# Patient Record
Sex: FEMALE | Race: WHITE | NOT HISPANIC OR LATINO | Employment: OTHER | URBAN - METROPOLITAN AREA
[De-identification: names, ages, dates, MRNs, and addresses within clinical notes are randomized per-mention and may not be internally consistent; named-entity substitution may affect disease eponyms.]

---

## 2017-01-03 ENCOUNTER — ONCOLOGY VISIT (OUTPATIENT)
Dept: ONCOLOGY | Facility: CLINIC | Age: 72
End: 2017-01-03
Payer: COMMERCIAL

## 2017-01-03 DIAGNOSIS — C78.6 PERITONEAL CARCINOMATOSIS (H): Primary | ICD-10-CM

## 2017-01-03 PROCEDURE — 96523 IRRIG DRUG DELIVERY DEVICE: CPT

## 2017-01-03 RX ORDER — HEPARIN SODIUM (PORCINE) LOCK FLUSH IV SOLN 100 UNIT/ML 100 UNIT/ML
5 SOLUTION INTRAVENOUS
Status: DISCONTINUED | OUTPATIENT
Start: 2017-01-03 | End: 2017-01-03 | Stop reason: HOSPADM

## 2017-01-03 RX ADMIN — HEPARIN SODIUM (PORCINE) LOCK FLUSH IV SOLN 100 UNIT/ML 5 ML: 100 SOLUTION at 16:20

## 2017-01-06 ENCOUNTER — OFFICE VISIT (OUTPATIENT)
Dept: URGENT CARE | Facility: URGENT CARE | Age: 72
End: 2017-01-06
Payer: COMMERCIAL

## 2017-01-06 VITALS
SYSTOLIC BLOOD PRESSURE: 135 MMHG | WEIGHT: 124.4 LBS | OXYGEN SATURATION: 94 % | BODY MASS INDEX: 20.09 KG/M2 | DIASTOLIC BLOOD PRESSURE: 75 MMHG | HEART RATE: 94 BPM | TEMPERATURE: 97.6 F

## 2017-01-06 DIAGNOSIS — J30.81 NON-SEASONAL ALLERGIC RHINITIS DUE TO ANIMAL HAIR AND DANDER: Primary | ICD-10-CM

## 2017-01-06 PROCEDURE — 99213 OFFICE O/P EST LOW 20 MIN: CPT | Performed by: INTERNAL MEDICINE

## 2017-01-06 RX ORDER — FLUTICASONE PROPIONATE 50 MCG
2 SPRAY, SUSPENSION (ML) NASAL DAILY
Qty: 1 BOTTLE | Refills: 1 | Status: SHIPPED | OUTPATIENT
Start: 2017-01-06

## 2017-01-06 NOTE — PROGRESS NOTES
SUBJECTIVE:  Iris Carrion is an 71 year old female who presents for allergies.  Generally uses zyrtec and was doing well with it but not helping now.  Recently got a kitten and this past week has runny nose, watery eyes, and cough. Eyes itchy as well.  Tried using claritin and benadryl but didn't help.  No fevers, chills or sweats.  No n/v/d.  No recent travel.  No known exposures.         has a past medical history of COPD (chronic obstructive pulmonary disease) (H); GERD (gastroesophageal reflux disease); Osteoporosis; Cervical high risk HPV (human papillomavirus) test positive (10/31/12); colposcopy with cervical biopsy (11/2012); Hypertension (2013); Uncomplicated asthma (1980); Peritoneal carcinomatosis (H) (8/24/2015); and Pneumonia.  ALLERGIES:  Levaquin    Current Outpatient Prescriptions   Medication     ipratropium - albuterol 0.5 mg/2.5 mg/3 mL (DUONEB) 0.5-2.5 (3) MG/3ML neb solution     potassium chloride SA (POTASSIUM CHLORIDE) 20 MEQ tablet     omeprazole (PRILOSEC) 40 MG capsule     doxycycline (VIBRAMYCIN) 100 MG capsule     tiotropium (SPIRIVA HANDIHALER) 18 MCG inhalation capsule     budesonide-formoterol (SYMBICORT) 160-4.5 MCG/ACT inhaler     albuterol (PROAIR HFA, PROVENTIL HFA, VENTOLIN HFA) 108 (90 BASE) MCG/ACT inhaler     chlorthalidone (HYGROTON) 25 MG tablet     LORazepam (ATIVAN) 0.5 MG tablet     prochlorperazine (COMPAZINE) 5 MG tablet     ibuprofen (ADVIL,MOTRIN) 600 MG tablet     cetirizine (ZYRTEC) 10 MG tablet     Cyclobenzaprine HCl (FLEXERIL PO)     MAGNESIUM OXIDE PO     calcium polycarbophil (FIBERCON) 625 MG tablet     ondansetron (ZOFRAN) 4 MG tablet     Zinc Sulfate (ZINC 15 PO)     Cholecalciferol (VITAMIN D-3) 5000 UNITS TABS     Calcium Carbonate (CALCIUM 500 PO)     BIOTIN PO     No current facility-administered medications for this visit.         ROS:  ROS is done and is negative for general/constitutional, eye, ENT, Respiratory, cardiovascular, GI, , Skin,  musculoskeletal except as noted elsewhere.      OBJECTIVE:  /75 mmHg  Pulse 94  Temp(Src) 97.6  F (36.4  C) (Oral)  Wt 124 lb 6.4 oz (56.427 kg)  SpO2 94%  GENERAL APPEARANCE: Alert, in no acute distress  EYES: normal  EARS: External ears normal. Canals clear. TM's normal.  NOSE: mild mucosal edema with mild clear rhinorrhea  OROPHARYNX:mild erythema, no tonsillar hypertrophy and no exudates present  NECK:No adenopathy,masses or thyromegaly  RESP: normal and clear to auscultation  CV:regular rate and rhythm and no murmurs, clicks, or gallops  ABDOMEN: Abdomen soft, non-tender. BS normal. No masses, organomegaly  SKIN: no ulcers, lesions or rash  MUSCULOSKELETAL:Musculoskeletal normal      RECENT LAB RESULTS  .    ASSESSMENT/PLAN:    ASSESSMENT / PLAN:  (J30.81) Non-seasonal allergic rhinitis due to animal hair and dander  (primary encounter diagnosis)  Comment: pt's sxs seem most c/w allergies, likely worsened by introduction of a kitten into the home.   Plan: fluticasone (FLONASE) 50 MCG/ACT spray        Reviewed medication instructions and side effects. Follow up if experiences side effects. Pt to use the albuterol inhaler she has at home for resp sxs.  Continue to take either zyrtec, claritin, or allergra daily.  If sxs not improve over next 2-3 weeks with the addition of flonase, f/u with PCP and consider trial of singulair. Discussed with her that the best thing would be to remove the cat from the home, but she is not willing to do that at this time.        See [facility] for orders, medications, letters, patient instructions    Rachel Whitmore M.D.

## 2017-01-06 NOTE — NURSING NOTE
"Chief Complaint   Patient presents with     Cough     x 1 week cough and congestion       Initial /75 mmHg  Pulse 94  Temp(Src) 97.6  F (36.4  C) (Oral)  Wt 124 lb 6.4 oz (56.427 kg)  SpO2 94% Estimated body mass index is 20.09 kg/(m^2) as calculated from the following:    Height as of 11/7/16: 5' 5.98\" (1.676 m).    Weight as of this encounter: 124 lb 6.4 oz (56.427 kg).  BP completed using cuff size: reggie GREGG CMA ([facility])  5:47 PM 1/6/2017      "

## 2017-01-13 ENCOUNTER — OFFICE VISIT (OUTPATIENT)
Dept: FAMILY MEDICINE | Facility: CLINIC | Age: 72
End: 2017-01-13
Payer: COMMERCIAL

## 2017-01-13 VITALS
BODY MASS INDEX: 19.86 KG/M2 | HEART RATE: 84 BPM | TEMPERATURE: 97 F | WEIGHT: 123 LBS | SYSTOLIC BLOOD PRESSURE: 127 MMHG | OXYGEN SATURATION: 99 % | DIASTOLIC BLOOD PRESSURE: 77 MMHG

## 2017-01-13 DIAGNOSIS — J44.1 COPD EXACERBATION (H): Primary | ICD-10-CM

## 2017-01-13 DIAGNOSIS — I10 ESSENTIAL HYPERTENSION WITH GOAL BLOOD PRESSURE LESS THAN 140/90: ICD-10-CM

## 2017-01-13 PROCEDURE — 99214 OFFICE O/P EST MOD 30 MIN: CPT | Performed by: PHYSICIAN ASSISTANT

## 2017-01-13 RX ORDER — AZITHROMYCIN 250 MG/1
TABLET, FILM COATED ORAL
Qty: 6 TABLET | Refills: 0 | Status: SHIPPED | OUTPATIENT
Start: 2017-01-13 | End: 2017-05-10

## 2017-01-13 RX ORDER — LORATADINE 10 MG/1
10 TABLET ORAL DAILY
COMMUNITY

## 2017-01-13 RX ORDER — METHYLPREDNISOLONE 4 MG
TABLET, DOSE PACK ORAL
Qty: 21 TABLET | Refills: 0 | Status: SHIPPED | OUTPATIENT
Start: 2017-01-13 | End: 2017-05-19

## 2017-01-13 ASSESSMENT — ANXIETY QUESTIONNAIRES
GAD7 TOTAL SCORE: 1
2. NOT BEING ABLE TO STOP OR CONTROL WORRYING: NOT AT ALL
7. FEELING AFRAID AS IF SOMETHING AWFUL MIGHT HAPPEN: NOT AT ALL
IF YOU CHECKED OFF ANY PROBLEMS ON THIS QUESTIONNAIRE, HOW DIFFICULT HAVE THESE PROBLEMS MADE IT FOR YOU TO DO YOUR WORK, TAKE CARE OF THINGS AT HOME, OR GET ALONG WITH OTHER PEOPLE: NOT DIFFICULT AT ALL
1. FEELING NERVOUS, ANXIOUS, OR ON EDGE: NOT AT ALL
6. BECOMING EASILY ANNOYED OR IRRITABLE: NOT AT ALL
3. WORRYING TOO MUCH ABOUT DIFFERENT THINGS: NOT AT ALL
5. BEING SO RESTLESS THAT IT IS HARD TO SIT STILL: NOT AT ALL

## 2017-01-13 ASSESSMENT — PATIENT HEALTH QUESTIONNAIRE - PHQ9: 5. POOR APPETITE OR OVEREATING: SEVERAL DAYS

## 2017-01-13 NOTE — NURSING NOTE
"Chief Complaint   Patient presents with     Cough       Mask not indicated for this appointment.     Initial /77 mmHg  Pulse 84  Temp(Src) 97  F (36.1  C) (Oral)  Wt 123 lb (55.792 kg)  SpO2 99% Estimated body mass index is 19.86 kg/(m^2) as calculated from the following:    Height as of 11/7/16: 5' 5.98\" (1.676 m).    Weight as of this encounter: 123 lb (55.792 kg)..  BP completed using cuff size: regular    Kym Cooper MA    "

## 2017-01-13 NOTE — PROGRESS NOTES
SUBJECTIVE:                                                    Iris Carrion is a 71 year old female who presents to clinic today for the following health issues:      ENT Symptoms             Symptoms: cc Present Absent Comment   Fever/Chills   x    Fatigue  x     Muscle Aches   x    Eye Irritation  x     Sneezing  x     Nasal Eleazar/Drg  x  Clear drainage    Sinus Pressure/Pain  x  Maxillary sinus pressure - causing headaches   Loss of smell   x    Dental pain   x    Sore Throat   x    Swollen Glands   x    Ear Pain/Fullness   x    Cough  x  Dry cough    Wheeze  x  Intermittent wheezing, worse than normal   Chest Pain   x    Shortness of breath  x  With coughing fits and at night, needs to use nebulizer 3 times daily to help control.   Rash   x    Other   x      Symptom duration:  2 weeks - shortness of breath worse this week   Symptom severity:  Mild   Treatments tried:  Nebulizer at home.    Contacts:  Daughter.             Problem list and histories reviewed & adjusted, as indicated.  Additional history: as documented    Patient Active Problem List   Diagnosis     GERD (gastroesophageal reflux disease)     Osteoporosis     Advanced directives, counseling/discussion     CARDIOVASCULAR SCREENING; LDL GOAL LESS THAN 160     Cervical high risk HPV (human papillomavirus) test positive     Hearing loss     Acute pancreatitis     Hypokalemia     Peritoneal carcinomatosis (H)     Hypomagnesemia     Chemotherapy-induced neutropenia (H)     Chronic bronchitis, unspecified chronic bronchitis type (H)     S/P LUCILA-BSO     Malignant neoplasm of ovary, left (H)     Family history of malignant neoplasm of ovary     Essential hypertension with goal blood pressure less than 140/90     Pulmonary nodules     Past Surgical History   Procedure Laterality Date     Colonoscopy  12/4/2012     Procedure: COLONOSCOPY;  COLONOSCOPY SCREEN;  Surgeon: Mushtaq Lara MD;  Location: [facility] OR     Cholecystectomy  6/2014     Gyn surgery        Hysterectomy total abd, jey salpingo-oophorectomy, node dissection, tumor debulking, combined Bilateral 11/25/2015     Procedure: COMBINED HYSTERECTOMY TOTAL ABDOMINAL, SALPINGO-OOPHORECTOMY, NODE DISSECTION, TUMOR DEBULKING;  Surgeon: Emma Cabrera MD;  Location:  OR     Herniorrhaphy hiatal N/A 11/25/2015     Procedure: HERNIORRHAPHY HIATAL;  Surgeon: Chip Carty MD;  Location:  OR       Social History   Substance Use Topics     Smoking status: Former Smoker -- 1.00 packs/day for 30 years     Types: Cigarettes     Start date: 08/11/1965     Quit date: 10/10/2003     Smokeless tobacco: Never Used     Alcohol Use: No     Family History   Problem Relation Age of Onset     CANCER Brother      testicular     DIABETES Sister      DIABETES Sister      Ovarian Cancer Mother 60     Depression/Anxiety Daughter      Depression Daughter      Asthma Father      OSTEOPOROSIS Sister      Other Cancer Brother          Current Outpatient Prescriptions   Medication Sig Dispense Refill     loratadine (CLARITIN) 10 MG tablet Take 10 mg by mouth daily       fluticasone (FLONASE) 50 MCG/ACT spray Spray 2 sprays into both nostrils daily 1 Bottle 1     ipratropium - albuterol 0.5 mg/2.5 mg/3 mL (DUONEB) 0.5-2.5 (3) MG/3ML neb solution Take 1 vial (3 mLs) by nebulization every 4 hours as needed for shortness of breath / dyspnea or wheezing 30 vial 5     potassium chloride SA (POTASSIUM CHLORIDE) 20 MEQ tablet Take 1 tablet (20 mEq) by mouth 2 times daily 60 tablet 0     omeprazole (PRILOSEC) 40 MG capsule Take 1 capsule (40 mg) by mouth daily Take 30-60 minutes before a meal. 90 capsule 2     tiotropium (SPIRIVA HANDIHALER) 18 MCG inhalation capsule Inhale 1 capsule (18 mcg) into the lungs daily Inhale contents of one capsule. . 90 capsule 1     budesonide-formoterol (SYMBICORT) 160-4.5 MCG/ACT inhaler Inhale 2 puffs into the lungs 2 times daily 1 Inhaler 5     albuterol (PROAIR HFA, PROVENTIL HFA, VENTOLIN  HFA) 108 (90 BASE) MCG/ACT inhaler Inhale 2 puffs into the lungs every 6 hours as needed for shortness of breath / dyspnea or wheezing 1 Inhaler 3     chlorthalidone (HYGROTON) 25 MG tablet Take 1 tablet (25 mg) by mouth daily       LORazepam (ATIVAN) 0.5 MG tablet Take 1 tablet (0.5 mg) by mouth every 6 hours as needed for anxiety (sleep nause or vomiting) 30 tablet 2     prochlorperazine (COMPAZINE) 5 MG tablet Take 1 tablet (5 mg) by mouth every 6 hours as needed for nausea or vomiting 30 tablet 2     ibuprofen (ADVIL,MOTRIN) 600 MG tablet Take 1 tablet (600 mg) by mouth every 6 hours as needed for moderate pain 40 tablet 0     MAGNESIUM OXIDE PO Take 200 mg by mouth 2 times daily       ondansetron (ZOFRAN) 4 MG tablet Take 1 tablet (4 mg) by mouth every 6 hours as needed for nausea 30 tablet 5     Zinc Sulfate (ZINC 15 PO) Take by mouth daily       Cholecalciferol (VITAMIN D-3) 5000 UNITS TABS Take 1 tablet by mouth daily.       Calcium Carbonate (CALCIUM 500 PO) Take 1 tablet by mouth daily.       BIOTIN PO Take 1 tablet by mouth daily.       Allergies   Allergen Reactions     Levaquin Rash     BP Readings from Last 3 Encounters:   01/13/17 127/77   01/06/17 135/75   11/07/16 124/71    Wt Readings from Last 3 Encounters:   01/13/17 123 lb (55.792 kg)   01/06/17 124 lb 6.4 oz (56.427 kg)   11/07/16 124 lb 12.8 oz (56.609 kg)                  Problem list, Medication list, Allergies, and Medical/Social/Surgical histories reviewed in [facility] and updated as appropriate.    ROS:  Constitutional, HEENT, cardiovascular, pulmonary, and integumentary systems are negative, except as otherwise noted.    OBJECTIVE:                                                    /77 mmHg  Pulse 84  Temp(Src) 97  F (36.1  C) (Oral)  Wt 123 lb (55.792 kg)  SpO2 99%  Body mass index is 19.86 kg/(m^2).  GENERAL: healthy, alert and no distress  EYES: Eyes grossly normal to inspection  HENT: normal cephalic/atraumatic, ear canals and  TM's normal, nose and mouth without ulcers or lesions, rhinorrhea clear, oropharynx clear, oral mucous membranes moist and sinuses: not tender  NECK: no adenopathy, no asymmetry, masses, or scars and thyroid normal to palpation  RESP: No significant wheezing. No rhonchi or rales. Slightly decreased breath sounds throughout. No use of accessory muscles  CV: regular rate and rhythm, normal S1 S2, no S3 or S4, no murmur, click or rub, no peripheral edema and peripheral pulses strong  MS: no gross musculoskeletal defects noted, no edema  SKIN: no suspicious lesions or rashes         ASSESSMENT/PLAN:                                                    1. COPD exacerbation (H)  Will start steroid burst. Hold on antibiotic - start if symptoms not improving or develop a fever over the next 2-3 days.   - azithromycin (ZITHROMAX) 250 MG tablet; Two tablets first day, then one tablet daily for four days.  Dispense: 6 tablet; Refill: 0  - methylPREDNISolone (MEDROL DOSEPAK) 4 MG tablet; Follow package instructions  Dispense: 21 tablet; Refill: 0    2. Essential hypertension with goal blood pressure less than 140/90  Controlled. Continue medication as prescribed      Patient Instructions   Start the Medrol dose pack as prescribed.  I suspect this is only a COPD exacerbation without an infection cause. If your symptoms are not improving over the next 2-3 days or you develop a fever, start the antibiotic as prescribed. If symptoms improve, do not take the antibiotic.     Rest and increase fluids.    Sleep with head of bed elevated at night. Have a humidifier running at night    Alternate motrin and tylenol as needed for discomfort.    Perform nasal saline rinses to help decrease nasal congestion or breath in steam multiple times daily.     Follow up with primary care provider in 1-2 weeks if no improvement of symptoms. Sooner if any worsening of symptoms.             Analisa Aguirre PA-C  [facility]

## 2017-01-13 NOTE — MR AVS SNAPSHOT
After Visit Summary   1/13/2017    Iris Carrion    MRN: [id number]           Patient Information     Date Of Birth          1945        Visit Information        Provider Department      1/13/2017 11:20 AM Analisa Aguirre PA-C [facility]        Today's Diagnoses     COPD exacerbation (H)    -  1     Essential hypertension with goal blood pressure less than 140/90           Care Instructions    Start the Medrol dose pack as prescribed.  I suspect this is only a COPD exacerbation without an infection cause. If your symptoms are not improving over the next 2-3 days or you develop a fever, start the antibiotic as prescribed. If symptoms improve, do not take the antibiotic.     Rest and increase fluids.    Sleep with head of bed elevated at night. Have a humidifier running at night    Alternate motrin and tylenol as needed for discomfort.    Perform nasal saline rinses to help decrease nasal congestion or breath in steam multiple times daily.     Follow up with primary care provider in 1-2 weeks if no improvement of symptoms. Sooner if any worsening of symptoms.             Follow-ups after your visit        Your next 10 appointments already scheduled     Feb 13, 2017  3:00 PM   [facility]   [facility]    [address] [place] [id number] [phone]            Feb 13, 2017  3:30 PM   Return Visit with CHANDLER Linares CNP   [facility]    [address] [place] [id number] [phone]              Who to contact     If you have questions or need follow up information about today's clinic visit or your schedule please contact [facility] directly at [phone].  Normal or non-critical lab and imaging results will be communicated to you by MyChart, letter or phone within 4 business days after the  clinic has received the results. If you do not hear from us within 7 days, please contact the clinic through [employer] or phone. If you have a critical or abnormal lab result, we will notify you by phone as soon as possible.  Submit refill requests through [employer] or call your pharmacy and they will forward the refill request to us. Please allow 3 business days for your refill to be completed.          Additional Information About Your Visit        [employer] Information     [employer] gives you secure access to your electronic health record. If you see a primary care provider, you can also send messages to your care team and make appointments. If you have questions, please call your primary care clinic.  If you do not have a primary care provider, please call [phone] and they will assist you.        Care EveryWhere ID     This is your Care EveryWhere ID. This could be used by other organizations to access your [facility] medical records  [id number]        Your Vitals Were     Pulse Temperature Pulse Oximetry             84 97  F (36.1  C) (Oral) 99%          Blood Pressure from Last 3 Encounters:   01/13/17 127/77   01/06/17 135/75   11/07/16 124/71    Weight from Last 3 Encounters:   01/13/17 123 lb (55.792 kg)   01/06/17 124 lb 6.4 oz (56.427 kg)   11/07/16 124 lb 12.8 oz (56.609 kg)              Today, you had the following     No orders found for display         Today's Medication Changes          These changes are accurate as of: 1/13/17 11:36 AM.  If you have any questions, ask your nurse or doctor.               Start taking these medicines.        Dose/Directions    azithromycin 250 MG tablet   Commonly known as:  ZITHROMAX   Used for:  COPD exacerbation (H)   Started by:  Analisa Aguirre PA-C        Two tablets first day, then one tablet daily for four days.   Quantity:  6 tablet   Refills:  0       methylPREDNISolone 4 MG tablet   Commonly known as:  MEDROL DOSEPAK   Used for:  COPD exacerbation (H)    Started by:  Analisa Aguirre PA-C        Follow package instructions   Quantity:  21 tablet   Refills:  0            Where to get your medicines      These medications were sent to [id number]  [place], [place] - [address]  [address] [place] [address]     Phone:  [phone]    - methylPREDNISolone 4 MG tablet      Some of these will need a paper prescription and others can be bought over the counter.  Ask your nurse if you have questions.     Bring a paper prescription for each of these medications    - azithromycin 250 MG tablet             Primary Care Provider Office Phone # Fax #    Pillo Koehler [phone]       [facility] [address] [facility] [address]        Thank you!     Thank you for choosing [facility]  for your care. Our goal is always to provide you with excellent care. Hearing back from our patients is one way we can continue to improve our services. Please take a few minutes to complete the written survey that you may receive in the mail after your visit with us. Thank you!             Your Updated Medication List - Protect others around you: Learn how to safely use, store and throw away your medicines at www.disposemymeds.org.          This list is accurate as of: 1/13/17 11:36 AM.  Always use your most recent med list.                   Brand Name Dispense Instructions for use    albuterol 108 (90 BASE) MCG/ACT Inhaler    PROAIR HFA/PROVENTIL HFA/VENTOLIN HFA    1 Inhaler    Inhale 2 puffs into the lungs every 6 hours as needed for shortness of breath / dyspnea or wheezing       azithromycin 250 MG tablet    ZITHROMAX    6 tablet    Two tablets first day, then one tablet daily for four days.       BIOTIN PO      Take 1 tablet by mouth daily.       budesonide-formoterol 160-4.5 MCG/ACT Inhaler    SYMBICORT    1 Inhaler    Inhale 2 puffs into the lungs 2 times daily       CALCIUM 500 PO      Take 1 tablet by  mouth daily.       chlorthalidone 25 MG tablet    HYGROTON     Take 1 tablet (25 mg) by mouth daily       fluticasone 50 MCG/ACT spray    FLONASE    1 Bottle    Spray 2 sprays into both nostrils daily       ibuprofen 600 MG tablet    ADVIL/MOTRIN    40 tablet    Take 1 tablet (600 mg) by mouth every 6 hours as needed for moderate pain       ipratropium - albuterol 0.5 mg/2.5 mg/3 mL 0.5-2.5 (3) MG/3ML neb solution    DUONEB    30 vial    Take 1 vial (3 mLs) by nebulization every 4 hours as needed for shortness of breath / dyspnea or wheezing       loratadine 10 MG tablet    CLARITIN     Take 10 mg by mouth daily       LORazepam 0.5 MG tablet    ATIVAN    30 tablet    Take 1 tablet (0.5 mg) by mouth every 6 hours as needed for anxiety (sleep nause or vomiting)       MAGNESIUM OXIDE PO      Take 200 mg by mouth 2 times daily       methylPREDNISolone 4 MG tablet    MEDROL DOSEPAK    21 tablet    Follow package instructions       omeprazole 40 MG capsule    priLOSEC    90 capsule    Take 1 capsule (40 mg) by mouth daily Take 30-60 minutes before a meal.       ondansetron 4 MG tablet    ZOFRAN    30 tablet    Take 1 tablet (4 mg) by mouth every 6 hours as needed for nausea       potassium chloride SA 20 MEQ CR tablet    potassium chloride    60 tablet    Take 1 tablet (20 mEq) by mouth 2 times daily       prochlorperazine 5 MG tablet    COMPAZINE    30 tablet    Take 1 tablet (5 mg) by mouth every 6 hours as needed for nausea or vomiting       tiotropium 18 MCG capsule    SPIRIVA HANDIHALER    90 capsule    Inhale 1 capsule (18 mcg) into the lungs daily Inhale contents of one capsule. .       Vitamin D-3 5000 UNITS Tabs      Take 1 tablet by mouth daily.       ZINC 15 PO      Take by mouth daily

## 2017-01-13 NOTE — PATIENT INSTRUCTIONS
Start the Medrol dose pack as prescribed.  I suspect this is only a COPD exacerbation without an infection cause. If your symptoms are not improving over the next 2-3 days or you develop a fever, start the antibiotic as prescribed. If symptoms improve, do not take the antibiotic.     Rest and increase fluids.    Sleep with head of bed elevated at night. Have a humidifier running at night    Alternate motrin and tylenol as needed for discomfort.    Perform nasal saline rinses to help decrease nasal congestion or breath in steam multiple times daily.     Follow up with primary care provider in 1-2 weeks if no improvement of symptoms. Sooner if any worsening of symptoms.

## 2017-01-14 ASSESSMENT — ANXIETY QUESTIONNAIRES: GAD7 TOTAL SCORE: 1

## 2017-01-14 ASSESSMENT — PATIENT HEALTH QUESTIONNAIRE - PHQ9: SUM OF ALL RESPONSES TO PHQ QUESTIONS 1-9: 2

## 2017-02-01 ENCOUNTER — MYC REFILL (OUTPATIENT)
Dept: FAMILY MEDICINE | Facility: CLINIC | Age: 72
End: 2017-02-01

## 2017-02-01 DIAGNOSIS — I10 ESSENTIAL HYPERTENSION WITH GOAL BLOOD PRESSURE LESS THAN 140/90: ICD-10-CM

## 2017-02-01 RX ORDER — CHLORTHALIDONE 25 MG/1
25 TABLET ORAL DAILY
Qty: 90 TABLET | Refills: 1 | Status: SHIPPED | OUTPATIENT
Start: 2017-02-01 | End: 2017-07-31

## 2017-02-01 NOTE — TELEPHONE ENCOUNTER
Message from [employer]:  Original authorizing provider: MD Billie CAMARGOcy PATI Carrion would like a refill of the following medications:  chlorthalidone (HYGROTON) 25 MG tablet [NIKOLE BRAVO MD]    Preferred pharmacy: [facility] [facility], [place] - [address]    Comment:

## 2017-02-13 ENCOUNTER — ONCOLOGY VISIT (OUTPATIENT)
Dept: ONCOLOGY | Facility: CLINIC | Age: 72
End: 2017-02-13
Payer: COMMERCIAL

## 2017-02-13 VITALS
HEIGHT: 66 IN | RESPIRATION RATE: 20 BRPM | TEMPERATURE: 97.8 F | WEIGHT: 123 LBS | BODY MASS INDEX: 19.77 KG/M2 | DIASTOLIC BLOOD PRESSURE: 76 MMHG | HEART RATE: 70 BPM | OXYGEN SATURATION: 95 % | SYSTOLIC BLOOD PRESSURE: 134 MMHG

## 2017-02-13 DIAGNOSIS — C56.2 MALIGNANT NEOPLASM OF OVARY, LEFT (H): ICD-10-CM

## 2017-02-13 DIAGNOSIS — C56.2 MALIGNANT NEOPLASM OF OVARY, LEFT (H): Primary | ICD-10-CM

## 2017-02-13 LAB — CANCER AG125 SERPL-ACNC: 12 U/ML (ref 0–30)

## 2017-02-13 PROCEDURE — 99213 OFFICE O/P EST LOW 20 MIN: CPT | Performed by: NURSE PRACTITIONER

## 2017-02-13 PROCEDURE — 99207 ZZC NO CHARGE NURSE ONLY: CPT

## 2017-02-13 PROCEDURE — 86304 IMMUNOASSAY TUMOR CA 125: CPT | Performed by: NURSE PRACTITIONER

## 2017-02-13 RX ORDER — HEPARIN SODIUM (PORCINE) LOCK FLUSH IV SOLN 100 UNIT/ML 100 UNIT/ML
5 SOLUTION INTRAVENOUS
Status: DISCONTINUED | OUTPATIENT
Start: 2017-02-13 | End: 2017-02-13 | Stop reason: HOSPADM

## 2017-02-13 RX ADMIN — HEPARIN SODIUM (PORCINE) LOCK FLUSH IV SOLN 100 UNIT/ML 5 ML: 100 SOLUTION at 15:16

## 2017-02-13 ASSESSMENT — PAIN SCALES - GENERAL: PAINLEVEL: NO PAIN (0)

## 2017-02-13 NOTE — NURSING NOTE
"Iris Carrion is a 71 year old female who presents for:  Chief Complaint   Patient presents with     Oncology Clinic Visit     3 MO F/U OVARIAN        Initial Vitals:  /76 (BP Location: Right arm, Patient Position: Chair, Cuff Size: Adult Regular)  Pulse 70  Temp 97.8  F (36.6  C) (Oral)  Resp 20  Ht 1.676 m (5' 5.98\")  Wt 55.8 kg (123 lb)  SpO2 95%  BMI 19.86 kg/m2 Estimated body mass index is 19.86 kg/(m^2) as calculated from the following:    Height as of this encounter: 1.676 m (5' 5.98\").    Weight as of this encounter: 55.8 kg (123 lb).. Body surface area is 1.61 meters squared. BP completed using cuff size: regular  No Pain (0) No LMP recorded. Patient is postmenopausal. Allergies and medications reviewed.     Medications: Medication refills not needed today.  Pharmacy name entered into [facility]:    [facility] [facility] [place] [facility] [address] [facility] [place] [place] [facility] [address] [facility] - [place], [place] - [address]    Comments:     8 minutes for nursing intake (face to face time)   LEÓN FERRARO LPN        "

## 2017-02-13 NOTE — MR AVS SNAPSHOT
After Visit Summary   2/13/2017    Iris Carrion    MRN: [id number]           Patient Information     Date Of Birth          1945        Visit Information        Provider Department      2/13/2017 3:30 PM Yenny Pak APRN CNP [facility]        Today's Diagnoses     Malignant neoplasm of ovary, left (H)    -  1       Follow-ups after your visit        Follow-up notes from your care team     Return in about 3 months (around 5/13/2017).      Your next 10 appointments already scheduled     Mar 13, 2017  4:00 PM CDT   [facility]   [facility]    [id number] [place] [address]   [phone]            Apr 11, 2017  4:00 PM CDT   Return Visit with [facility]   [facility]    [address] [place] [address]   [phone]            May 08, 2017  4:00 PM CDT   Return Visit with [facility]   [facility]    [address] [place] [address]   [phone]            May 10, 2017  1:00 PM CDT   Return Visit with CHANDLER Linares CNP   [facility] ([facility])    [id number] [place] [address]   [phone]              Who to contact     If you have questions or need follow up information about today's clinic visit or your schedule please contact [facility] directly at [phone].  Normal or non-critical lab and imaging results will be communicated to you by MyChart, letter or phone within 4 business days after the clinic has received the results. If you do not hear from us within 7 days, please contact the clinic through MyChart or phone. If you have a critical or abnormal lab result, we will notify you by phone as soon as possible.  Submit refill  "requests through [employer] or call your pharmacy and they will forward the refill request to us. Please allow 3 business days for your refill to be completed.          Additional Information About Your Visit        YouLicenseharMamaBear App Information     [employer] gives you secure access to your electronic health record. If you see a primary care provider, you can also send messages to your care team and make appointments. If you have questions, please call your primary care clinic.  If you do not have a primary care provider, please call [phone] and they will assist you.      [employer] is an electronic gateway that provides easy, online access to your medical records. With [employer], you can request a clinic appointment, read your test results, renew a prescription or communicate with your care team.     To access your existing account, please contact your [facility] or call [phone] for assistance.        Care EveryWhere ID     This is your Care EveryWhere ID. This could be used by other organizations to access your [place] medical records  [id number]        Your Vitals Were     Pulse Temperature Respirations Height Pulse Oximetry BMI (Body Mass Index)    70 97.8  F (36.6  C) (Oral) 20 1.676 m (5' 5.98\") 95% 19.86 kg/m2       Blood Pressure from Last 3 Encounters:   02/13/17 134/76   01/13/17 127/77   01/06/17 135/75    Weight from Last 3 Encounters:   02/13/17 55.8 kg (123 lb)   01/13/17 55.8 kg (123 lb)   01/06/17 56.4 kg (124 lb 6.4 oz)              Today, you had the following     No orders found for display       Primary Care Provider Office Phone # Fax #    Pillo Koehler [phone]       [facility] [address] [facility] [address]        Thank you!     Thank you for choosing [facility]  for your care. Our goal is always to provide you with excellent care. Hearing back from our patients is one way we can continue to improve our " services. Please take a few minutes to complete the written survey that you may receive in the mail after your visit with us. Thank you!             Your Updated Medication List - Protect others around you: Learn how to safely use, store and throw away your medicines at www.disposemymeds.org.          This list is accurate as of: 2/13/17  4:23 PM.  Always use your most recent med list.                   Brand Name Dispense Instructions for use    albuterol 108 (90 BASE) MCG/ACT Inhaler    PROAIR HFA/PROVENTIL HFA/VENTOLIN HFA    1 Inhaler    Inhale 2 puffs into the lungs every 6 hours as needed for shortness of breath / dyspnea or wheezing       azithromycin 250 MG tablet    ZITHROMAX    6 tablet    Two tablets first day, then one tablet daily for four days.       BIOTIN PO      Take 1 tablet by mouth daily.       budesonide-formoterol 160-4.5 MCG/ACT Inhaler    SYMBICORT    1 Inhaler    Inhale 2 puffs into the lungs 2 times daily       CALCIUM 500 PO      Take 1 tablet by mouth daily.       chlorthalidone 25 MG tablet    HYGROTON    90 tablet    Take 1 tablet (25 mg) by mouth daily       fluticasone 50 MCG/ACT spray    FLONASE    1 Bottle    Spray 2 sprays into both nostrils daily       ibuprofen 600 MG tablet    ADVIL/MOTRIN    40 tablet    Take 1 tablet (600 mg) by mouth every 6 hours as needed for moderate pain       ipratropium - albuterol 0.5 mg/2.5 mg/3 mL 0.5-2.5 (3) MG/3ML neb solution    DUONEB    30 vial    Take 1 vial (3 mLs) by nebulization every 4 hours as needed for shortness of breath / dyspnea or wheezing       loratadine 10 MG tablet    CLARITIN     Take 10 mg by mouth daily       LORazepam 0.5 MG tablet    ATIVAN    30 tablet    Take 1 tablet (0.5 mg) by mouth every 6 hours as needed for anxiety (sleep nause or vomiting)       MAGNESIUM OXIDE PO      Take 200 mg by mouth 2 times daily       methylPREDNISolone 4 MG tablet    MEDROL DOSEPAK    21 tablet    Follow package instructions        omeprazole 40 MG capsule    priLOSEC    90 capsule    Take 1 capsule (40 mg) by mouth daily Take 30-60 minutes before a meal.       ondansetron 4 MG tablet    ZOFRAN    30 tablet    Take 1 tablet (4 mg) by mouth every 6 hours as needed for nausea       potassium chloride SA 20 MEQ CR tablet    potassium chloride    60 tablet    Take 1 tablet (20 mEq) by mouth 2 times daily       prochlorperazine 5 MG tablet    COMPAZINE    30 tablet    Take 1 tablet (5 mg) by mouth every 6 hours as needed for nausea or vomiting       tiotropium 18 MCG capsule    SPIRIVA HANDIHALER    90 capsule    Inhale 1 capsule (18 mcg) into the lungs daily Inhale contents of one capsule. .       Vitamin D-3 5000 UNITS Tabs      Take 1 tablet by mouth daily.       ZINC 15 PO      Take by mouth daily

## 2017-02-13 NOTE — MR AVS SNAPSHOT
After Visit Summary   2/13/2017    Iris Carrion    MRN: [id number]           Patient Information     Date Of Birth          1945        Visit Information        Provider Department      2/13/2017 3:00 PM [facility]        Today's Diagnoses     Malignant neoplasm of ovary, left (H)           Follow-ups after your visit        Your next 10 appointments already scheduled     Mar 13, 2017  4:00 PM CDT   Return Visit with [facility]   [facility] ([facility])    [address] [place] [address]   [phone]            Apr 11, 2017  4:00 PM CDT   Return Visit with [facility]   [facility] ([facility])    [address] [place] [address]   [phone]            May 08, 2017  4:00 PM CDT   Return Visit with [facility]   [facility] ([facility])    [address] [place] [address]   [phone]            May 10, 2017  1:00 PM CDT   Return Visit with CHANDLER Linares CNP   [facility] ([facility])    [address] [place] [address] [phone]              Who to contact     If you have questions or need follow up information about today's clinic visit or your schedule please contact [facility] directly at [phone].  Normal or non-critical lab and imaging results will be communicated to you by MyChart, letter or phone within 4 business days after the clinic has received the results. If you do not hear from us within 7 days, please contact the clinic through MyChart or phone. If you have a critical or abnormal lab result, we will notify you by phone as soon as possible.  Submit refill requests through [employer] or call your pharmacy and they will forward the refill request to us. Please  allow 3 business days for your refill to be completed.          Additional Information About Your Visit        [employer] Ehart Information     [employer] gives you secure access to your electronic health record. If you see a primary care provider, you can also send messages to your care team and make appointments. If you have questions, please call your primary care clinic.  If you do not have a primary care provider, please call [phone] and they will assist you.      [employer] is an electronic gateway that provides easy, online access to your medical records. With [employer], you can request a clinic appointment, read your test results, renew a prescription or communicate with your care team.     To access your existing account, please contact your [facility] or call [phone] for assistance.        Care EveryWhere ID     This is your Care EveryWhere ID. This could be used by other organizations to access your [place] medical records  [id number]         Blood Pressure from Last 3 Encounters:   02/13/17 134/76   01/13/17 127/77   01/06/17 135/75    Weight from Last 3 Encounters:   02/13/17 55.8 kg (123 lb)   01/13/17 55.8 kg (123 lb)   01/06/17 56.4 kg (124 lb 6.4 oz)              We Performed the Following             Primary Care Provider Office Phone # Fax #    Pillo Koehler [phone]       [facility] [address] [facility] [address]        Thank you!     Thank you for choosing [facility]  for your care. Our goal is always to provide you with excellent care. Hearing back from our patients is one way we can continue to improve our services. Please take a few minutes to complete the written survey that you may receive in the mail after your visit with us. Thank you!             Your Updated Medication List - Protect others around you: Learn how to safely use, store and throw away your medicines at www.disposemymeds.org.           This list is accurate as of: 2/13/17  4:50 PM.  Always use your most recent med list.                   Brand Name Dispense Instructions for use    albuterol 108 (90 BASE) MCG/ACT Inhaler    PROAIR HFA/PROVENTIL HFA/VENTOLIN HFA    1 Inhaler    Inhale 2 puffs into the lungs every 6 hours as needed for shortness of breath / dyspnea or wheezing       azithromycin 250 MG tablet    ZITHROMAX    6 tablet    Two tablets first day, then one tablet daily for four days.       BIOTIN PO      Take 1 tablet by mouth daily.       budesonide-formoterol 160-4.5 MCG/ACT Inhaler    SYMBICORT    1 Inhaler    Inhale 2 puffs into the lungs 2 times daily       CALCIUM 500 PO      Take 1 tablet by mouth daily.       chlorthalidone 25 MG tablet    HYGROTON    90 tablet    Take 1 tablet (25 mg) by mouth daily       fluticasone 50 MCG/ACT spray    FLONASE    1 Bottle    Spray 2 sprays into both nostrils daily       ibuprofen 600 MG tablet    ADVIL/MOTRIN    40 tablet    Take 1 tablet (600 mg) by mouth every 6 hours as needed for moderate pain       ipratropium - albuterol 0.5 mg/2.5 mg/3 mL 0.5-2.5 (3) MG/3ML neb solution    DUONEB    30 vial    Take 1 vial (3 mLs) by nebulization every 4 hours as needed for shortness of breath / dyspnea or wheezing       loratadine 10 MG tablet    CLARITIN     Take 10 mg by mouth daily       LORazepam 0.5 MG tablet    ATIVAN    30 tablet    Take 1 tablet (0.5 mg) by mouth every 6 hours as needed for anxiety (sleep nause or vomiting)       MAGNESIUM OXIDE PO      Take 200 mg by mouth 2 times daily       methylPREDNISolone 4 MG tablet    MEDROL DOSEPAK    21 tablet    Follow package instructions       omeprazole 40 MG capsule    priLOSEC    90 capsule    Take 1 capsule (40 mg) by mouth daily Take 30-60 minutes before a meal.       ondansetron 4 MG tablet    ZOFRAN    30 tablet    Take 1 tablet (4 mg) by mouth every 6 hours as needed for nausea       potassium chloride SA 20 MEQ CR tablet    potassium  chloride    60 tablet    Take 1 tablet (20 mEq) by mouth 2 times daily       prochlorperazine 5 MG tablet    COMPAZINE    30 tablet    Take 1 tablet (5 mg) by mouth every 6 hours as needed for nausea or vomiting       tiotropium 18 MCG capsule    SPIRIVA HANDIHALER    90 capsule    Inhale 1 capsule (18 mcg) into the lungs daily Inhale contents of one capsule. .       Vitamin D-3 5000 UNITS Tabs      Take 1 tablet by mouth daily.       ZINC 15 PO      Take by mouth daily

## 2017-02-13 NOTE — PROGRESS NOTES
Follow Up Notes on Referred Patient    Date: 2017       Dr. Emma Cabrera MD   [facility]  [address] [place] [address]       RE: Iris Carrion  : 1945  ANGELES: 2017    Dear Dr. 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 She completed treatment 2016. She is here today for a follow up visit.     In brief:  Ms Carrion started having her symptoms of constipation and bloating (May/2015). Her doctor recommended miralax. Symptoms continued to occurred so they followed with a CT scan which showed extensive peritoneal metastatic disease.      Mother had cancer unknown age, brother had testicular cancer. Has 2 children.      8/17/15: CT c/a/p IMPRESSION:   1. Left renal cortical cysts. No enhancing renal mass. No urinary tract calculi or hydronephrosis. Collecting systems, ureters and bladder are unremarkable.  2. Extensive peritoneal metastatic disease with moderate ascites likely related to malignant ascites. Followup as clinically warranted. An obvious etiology for this metastatic disease is not  visualized.  3. No bowel obstruction or diverticulitis. Serosal metastatic disease is present. No obvious colonic mass. Followup colonoscopy as needed for further assessment.  4. Moderate-sized hiatal hernia. No gastric obstruction.     8/22/15:  1950     8/27/15: New patient visit. she continues to have constipation and bloating. She has lost about 5 pounds. She also admits to having lood in urine as well as urinary urgenrgy. She has loose bm bm every other day.      Plan: chemotherapy with Taxol/Carbo. To be considered for these clinical trials: PARP, avatar circulating tumor study, small neogman.      8/31/15: CT guided omental nodule biopsy. Final diagnosis:  Adenocarcinoma, with features consistent with endometrioid adenocarcinoma      COMMENT:  Immunohistochemical  staining was obtained with appropriate control slides for ER, [place] 8 and WT-1. Malignant cells are positive for all three markers confirming their endometrioid cell differentiation.      Plan: treat like an ovarian cancer but will do dose dense Taxol carbo for 3 cycles then be re-evalated with scan      9/4/15-10/14/15: Cycle #1-3 dose dense Taxol/Carbo.  2008, 470, 30.      11/19/15: CT c/a/p IMPRESSION:   1. Decreased peritoneal carcinomatosis and malignant ascites.  2. Severe emphysema and scattered areas of likely postinfectious scarring. Several of these areas particularly along the right minor fissure appear more nodular malignancy cannot be excluded. Recommend 3-six-month interval followup.  3. Diverticulosis.  4. Osteopenia and numerous compression deformities, none of which appear acute.  5. Stable large hiatal hernia.     11/23/15:  19     11/25/15: Exploratory Laparotomy, Repair Umbilical Hernia, Total Abdominal Hysterectomy, Right Salpingo Oophorectomy, Left Salpingectomy, Appendectomy, Complete Omentectomy, CUSA Tumor Debulking,Cystoscopy, Left PeriAortc Lymph Node Biopsy, Insertion Peritoneal Port, Mobilization of Liver, Ablation of Liver Nodules Open Repair Hiatal Hernia Repair     Surgical pathology report:  SPECIMEN(S):  A: Hernia sac, umbilical  B: Hepatic ligament  C: Splenic flexure implant  D: Inferior vena cava implant  E: Omentum resectection  F: Nodule, liver  G: Right pelvic peritoneum  H: Cul-de-sac peritoneum  I: Uterus, cervix, right fallopian tube and ovary  J: Large bowel epicloicae  K: Appendix  L: Lymph node, left periaortic    FINAL DIAGNOSIS:  A: Umbilical hernia sac, repair:  - Adenocarcinoma  - Fibroadipose tissue, partially surfaced by mesothelium, consistent with hernia sac    B: Hepatic ligament, biopsy:  - One benign lymph node (0/1)    C: Splenic ligament implant, biopsy:  - Adenocarcinoma    D: Inferior vena cava implant, biopsy:  - Adenocarcinoma    E: Omentum,  "omentectomy:  - Adenocarcinoma    F: \"Liver nodule\", biopsy:  - Fibro-adipose tissue with adenocarcinoma    G: Right pelvic peritoneum, biopsy:  - Adenocarcinoma    H: Cul-de-sac peritoneum, biopsy:  - Adenocarcinoma    I: Uterus, cervix, right ovary and fallopian tube, hysterectomy with right salpingo-oophorectomy:  - Adenocarcinoma present in the soft tissue at the left adnexal area,cervix and right fallopian tube, most likely endometrioid adenocarcinoma  - Atrophic endometrium  - Benign endometrial polyp  - Myometrium with no significant histologic abnormality  - Atrophic cervix and nabothian cysts  - Tumor present on the external aspect of the cervix  - Right ovary with numerous corpora albicantia and simple epithelial cysts  - Right fallopian tube with a single focus of carcinoma  - See comment    J: Large bowel epiploica, biopsy:  - Adenocarcinoma    K: Appendix, appendectomy:  - Appendix with fibrous replacement    L: Lymph nodes, left periaortic, excision:  - One benign lymph node (0/1)     12/14/15:  46. post op visit. reviewed the results of her surgical pathology copy of report was given to pt. She will start IV/IP taxol/Cddp chemo tomorrow. IV fluids on Thursday and Friday at [place] and Saturday and Sunday at the [facility]. Day 8 taxol will be on dec 22nd. She will need to have fluids again on 23rd and 24th. She will also need Neupogen.      Plan: 3 cycles IV/IP chemo.      12/15/15: Cycle #1 IV/IP chemo.    1/6/16: Cycle #2 IV/IP chemo.   18. Delayed d/t neutropenia; given 1/13. Neupogen and Neulasta added.    2/1/16: Cycle #3 IV/IP chemo.  23  2/29/16:  41. CT cap  Findings:  Right internal jugular Port-A-Cath with tip at atriocaval junction. The heart is not enlarged. No significant pericardial effusion. The mediastinal great vessels are normal in caliber. No central pulmonary embolus. Thoracic aortic atherosclerotic calcifications. Calcified mediastinal and " hilar lymph nodes. No lymphadenopathy in the chest by size criteria. The central tracheobronchial tree is patent. New nodular pleural-based consolidative opacity in the left lower lobe measuring 26 x 29 mm (image 110, series 7). There are a few other new subcentimeter nodules in the left lower lobe. For example, 7 mm nodule on image 104, series 4. Severe centrilobular emphysematous changes. Likely nodular scarring in the right upper lobe (image 32, series 7) is not significantly changed. Other scattered fibrotic changes, most prominent in the right lower lobe, do not appear significantly changed. Calcified granuloma in the right lower lobe. Postsurgical changes of a hernia repair and hysterectomy/bilateral salpingo-oophorectomy. There is no abnormal soft tissue within the  resection bed to suggest locally recurrent disease. No suspicious liver lesions. Unchanged extrahepatic and central intrahepatic biliary dilatation, likely reservoir effect in the setting of cholecystectomy. Atrophic pancreas. Calcified splenic granulomata. Unchanged  hypoattenuating lesions in the kidneys, the largest of which on the left are compatible with cysts. No abnormally dilated or thickened loops of bowel. No free intraperitoneal air or free fluid. Colonic  diverticuli without evidence of active inflammation. Duodenal diverticulum. Intra-abdominal port with tip in the left pelvis. Aortoiliac atheromatous plaque without aneurysmal dilatation. No  abnormally enlarged abdominal or pelvic lymph nodes. No definite residual peritoneal/or omental nodules. Marked generalized osteopenia. Redemonstration of multiple compression  deformities throughout the thoracolumbar spine, greatest at T8 with greater than 50% loss of vertebral body height, not significantly changed. There is mild increased associated sclerosis. Associated kyphosis of the upper thoracic spine. There are 6 lumbar type vertebral bodies. No new aggressive osseous  "lesions.  Impression:  1. Continued positive response to therapy in the abdomen/pelvis with no definite residual peritoneal/omental metastasis.  2. New pleural-based consolidative opacity in the left lower lobe. There are a few other new subcentimeter nodules in the left lower lobe. This may be secondary to infection/inflammation, but metastases should be considered and short-term interval followup is recommended.     4/6/16 CT/PET IMPRESSION: Patient's history of ovarian carcinoma.  1. Left lower lobe 2 cm irregular pulmonary nodular density is minimally metabolic and argues that it is benign such as rounded atelectasis and less likely rounded pneumonia.  2. Underlying COPD with scattered scars within both lungs but no hypermetabolic structures to indicate malignancy.  3. No evidence of carcinomatosis, peritoneal or abdominal metastasis, or metastatic disease to any of the organs of the abdomen or pelvis.  4. Possible mild esophagitis at the GE junction.     4/11/16:  8. \"Had been unclear as to why  up to 41 from 23-this is concerning in light of recent completion of cddp/taxol, however the  is 8. CT PET is negative for metastatic disease. Recommend q 3 month visits x 2 years, then every 6 mos for 3 additional years.\"     7/25/16:   9.  7/27/16: CT cap Impression:    1. Interval resolution of left lower lobe spiculated pulmonary nodule. This likely represented atelectasis or consolidation.  2. Significant panlobular emphysematous changes of both lungs.  3. Spiculated right upper lobe pulmonary nodule measuring up to 8 mm, unchanged since 11/19/2015, although progressed since 2/4/2011. This may represent scarring. Recommend attention on followup.  4. New right hip fracture, possibly pathologic.  5. New sclerotic lesions of the sternum which are indeterminate. This may represent metastatic disease.     Of note, patient tripped over a cord at home 5/30/16 and fell and had an avulsed fx of her " right hip. She underwent PT for this. She subsequently fell backwards 6/13/16 and fractured her left wrist and seeing OT for this.      She was recommended to have a bone scan done. This was scheduled and the patient cancelled the appt and has not yet rescheduled this; she has been contacted several times to have this done.      10/31/16:  15.  12/5/16:   11.  2/13/17:  pending.      Today she comes to clinic feeling well and denies any concerns. She denies any vaginal bleeding, no changes in her bowel or bladder habits, no nausea/emesis, no lower extremity edema, and no difficulties eating or sleeping. She denies any abdominal discomfort/bloating, no fevers or chills, and no chest pain. She continues to have shortness of breath from her COPD and is due to see her PCP about this.. She is current with her health screenings (colonoscopy is due this year) an dis due to see her PCP for a check on her DM. She is not sexually active. She has a BP cuff at home and uses it. She has been getting her chest port flushed every month. She has been taking her Mg supplement.          Review of Systems:    Systemic           no weight changes; no fever; no chills; no night sweats; no appetite changes  Skin           no rashes, or lesions  Eye           no irritation; no changes in vision  Katarzyna-Laryngeal           no dysphagia; no hoarseness   Pulmonary    no cough; + shortness of breath (COPD)  Cardiovascular    no chest pain; no palpitations  Gastrointestinal    no diarrhea; no constipation; no abdominal pain; no changes in bowel habits; no blood in stool  Genitourinary   no urinary frequency; no urinary urgency; no dysuria; no pain; no abnormal vaginal discharge; no abnormal vaginal bleeding  Breast    no breast discharge; no breast changes; no breast pain  Musculoskeletal    no myalgias; no arthralgias; no back pain  Psychiatric           no depressed mood; no anxiety    Hematologic               no tender lymph  nodes; no noticeable swellings or lumps   Endocrine    no hot flashes; no heat/cold intolerance         Neurological   no tremor; no numbness and tingling; no headaches; no difficulty sleeping      Past Medical History:    Past Medical History   Diagnosis Date     Cervical high risk HPV (human papillomavirus) test positive 10/31/12     type 18     COPD (chronic obstructive pulmonary disease) (H)      GERD (gastroesophageal reflux disease)      Hx of colposcopy with cervical biopsy 11/2012     negative     Hypertension 2013     Osteoporosis      Peritoneal carcinomatosis (H) 8/24/2015     Pneumonia      Uncomplicated asthma 1980         Past Surgical History:    Past Surgical History   Procedure Laterality Date     Colonoscopy  12/4/2012     Procedure: COLONOSCOPY;  COLONOSCOPY SCREEN;  Surgeon: Mushtaq Lara MD;  Location: [facility] OR     Cholecystectomy  6/2014     Gyn surgery       Hysterectomy total abd, jey salpingo-oophorectomy, node dissection, tumor debulking, combined Bilateral 11/25/2015     Procedure: COMBINED HYSTERECTOMY TOTAL ABDOMINAL, SALPINGO-OOPHORECTOMY, NODE DISSECTION, TUMOR DEBULKING;  Surgeon: Emma Cabrera MD;  Location: [facility] OR     Herniorrhaphy hiatal N/A 11/25/2015     Procedure: HERNIORRHAPHY HIATAL;  Surgeon: Chip Carty MD;  Location: [facility] OR         Health Maintenance Due   Topic Date Due     DEXA SCAN SCREENING (SYSTEM ASSIGNED)  08/11/2012       Current Medications:     Current Outpatient Prescriptions   Medication Sig Dispense Refill     chlorthalidone (HYGROTON) 25 MG tablet Take 1 tablet (25 mg) by mouth daily 90 tablet 1     loratadine (CLARITIN) 10 MG tablet Take 10 mg by mouth daily       azithromycin (ZITHROMAX) 250 MG tablet Two tablets first day, then one tablet daily for four days. 6 tablet 0     methylPREDNISolone (MEDROL DOSEPAK) 4 MG tablet Follow package instructions 21 tablet 0     fluticasone (FLONASE) 50 MCG/ACT spray Spray 2 sprays into both  nostrils daily 1 Bottle 1     ipratropium - albuterol 0.5 mg/2.5 mg/3 mL (DUONEB) 0.5-2.5 (3) MG/3ML neb solution Take 1 vial (3 mLs) by nebulization every 4 hours as needed for shortness of breath / dyspnea or wheezing 30 vial 5     potassium chloride SA (POTASSIUM CHLORIDE) 20 MEQ tablet Take 1 tablet (20 mEq) by mouth 2 times daily 60 tablet 0     omeprazole (PRILOSEC) 40 MG capsule Take 1 capsule (40 mg) by mouth daily Take 30-60 minutes before a meal. 90 capsule 2     tiotropium (SPIRIVA HANDIHALER) 18 MCG inhalation capsule Inhale 1 capsule (18 mcg) into the lungs daily Inhale contents of one capsule. . 90 capsule 1     budesonide-formoterol (SYMBICORT) 160-4.5 MCG/ACT inhaler Inhale 2 puffs into the lungs 2 times daily 1 Inhaler 5     albuterol (PROAIR HFA, PROVENTIL HFA, VENTOLIN HFA) 108 (90 BASE) MCG/ACT inhaler Inhale 2 puffs into the lungs every 6 hours as needed for shortness of breath / dyspnea or wheezing 1 Inhaler 3     LORazepam (ATIVAN) 0.5 MG tablet Take 1 tablet (0.5 mg) by mouth every 6 hours as needed for anxiety (sleep nause or vomiting) 30 tablet 2     prochlorperazine (COMPAZINE) 5 MG tablet Take 1 tablet (5 mg) by mouth every 6 hours as needed for nausea or vomiting 30 tablet 2     ibuprofen (ADVIL,MOTRIN) 600 MG tablet Take 1 tablet (600 mg) by mouth every 6 hours as needed for moderate pain 40 tablet 0     MAGNESIUM OXIDE PO Take 200 mg by mouth 2 times daily       ondansetron (ZOFRAN) 4 MG tablet Take 1 tablet (4 mg) by mouth every 6 hours as needed for nausea 30 tablet 5     Zinc Sulfate (ZINC 15 PO) Take by mouth daily       Cholecalciferol (VITAMIN D-3) 5000 UNITS TABS Take 1 tablet by mouth daily.       Calcium Carbonate (CALCIUM 500 PO) Take 1 tablet by mouth daily.       BIOTIN PO Take 1 tablet by mouth daily.           Allergies:        Allergies   Allergen Reactions     Levaquin Rash        Social History:     Social History   Substance Use Topics     Smoking status: Former  "Smoker     Packs/day: 1.00     Years: 30.00     Types: Cigarettes     Start date: 8/11/1965     Quit date: 10/10/2003     Smokeless tobacco: Never Used     Alcohol use No       History   Drug Use No         Family History:     The patient's family history is notable for:    Family History   Problem Relation Age of Onset     CANCER Brother      testicular     DIABETES Sister      Ovarian Cancer Mother 60     Asthma Father      DIABETES Sister      Depression/Anxiety Daughter      Depression Daughter      OSTEOPOROSIS Sister      Other Cancer Brother          Physical Exam:     /76 (BP Location: Right arm, Patient Position: Chair, Cuff Size: Adult Regular)  Pulse 70  Temp 97.8  F (36.6  C) (Oral)  Resp 20  Ht 1.676 m (5' 5.98\")  Wt 55.8 kg (123 lb)  SpO2 95%  BMI 19.86 kg/m2  Body mass index is 19.86 kg/(m^2).    General Appearance: healthy and alert, no distress     HEENT: no thyromegaly, no palpable nodules or masses        Cardiovascular: regular rate and rhythm, no gallops, rubs or murmurs     Respiratory: lungs clear, no rales, rhonchi or wheezes, normal diaphragmatic excursion    Musculoskeletal: extremities non tender and without edema    Skin: no lesions or rashes     Neurological: normal gait, no gross defects     Psychiatric: appropriate mood and affect                               Hematological: normal cervical, supraclavicular and inguinal lymph nodes     Gastrointestinal:       abdomen soft, non-tender, non-distended, no organomegaly or masses    Genitourinary: External genitalia and urethral meatus appears normal.  Vagina is smooth without nodularity or masses.  Cervix surgically absent. Bimanual exam reveal no masses, nodularity or fullness.  Recto-vaginal exam confirms these findings.      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 She completed treatment 4/2016. She is here today for a follow up " visit.    15 minutes were spent with this patient, over 50% of that time was spent in symptom management, treatment planning and in counseling and coordination of care.      Plan:     1.)        Patient to RTC in 3 months for her next surveillance visit and ; today's is pending. Reviewed signs and symptoms for when she should contact the clinic or seek additional care. Patient to contact the clinic with any questions or concerns in the interim.     2.) Genetic risk factors were assessed and she is negative for mutations in GINGER, BARD1, BRCA1, BRCA2, BRIP1, CDH1, CHEK2, EPCAM, MLH1, MRE11A, MSH2, MSH6, MUTYH, NBN, NF1, PALB2, PMS2, PTEN, RAD50, RAD51C, RAD51D, SMARCA4, STK11, and TP53.    3.) Labs and/or tests ordered include:  .     4.) Health maintenance issues addressed today include annual health maintenance and non-gyn issues with PCP. Encouraged to have her Mg drawn when she has her DM labs done (as she has already had lab work today) to see if she still needs to be taking her Mg supplement. She verbalized understanding.    5.)        Chest port: continue to have monthly flushes. Will discuss removal at her next visit.    CHANDLER Jay, WHNP-BC, ANP-BC  Women's Health Nurse Practitioner  Adult Nurse Pracitioner  Gynecologic Oncology          CC  Patient Care Team:  Pillo Koehler MD as PCP - General (Family Practice)  Jessica Lua, RN as Continuity Care Coordinator (Gyn-Onc)  Iliana Pierre, JANE as   ARLINE SINGH

## 2017-02-21 ENCOUNTER — MYC REFILL (OUTPATIENT)
Dept: ONCOLOGY | Facility: CLINIC | Age: 72
End: 2017-02-21

## 2017-02-21 DIAGNOSIS — C78.6 PERITONEAL CARCINOMATOSIS (H): ICD-10-CM

## 2017-02-21 RX ORDER — PROCHLORPERAZINE MALEATE 5 MG
5 TABLET ORAL EVERY 6 HOURS PRN
Qty: 30 TABLET | Refills: 2 | OUTPATIENT
Start: 2017-02-21

## 2017-02-21 NOTE — TELEPHONE ENCOUNTER
Message from [facility]:  Original authorizing provider: CHANDLER Linares CNPcy PATI Carrion would like a refill of the following medications:  prochlorperazine (COMPAZINE) 5 MG tablet [CHANDLER Linares CNP]    Preferred pharmacy: [id number] - [place], [place] - [address]    Comment:      Medication renewals requested in this message routed to other providers:  LORazepam (ATIVAN) 0.5 MG tablet [CHANDLER Bergman CNP]

## 2017-02-21 NOTE — TELEPHONE ENCOUNTER
RN called patient to see why she required refill for Ativan and Compazine. Patient states she occasionally gets anxious and has an upset stomach and likes to have them on hand.  Explained to patient these medications are usually prescribed by our providers for side effects from chemotherapy.  Since it has been a year since she received chemotherapy, she should probably check with her PCP regarding her symptoms and treatment for them.  Patient expressed understanding and will check with her PCP.  Melissa Cosme RN, BSN, OCN

## 2017-03-13 ENCOUNTER — ONCOLOGY VISIT (OUTPATIENT)
Dept: ONCOLOGY | Facility: CLINIC | Age: 72
End: 2017-03-13
Payer: COMMERCIAL

## 2017-03-13 DIAGNOSIS — C56.2 MALIGNANT NEOPLASM OF OVARY, LEFT (H): Primary | ICD-10-CM

## 2017-03-13 PROCEDURE — 96523 IRRIG DRUG DELIVERY DEVICE: CPT

## 2017-03-13 RX ORDER — HEPARIN SODIUM (PORCINE) LOCK FLUSH IV SOLN 100 UNIT/ML 100 UNIT/ML
5 SOLUTION INTRAVENOUS
Status: DISCONTINUED | OUTPATIENT
Start: 2017-03-13 | End: 2017-03-13 | Stop reason: HOSPADM

## 2017-03-13 RX ADMIN — HEPARIN SODIUM (PORCINE) LOCK FLUSH IV SOLN 100 UNIT/ML 5 ML: 100 SOLUTION at 16:03

## 2017-03-13 NOTE — PROGRESS NOTES
"Patient's name and  were verified. See Doc Flowsheet - IV assess for details.   IVAD accessed with 20G 3/4\" verma gripper plus needle   Blood return positive: YES   Site without redness, tenderness or swelling: YES   Flushed with 10 mL NS and 5 mL 100 units/mL Heparin   Needle: D/c'd intact. Gauze dressing applied.   Comments: Port flushed. Patient tolerated procedure without incident.   Felipe Cao RN, BSN, OCN      "

## 2017-03-13 NOTE — MR AVS SNAPSHOT
After Visit Summary   3/13/2017    Iris Carrion    MRN: [id number]           Patient Information     Date Of Birth          1945        Visit Information        Provider Department      3/13/2017 4:00 PM NURSE ONLY CANCER CENTER [facility]        Today's Diagnoses     Malignant neoplasm of ovary, left (H)    -  1       Follow-ups after your visit        Your next 10 appointments already scheduled     Apr 11, 2017  4:00 PM CDT   Return Visit with NURSE ONLY CANCER CENTER   [facility])    [address] [place] [id number] [phone]            May 08, 2017  4:00 PM CDT   Return Visit with NURSE ONLY CANCER CENTER   [facility] ([facility])    [address] [place] [id number] [phone]            May 10, 2017  1:00 PM CDT   Return Visit with CHANDLER Linares CNP   [facility])    [address] [place] [address] [phone]              Who to contact     If you have questions or need follow up information about today's clinic visit or your schedule please contact [facility] directly at [phone].  Normal or non-critical lab and imaging results will be communicated to you by MyChart, letter or phone within 4 business days after the clinic has received the results. If you do not hear from us within 7 days, please contact the clinic through MyChart or phone. If you have a critical or abnormal lab result, we will notify you by phone as soon as possible.  Submit refill requests through Naubo or call your pharmacy and they will forward the refill request to us. Please allow 3 business days for your refill to be completed.          Additional Information About Your Visit        Marketohart Information     Naubo gives you secure access to your electronic health record. If you  see a primary care provider, you can also send messages to your care team and make appointments. If you have questions, please call your primary care clinic.  If you do not have a primary care provider, please call [phone] and they will assist you.      eCommHub is an electronic gateway that provides easy, online access to your medical records. With eCommHub, you can request a clinic appointment, read your test results, renew a prescription or communicate with your care team.     To access your existing account, please contact your [facility] or call [phone] for assistance.        Care EveryWhere ID     This is your Care EveryWhere ID. This could be used by other organizations to access your [facility] medical records  [id number]         Blood Pressure from Last 3 Encounters:   02/13/17 134/76   01/13/17 127/77   01/06/17 135/75    Weight from Last 3 Encounters:   02/13/17 55.8 kg (123 lb)   01/13/17 55.8 kg (123 lb)   01/06/17 56.4 kg (124 lb 6.4 oz)              Today, you had the following     No orders found for display       Primary Care Provider Office Phone # Fax #    Pillo Koehler [phone]       [facility] [address] [facility] [address]        Thank you!     Thank you for choosing [facility]  for your care. Our goal is always to provide you with excellent care. Hearing back from our patients is one way we can continue to improve our services. Please take a few minutes to complete the written survey that you may receive in the mail after your visit with us. Thank you!             Your Updated Medication List - Protect others around you: Learn how to safely use, store and throw away your medicines at www.disposemymeds.org.          This list is accurate as of: 3/13/17  4:10 PM.  Always use your most recent med list.                   Brand Name Dispense Instructions for use    albuterol 108 (90 BASE) MCG/ACT  Inhaler    PROAIR HFA/PROVENTIL HFA/VENTOLIN HFA    1 Inhaler    Inhale 2 puffs into the lungs every 6 hours as needed for shortness of breath / dyspnea or wheezing       azithromycin 250 MG tablet    ZITHROMAX    6 tablet    Two tablets first day, then one tablet daily for four days.       BIOTIN PO      Take 1 tablet by mouth daily.       budesonide-formoterol 160-4.5 MCG/ACT Inhaler    SYMBICORT    1 Inhaler    Inhale 2 puffs into the lungs 2 times daily       CALCIUM 500 PO      Take 1 tablet by mouth daily.       chlorthalidone 25 MG tablet    HYGROTON    90 tablet    Take 1 tablet (25 mg) by mouth daily       fluticasone 50 MCG/ACT spray    FLONASE    1 Bottle    Spray 2 sprays into both nostrils daily       ibuprofen 600 MG tablet    ADVIL/MOTRIN    40 tablet    Take 1 tablet (600 mg) by mouth every 6 hours as needed for moderate pain       ipratropium - albuterol 0.5 mg/2.5 mg/3 mL 0.5-2.5 (3) MG/3ML neb solution    DUONEB    30 vial    Take 1 vial (3 mLs) by nebulization every 4 hours as needed for shortness of breath / dyspnea or wheezing       loratadine 10 MG tablet    CLARITIN     Take 10 mg by mouth daily       LORazepam 0.5 MG tablet    ATIVAN    30 tablet    Take 1 tablet (0.5 mg) by mouth every 6 hours as needed for anxiety (sleep nause or vomiting)       MAGNESIUM OXIDE PO      Take 200 mg by mouth 2 times daily       methylPREDNISolone 4 MG tablet    MEDROL DOSEPAK    21 tablet    Follow package instructions       omeprazole 40 MG capsule    priLOSEC    90 capsule    Take 1 capsule (40 mg) by mouth daily Take 30-60 minutes before a meal.       ondansetron 4 MG tablet    ZOFRAN    30 tablet    Take 1 tablet (4 mg) by mouth every 6 hours as needed for nausea       potassium chloride SA 20 MEQ CR tablet    potassium chloride    60 tablet    Take 1 tablet (20 mEq) by mouth 2 times daily       prochlorperazine 5 MG tablet    COMPAZINE    30 tablet    Take 1 tablet (5 mg) by mouth every 6 hours as  needed for nausea or vomiting       tiotropium 18 MCG capsule    SPIRIVA HANDIHALER    90 capsule    Inhale 1 capsule (18 mcg) into the lungs daily Inhale contents of one capsule. .       Vitamin D-3 5000 UNITS Tabs      Take 1 tablet by mouth daily.       ZINC 15 PO      Take by mouth daily

## 2017-04-11 ENCOUNTER — ONCOLOGY VISIT (OUTPATIENT)
Dept: ONCOLOGY | Facility: CLINIC | Age: 72
End: 2017-04-11
Payer: COMMERCIAL

## 2017-04-11 DIAGNOSIS — C56.2 MALIGNANT NEOPLASM OF OVARY, LEFT (H): Primary | ICD-10-CM

## 2017-04-11 PROCEDURE — 96523 IRRIG DRUG DELIVERY DEVICE: CPT

## 2017-04-11 RX ORDER — HEPARIN SODIUM (PORCINE) LOCK FLUSH IV SOLN 100 UNIT/ML 100 UNIT/ML
5 SOLUTION INTRAVENOUS
Status: DISCONTINUED | OUTPATIENT
Start: 2017-04-11 | End: 2017-04-11 | Stop reason: HOSPADM

## 2017-04-11 RX ADMIN — HEPARIN SODIUM (PORCINE) LOCK FLUSH IV SOLN 100 UNIT/ML 5 ML: 100 SOLUTION at 16:06

## 2017-04-11 NOTE — MR AVS SNAPSHOT
After Visit Summary   4/11/2017    Iris Carrion    MRN: [id number]           Patient Information     Date Of Birth          1945        Visit Information        Provider Department      4/11/2017 4:00 PM NURSE ONLY CANCER CENTER [facility]        Today's Diagnoses     Malignant neoplasm of ovary, left (H)    -  1       Follow-ups after your visit        Your next 10 appointments already scheduled     May 08, 2017  4:00 PM CDT   [facility] with [facility] ONLY [facility]ER CENTER   [facility]    [id number] [place] [address] [phone]            May 10, 2017  1:00 PM CDT   Return Visit with CHANDLER Linares CNP   [facility]    [address] [place] [id number] [phone]              Who to contact     If you have questions or need follow up information about today's clinic visit or your schedule please contact [facility] directly at [phone].  Normal or non-critical lab and imaging results will be communicated to you by CodeSquarehart, letter or phone within 4 business days after the clinic has received the results. If you do not hear from us within 7 days, please contact the clinic through CodeSquarehart or phone. If you have a critical or abnormal lab result, we will notify you by phone as soon as possible.  Submit refill requests through Insticator or call your pharmacy and they will forward the refill request to us. Please allow 3 business days for your refill to be completed.          Additional Information About Your Visit        MyChart Information     Insticator gives you secure access to your electronic health record. If you see a primary care provider, you can also send messages to your care team and make appointments. If you have questions, please call your primary care clinic.  If you do not have a primary care provider,  please call [phone] and they will assist you.      MedSave [employer] is an electronic gateway that provides easy, online access to your medical records. With MedSave [employer], you can request a clinic appointment, read your test results, renew a prescription or communicate with your care team.     To access your existing account, please contact your [facility] or call [phone] for assistance.        Care EveryWhere ID     This is your Care EveryWhere ID. This could be used by other organizations to access your [facility] medical records  [id number]         Blood Pressure from Last 3 Encounters:   02/13/17 134/76   01/13/17 127/77   01/06/17 135/75    Weight from Last 3 Encounters:   02/13/17 55.8 kg (123 lb)   01/13/17 55.8 kg (123 lb)   01/06/17 56.4 kg (124 lb 6.4 oz)              We Performed the Following     Central Venous Catheter: implanted port (Port-A-Cath, Power Port)        Primary Care Provider Office Phone # Fax #    Pillo Koehler [phone]       [facility] [address] [facility] [address]        Thank you!     Thank you for choosing [facility]  for your care. Our goal is always to provide you with excellent care. Hearing back from our patients is one way we can continue to improve our services. Please take a few minutes to complete the written survey that you may receive in the mail after your visit with us. Thank you!             Your Updated Medication List - Protect others around you: Learn how to safely use, store and throw away your medicines at www.disposemymeds.org.          This list is accurate as of: 4/11/17  4:07 PM.  Always use your most recent med list.                   Brand Name Dispense Instructions for use    albuterol 108 (90 BASE) MCG/ACT Inhaler    PROAIR HFA/PROVENTIL HFA/VENTOLIN HFA    1 Inhaler    Inhale 2 puffs into the lungs every 6 hours as needed for shortness of breath / dyspnea or wheezing        azithromycin 250 MG tablet    ZITHROMAX    6 tablet    Two tablets first day, then one tablet daily for four days.       BIOTIN PO      Take 1 tablet by mouth daily.       budesonide-formoterol 160-4.5 MCG/ACT Inhaler    SYMBICORT    1 Inhaler    Inhale 2 puffs into the lungs 2 times daily       CALCIUM 500 PO      Take 1 tablet by mouth daily.       chlorthalidone 25 MG tablet    HYGROTON    90 tablet    Take 1 tablet (25 mg) by mouth daily       fluticasone 50 MCG/ACT spray    FLONASE    1 Bottle    Spray 2 sprays into both nostrils daily       ibuprofen 600 MG tablet    ADVIL/MOTRIN    40 tablet    Take 1 tablet (600 mg) by mouth every 6 hours as needed for moderate pain       ipratropium - albuterol 0.5 mg/2.5 mg/3 mL 0.5-2.5 (3) MG/3ML neb solution    DUONEB    30 vial    Take 1 vial (3 mLs) by nebulization every 4 hours as needed for shortness of breath / dyspnea or wheezing       loratadine 10 MG tablet    CLARITIN     Take 10 mg by mouth daily       LORazepam 0.5 MG tablet    ATIVAN    30 tablet    Take 1 tablet (0.5 mg) by mouth every 6 hours as needed for anxiety (sleep nause or vomiting)       MAGNESIUM OXIDE PO      Take 200 mg by mouth 2 times daily       methylPREDNISolone 4 MG tablet    MEDROL DOSEPAK    21 tablet    Follow package instructions       omeprazole 40 MG capsule    priLOSEC    90 capsule    Take 1 capsule (40 mg) by mouth daily Take 30-60 minutes before a meal.       ondansetron 4 MG tablet    ZOFRAN    30 tablet    Take 1 tablet (4 mg) by mouth every 6 hours as needed for nausea       potassium chloride SA 20 MEQ CR tablet    potassium chloride    60 tablet    Take 1 tablet (20 mEq) by mouth 2 times daily       prochlorperazine 5 MG tablet    COMPAZINE    30 tablet    Take 1 tablet (5 mg) by mouth every 6 hours as needed for nausea or vomiting       tiotropium 18 MCG capsule    SPIRIVA HANDIHALER    90 capsule    Inhale 1 capsule (18 mcg) into the lungs daily Inhale contents of one  capsule. .       Vitamin D-3 5000 UNITS Tabs      Take 1 tablet by mouth daily.       ZINC 15 PO      Take by mouth daily

## 2017-04-13 ENCOUNTER — TRANSFERRED RECORDS (OUTPATIENT)
Dept: HEALTH INFORMATION MANAGEMENT | Facility: CLINIC | Age: 72
End: 2017-04-13

## 2017-05-08 ENCOUNTER — ONCOLOGY VISIT (OUTPATIENT)
Dept: ONCOLOGY | Facility: CLINIC | Age: 72
End: 2017-05-08
Payer: COMMERCIAL

## 2017-05-08 DIAGNOSIS — C56.2 MALIGNANT NEOPLASM OF OVARY, LEFT (H): ICD-10-CM

## 2017-05-08 LAB — CANCER AG125 SERPL-ACNC: 11 U/ML (ref 0–30)

## 2017-05-08 PROCEDURE — 36591 DRAW BLOOD OFF VENOUS DEVICE: CPT

## 2017-05-08 PROCEDURE — 86304 IMMUNOASSAY TUMOR CA 125: CPT | Performed by: NURSE PRACTITIONER

## 2017-05-08 RX ORDER — HEPARIN SODIUM (PORCINE) LOCK FLUSH IV SOLN 100 UNIT/ML 100 UNIT/ML
5 SOLUTION INTRAVENOUS
Status: DISCONTINUED | OUTPATIENT
Start: 2017-05-08 | End: 2017-05-08 | Stop reason: HOSPADM

## 2017-05-08 RX ADMIN — HEPARIN SODIUM (PORCINE) LOCK FLUSH IV SOLN 100 UNIT/ML 5 ML: 100 SOLUTION at 16:09

## 2017-05-08 NOTE — PROGRESS NOTES
sent prior to provider appointment on 5/10/17. See IV flowsheet for port access details.    Dahiana Zelaya RN

## 2017-05-08 NOTE — MR AVS SNAPSHOT
After Visit Summary   5/8/2017    Iris Carrion    MRN: [id number]           Patient Information     Date Of Birth          1945        Visit Information        Provider Department      5/8/2017 4:00 PM NURSE ONLY CANCER CENTER [facility]        Today's Diagnoses     Malignant neoplasm of ovary, left (H)           Follow-ups after your visit        Your next 10 appointments already scheduled     May 10, 2017  1:00 PM CDT   Return Visit with CHANDLER Linares CNP   [facility] ([facility])    [address] [place] [address] [phone]              Who to contact     If you have questions or need follow up information about today's clinic visit or your schedule please contact [facility] directly at [phone].  Normal or non-critical lab and imaging results will be communicated to you by Niko Nikohart, letter or phone within 4 business days after the clinic has received the results. If you do not hear from us within 7 days, please contact the clinic through Niko Nikohart or phone. If you have a critical or abnormal lab result, we will notify you by phone as soon as possible.  Submit refill requests through Emprivo or call your pharmacy and they will forward the refill request to us. Please allow 3 business days for your refill to be completed.          Additional Information About Your Visit        Niko Nikohart Information     Emprivo gives you secure access to your electronic health record. If you see a primary care provider, you can also send messages to your care team and make appointments. If you have questions, please call your primary care clinic.  If you do not have a primary care provider, please call [phone] and they will assist you.      Emprivo is an electronic gateway that provides easy, online access to your medical records. With Emprivo, you can request a clinic appointment, read your test  results, renew a prescription or communicate with your care team.     To access your existing account, please contact your [facility] or call [phone] for assistance.        Care EveryWhere ID     This is your Care EveryWhere ID. This could be used by other organizations to access your [facility] medical records  [id number]         Blood Pressure from Last 3 Encounters:   02/13/17 134/76   01/13/17 127/77   01/06/17 135/75    Weight from Last 3 Encounters:   02/13/17 55.8 kg (123 lb)   01/13/17 55.8 kg (123 lb)   01/06/17 56.4 kg (124 lb 6.4 oz)              We Performed the Following          Central Venous Catheter: implanted port (Port-A-Cath, Power Port)        Primary Care Provider Office Phone # Fax #    Pillo Koehler [phone]       [facility] [address] [facility] [address]        Thank you!     Thank you for choosing [facility]  for your care. Our goal is always to provide you with excellent care. Hearing back from our patients is one way we can continue to improve our services. Please take a few minutes to complete the written survey that you may receive in the mail after your visit with us. Thank you!             Your Updated Medication List - Protect others around you: Learn how to safely use, store and throw away your medicines at www.disposemymeds.org.          This list is accurate as of: 5/8/17  4:13 PM.  Always use your most recent med list.                   Brand Name Dispense Instructions for use    albuterol 108 (90 BASE) MCG/ACT Inhaler    PROAIR HFA/PROVENTIL HFA/VENTOLIN HFA    1 Inhaler    Inhale 2 puffs into the lungs every 6 hours as needed for shortness of breath / dyspnea or wheezing       azithromycin 250 MG tablet    ZITHROMAX    6 tablet    Two tablets first day, then one tablet daily for four days.       BIOTIN PO      Take 1 tablet by mouth daily.       budesonide-formoterol 160-4.5  MCG/ACT Inhaler    SYMBICORT    1 Inhaler    Inhale 2 puffs into the lungs 2 times daily       CALCIUM 500 PO      Take 1 tablet by mouth daily.       chlorthalidone 25 MG tablet    HYGROTON    90 tablet    Take 1 tablet (25 mg) by mouth daily       fluticasone 50 MCG/ACT spray    FLONASE    1 Bottle    Spray 2 sprays into both nostrils daily       ibuprofen 600 MG tablet    ADVIL/MOTRIN    40 tablet    Take 1 tablet (600 mg) by mouth every 6 hours as needed for moderate pain       ipratropium - albuterol 0.5 mg/2.5 mg/3 mL 0.5-2.5 (3) MG/3ML neb solution    DUONEB    30 vial    Take 1 vial (3 mLs) by nebulization every 4 hours as needed for shortness of breath / dyspnea or wheezing       loratadine 10 MG tablet    CLARITIN     Take 10 mg by mouth daily       LORazepam 0.5 MG tablet    ATIVAN    30 tablet    Take 1 tablet (0.5 mg) by mouth every 6 hours as needed for anxiety (sleep nause or vomiting)       MAGNESIUM OXIDE PO      Take 200 mg by mouth 2 times daily       methylPREDNISolone 4 MG tablet    MEDROL DOSEPAK    21 tablet    Follow package instructions       omeprazole 40 MG capsule    priLOSEC    90 capsule    Take 1 capsule (40 mg) by mouth daily Take 30-60 minutes before a meal.       ondansetron 4 MG tablet    ZOFRAN    30 tablet    Take 1 tablet (4 mg) by mouth every 6 hours as needed for nausea       potassium chloride SA 20 MEQ CR tablet    potassium chloride    60 tablet    Take 1 tablet (20 mEq) by mouth 2 times daily       prochlorperazine 5 MG tablet    COMPAZINE    30 tablet    Take 1 tablet (5 mg) by mouth every 6 hours as needed for nausea or vomiting       tiotropium 18 MCG capsule    SPIRIVA HANDIHALER    90 capsule    Inhale 1 capsule (18 mcg) into the lungs daily Inhale contents of one capsule. .       Vitamin D-3 5000 UNITS Tabs      Take 1 tablet by mouth daily.       ZINC 15 PO      Take by mouth daily

## 2017-05-10 ENCOUNTER — ONCOLOGY VISIT (OUTPATIENT)
Dept: ONCOLOGY | Facility: CLINIC | Age: 72
End: 2017-05-10
Payer: COMMERCIAL

## 2017-05-10 VITALS
WEIGHT: 123.9 LBS | SYSTOLIC BLOOD PRESSURE: 130 MMHG | HEART RATE: 75 BPM | OXYGEN SATURATION: 96 % | DIASTOLIC BLOOD PRESSURE: 74 MMHG | HEIGHT: 66 IN | TEMPERATURE: 97.1 F | RESPIRATION RATE: 15 BRPM | BODY MASS INDEX: 19.91 KG/M2

## 2017-05-10 DIAGNOSIS — C56.2 MALIGNANT NEOPLASM OF OVARY, LEFT (H): Primary | ICD-10-CM

## 2017-05-10 DIAGNOSIS — C78.6 PERITONEAL CARCINOMATOSIS (H): ICD-10-CM

## 2017-05-10 PROCEDURE — 99213 OFFICE O/P EST LOW 20 MIN: CPT | Performed by: NURSE PRACTITIONER

## 2017-05-10 ASSESSMENT — PAIN SCALES - GENERAL: PAINLEVEL: NO PAIN (0)

## 2017-05-10 NOTE — PROGRESS NOTES
Follow Up Notes on Referred Patient    Date: 5/10/2017       Dr. Emma Cabrera MD   [facility]  [address] [place] [address]       RE: Iris Carrion  : 1945  ANGELES: 5/10/2017    Dear Dr. 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 She completed treatment 2016. She is here today for a follow up visit.      In brief:  Ms Carrion started having her symptoms of constipation and bloating (May/2015). Her doctor recommended miralax. Symptoms continued to occurred so they followed with a CT scan which showed extensive peritoneal metastatic disease.       Mother had cancer unknown age, brother had testicular cancer. Has 2 children.       8/17/15: CT c/a/p IMPRESSION:   1. Left renal cortical cysts. No enhancing renal mass. No urinary tract calculi or hydronephrosis. Collecting systems, ureters and bladder are unremarkable.  2. Extensive peritoneal metastatic disease with moderate ascites likely related to malignant ascites. Followup as clinically warranted. An obvious etiology for this metastatic disease is not  visualized.  3. No bowel obstruction or diverticulitis. Serosal metastatic disease is present. No obvious colonic mass. Followup colonoscopy as needed for further assessment.  4. Moderate-sized hiatal hernia. No gastric obstruction.      8/22/15:  1950      8/27/15: New patient visit. she continues to have constipation and bloating. She has lost about 5 pounds. She also admits to having lood in urine as well as urinary urgenrgy. She has loose bm bm every other day.       Plan: chemotherapy with Taxol/Carbo. To be considered for these clinical trials: PARP, avatar circulating tumor study, small neogman.       8/31/15: CT guided omental nodule biopsy. Final diagnosis:  Adenocarcinoma, with features consistent with endometrioid adenocarcinoma        COMMENT:  Immunohistochemical staining was obtained with appropriate control slides for ER, Talent 8 and WT-1. Malignant cells are positive for all three markers confirming their endometrioid cell differentiation.       Plan: treat like an ovarian cancer but will do dose dense Taxol carbo for 3 cycles then be re-evalated with scan       9/4/15-10/14/15: Cycle #1-3 dose dense Taxol/Carbo.  2008, 470, 30.       11/19/15: CT c/a/p IMPRESSION:   1. Decreased peritoneal carcinomatosis and malignant ascites.  2. Severe emphysema and scattered areas of likely postinfectious scarring. Several of these areas particularly along the right minor fissure appear more nodular malignancy cannot be excluded. Recommend 3-six-month interval followup.  3. Diverticulosis.  4. Osteopenia and numerous compression deformities, none of which appear acute.  5. Stable large hiatal hernia.      11/23/15:  19      11/25/15: Exploratory Laparotomy, Repair Umbilical Hernia, Total Abdominal Hysterectomy, Right Salpingo Oophorectomy, Left Salpingectomy, Appendectomy, Complete Omentectomy, CUSA Tumor Debulking,Cystoscopy, Left PeriAortc Lymph Node Biopsy, Insertion Peritoneal Port, Mobilization of Liver, Ablation of Liver Nodules Open Repair Hiatal Hernia Repair      Surgical pathology report:  SPECIMEN(S):  A: Hernia sac, umbilical  B: Hepatic ligament  C: Splenic flexure implant  D: Inferior vena cava implant  E: Omentum resectection  F: Nodule, liver  G: Right pelvic peritoneum  H: Cul-de-sac peritoneum  I: Uterus, cervix, right fallopian tube and ovary  J: Large bowel epicloicae  K: Appendix  L: Lymph node, left periaortic    FINAL DIAGNOSIS:  A: Umbilical hernia sac, repair:  - Adenocarcinoma  - Fibroadipose tissue, partially surfaced by mesothelium, consistent with hernia sac    B: Hepatic ligament, biopsy:  - One benign lymph node (0/1)    C: Splenic ligament implant, biopsy:  - Adenocarcinoma    D: Inferior vena cava implant,  "biopsy:  - Adenocarcinoma    E: Omentum, omentectomy:  - Adenocarcinoma    F: \"Liver nodule\", biopsy:  - Fibro-adipose tissue with adenocarcinoma    G: Right pelvic peritoneum, biopsy:  - Adenocarcinoma    H: Cul-de-sac peritoneum, biopsy:  - Adenocarcinoma    I: Uterus, cervix, right ovary and fallopian tube, hysterectomy with right salpingo-oophorectomy:  - Adenocarcinoma present in the soft tissue at the left adnexal area,cervix and right fallopian tube, most likely endometrioid adenocarcinoma  - Atrophic endometrium  - Benign endometrial polyp  - Myometrium with no significant histologic abnormality  - Atrophic cervix and nabothian cysts  - Tumor present on the external aspect of the cervix  - Right ovary with numerous corpora albicantia and simple epithelial cysts  - Right fallopian tube with a single focus of carcinoma  - See comment    J: Large bowel epiploica, biopsy:  - Adenocarcinoma    K: Appendix, appendectomy:  - Appendix with fibrous replacement    L: Lymph nodes, left periaortic, excision:  - One benign lymph node (0/1)      12/14/15:  46. post op visit. reviewed the results of her surgical pathology copy of report was given to pt. She will start IV/IP taxol/Cddp chemo tomorrow. IV fluids on Thursday and Friday at [place] and Saturday and Sunday at the [facility]. Day 8 taxol will be on dec 22nd. She will need to have fluids again on 23rd and 24th. She will also need Neupogen.       Plan: 3 cycles IV/IP chemo.       12/15/15: Cycle #1 IV/IP chemo.    1/6/16: Cycle #2 IV/IP chemo.   18. Delayed d/t neutropenia; given 1/13. Neupogen and Neulasta added.    2/1/16: Cycle #3 IV/IP chemo.  23  2/29/16:  41. CT cap  Findings:  Right internal jugular Port-A-Cath with tip at atriocaval junction. The heart is not enlarged. No significant pericardial effusion. The mediastinal great vessels are normal in caliber. No central pulmonary embolus. Thoracic aortic " atherosclerotic calcifications. Calcified mediastinal and hilar lymph nodes. No lymphadenopathy in the chest by size criteria. The central tracheobronchial tree is patent. New nodular pleural-based consolidative opacity in the left lower lobe measuring 26 x 29 mm (image 110, series 7). There are a few other new subcentimeter nodules in the left lower lobe. For example, 7 mm nodule on image 104, series 4. Severe centrilobular emphysematous changes. Likely nodular scarring in the right upper lobe (image 32, series 7) is not significantly changed. Other scattered fibrotic changes, most prominent in the right lower lobe, do not appear significantly changed. Calcified granuloma in the right lower lobe. Postsurgical changes of a hernia repair and hysterectomy/bilateral salpingo-oophorectomy. There is no abnormal soft tissue within the  resection bed to suggest locally recurrent disease. No suspicious liver lesions. Unchanged extrahepatic and central intrahepatic biliary dilatation, likely reservoir effect in the setting of cholecystectomy. Atrophic pancreas. Calcified splenic granulomata. Unchanged  hypoattenuating lesions in the kidneys, the largest of which on the left are compatible with cysts. No abnormally dilated or thickened loops of bowel. No free intraperitoneal air or free fluid. Colonic  diverticuli without evidence of active inflammation. Duodenal diverticulum. Intra-abdominal port with tip in the left pelvis. Aortoiliac atheromatous plaque without aneurysmal dilatation. No  abnormally enlarged abdominal or pelvic lymph nodes. No definite residual peritoneal/or omental nodules. Marked generalized osteopenia. Redemonstration of multiple compression  deformities throughout the thoracolumbar spine, greatest at T8 with greater than 50% loss of vertebral body height, not significantly changed. There is mild increased associated sclerosis. Associated kyphosis of the upper thoracic spine. There are 6 lumbar type  "vertebral bodies. No new aggressive osseous lesions.  Impression:  1. Continued positive response to therapy in the abdomen/pelvis with no definite residual peritoneal/omental metastasis.  2. New pleural-based consolidative opacity in the left lower lobe. There are a few other new subcentimeter nodules in the left lower lobe. This may be secondary to infection/inflammation, but metastases should be considered and short-term interval followup is recommended.      4/6/16 CT/PET IMPRESSION: Patient's history of ovarian carcinoma.  1. Left lower lobe 2 cm irregular pulmonary nodular density is minimally metabolic and argues that it is benign such as rounded atelectasis and less likely rounded pneumonia.  2. Underlying COPD with scattered scars within both lungs but no hypermetabolic structures to indicate malignancy.  3. No evidence of carcinomatosis, peritoneal or abdominal metastasis, or metastatic disease to any of the organs of the abdomen or pelvis.  4. Possible mild esophagitis at the GE junction.      4/11/16:  8. \"Had been unclear as to why  up to 41 from 23-this is concerning in light of recent completion of cddp/taxol, however the  is 8. CT PET is negative for metastatic disease. Recommend q 3 month visits x 2 years, then every 6 mos for 3 additional years.\"      7/25/16:   9.  7/27/16: CT cap Impression:    1. Interval resolution of left lower lobe spiculated pulmonary nodule. This likely represented atelectasis or consolidation.  2. Significant panlobular emphysematous changes of both lungs.  3. Spiculated right upper lobe pulmonary nodule measuring up to 8 mm, unchanged since 11/19/2015, although progressed since 2/4/2011. This may represent scarring. Recommend attention on followup.  4. New right hip fracture, possibly pathologic.  5. New sclerotic lesions of the sternum which are indeterminate. This may represent metastatic disease.      Of note, patient tripped over a cord at home " 5/30/16 and fell and had an avulsed fx of her right hip. She underwent PT for this. She subsequently fell backwards 6/13/16 and fractured her left wrist and seeing OT for this.       She was recommended to have a bone scan done. This was scheduled and the patient cancelled the appt and has not yet rescheduled this; she has been contacted several times to have this done.       10/31/16:  15.  12/5/16:  11.  2/13/17:  12.  5/8/17:  11.       Today she comes to clinic feeling well and denies any new issues or concerns. She denies any vaginal bleeding, no changes in her bowel or bladder habits, no nausea/emesis, no lower extremity edema, and no difficulties eating or sleeping. She denies any abdominal discomfort/bloating, no fevers or chills, and no chest pain or new shortness of breath. She is current with her health screenings (colonoscopy is due end of the year). She is not sexually active and denies any vaginal or vulvar dryness. She checks her BP at home and reports readings of 130's/65. She has been having her chest port flushed every month.         Review of Systems:    Systemic           no weight changes; no fever; no chills; no night sweats; no appetite changes  Skin           no rashes, or lesions  Eye           no irritation; no changes in vision  Katarzyna-Laryngeal           no dysphagia; no hoarseness   Pulmonary    no cough; no shortness of breath; + COPD  Cardiovascular    no chest pain; no palpitations  Gastrointestinal    no diarrhea; no constipation; no abdominal pain; no changes in bowel habits; no blood in stool  Genitourinary   no urinary frequency; no urinary urgency; no dysuria; no pain; no abnormal vaginal discharge; no abnormal vaginal bleeding  Breast    no breast discharge; no breast changes; no breast pain  Musculoskeletal    no myalgias; no arthralgias; no back pain  Psychiatric           no depressed mood; no anxiety    Hematologic               no tender lymph nodes; no  noticeable swellings or lumps   Endocrine    no hot flashes; no heat/cold intolerance         Neurological   no tremor; no numbness and tingling; no headaches; no difficulty sleeping      Past Medical History:    Past Medical History:   Diagnosis Date     Cervical high risk HPV (human papillomavirus) test positive 10/31/12    type 18     COPD (chronic obstructive pulmonary disease) (H)      GERD (gastroesophageal reflux disease)      Hx of colposcopy with cervical biopsy 11/2012    negative     Hypertension 2013     Osteoporosis      Peritoneal carcinomatosis (H) 8/24/2015     Pneumonia      Uncomplicated asthma 1980         Past Surgical History:    Past Surgical History:   Procedure Laterality Date     CHOLECYSTECTOMY  6/2014     COLONOSCOPY  12/4/2012    Procedure: COLONOSCOPY;  COLONOSCOPY SCREEN;  Surgeon: Mushtaq Lara MD;  Location: [facility] OR     GYN SURGERY       HERNIORRHAPHY HIATAL N/A 11/25/2015    Procedure: HERNIORRHAPHY HIATAL;  Surgeon: Chip Carty MD;  Location: [facility] OR     HYSTERECTOMY TOTAL ABD, ALVIN SALPINGO-OOPHORECTOMY, NODE DISSECTION, TUMOR DEBULKING, COMBINED Bilateral 11/25/2015    Procedure: COMBINED HYSTERECTOMY TOTAL ABDOMINAL, SALPINGO-OOPHORECTOMY, NODE DISSECTION, TUMOR DEBULKING;  Surgeon: Emma Cabrera MD;  Location: [facility] OR         Health Maintenance Due   Topic Date Due     DEXA SCAN SCREENING (SYSTEM ASSIGNED)  08/11/2012       Current Medications:     Current Outpatient Prescriptions   Medication Sig Dispense Refill     tiotropium (SPIRIVA HANDIHALER) 18 MCG capsule Inhale 1 capsule (18 mcg) into the lungs daily Inhale contents of one capsule. . 90 capsule 2     chlorthalidone (HYGROTON) 25 MG tablet Take 1 tablet (25 mg) by mouth daily 90 tablet 1     loratadine (CLARITIN) 10 MG tablet Take 10 mg by mouth daily       fluticasone (FLONASE) 50 MCG/ACT spray Spray 2 sprays into both nostrils daily 1 Bottle 1     ipratropium - albuterol 0.5 mg/2.5 mg/3 mL  (DUONEB) 0.5-2.5 (3) MG/3ML neb solution Take 1 vial (3 mLs) by nebulization every 4 hours as needed for shortness of breath / dyspnea or wheezing 30 vial 5     potassium chloride SA (POTASSIUM CHLORIDE) 20 MEQ tablet Take 1 tablet (20 mEq) by mouth 2 times daily 60 tablet 0     omeprazole (PRILOSEC) 40 MG capsule Take 1 capsule (40 mg) by mouth daily Take 30-60 minutes before a meal. 90 capsule 2     budesonide-formoterol (SYMBICORT) 160-4.5 MCG/ACT inhaler Inhale 2 puffs into the lungs 2 times daily 1 Inhaler 5     albuterol (PROAIR HFA, PROVENTIL HFA, VENTOLIN HFA) 108 (90 BASE) MCG/ACT inhaler Inhale 2 puffs into the lungs every 6 hours as needed for shortness of breath / dyspnea or wheezing 1 Inhaler 3     ibuprofen (ADVIL,MOTRIN) 600 MG tablet Take 1 tablet (600 mg) by mouth every 6 hours as needed for moderate pain 40 tablet 0     MAGNESIUM OXIDE PO Take 200 mg by mouth 2 times daily       Zinc Sulfate (ZINC 15 PO) Take by mouth daily       Cholecalciferol (VITAMIN D-3) 5000 UNITS TABS Take 1 tablet by mouth daily.       Calcium Carbonate (CALCIUM 500 PO) Take 1 tablet by mouth daily.       BIOTIN PO Take 1 tablet by mouth daily.       methylPREDNISolone (MEDROL DOSEPAK) 4 MG tablet Follow package instructions (Patient not taking: Reported on 5/10/2017) 21 tablet 0     LORazepam (ATIVAN) 0.5 MG tablet Take 1 tablet (0.5 mg) by mouth every 6 hours as needed for anxiety (sleep nause or vomiting) (Patient not taking: Reported on 5/10/2017) 30 tablet 2         Allergies:        Allergies   Allergen Reactions     Levaquin Rash        Social History:     Social History   Substance Use Topics     Smoking status: Former Smoker     Packs/day: 1.00     Years: 30.00     Types: Cigarettes     Start date: 8/11/1965     Quit date: 10/10/2003     Smokeless tobacco: Never Used     Alcohol use No       History   Drug Use No         Family History:     The patient's family history is notable for:    Family History   Problem  "Relation Age of Onset     CANCER Brother      testicular     DIABETES Sister      Ovarian Cancer Mother 60     Asthma Father      DIABETES Sister      Depression/Anxiety Daughter      Depression Daughter      OSTEOPOROSIS Sister      Other Cancer Brother          Physical Exam:     /74  Pulse 75  Temp 97.1  F (36.2  C)  Resp 15  Ht 1.676 m (5' 5.98\")  Wt 56.2 kg (123 lb 14.4 oz)  SpO2 96%  BMI 20.01 kg/m2  Body mass index is 20.01 kg/(m^2).    General Appearance: healthy and alert, no distress     HEENT: no thyromegaly, no palpable nodules or masses        Cardiovascular: regular rate and rhythm, no gallops, rubs or murmurs     Respiratory: lungs clear, no rales, rhonchi or wheezes, normal diaphragmatic excursion    Musculoskeletal: extremities non tender and without edema    Skin: no lesions or rashes     Neurological: normal gait, no gross defects     Psychiatric: appropriate mood and affect                               Hematological: normal cervical, supraclavicular and inguinal lymph nodes     Gastrointestinal:       abdomen soft, non-tender, non-distended, no organomegaly or masses    Genitourinary: External genitalia and urethral meatus appears normal; atrophic.  Vagina is smooth without nodularity or masses.  Cervix surgically absent.  Bimanual exam reveal no masses, nodularity or fullness.  Recto-vaginal exam confirms these findings.      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 She completed treatment 4/2016. She is here today for a follow up visit.        15 minutes were spent with this patient, over 50% of that time was spent in symptom management, treatment planning and in counseling and coordination of care.      Plan:     1.)        Patient to RTC in 3 months for her next surveillance visit and ; reviewed her most recent  and answered her questions to the best of my ability. Reviewed " remaining surveillance of every 3 months for one more year and then every 6 months x 3 additional years. Reviewed signs and symptoms for when she should contact the clinic or seek additional care. Patient to contact the clinic with any questions or concerns in the interim.     2.) Genetic risk factors were assessed and she is negative for mutations in GINGER, BARD1, BRCA1, BRCA2, BRIP1, CDH1, CHEK2, EPCAM, MLH1, MRE11A, MSH2, MSH6, MUTYH, NBN, NF1, PALB2, PMS2, PTEN, RAD50, RAD51C, RAD51D, SMARCA4, STK11, and TP53.     3.) Labs and/or tests ordered include:  . Port removal.      4.) Health maintenance issues addressed today include annual health maintenance and non-gynecologic issues with PCP.    5.)        Chest port: she is now a year post treatment and can have this removed at any point. At this time she would like to have this removed. Order place.     CHANDLER Jay, WHNP-BC, ANP-BC  Women's Health Nurse Practitioner  Adult Nurse Pracitioner  Gynecologic Oncology          CC  Patient Care Team:  Pillo Koehler MD as PCP - General (Family Practice)  Jessica Lua, RN as Continuity Care Coordinator (Gyn-Onc)  Iliana Pierre, JANE as   ARLINE SINGH

## 2017-05-10 NOTE — MR AVS SNAPSHOT
After Visit Summary   5/10/2017    Iris Carrion    MRN: [id number]           Patient Information     Date Of Birth          1945        Visit Information        Provider Department      5/10/2017 1:00 PM Yenny Pak APRN CNP [facility]        Today's Diagnoses     Malignant neoplasm of ovary, left (H)    -  1    Peritoneal carcinomatosis (H)           Follow-ups after your visit        Follow-up notes from your care team     Return in about 3 months (around 8/10/2017).      Your next 10 appointments already scheduled     May 19, 2017  1:45 PM CDT   PROCEDURE with Dary Hermosillo MD   Formerly named [facility])    [id number] [place] [id number] [phone]            Aug 16, 2017 12:45 PM CDT   LAB with LAB ONC [facility])    [address] [place] [address] [phone]           Patient must bring picture ID.  Patient should be prepared to give a urine specimen  Please do not eat 10-12 hours before your appointment if you are coming in fasting for labs on lipids, cholesterol, or glucose (sugar).  Pregnant women should follow their Care Team instructions. Water with medications is okay. Do not drink coffee or other fluids.   If you have concerns about taking  your medications, please ask at office or if scheduling via ITChart, send a message by clicking on Secure Messaging, Message Your Care Team.            Aug 16, 2017  1:30 PM CDT   Return Visit with CHANDLER Linarse CNP   [facility] ([facility])    [address] [place] [id number] [phone]              Future tests that were ordered for you today     Open Future Orders        Priority Expected Expires Ordered    IR Port Removal Right Routine  5/10/2018 5/10/2017            Who to contact     If you have questions or need  "follow up information about today's clinic visit or your schedule please contact [facility] directly at [phone].  Normal or non-critical lab and imaging results will be communicated to you by SocialVesthart, letter or phone within 4 business days after the clinic has received the results. If you do not hear from us within 7 days, please contact the clinic through Social[employer]est[employer] or phone. If you have a critical or abnormal lab result, we will notify you by phone as soon as possible.  Submit refill requests through [employer] or call your pharmacy and they will forward the refill request to us. Please allow 3 business days for your refill to be completed.          Additional Information About Your Visit        AXS-One Information     [employer]One gives you secure access to your electronic health record. If you see a primary care provider, you can also send messages to your care team and make appointments. If you have questions, please call your primary care clinic.  If you do not have a primary care provider, please call [phone] and they will assist you.      [employer] is an electronic gateway that provides easy, online access to your medical records. With [employer]One, you can request a clinic appointment, read your test results, renew a prescription or communicate with your care team.     To access your existing account, please contact your [facility] or call [phone] for assistance.        Care EveryWhere ID     This is your Care EveryWhere ID. This could be used by other organizations to access your [place] medical records  [id number]        Your Vitals Were     Pulse Temperature Respirations Height Pulse Oximetry BMI (Body Mass Index)    75 97.1  F (36.2  C) 15 1.676 m (5' 5.98\") 96% 20.01 kg/m2       Blood Pressure from Last 3 Encounters:   05/10/17 130/74   02/13/17 134/76   01/13/17 127/77    Weight from Last 3 Encounters:   05/10/17 56.2 kg (123 lb 14.4 oz)   02/13/17 55.8 " kg (123 lb)   01/13/17 55.8 kg (123 lb)               Primary Care Provider Office Phone # Fax #    Pillo Koehler [phone]       [employer] [facility] [address] [facility] [address]        Thank you!     Thank you for choosing [facility]  for your care. Our goal is always to provide you with excellent care. Hearing back from our patients is one way we can continue to improve our services. Please take a few minutes to complete the written survey that you may receive in the mail after your visit with us. Thank you!             Your Updated Medication List - Protect others around you: Learn how to safely use, store and throw away your medicines at www.disposemymeds.org.          This list is accurate as of: 5/10/17  1:42 PM.  Always use your most recent med list.                   Brand Name Dispense Instructions for use    albuterol 108 (90 BASE) MCG/ACT Inhaler    PROAIR HFA/PROVENTIL HFA/VENTOLIN HFA    1 Inhaler    Inhale 2 puffs into the lungs every 6 hours as needed for shortness of breath / dyspnea or wheezing       BIOTIN PO      Take 1 tablet by mouth daily.       budesonide-formoterol 160-4.5 MCG/ACT Inhaler    SYMBICORT    1 Inhaler    Inhale 2 puffs into the lungs 2 times daily       CALCIUM 500 PO      Take 1 tablet by mouth daily.       chlorthalidone 25 MG tablet    HYGROTON    90 tablet    Take 1 tablet (25 mg) by mouth daily       fluticasone 50 MCG/ACT spray    FLONASE    1 Bottle    Spray 2 sprays into both nostrils daily       ibuprofen 600 MG tablet    ADVIL/MOTRIN    40 tablet    Take 1 tablet (600 mg) by mouth every 6 hours as needed for moderate pain       ipratropium - albuterol 0.5 mg/2.5 mg/3 mL 0.5-2.5 (3) MG/3ML neb solution    DUONEB    30 vial    Take 1 vial (3 mLs) by nebulization every 4 hours as needed for shortness of breath / dyspnea or wheezing       loratadine 10 MG tablet    CLARITIN     Take 10 mg by mouth daily       MAGNESIUM  OXIDE PO      Take 200 mg by mouth 2 times daily       methylPREDNISolone 4 MG tablet    MEDROL DOSEPAK    21 tablet    Follow package instructions       omeprazole 40 MG capsule    priLOSEC    90 capsule    Take 1 capsule (40 mg) by mouth daily Take 30-60 minutes before a meal.       potassium chloride SA 20 MEQ CR tablet    potassium chloride    60 tablet    Take 1 tablet (20 mEq) by mouth 2 times daily       tiotropium 18 MCG capsule    SPIRIVA HANDIHALER    90 capsule    Inhale 1 capsule (18 mcg) into the lungs daily Inhale contents of one capsule. .       Vitamin D-3 5000 UNITS Tabs      Take 1 tablet by mouth daily.       ZINC 15 PO      Take by mouth daily

## 2017-05-10 NOTE — NURSING NOTE
"Oncology Rooming Note    May 10, 2017 1:05 PM   Iris Carrion is a 71 year old female who presents for:    Chief Complaint   Patient presents with     Oncology Clinic Visit     follow up     Initial Vitals: /74  Pulse 75  Temp 97.1  F (36.2  C)  Resp 15  Ht 1.676 m (5' 5.98\")  Wt 56.2 kg (123 lb 14.4 oz)  SpO2 96%  BMI 20.01 kg/m2 Estimated body mass index is 20.01 kg/(m^2) as calculated from the following:    Height as of this encounter: 1.676 m (5' 5.98\").    Weight as of this encounter: 56.2 kg (123 lb 14.4 oz). Body surface area is 1.62 meters squared.  No Pain (0) Comment: Data Unavailable   No LMP recorded. Patient is postmenopausal.  Allergies reviewed: Yes  Medications reviewed: Yes    Medications: Medication refills not needed today.  Pharmacy name entered into [facility]:    [facility] [address] [place], [place] [facility] [address] [facility] [address] [facility] [address] [place], [place] - [address]    Clinical concerns: none  [facility] was notified.    5 minutes for nursing intake (face to face time)     Nidia Joseph LPN              "

## 2017-05-12 ENCOUNTER — TELEPHONE (OUTPATIENT)
Dept: SURGERY | Facility: CLINIC | Age: 72
End: 2017-05-12

## 2017-05-12 NOTE — TELEPHONE ENCOUNTER
Called patient with appointment reminder, updated any changes in the chart and answered any questions. Nurse went over preparation instructions of the port removal. Pt verbalized understanding.    Preparation for a port removal:    You have been scheduled for a port removal with Dr. Hermosillo. The procedure takes about 15-20 minutes and will be done in the office.  During this procedure you are not sedated, a medication call Lidocaine will be injected under the skin to numb the area. This medication is effective in blocking the feeling of pain but will not make you sleepy nor affect your ability to drive. Following the procedure you will be given verbal and written post procedure instructions.     How to Prepare for the Procedure:    - On the day of the procedure eat and drink normally.  Please do not fast or skip meals on the day of your procedure.     -  Do not take any of the following medications 7 days before your procedure:   Anacin   Bufferin   Excedrin   Ibuprofen   Ecotrin   Motrin   Naprosyn   Any other aspirin based products.   Any other anti-inflammatory medications.     - If you are taking any anticoagulation medications such as Coumadin, Jantoven, Warfarin please discuss with your primary doctor for direction on whether these medications can be stopped prior to your appointment.     If you have any questions or concerns, feel free to contact the Surgery Clinic at [phone] during M-F, 8:00-5:00 business hours.

## 2017-05-19 ENCOUNTER — OFFICE VISIT (OUTPATIENT)
Dept: SURGERY | Facility: CLINIC | Age: 72
End: 2017-05-19
Attending: NURSE PRACTITIONER
Payer: COMMERCIAL

## 2017-05-19 VITALS
DIASTOLIC BLOOD PRESSURE: 79 MMHG | TEMPERATURE: 97.6 F | SYSTOLIC BLOOD PRESSURE: 148 MMHG | HEART RATE: 68 BPM | OXYGEN SATURATION: 98 %

## 2017-05-19 DIAGNOSIS — C56.2 MALIGNANT NEOPLASM OF OVARY, LEFT (H): ICD-10-CM

## 2017-05-19 DIAGNOSIS — C78.6 PERITONEAL CARCINOMATOSIS (H): ICD-10-CM

## 2017-05-19 PROCEDURE — 36590 REMOVAL TUNNELED CV CATH: CPT | Performed by: SURGERY

## 2017-05-19 NOTE — NURSING NOTE
"Iris Carrion's goals for this visit include:   Chief Complaint   Patient presents with     Procedure     Malignant neoplasm of ovary, left (H) port remvoal        She requests these members of her care team be copied on today's visit information: YES-PCP     Referring Provider:  CHANDLER Linares CNP  [facility]  [address] [facility] [address]  [place] [address]    Initial /79 (BP Location: Right arm, Patient Position: Chair, Cuff Size: Adult Regular)  Pulse 68  Temp 97.6  F (36.4  C) (Oral)  SpO2 98% Estimated body mass index is 20.01 kg/(m^2) as calculated from the following:    Height as of 5/10/17: 1.676 m (5' 5.98\").    Weight as of 5/10/17: 56.2 kg (123 lb 14.4 oz).  BP completed using cuff size: regular      "

## 2017-05-19 NOTE — PATIENT INSTRUCTIONS
"Wound Care Instructions  Possible complications of any surgical procedure are bleeding, infection, scarring, alteration in skin color and sensation, muscle weakness in the area, wound dehiscence or seperation, or recurrence of the lesion or disease. On occasion, after healing, a secondary procedure or revision may be recommended in order to obtain the best cosmetic or functional result.   After your surgery please follow these instructions:  1. There are absorbable stitches placed under your skin. These should dissolve in 7-10 days. There is also an outer \"super\" glue on the incision. The outer glue will fall off on its own, usually after 7-10 days. Please do not remove this outer glue, let it fall off naturally.  2. For the first 24 hour relax and take it easy. Do not do any vigorous exercise, heavy lifting, or bending forward. This could cause the wound to bleed.  3. Post-operative pain is usually mild. You may take plain or extra strength Tylenol every 4 hours as needed (do not take more than 4,000mg in one day). Do not take any medicine that contains aspirin, ibuprofen, or motrin unless you have been recommended these by a doctor.  Avoid alcohol and vitamin E as these may increase your tendency to bleed.  4. You may put an ice pack wrapped in a towel around the area for 20 minutes every 2-3 hours. This may help reduce swelling, bruising, and pain. Make sure the ice pack is waterproof.  5. You may see a small amount of drainage or blood. This is normal. However, if drainage or bleeding continues, you will need to apply firm pressure over the area for 15 minutes. If bleeding continues after applying pressure for 15 minutes then go to the nearest emergency room.  6. Use caution when washing the wound, be gentle and do not let the forceful shower stream hit the wound directly. PAT DRY.    Call Us If:  1. You have pain that is not controlled with Tylenol.  2. You have signs or symptoms of an infection, such as: fever " over 100 degrees F, redness, warmth, or foul-smelling or yellow/creamy drainage from the wound.  Who should I call with questions?    [facility]: [phone]

## 2017-05-19 NOTE — MR AVS SNAPSHOT
"              After Visit Summary   5/19/2017    Iris Carrion    MRN: [id number]           Patient Information     Date Of Birth          1945        Visit Information        Provider Department      5/19/2017 1:45 PM Dary Hermosillo MD [facility]        Today's Diagnoses     Malignant neoplasm of ovary, left (H)        Peritoneal carcinomatosis (H)          Care Instructions    Wound Care Instructions  Possible complications of any surgical procedure are bleeding, infection, scarring, alteration in skin color and sensation, muscle weakness in the area, wound dehiscence or seperation, or recurrence of the lesion or disease. On occasion, after healing, a secondary procedure or revision may be recommended in order to obtain the best cosmetic or functional result.   After your surgery please follow these instructions:  1. There are absorbable stitches placed under your skin. These should dissolve in 7-10 days. There is also an outer \"super\" glue on the incision. The outer glue will fall off on its own, usually after 7-10 days. Please do not remove this outer glue, let it fall off naturally.  2. For the first 24 hour relax and take it easy. Do not do any vigorous exercise, heavy lifting, or bending forward. This could cause the wound to bleed.  3. Post-operative pain is usually mild. You may take plain or extra strength Tylenol every 4 hours as needed (do not take more than 4,000mg in one day). Do not take any medicine that contains aspirin, ibuprofen, or motrin unless you have been recommended these by a doctor.  Avoid alcohol and vitamin E as these may increase your tendency to bleed.  4. You may put an ice pack wrapped in a towel around the area for 20 minutes every 2-3 hours. This may help reduce swelling, bruising, and pain. Make sure the ice pack is waterproof.  5. You may see a small amount of drainage or blood. This is normal. However, if drainage or bleeding continues, you will " need to apply firm pressure over the area for 15 minutes. If bleeding continues after applying pressure for 15 minutes then go to the nearest emergency room.  6. Use caution when washing the wound, be gentle and do not let the forceful shower stream hit the wound directly. PAT DRY.    Call Us If:  1. You have pain that is not controlled with Tylenol.  2. You have signs or symptoms of an infection, such as: fever over 100 degrees F, redness, warmth, or foul-smelling or yellow/creamy drainage from the wound.  Who should I call with questions?    [facility]: [phone]             Follow-ups after your visit        Your next 10 appointments already scheduled     Aug 16, 2017 12:45 PM CDT   LAB with LAB ONC [facility])    [address] [place] [address] [phone]           Patient must bring picture ID.  Patient should be prepared to give a urine specimen  Please do not eat 10-12 hours before your appointment if you are coming in fasting for labs on lipids, cholesterol, or glucose (sugar).  Pregnant women should follow their Care Team instructions. Water with medications is okay. Do not drink coffee or other fluids.   If you have concerns about taking  your medications, please ask at office or if scheduling via Startapp, send a message by clicking on Secure Messaging, Message Your Care Team.            Aug 16, 2017  1:30 PM CDT   Return Visit with CHANDLER Linares CNP   [facility])    [address] [address] [place] [address] [phone]              Who to contact     If you have questions or need follow up information about today's clinic visit or your schedule please contact [facility] directly at [phone].  Normal or non-critical lab and imaging results will be communicated to you by MyChart, letter or phone within 4  business days after the clinic has received the results. If you do not hear from us within 7 days, please contact the clinic through [employer] or phone. If you have a critical or abnormal lab result, we will notify you by phone as soon as possible.  Submit refill requests through [employer] or call your pharmacy and they will forward the refill request to us. Please allow 3 business days for your refill to be completed.          Additional Information About Your Visit        [employer] Information     [employer] gives you secure access to your electronic health record. If you see a primary care provider, you can also send messages to your care team and make appointments. If you have questions, please call your primary care clinic.  If you do not have a primary care provider, please call [phone] and they will assist you.      [employer] is an electronic gateway that provides easy, online access to your medical records. With [employer], you can request a clinic appointment, read your test results, renew a prescription or communicate with your care team.     To access your existing account, please contact your [facility] or call [phone] for assistance.        Care EveryWhere ID     This is your Care EveryWhere ID. This could be used by other organizations to access your [place] medical records  [id number]        Your Vitals Were     Pulse Temperature Pulse Oximetry             68 97.6  F (36.4  C) (Oral) 98%          Blood Pressure from Last 3 Encounters:   05/19/17 148/79   05/10/17 130/74   02/13/17 134/76    Weight from Last 3 Encounters:   05/10/17 56.2 kg (123 lb 14.4 oz)   02/13/17 55.8 kg (123 lb)   01/13/17 55.8 kg (123 lb)              We Performed the Following     IR Port Removal Right        Primary Care Provider Office Phone # Fax #    Pillo Koehler [phone]       [facility] [address] [facility] [address]        Thank you!     Thank you  for choosing [facility]  for your care. Our goal is always to provide you with excellent care. Hearing back from our patients is one way we can continue to improve our services. Please take a few minutes to complete the written survey that you may receive in the mail after your visit with us. Thank you!             Your Updated Medication List - Protect others around you: Learn how to safely use, store and throw away your medicines at www.disposemymeds.org.          This list is accurate as of: 5/19/17  2:13 PM.  Always use your most recent med list.                   Brand Name Dispense Instructions for use    albuterol 108 (90 BASE) MCG/ACT Inhaler    PROAIR HFA/PROVENTIL HFA/VENTOLIN HFA    1 Inhaler    Inhale 2 puffs into the lungs every 6 hours as needed for shortness of breath / dyspnea or wheezing       BIOTIN PO      Take 1 tablet by mouth daily.       budesonide-formoterol 160-4.5 MCG/ACT Inhaler    SYMBICORT    1 Inhaler    Inhale 2 puffs into the lungs 2 times daily       CALCIUM 500 PO      Take 1 tablet by mouth daily.       chlorthalidone 25 MG tablet    HYGROTON    90 tablet    Take 1 tablet (25 mg) by mouth daily       fluticasone 50 MCG/ACT spray    FLONASE    1 Bottle    Spray 2 sprays into both nostrils daily       ibuprofen 600 MG tablet    ADVIL/MOTRIN    40 tablet    Take 1 tablet (600 mg) by mouth every 6 hours as needed for moderate pain       ipratropium - albuterol 0.5 mg/2.5 mg/3 mL 0.5-2.5 (3) MG/3ML neb solution    DUONEB    30 vial    Take 1 vial (3 mLs) by nebulization every 4 hours as needed for shortness of breath / dyspnea or wheezing       loratadine 10 MG tablet    CLARITIN     Take 10 mg by mouth daily       MAGNESIUM OXIDE PO      Take 200 mg by mouth 2 times daily       omeprazole 40 MG capsule    priLOSEC    90 capsule    Take 1 capsule (40 mg) by mouth daily Take 30-60 minutes before a meal.       potassium chloride SA 20 MEQ CR tablet    potassium chloride     60 tablet    Take 1 tablet (20 mEq) by mouth 2 times daily       tiotropium 18 MCG capsule    SPIRIVA HANDIHALER    90 capsule    Inhale 1 capsule (18 mcg) into the lungs daily Inhale contents of one capsule. .       Vitamin D-3 5000 UNITS Tabs      Take 1 tablet by mouth daily.       ZINC 15 PO      Take by mouth daily

## 2017-06-01 NOTE — PROGRESS NOTES
IN-CLINIC PROCEDURE NOTE      PREOPERATIVE DIAGNOSIS:  Ovarian carcinoma.      POSTOPERATIVE DIAGNOSIS:  Ovarian carcinoma.      SURGICAL PROCEDURE PERFORMED:  Excision of a right anterior chest wall MediPort.      SURGEON:  Best Hermosillo MD      ASSISTANT:  None.      ANESTHESIA:  1% lidocaine with epinephrine.      COMPLICATIONS:  None.      ESTIMATED BLOOD LOSS:  Less than 5 cc.      DETAILS OF PROCEDURE:  After discussing the procedure with Iris Carrion, as well as the risks including but not limited to bleeding, infection, pneumothorax or hemothorax, consent was obtained.      A timeout was performed.  The area was prepped and draped in sterile fashion.  1% lidocaine was injected into the skin and subcutaneous tissue after confirming the patient had no allergy to lidocaine.  We made the incision over the previous incision site.  I dissected down to the MediPort catheter.  The catheter was dissected free and pulled from its insertion into the jugular vein.  Pressure was held for almost 2 minutes over the jugular vein insertion site.  I then dissected around the MediPort reservoir.  The specimen was removed in toto.  The area was irrigated.  No bleeding was identified.  The subcutaneous tissue was then approximated using a 4-0 Vicryl interrupted suture.  The skin incision was approximated using 5-0 Monocryl running subcuticular suture.  The wound was dressed with Dermabond.  The patient tolerated the procedure well.         BEST HERMOSILLO MD             D: 2017 19:02   T: 2017 06:35   MT: MICHELLE#[id number]      Name:     IRIS CARRION   MRN:      [id number]        Account:        [id number]   :      1945           Procedure Date: 2017      Document: [id number]       cc: Pillo Koehler MD

## 2017-06-18 ENCOUNTER — OFFICE VISIT (OUTPATIENT)
Dept: URGENT CARE | Facility: URGENT CARE | Age: 72
End: 2017-06-18
Payer: COMMERCIAL

## 2017-06-18 VITALS
SYSTOLIC BLOOD PRESSURE: 129 MMHG | HEART RATE: 82 BPM | TEMPERATURE: 97.6 F | OXYGEN SATURATION: 97 % | DIASTOLIC BLOOD PRESSURE: 69 MMHG | WEIGHT: 124.38 LBS | BODY MASS INDEX: 20.08 KG/M2

## 2017-06-18 DIAGNOSIS — M54.6 RIGHT-SIDED THORACIC BACK PAIN, UNSPECIFIED CHRONICITY: Primary | ICD-10-CM

## 2017-06-18 DIAGNOSIS — M41.9 SCOLIOSIS OF THORACIC SPINE, UNSPECIFIED SCOLIOSIS TYPE: ICD-10-CM

## 2017-06-18 PROCEDURE — 99213 OFFICE O/P EST LOW 20 MIN: CPT | Performed by: FAMILY MEDICINE

## 2017-06-18 RX ORDER — TRAMADOL HYDROCHLORIDE 50 MG/1
25-50 TABLET ORAL EVERY 8 HOURS PRN
Qty: 20 TABLET | Refills: 0 | Status: SHIPPED | OUTPATIENT
Start: 2017-06-18 | End: 2017-09-27

## 2017-06-18 NOTE — PROGRESS NOTES
SUBJECTIVE:                                                    Iris Carrion is a 71 year old female who presents to clinic today for the following health issues:      Back pain       Duration: chronic low back pain, worse this morning     Description (location/character/radiation): mid back pain     Intensity:  8/10    Accompanying signs and symptoms: none, no paresthesia, bowel or bladder symptoms, no h/o trauma, fall or other injury      History (similar episodes/previous evaluation): None    Precipitating or alleviating factors: None    Therapies tried and outcome: paulo back and body, aspirin, hot and cold pack, ibuprofen with no relief         Problem list and histories reviewed & adjusted, as indicated.  Additional history: as documented    Patient Active Problem List   Diagnosis     GERD (gastroesophageal reflux disease)     Osteoporosis     Advanced directives, counseling/discussion     CARDIOVASCULAR SCREENING; LDL GOAL LESS THAN 160     Cervical high risk HPV (human papillomavirus) test positive     Hearing loss     Acute pancreatitis     Hypokalemia     Peritoneal carcinomatosis (H)     Hypomagnesemia     Chemotherapy-induced neutropenia (H)     Chronic bronchitis, unspecified chronic bronchitis type (H)     S/P LUCILA-BSO     Malignant neoplasm of ovary, left (H)     Family history of malignant neoplasm of ovary     Essential hypertension with goal blood pressure less than 140/90     Pulmonary nodules     Past Surgical History:   Procedure Laterality Date     CHOLECYSTECTOMY  6/2014     COLONOSCOPY  12/4/2012    Procedure: COLONOSCOPY;  COLONOSCOPY SCREEN;  Surgeon: Mushtaq Lara MD;  Location: [facility]     GYN SURGERY       HERNIORRHAPHY HIATAL N/A 11/25/2015    Procedure: HERNIORRHAPHY HIATAL;  Surgeon: Chip Carty MD;  Location: [facility] OR     HYSTERECTOMY TOTAL ABD, ALVIN SALPINGO-OOPHORECTOMY, NODE DISSECTION, TUMOR DEBULKING, COMBINED Bilateral 11/25/2015    Procedure: COMBINED  HYSTERECTOMY TOTAL ABDOMINAL, SALPINGO-OOPHORECTOMY, NODE DISSECTION, TUMOR DEBULKING;  Surgeon: Emma Cabrera MD;  Location:  OR       Social History   Substance Use Topics     Smoking status: Former Smoker     Packs/day: 1.00     Years: 30.00     Types: Cigarettes     Start date: 8/11/1965     Quit date: 10/10/2003     Smokeless tobacco: Never Used     Alcohol use No     Family History   Problem Relation Age of Onset     CANCER Brother      testicular     DIABETES Sister      Ovarian Cancer Mother 60     Asthma Father      DIABETES Sister      Depression/Anxiety Daughter      Depression Daughter      OSTEOPOROSIS Sister      Other Cancer Brother          Current Outpatient Prescriptions   Medication Sig Dispense Refill     tiotropium (SPIRIVA HANDIHALER) 18 MCG capsule Inhale 1 capsule (18 mcg) into the lungs daily Inhale contents of one capsule. . 90 capsule 2     chlorthalidone (HYGROTON) 25 MG tablet Take 1 tablet (25 mg) by mouth daily 90 tablet 1     loratadine (CLARITIN) 10 MG tablet Take 10 mg by mouth daily       fluticasone (FLONASE) 50 MCG/ACT spray Spray 2 sprays into both nostrils daily 1 Bottle 1     ipratropium - albuterol 0.5 mg/2.5 mg/3 mL (DUONEB) 0.5-2.5 (3) MG/3ML neb solution Take 1 vial (3 mLs) by nebulization every 4 hours as needed for shortness of breath / dyspnea or wheezing 30 vial 5     potassium chloride SA (POTASSIUM CHLORIDE) 20 MEQ tablet Take 1 tablet (20 mEq) by mouth 2 times daily 60 tablet 0     omeprazole (PRILOSEC) 40 MG capsule Take 1 capsule (40 mg) by mouth daily Take 30-60 minutes before a meal. 90 capsule 2     budesonide-formoterol (SYMBICORT) 160-4.5 MCG/ACT inhaler Inhale 2 puffs into the lungs 2 times daily 1 Inhaler 5     albuterol (PROAIR HFA, PROVENTIL HFA, VENTOLIN HFA) 108 (90 BASE) MCG/ACT inhaler Inhale 2 puffs into the lungs every 6 hours as needed for shortness of breath / dyspnea or wheezing 1 Inhaler 3     ibuprofen (ADVIL,MOTRIN) 600 MG tablet Take  1 tablet (600 mg) by mouth every 6 hours as needed for moderate pain 40 tablet 0     MAGNESIUM OXIDE PO Take 200 mg by mouth 2 times daily       Zinc Sulfate (ZINC 15 PO) Take by mouth daily       Cholecalciferol (VITAMIN D-3) 5000 UNITS TABS Take 1 tablet by mouth daily.       Calcium Carbonate (CALCIUM 500 PO) Take 1 tablet by mouth daily.       BIOTIN PO Take 1 tablet by mouth daily.       Allergies   Allergen Reactions     Levaquin Rash     Recent Labs   Lab Test  10/05/16   1510  07/25/16   0857   02/01/16   1045   01/13/16   0840   12/14/15   1350   07/18/15   0930   11/03/12   1117   LDL   --   91   --    --    --    --    --    --    --   93   --   148*   HDL   --   61   --    --    --    --    --    --    --   48*   --   58   TRIG   --   166*   --    --    --    --    --    --    --   189*   --   119   ALT   --    --    --   18   --   35   --   21   < >   --    < >   --    CR  0.81  0.96   < >  0.77   < >  0.93   < >  0.62   < >  0.78   < >  0.74   GFRESTIMATED  70  58*   < >  74   < >  60*   < >  >90  Non  GFR Calc     < >  73   < >  79   GFRESTBLACK  85  70   < >  90   < >  72   < >  >90   GFR Calc     < >  88   < >  >90   POTASSIUM  3.2*  3.6   < >  3.5   < >  3.8   < >  3.5   < >  2.9*   < >  4.1    < > = values in this interval not displayed.      BP Readings from Last 3 Encounters:   06/18/17 129/69   05/19/17 148/79   05/10/17 130/74    Wt Readings from Last 3 Encounters:   06/18/17 124 lb 6 oz (56.4 kg)   05/10/17 123 lb 14.4 oz (56.2 kg)   02/13/17 123 lb (55.8 kg)                  Labs reviewed in EPIC    ROS:  Constitutional, HEENT, cardiovascular, pulmonary, gi and gu systems are negative, except as otherwise noted.    OBJECTIVE:                                                    /69  Pulse 82  Temp 97.6  F (36.4  C) (Tympanic)  Wt 124 lb 6 oz (56.4 kg)  SpO2 97%  BMI 20.08 kg/m2  Body mass index is 20.08 kg/(m^2).  GENERAL: alert, frail and  elderly  NECK: no adenopathy, no asymmetry, masses, or scars and thyroid normal to palpation  RESP: lungs clear to auscultation - no rales, rhonchi or wheezes  CV: regular rates and rhythm, normal S1 S2, no S3 or S4 and no murmur, click or rub  ABDOMEN: soft, nontender, no hepatosplenomegaly, no masses and bowel sounds normal  MS: right lateral thoracic paraspinal tenderness along with scoliotic spine, SLR negative, sensation to touch and pressure intact, pulses 3+   NEURO: no focal neurology      ASSESSMENT/PLAN:                                                          ICD-10-CM    1. Right-sided thoracic back pain, unspecified chronicity M54.6 traMADol (ULTRAM) 50 MG tablet     PHYSICAL THERAPY REFERRAL   2. Scoliosis of thoracic spine, unspecified scoliosis type M41.9 traMADol (ULTRAM) 50 MG tablet     PHYSICAL THERAPY REFERRAL       Discussed in detail differentials and further management. Symptoms are musculoskeletal in etiology. Recommended conservative treatment and suggested to continue tylenol, ibuprofen and heating massage. Tramadol prescribed for severe pain, common side effects discussed including sedation. Suggested physical therapy if symptoms persist or worsen, should also have imaging in that case. Written instructions/information provided. Patient understood and in agreement with the above plan. All questions are answered. Follow-up if symptoms persist or worsen.      MEDICATIONS:   Orders Placed This Encounter   Medications     traMADol (ULTRAM) 50 MG tablet     Sig: Take 0.5-1 tablets (25-50 mg) by mouth every 8 hours as needed for pain     Dispense:  20 tablet     Refill:  0     Patient Instructions       How Your Back Works  A healthy back allows you to bend and stretch without pain. The spine has three natural curves, which keep your body balanced. Strong, flexible muscles support your spine. Soft, cushioning disks separate the hard bones of your spine, allowing it to bend and move.    The  parts of the spine    The vertebrae are the 24 bones that make up the spine.    The spinous process is the part of each vertebra you can feel through your skin.    Each of these bones has a canal that runs top to bottom. Together these canals form a tunnel called the spinal canal.    The lamina of each vertebra forms the back of the spinal canal.    Running through the canal are nerves.    A foramen is a small opening where a nerve leaves the spinal canal.    Disks serve as cushions between vertebrae. A disk s soft center absorbs shock during movement.     Two vertebrae and a disk     The supporting muscles  Strong, flexible muscles help maintain your three natural curves. They hold your spine in proper alignment. This helps support your upper body. Strong core muscular including the stomach, buttock, and thigh muscles help take the strain off your back.  Date Last Reviewed: 8/31/2015 2000-2017 [employer]. [address] [place] [address]. All rights reserved. This information is not intended as a substitute for professional medical care. Always follow your healthcare professional's instructions.        Patient Education    Tramadol Hydrochloride Oral capsule, biphasic release    Tramadol Hydrochloride Oral disintegrating tablet    Tramadol Hydrochloride Oral tablet    Tramadol Hydrochloride Oral tablet, extended-release  Tramadol Hydrochloride Oral tablet  What is this medicine?  TRAMADOL (TRA ma dole) is a pain reliever. It is used to treat moderate to severe pain in adults.  This medicine may be used for other purposes; ask your health care provider or pharmacist if you have questions.  What should I tell my health care provider before I take this medicine?  They need to know if you have any of these conditions:    brain tumor    depression    drug abuse or addiction    head injury    if you frequently drink alcohol containing drinks    kidney disease or trouble passing urine    liver  disease    lung disease, asthma, or breathing problems    seizures or epilepsy    suicidal thoughts, plans, or attempt; a previous suicide attempt by you or a family member    an unusual or allergic reaction to tramadol, codeine, other medicines, foods, dyes, or preservatives    pregnant or trying to get pregnant    breast-feeding  How should I use this medicine?  Take this medicine by mouth with a full glass of water. Follow the directions on the prescription label. If the medicine upsets your stomach, take it with food or milk. Do not take more medicine than you are told to take.  Talk to your pediatrician regarding the use of this medicine in children. Special care may be needed.  Overdosage: If you think you have taken too much of this medicine contact a poison control center or emergency room at once.  NOTE: This medicine is only for you. Do not share this medicine with others.  What if I miss a dose?  If you miss a dose, take it as soon as you can. If it is almost time for your next dose, take only that dose. Do not take double or extra doses.  What may interact with this medicine?  Do not take this medicine with any of the following medications:    MAOIs like Carbex, Eldepryl, Marplan, Nardil, and Parnate  This medicine may also interact with the following medications:    alcohol or medicines that contain alcohol    antihistamines    benzodiazepines    bupropion    carbamazepine or oxcarbazepine    clozapine    cyclobenzaprine    digoxin    furazolidone    linezolid    medicines for depression, anxiety, or psychotic disturbances    medicines for migraine headache like almotriptan, eletriptan, frovatriptan, naratriptan, rizatriptan, sumatriptan, zolmitriptan    medicines for pain like pentazocine, buprenorphine, butorphanol, meperidine, nalbuphine, and propoxyphene    medicines for sleep    muscle relaxants    naltrexone    phenobarbital    phenothiazines like perphenazine, thioridazine, chlorpromazine,  mesoridazine, fluphenazine, prochlorperazine, promazine, and trifluoperazine    procarbazine    warfarin  This list may not describe all possible interactions. Give your health care provider a list of all the medicines, herbs, non-prescription drugs, or dietary supplements you use. Also tell them if you smoke, drink alcohol, or use illegal drugs. Some items may interact with your medicine.  What should I watch for while using this medicine?  Tell your doctor or health care professional if your pain does not go away, if it gets worse, or if you have new or a different type of pain. You may develop tolerance to the medicine. Tolerance means that you will need a higher dose of the medicine for pain relief. Tolerance is normal and is expected if you take this medicine for a long time.  Do not suddenly stop taking your medicine because you may develop a severe reaction. Your body becomes used to the medicine. This does NOT mean you are addicted. Addiction is a behavior related to getting and using a drug for a non-medical reason. If you have pain, you have a medical reason to take pain medicine. Your doctor will tell you how much medicine to take. If your doctor wants you to stop the medicine, the dose will be slowly lowered over time to avoid any side effects.  You may get drowsy or dizzy. Do not drive, use machinery, or do anything that needs mental alertness until you know how this medicine affects you. Do not stand or sit up quickly, especially if you are an older patient. This reduces the risk of dizzy or fainting spells. Alcohol can increase or decrease the effects of this medicine. Avoid alcoholic drinks.  You may have constipation. Try to have a bowel movement at least every 2 to 3 days. If you do not have a bowel movement for 3 days, call your doctor or health care professional.   Your mouth may get dry. Chewing sugarless gum or sucking hard candy, and drinking plenty of water may help. Contact your doctor if  the problem does not go away or is severe.  What side effects may I notice from receiving this medicine?  Side effects that you should report to your doctor or health care professional as soon as possible:    allergic reactions like skin rash, itching or hives, swelling of the face, lips, or tongue    breathing difficulties, wheezing    confusion    itching    light headedness or fainting spells    redness, blistering, peeling or loosening of the skin, including inside the mouth    seizures  Side effects that usually do not require medical attention (report to your doctor or health care professional if they continue or are bothersome):    constipation    dizziness    drowsiness    headache    nausea, vomiting  This list may not describe all possible side effects. Call your doctor for medical advice about side effects. You may report side effects to FDA at 1-791-FDA-2246.  Where should I keep my medicine?  Keep out of the reach of children.  Store at room temperature between 15 and 30 degrees C (59 and 86 degrees F). Keep container tightly closed. Throw away any unused medicine after the expiration date.  NOTE:This sheet is a summary. It may not cover all possible information. If you have questions about this medicine, talk to your doctor, pharmacist, or health care provider. Copyright  2016 [employer]            Indio Benavides MD  [facility]

## 2017-06-18 NOTE — NURSING NOTE
"Chief Complaint   Patient presents with     Back Pain       Initial /69  Pulse 82  Temp 97.6  F (36.4  C) (Tympanic)  Wt 124 lb 6 oz (56.4 kg)  SpO2 97%  BMI 20.08 kg/m2 Estimated body mass index is 20.08 kg/(m^2) as calculated from the following:    Height as of 5/10/17: 5' 5.98\" (1.676 m).    Weight as of this encounter: 124 lb 6 oz (56.4 kg).  Medication Reconciliation: complete     CHRISTY Lee      "

## 2017-06-18 NOTE — PATIENT INSTRUCTIONS
How Your Back Works  A healthy back allows you to bend and stretch without pain. The spine has three natural curves, which keep your body balanced. Strong, flexible muscles support your spine. Soft, cushioning disks separate the hard bones of your spine, allowing it to bend and move.    The parts of the spine    The vertebrae are the 24 bones that make up the spine.    The spinous process is the part of each vertebra you can feel through your skin.    Each of these bones has a canal that runs top to bottom. Together these canals form a tunnel called the spinal canal.    The lamina of each vertebra forms the back of the spinal canal.    Running through the canal are nerves.    A foramen is a small opening where a nerve leaves the spinal canal.    Disks serve as cushions between vertebrae. A disk s soft center absorbs shock during movement.     Two vertebrae and a disk     The supporting muscles  Strong, flexible muscles help maintain your three natural curves. They hold your spine in proper alignment. This helps support your upper body. Strong core muscular including the stomach, buttock, and thigh muscles help take the strain off your back.  Date Last Reviewed: 8/31/2015 2000-2017 [employer]. [address] [place] [address]. All rights reserved. This information is not intended as a substitute for professional medical care. Always follow your healthcare professional's instructions.        Patient Education    Tramadol Hydrochloride Oral capsule, biphasic release    Tramadol Hydrochloride Oral disintegrating tablet    Tramadol Hydrochloride Oral tablet    Tramadol Hydrochloride Oral tablet, extended-release  Tramadol Hydrochloride Oral tablet  What is this medicine?  TRAMADOL (TRA ma dole) is a pain reliever. It is used to treat moderate to severe pain in adults.  This medicine may be used for other purposes; ask your health care provider or pharmacist if you have questions.  What should  I tell my health care provider before I take this medicine?  They need to know if you have any of these conditions:    brain tumor    depression    drug abuse or addiction    head injury    if you frequently drink alcohol containing drinks    kidney disease or trouble passing urine    liver disease    lung disease, asthma, or breathing problems    seizures or epilepsy    suicidal thoughts, plans, or attempt; a previous suicide attempt by you or a family member    an unusual or allergic reaction to tramadol, codeine, other medicines, foods, dyes, or preservatives    pregnant or trying to get pregnant    breast-feeding  How should I use this medicine?  Take this medicine by mouth with a full glass of water. Follow the directions on the prescription label. If the medicine upsets your stomach, take it with food or milk. Do not take more medicine than you are told to take.  Talk to your pediatrician regarding the use of this medicine in children. Special care may be needed.  Overdosage: If you think you have taken too much of this medicine contact a poison control center or emergency room at once.  NOTE: This medicine is only for you. Do not share this medicine with others.  What if I miss a dose?  If you miss a dose, take it as soon as you can. If it is almost time for your next dose, take only that dose. Do not take double or extra doses.  What may interact with this medicine?  Do not take this medicine with any of the following medications:    MAOIs like Carbex, Eldepryl, Marplan, Nardil, and Parnate  This medicine may also interact with the following medications:    alcohol or medicines that contain alcohol    antihistamines    benzodiazepines    bupropion    carbamazepine or oxcarbazepine    clozapine    cyclobenzaprine    digoxin    furazolidone    linezolid    medicines for depression, anxiety, or psychotic disturbances    medicines for migraine headache like almotriptan, eletriptan, frovatriptan, naratriptan,  rizatriptan, sumatriptan, zolmitriptan    medicines for pain like pentazocine, buprenorphine, butorphanol, meperidine, nalbuphine, and propoxyphene    medicines for sleep    muscle relaxants    naltrexone    phenobarbital    phenothiazines like perphenazine, thioridazine, chlorpromazine, mesoridazine, fluphenazine, prochlorperazine, promazine, and trifluoperazine    procarbazine    warfarin  This list may not describe all possible interactions. Give your health care provider a list of all the medicines, herbs, non-prescription drugs, or dietary supplements you use. Also tell them if you smoke, drink alcohol, or use illegal drugs. Some items may interact with your medicine.  What should I watch for while using this medicine?  Tell your doctor or health care professional if your pain does not go away, if it gets worse, or if you have new or a different type of pain. You may develop tolerance to the medicine. Tolerance means that you will need a higher dose of the medicine for pain relief. Tolerance is normal and is expected if you take this medicine for a long time.  Do not suddenly stop taking your medicine because you may develop a severe reaction. Your body becomes used to the medicine. This does NOT mean you are addicted. Addiction is a behavior related to getting and using a drug for a non-medical reason. If you have pain, you have a medical reason to take pain medicine. Your doctor will tell you how much medicine to take. If your doctor wants you to stop the medicine, the dose will be slowly lowered over time to avoid any side effects.  You may get drowsy or dizzy. Do not drive, use machinery, or do anything that needs mental alertness until you know how this medicine affects you. Do not stand or sit up quickly, especially if you are an older patient. This reduces the risk of dizzy or fainting spells. Alcohol can increase or decrease the effects of this medicine. Avoid alcoholic drinks.  You may have  constipation. Try to have a bowel movement at least every 2 to 3 days. If you do not have a bowel movement for 3 days, call your doctor or health care professional.   Your mouth may get dry. Chewing sugarless gum or sucking hard candy, and drinking plenty of water may help. Contact your doctor if the problem does not go away or is severe.  What side effects may I notice from receiving this medicine?  Side effects that you should report to your doctor or health care professional as soon as possible:    allergic reactions like skin rash, itching or hives, swelling of the face, lips, or tongue    breathing difficulties, wheezing    confusion    itching    light headedness or fainting spells    redness, blistering, peeling or loosening of the skin, including inside the mouth    seizures  Side effects that usually do not require medical attention (report to your doctor or health care professional if they continue or are bothersome):    constipation    dizziness    drowsiness    headache    nausea, vomiting  This list may not describe all possible side effects. Call your doctor for medical advice about side effects. You may report side effects to FDA at 9-325-FDA-4286.  Where should I keep my medicine?  Keep out of the reach of children.  Store at room temperature between 15 and 30 degrees C (59 and 86 degrees F). Keep container tightly closed. Throw away any unused medicine after the expiration date.  NOTE:This sheet is a summary. It may not cover all possible information. If you have questions about this medicine, talk to your doctor, pharmacist, or health care provider. Copyright  2016 [employer]

## 2017-06-18 NOTE — MR AVS SNAPSHOT
After Visit Summary   6/18/2017    Iris Carrion    MRN: [id number]           Patient Information     Date Of Birth          1945        Visit Information        Provider Department      6/18/2017 12:30 PM Indio Benavides MD [facility]        Today's Diagnoses     Right-sided thoracic back pain, unspecified chronicity    -  1    Scoliosis of thoracic spine, unspecified scoliosis type          Care Instructions      How Your Back Works  A healthy back allows you to bend and stretch without pain. The spine has three natural curves, which keep your body balanced. Strong, flexible muscles support your spine. Soft, cushioning disks separate the hard bones of your spine, allowing it to bend and move.    The parts of the spine    The vertebrae are the 24 bones that make up the spine.    The spinous process is the part of each vertebra you can feel through your skin.    Each of these bones has a canal that runs top to bottom. Together these canals form a tunnel called the spinal canal.    The lamina of each vertebra forms the back of the spinal canal.    Running through the canal are nerves.    A foramen is a small opening where a nerve leaves the spinal canal.    Disks serve as cushions between vertebrae. A disk s soft center absorbs shock during movement.     Two vertebrae and a disk     The supporting muscles  Strong, flexible muscles help maintain your three natural curves. They hold your spine in proper alignment. This helps support your upper body. Strong core muscular including the stomach, buttock, and thigh muscles help take the strain off your back.  Date Last Reviewed: 8/31/2015 2000-2017 [employer]. [address] [place] [address], [place] [address]. All rights reserved. This information is not intended as a substitute for professional medical care. Always follow your healthcare professional's instructions.        Patient Education    Tramadol Hydrochloride Oral  capsule, biphasic release    Tramadol Hydrochloride Oral disintegrating tablet    Tramadol Hydrochloride Oral tablet    Tramadol Hydrochloride Oral tablet, extended-release  Tramadol Hydrochloride Oral tablet  What is this medicine?  TRAMADOL (TRA ma dole) is a pain reliever. It is used to treat moderate to severe pain in adults.  This medicine may be used for other purposes; ask your health care provider or pharmacist if you have questions.  What should I tell my health care provider before I take this medicine?  They need to know if you have any of these conditions:    brain tumor    depression    drug abuse or addiction    head injury    if you frequently drink alcohol containing drinks    kidney disease or trouble passing urine    liver disease    lung disease, asthma, or breathing problems    seizures or epilepsy    suicidal thoughts, plans, or attempt; a previous suicide attempt by you or a family member    an unusual or allergic reaction to tramadol, codeine, other medicines, foods, dyes, or preservatives    pregnant or trying to get pregnant    breast-feeding  How should I use this medicine?  Take this medicine by mouth with a full glass of water. Follow the directions on the prescription label. If the medicine upsets your stomach, take it with food or milk. Do not take more medicine than you are told to take.  Talk to your pediatrician regarding the use of this medicine in children. Special care may be needed.  Overdosage: If you think you have taken too much of this medicine contact a poison control center or emergency room at once.  NOTE: This medicine is only for you. Do not share this medicine with others.  What if I miss a dose?  If you miss a dose, take it as soon as you can. If it is almost time for your next dose, take only that dose. Do not take double or extra doses.  What may interact with this medicine?  Do not take this medicine with any of the following medications:    MAOIs like Carbex,  Eldepryl, Marplan, Nardil, and Parnate  This medicine may also interact with the following medications:    alcohol or medicines that contain alcohol    antihistamines    benzodiazepines    bupropion    carbamazepine or oxcarbazepine    clozapine    cyclobenzaprine    digoxin    furazolidone    linezolid    medicines for depression, anxiety, or psychotic disturbances    medicines for migraine headache like almotriptan, eletriptan, frovatriptan, naratriptan, rizatriptan, sumatriptan, zolmitriptan    medicines for pain like pentazocine, buprenorphine, butorphanol, meperidine, nalbuphine, and propoxyphene    medicines for sleep    muscle relaxants    naltrexone    phenobarbital    phenothiazines like perphenazine, thioridazine, chlorpromazine, mesoridazine, fluphenazine, prochlorperazine, promazine, and trifluoperazine    procarbazine    warfarin  This list may not describe all possible interactions. Give your health care provider a list of all the medicines, herbs, non-prescription drugs, or dietary supplements you use. Also tell them if you smoke, drink alcohol, or use illegal drugs. Some items may interact with your medicine.  What should I watch for while using this medicine?  Tell your doctor or health care professional if your pain does not go away, if it gets worse, or if you have new or a different type of pain. You may develop tolerance to the medicine. Tolerance means that you will need a higher dose of the medicine for pain relief. Tolerance is normal and is expected if you take this medicine for a long time.  Do not suddenly stop taking your medicine because you may develop a severe reaction. Your body becomes used to the medicine. This does NOT mean you are addicted. Addiction is a behavior related to getting and using a drug for a non-medical reason. If you have pain, you have a medical reason to take pain medicine. Your doctor will tell you how much medicine to take. If your doctor wants you to stop the  medicine, the dose will be slowly lowered over time to avoid any side effects.  You may get drowsy or dizzy. Do not drive, use machinery, or do anything that needs mental alertness until you know how this medicine affects you. Do not stand or sit up quickly, especially if you are an older patient. This reduces the risk of dizzy or fainting spells. Alcohol can increase or decrease the effects of this medicine. Avoid alcoholic drinks.  You may have constipation. Try to have a bowel movement at least every 2 to 3 days. If you do not have a bowel movement for 3 days, call your doctor or health care professional.   Your mouth may get dry. Chewing sugarless gum or sucking hard candy, and drinking plenty of water may help. Contact your doctor if the problem does not go away or is severe.  What side effects may I notice from receiving this medicine?  Side effects that you should report to your doctor or health care professional as soon as possible:    allergic reactions like skin rash, itching or hives, swelling of the face, lips, or tongue    breathing difficulties, wheezing    confusion    itching    light headedness or fainting spells    redness, blistering, peeling or loosening of the skin, including inside the mouth    seizures  Side effects that usually do not require medical attention (report to your doctor or health care professional if they continue or are bothersome):    constipation    dizziness    drowsiness    headache    nausea, vomiting  This list may not describe all possible side effects. Call your doctor for medical advice about side effects. You may report side effects to FDA at 3-059-JEO-1303.  Where should I keep my medicine?  Keep out of the reach of children.  Store at room temperature between 15 and 30 degrees C (59 and 86 degrees F). Keep container tightly closed. Throw away any unused medicine after the expiration date.  NOTE:This sheet is a summary. It may not cover all possible information. If  "you have questions about this medicine, talk to your doctor, pharmacist, or health care provider. Copyright  2016 [employer]                Follow-ups after your visit        Additional Services     PHYSICAL THERAPY REFERRAL       *This therapy referral will be filtered to a centralized scheduling office at [facility] and the patient will receive a call to schedule an appointment at a [facility] location most convenient for them. *     [facility] provides Physical Therapy evaluation and treatment and many specialty services across the [facility] system.  If requesting a specialty program, please choose from the list below.    If you have not heard from the scheduling office within 2 business days, please call [phone] for all locations, with the exception of [place], please call [phone].  Treatment: Evaluation & Treatment  Special Instructions/Modalities: none   Special Programs: None    Please be aware that coverage of these services is subject to the terms and limitations of your health insurance plan.  Call member services at your health plan with any benefit or coverage questions.      **Note to Provider:  If you are referring outside of [facility] for the therapy appointment, please list the name of the location in the \"special instructions\" above, print the referral and give to the patient to schedule the appointment.                  Your next 10 appointments already scheduled     Aug 28, 2017  3:30 PM CDT   LAB with LAB ONC [facility] ([facility])    [address] [place] [address] [phone]           Patient must bring picture ID.  Patient should be prepared to give a urine specimen  Please do not eat 10-12 hours before your appointment if you are coming in fasting for labs on lipids, cholesterol, or glucose (sugar).  Pregnant women should follow their Care Team instructions. Water with medications " is okay. Do not drink coffee or other fluids.   If you have concerns about taking  your medications, please ask at office or if scheduling via [employer] send a message by clicking on Secure Messaging, Message Your Care Team.            Aug 28, 2017  4:00 PM CDT   Return Visit with CHANDLER Linares CNP   [facility] ([facility])    [address] [place] [address] [phone]              Who to contact     If you have questions or need follow up information about today's clinic visit or your schedule please contact [facility] directly at [phone].  Normal or non-critical lab and imaging results will be communicated to you by MyChart, letter or phone within 4 business days after the clinic has received the results. If you do not hear from us within 7 days, please contact the clinic through [employer] or phone. If you have a critical or abnormal lab result, we will notify you by phone as soon as possible.  Submit refill requests through [employer] or call your pharmacy and they will forward the refill request to us. Please allow 3 business days for your refill to be completed.          Additional Information About Your Visit        [employer] Information     [employer] gives you secure access to your electronic health record. If you see a primary care provider, you can also send messages to your care team and make appointments. If you have questions, please call your primary care clinic.  If you do not have a primary care provider, please call [phone] and they will assist you.        Care EveryWhere ID     This is your Care EveryWhere ID. This could be used by other organizations to access your [facility] medical records  [id number]        Your Vitals Were     Pulse Temperature Pulse Oximetry BMI (Body Mass Index)          82 97.6  F (36.4  C) (Tympanic) 97% 20.08 kg/m2         Blood Pressure from Last 3 Encounters:   06/18/17 129/69   05/19/17  148/79   05/10/17 130/74    Weight from Last 3 Encounters:   06/18/17 124 lb 6 oz (56.4 kg)   05/10/17 123 lb 14.4 oz (56.2 kg)   02/13/17 123 lb (55.8 kg)              We Performed the Following     PHYSICAL THERAPY REFERRAL          Today's Medication Changes          These changes are accurate as of: 6/18/17  1:00 PM.  If you have any questions, ask your nurse or doctor.               Start taking these medicines.        Dose/Directions    traMADol 50 MG tablet   Commonly known as:  ULTRAM   Used for:  Right-sided thoracic back pain, unspecified chronicity, Scoliosis of thoracic spine, unspecified scoliosis type   Started by:  Indio Benavides MD        Dose:  25-50 mg   Take 0.5-1 tablets (25-50 mg) by mouth every 8 hours as needed for pain   Quantity:  20 tablet   Refills:  0            Where to get your medicines      Some of these will need a paper prescription and others can be bought over the counter.  Ask your nurse if you have questions.     Bring a paper prescription for each of these medications     traMADol 50 MG tablet                Primary Care Provider Office Phone # Fax #    iPllo Koehler [phone]       [facility] [address] [facility] [address]        Thank you!     Thank you for choosing [facility]  for your care. Our goal is always to provide you with excellent care. Hearing back from our patients is one way we can continue to improve our services. Please take a few minutes to complete the written survey that you may receive in the mail after your visit with us. Thank you!             Your Updated Medication List - Protect others around you: Learn how to safely use, store and throw away your medicines at www.disposemymeds.org.          This list is accurate as of: 6/18/17  1:00 PM.  Always use your most recent med list.                   Brand Name Dispense Instructions for use    albuterol 108 (90 BASE) MCG/ACT Inhaler    PROAIR  HFA/PROVENTIL HFA/VENTOLIN HFA    1 Inhaler    Inhale 2 puffs into the lungs every 6 hours as needed for shortness of breath / dyspnea or wheezing       BIOTIN PO      Take 1 tablet by mouth daily.       budesonide-formoterol 160-4.5 MCG/ACT Inhaler    SYMBICORT    1 Inhaler    Inhale 2 puffs into the lungs 2 times daily       CALCIUM 500 PO      Take 1 tablet by mouth daily.       chlorthalidone 25 MG tablet    HYGROTON    90 tablet    Take 1 tablet (25 mg) by mouth daily       fluticasone 50 MCG/ACT spray    FLONASE    1 Bottle    Spray 2 sprays into both nostrils daily       ibuprofen 600 MG tablet    ADVIL/MOTRIN    40 tablet    Take 1 tablet (600 mg) by mouth every 6 hours as needed for moderate pain       ipratropium - albuterol 0.5 mg/2.5 mg/3 mL 0.5-2.5 (3) MG/3ML neb solution    DUONEB    30 vial    Take 1 vial (3 mLs) by nebulization every 4 hours as needed for shortness of breath / dyspnea or wheezing       loratadine 10 MG tablet    CLARITIN     Take 10 mg by mouth daily       MAGNESIUM OXIDE PO      Take 200 mg by mouth 2 times daily       omeprazole 40 MG capsule    priLOSEC    90 capsule    Take 1 capsule (40 mg) by mouth daily Take 30-60 minutes before a meal.       potassium chloride SA 20 MEQ CR tablet    potassium chloride    60 tablet    Take 1 tablet (20 mEq) by mouth 2 times daily       tiotropium 18 MCG capsule    SPIRIVA HANDIHALER    90 capsule    Inhale 1 capsule (18 mcg) into the lungs daily Inhale contents of one capsule. .       traMADol 50 MG tablet    ULTRAM    20 tablet    Take 0.5-1 tablets (25-50 mg) by mouth every 8 hours as needed for pain       Vitamin D-3 5000 UNITS Tabs      Take 1 tablet by mouth daily.       ZINC 15 PO      Take by mouth daily

## 2017-07-07 ENCOUNTER — THERAPY VISIT (OUTPATIENT)
Dept: PHYSICAL THERAPY | Facility: CLINIC | Age: 72
End: 2017-07-07
Payer: COMMERCIAL

## 2017-07-07 DIAGNOSIS — M54.50 BILATERAL LOW BACK PAIN WITHOUT SCIATICA, UNSPECIFIED CHRONICITY: Primary | ICD-10-CM

## 2017-07-07 PROCEDURE — 97110 THERAPEUTIC EXERCISES: CPT | Mod: GP | Performed by: PHYSICAL THERAPIST

## 2017-07-07 PROCEDURE — 97161 PT EVAL LOW COMPLEX 20 MIN: CPT | Mod: GP | Performed by: PHYSICAL THERAPIST

## 2017-07-07 NOTE — MR AVS SNAPSHOT
After Visit Summary   7/7/2017    Iris Carrion    MRN: [id number]           Patient Information     Date Of Birth          1945        Visit Information        Provider Department      7/7/2017 2:10 PM Kennedy John PT [facility] [place] [facility]        Today's Diagnoses     Bilateral low back pain without sciatica, unspecified chronicity    -  1       Follow-ups after your visit        Your next 10 appointments already scheduled     Jul 14, 2017  2:10 PM CDT   JANE Spine with Andrzej Barcenas PT   [facility] [place] [facility] ([facility] [place])    [address] [facility] [facility] [address]  [place] [address]   [phone]            Jul 21, 2017  2:10 PM CDT   JANE Spine with Andrzej Barcenas PT   [facility] [place] [facility] ([facility] [place])    [address] [facility] [address]  [place] [address]   [phone]            Aug 30, 2017  8:30 AM CDT   LAB with LAB ONC [facility])    [id number] [place] [address] [phone]           Patient must bring picture ID.  Patient should be prepared to give a urine specimen  Please do not eat 10-12 hours before your appointment if you are coming in fasting for labs on lipids, cholesterol, or glucose (sugar).  Pregnant women should follow their Care Team instructions. Water with medications is okay. Do not drink coffee or other fluids.   If you have concerns about taking  your medications, please ask at office or if scheduling via [employer], send a message by clicking on Secure Messaging, Message Your Care Team.            Aug 30, 2017  9:00 AM CDT   Return Visit with CHANDLER Linares [facility])    [address] [address] [place] [address] [phone]              Who to contact     If you have questions or need follow up information about today's clinic  visit or your schedule please contact [facility] directly at [phone].  Normal or non-critical lab and imaging results will be communicated to you by MyChart, letter or phone within 4 business days after the clinic has received the results. If you do not hear from us within 7 days, please contact the clinic through [employer] or phone. If you have a critical or abnormal lab result, we will notify you by phone as soon as possible.  Submit refill requests through [employer] or call your pharmacy and they will forward the refill request to us. Please allow 3 business days for your refill to be completed.          Additional Information About Your Visit        [employer] Information     [employer] gives you secure access to your electronic health record. If you see a primary care provider, you can also send messages to your care team and make appointments. If you have questions, please call your primary care clinic.  If you do not have a primary care provider, please call [phone] and they will assist you.        Care EveryWhere ID     This is your Care EveryWhere ID. This could be used by other organizations to access your [facility] medical records  [id number]         Blood Pressure from Last 3 Encounters:   06/18/17 129/69   05/19/17 148/79   05/10/17 130/74    Weight from Last 3 Encounters:   06/18/17 56.4 kg (124 lb 6 oz)   05/10/17 56.2 kg (123 lb 14.4 oz)   02/13/17 55.8 kg (123 lb)              We Performed the Following     HC PT EVAL, LOW COMPLEXITY     JANE INITIAL EVAL REPORT     THERAPEUTIC EXERCISES        Primary Care Provider Office Phone # Fax #    Pillo Koehler [phone]       [facility] [address] [facility] [address]        Equal Access to Services     [place] RODRIGUEZ : Junior Hernandes, anisha couch, delia wallace, vignesh coats. So [facility] [phone].    ATENCIÓN: Shanna ewing,  tiene a pena disposición servicios gratuitos de asistencia lingüística. Li bazzi [phone].    We comply with applicable federal civil rights laws and [place] laws. We do not discriminate on the basis of race, color, national origin, age, disability sex, sexual orientation or gender identity.            Thank you!     Thank you for choosing [facility]  for your care. Our goal is always to provide you with excellent care. Hearing back from our patients is one way we can continue to improve our services. Please take a few minutes to complete the written survey that you may receive in the mail after your visit with us. Thank you!             Your Updated Medication List - Protect others around you: Learn how to safely use, store and throw away your medicines at www.disposemymeds.org.          This list is accurate as of: 7/7/17 11:59 PM.  Always use your most recent med list.                   Brand Name Dispense Instructions for use Diagnosis    albuterol 108 (90 BASE) MCG/ACT Inhaler    PROAIR HFA/PROVENTIL HFA/VENTOLIN HFA    1 Inhaler    Inhale 2 puffs into the lungs every 6 hours as needed for shortness of breath / dyspnea or wheezing    Chronic bronchitis, unspecified chronic bronchitis type (H)       BIOTIN PO      Take 1 tablet by mouth daily.        budesonide-formoterol 160-4.5 MCG/ACT Inhaler    SYMBICORT    1 Inhaler    Inhale 2 puffs into the lungs 2 times daily    COPD (chronic obstructive pulmonary disease) (H)       CALCIUM 500 PO      Take 1 tablet by mouth daily.        chlorthalidone 25 MG tablet    HYGROTON    90 tablet    Take 1 tablet (25 mg) by mouth daily    Essential hypertension with goal blood pressure less than 140/90       fluticasone 50 MCG/ACT spray    FLONASE    1 Bottle    Spray 2 sprays into both nostrils daily    Non-seasonal allergic rhinitis due to animal hair and dander       ibuprofen 600 MG tablet    ADVIL/MOTRIN    40 tablet    Take 1 tablet  (600 mg) by mouth every 6 hours as needed for moderate pain    S/P LUCILA-BSO       ipratropium - albuterol 0.5 mg/2.5 mg/3 mL 0.5-2.5 (3) MG/3ML neb solution    DUONEB    30 vial    Take 1 vial (3 mLs) by nebulization every 4 hours as needed for shortness of breath / dyspnea or wheezing    COPD (chronic obstructive pulmonary disease) (H)       loratadine 10 MG tablet    CLARITIN     Take 10 mg by mouth daily        MAGNESIUM OXIDE PO      Take 200 mg by mouth 2 times daily        omeprazole 40 MG capsule    priLOSEC    90 capsule    Take 1 capsule (40 mg) by mouth daily Take 30-60 minutes before a meal.    GERD (gastroesophageal reflux disease)       potassium chloride SA 20 MEQ CR tablet    potassium chloride    60 tablet    Take 1 tablet (20 mEq) by mouth 2 times daily    Hypokalemia       tiotropium 18 MCG capsule    SPIRIVA HANDIHALER    90 capsule    Inhale 1 capsule (18 mcg) into the lungs daily Inhale contents of one capsule. .    COPD (chronic obstructive pulmonary disease) (H)       traMADol 50 MG tablet    ULTRAM    20 tablet    Take 0.5-1 tablets (25-50 mg) by mouth every 8 hours as needed for pain    Right-sided thoracic back pain, unspecified chronicity, Scoliosis of thoracic spine, unspecified scoliosis type       Vitamin D-3 5000 UNITS Tabs      Take 1 tablet by mouth daily.        ZINC 15 PO      Take by mouth daily

## 2017-07-07 NOTE — PROGRESS NOTES
[facility] Initial Evaluation    Subjective:    Patient is a 71 year old female presenting with rehab back hpi. The history is provided by the patient. No  was used.   Iris Carrion is a 71 year old female with a lumbar condition.  Condition occurred with:  Insidious onset.  Condition occurred: for unknown reasons.  This is a chronic and recurrent condition  Patient reports history of recurrent low back pain with exacerbation of pain 3 weeks ago. She reports no change in activity level prior to this, but woke up with the pain one morning and could barely move. Most recently saw MD for this problem on 6/18/17.    Patient reports pain:  Lower lumbar spine.  Radiates to:  No radiation.  Pain is described as aching and is intermittent and reported as 3/10 (up to 5-6/10).  Associated symptoms:  Loss of motion/stiffness. Pain is worse during the day.  Symptoms are exacerbated by certain positions and standing and relieved by activity/movement.  Since onset symptoms are gradually improving.        General health as reported by patient is fair.  Pertinent medical history includes:  Osteoporosis, cancer, high blood pressure, asthma, emphysema, migraines and history of fractures (right hip fracture - June 2016).  Medical allergies: yes (Levaquin).  Other surgeries include:  Cancer surgery (Nov. 2015 - ovarian cancer).  Current medications:  High blood pressure medication and other (COPD medications, vitamins, Omeprazol).  Current occupation is Retired  .        Barriers include:  None as reported by the patient.    Red flags:  None as reported by the patient.                        Objective:          Flexibility/Screens:       Lower Extremity:  Decreased left lower extremity flexibility:Piriformis and Hamstrings    Decreased right lower extremity flexibility:  Hamstrings               Lumbar/SI Evaluation              Lumbar Palpation:  Palpation (lumbar): Hypertonicity in  bilateral lumbar and thoracic paraspinals.  Tenderness present at Left:    Erector Spinae  Tenderness present at Right: Erector Spinae                                                       Bruce Lumbar Evaluation    Posture:  Sitting: fair  Standing: fair  Lordosis: Reduced  Lateral Shift: no  Correction of Posture: no effect  Other Observations: Increased thoracic kyphosis  Movement Loss:  Flexion (Flex): min  Extension (EXT): mod and pain  Side Glide R (SG R): mod  Side Glide L (SG L): mod, major and pain  Test Movements:  FIS: During: no effect  After: no effect    Repeat FIS: During: no effect  After: no effect  Mechanical Response: no effect  EIS: During: increases  After: no worse    Repeat EIS: During: increases  After: worse  Mechanical Response: no effect  FATOU: During: no effect  After: no effect    Repeat FATOU: During: decreases  After: better  Mechanical Response: IncROM    SGIS R: During: no effect  After: no effect      SGIS L: During: increases  After: no worse                                                 ROS    Assessment/Plan:      Patient is a 71 year old female with lumbar complaints.    Patient has the following significant findings with corresponding treatment plan.                Diagnosis 1:  Low back pain    Pain -  self management, education, directional preference exercise and home program  Decreased ROM/flexibility - manual therapy, therapeutic exercise and home program  Decreased joint mobility - manual therapy, therapeutic exercise and home program  Impaired muscle performance - neuro re-education and home program  Decreased function - therapeutic activities and home program  Impaired posture - neuro re-education and home program    Therapy Evaluation Codes:   1) History comprised of:   Personal factors that impact the plan of care:      Age.    Comorbidity factors that impact the plan of care are:      Cancer, Emphysema and Osteoporosis.     Medications impacting care:  None.  2) Examination of Body Systems comprised of:   Body structures and functions that impact the plan of care:      Lumbar spine.   Activity limitations that impact the plan of care are:      Standing.  3) Clinical presentation characteristics are:   Stable/Uncomplicated.  4) Decision-Making    Low complexity using standardized patient assessment instrument and/or measureable assessment of functional outcome.  Cumulative Therapy Evaluation is: Low complexity.    Previous and current functional limitations:  (See Goal Flow Sheet for this information)    Short term and Long term goals: (See Goal Flow Sheet for this information)     Communication ability:  Patient appears to be able to clearly communicate and understand verbal and written communication and follow directions correctly.  Treatment Explanation - The following has been discussed with the patient:   RX ordered/plan of care  Anticipated outcomes  Possible risks and side effects  This patient would benefit from PT intervention to resume normal activities.   Rehab potential is good.    Frequency:  1 X week, once daily  Duration:  for 4 weeks tapering to 2 X a month over 4 weeks  Discharge Plan:  Achieve all LTG.  Independent in home treatment program.  Reach maximal therapeutic benefit.    Please refer to the daily flowsheet for treatment today, total treatment time and time spent performing 1:1 timed codes.

## 2017-07-14 ENCOUNTER — THERAPY VISIT (OUTPATIENT)
Dept: PHYSICAL THERAPY | Facility: CLINIC | Age: 72
End: 2017-07-14
Payer: COMMERCIAL

## 2017-07-14 DIAGNOSIS — M54.50 BILATERAL LOW BACK PAIN WITHOUT SCIATICA, UNSPECIFIED CHRONICITY: ICD-10-CM

## 2017-07-14 PROCEDURE — 97110 THERAPEUTIC EXERCISES: CPT | Mod: GP | Performed by: PHYSICAL THERAPIST

## 2017-07-14 NOTE — MR AVS SNAPSHOT
After Visit Summary   7/14/2017    Iris Carrion    MRN: [id number]           Patient Information     Date Of Birth          1945        Visit Information        Provider Department      7/14/2017 2:10 PM Andrzej Barcenas PT [facility] [place] [facility]        Today's Diagnoses     Bilateral low back pain without sciatica, unspecified chronicity           Follow-ups after your visit        Your next 10 appointments already scheduled     Jul 21, 2017  2:10 PM CDT   [facility] with Andrzej Barcenas PT   [facility] [place] [facility] ([facility] [place])    [address] [address] [address] [facility]  [address]  [place] [id number] [phone]            Aug 30, 2017  8:30 AM CDT   LAB with LAB ONC [facility])    [id number] [place] [id number] [phone]           Patient must bring picture ID.  Patient should be prepared to give a urine specimen  Please do not eat 10-12 hours before your appointment if you are coming in fasting for labs on lipids, cholesterol, or glucose (sugar).  Pregnant women should follow their Care Team instructions. Water with medications is okay. Do not drink coffee or other fluids.   If you have concerns about taking  your medications, please ask at office or if scheduling via [employer], send a message by clicking on Secure Messaging, Message Your Care Team.            Aug 30, 2017  9:00 AM CDT   Return Visit with [facility])    [address] [address] [place] [address] [phone]              Who to contact     If you have questions or need follow up information about today's clinic visit or your schedule please contact [facility] directly at [phone].  Normal or non-critical lab and imaging results will be communicated to you by MyChart, letter or phone  within 4 business days after the clinic has received the results. If you do not hear from us within 7 days, please contact the clinic through [employer] or phone. If you have a critical or abnormal lab result, we will notify you by phone as soon as possible.  Submit refill requests through [employer] or call your pharmacy and they will forward the refill request to us. Please allow 3 business days for your refill to be completed.          Additional Information About Your Visit        [employer] Information     [employer] gives you secure access to your electronic health record. If you see a primary care provider, you can also send messages to your care team and make appointments. If you have questions, please call your primary care clinic.  If you do not have a primary care provider, please call [phone] and they will assist you.        Care EveryWhere ID     This is your Care EveryWhere ID. This could be used by other organizations to access your [facility] medical records  [id number]         Blood Pressure from Last 3 Encounters:   06/18/17 129/69   05/19/17 148/79   05/10/17 130/74    Weight from Last 3 Encounters:   06/18/17 56.4 kg (124 lb 6 oz)   05/10/17 56.2 kg (123 lb 14.4 oz)   02/13/17 55.8 kg (123 lb)              We Performed the Following     THERAPEUTIC EXERCISES        Primary Care Provider Office Phone # Fax #    Pillo Koehler [phone]       [facility] [address] [facility] [address]        Equal Access to Services     [place] RODRIGUEZ : Hadii bouchra ku hadasho Soomaali, waaxda luqadaha, qaybta kaalmada adeegyada, vignesh coats. So [facility] [phone].    ATENCIÓN: Si habla español, tiene a pena disposición servicios gratuitos de asistencia lingüística. Llame al [phone].    We comply with applicable federal civil rights laws and [place] laws. We do not discriminate on the basis of race, color, national origin, age, disability sex, sexual  orientation or gender identity.            Thank you!     Thank you for choosing [facility]  for your care. Our goal is always to provide you with excellent care. Hearing back from our patients is one way we can continue to improve our services. Please take a few minutes to complete the written survey that you may receive in the mail after your visit with us. Thank you!             Your Updated Medication List - Protect others around you: Learn how to safely use, store and throw away your medicines at www.disposemymeds.org.          This list is accurate as of: 7/14/17  2:46 PM.  Always use your most recent med list.                   Brand Name Dispense Instructions for use Diagnosis    albuterol 108 (90 BASE) MCG/ACT Inhaler    PROAIR HFA/PROVENTIL HFA/VENTOLIN HFA    1 Inhaler    Inhale 2 puffs into the lungs every 6 hours as needed for shortness of breath / dyspnea or wheezing    Chronic bronchitis, unspecified chronic bronchitis type (H)       BIOTIN PO      Take 1 tablet by mouth daily.        budesonide-formoterol 160-4.5 MCG/ACT Inhaler    SYMBICORT    1 Inhaler    Inhale 2 puffs into the lungs 2 times daily    COPD (chronic obstructive pulmonary disease) (H)       CALCIUM 500 PO      Take 1 tablet by mouth daily.        chlorthalidone 25 MG tablet    HYGROTON    90 tablet    Take 1 tablet (25 mg) by mouth daily    Essential hypertension with goal blood pressure less than 140/90       fluticasone 50 MCG/ACT spray    FLONASE    1 Bottle    Spray 2 sprays into both nostrils daily    Non-seasonal allergic rhinitis due to animal hair and dander       ibuprofen 600 MG tablet    ADVIL/MOTRIN    40 tablet    Take 1 tablet (600 mg) by mouth every 6 hours as needed for moderate pain    S/P LUCILA-BSO       ipratropium - albuterol 0.5 mg/2.5 mg/3 mL 0.5-2.5 (3) MG/3ML neb solution    DUONEB    30 vial    Take 1 vial (3 mLs) by nebulization every 4 hours as needed for shortness of breath /  dyspnea or wheezing    COPD (chronic obstructive pulmonary disease) (H)       loratadine 10 MG tablet    CLARITIN     Take 10 mg by mouth daily        MAGNESIUM OXIDE PO      Take 200 mg by mouth 2 times daily        omeprazole 40 MG capsule    priLOSEC    90 capsule    Take 1 capsule (40 mg) by mouth daily Take 30-60 minutes before a meal.    GERD (gastroesophageal reflux disease)       potassium chloride SA 20 MEQ CR tablet    potassium chloride    60 tablet    Take 1 tablet (20 mEq) by mouth 2 times daily    Hypokalemia       tiotropium 18 MCG capsule    SPIRIVA HANDIHALER    90 capsule    Inhale 1 capsule (18 mcg) into the lungs daily Inhale contents of one capsule. .    COPD (chronic obstructive pulmonary disease) (H)       traMADol 50 MG tablet    ULTRAM    20 tablet    Take 0.5-1 tablets (25-50 mg) by mouth every 8 hours as needed for pain    Right-sided thoracic back pain, unspecified chronicity, Scoliosis of thoracic spine, unspecified scoliosis type       Vitamin D-3 5000 UNITS Tabs      Take 1 tablet by mouth daily.        ZINC 15 PO      Take by mouth daily

## 2017-07-14 NOTE — PROGRESS NOTES
"Subjective:    HPI                    Objective:    System    Physical Exam    General     ROS    Assessment/Plan:      SUBJECTIVE  Subjective: Exercises are helping.  Can bend farther forward and a little less pain with standing   Current Pain level: 2/10   Changes in function:  Yes (See Goal flowsheet attached for changes in current functional level)     Adverse reaction to treatment or activity:  None    OBJECTIVE  Objective: Initially finger tips 7\" from floor with standing flex and down to 3\" following exercises     ASSESSMENT  Iris continues to require intervention to meet STG and LTG's: PT  Patient is progressing as expected.  Response to therapy has shown an improvement in  pain level and ROM   Progress made towards STG/LTG?  Yes (See Goal flowsheet attached for updates on achievement of STG and LTG)    PLAN  Current treatment program is being advanced to more complex exercises.    PTA/ATC plan:  N/A    Please refer to the daily flowsheet for treatment today, total treatment time and time spent performing 1:1 timed codes.              "

## 2017-07-28 ENCOUNTER — MYC REFILL (OUTPATIENT)
Dept: FAMILY MEDICINE | Facility: CLINIC | Age: 72
End: 2017-07-28

## 2017-07-28 DIAGNOSIS — J44.9 COPD (CHRONIC OBSTRUCTIVE PULMONARY DISEASE) (H): ICD-10-CM

## 2017-07-28 DIAGNOSIS — K21.9 GERD (GASTROESOPHAGEAL REFLUX DISEASE): ICD-10-CM

## 2017-07-28 RX ORDER — BUDESONIDE AND FORMOTEROL FUMARATE DIHYDRATE 160; 4.5 UG/1; UG/1
2 AEROSOL RESPIRATORY (INHALATION) 2 TIMES DAILY
Qty: 1 INHALER | Refills: 5 | Status: SHIPPED | OUTPATIENT
Start: 2017-07-28 | End: 2018-04-08

## 2017-07-28 RX ORDER — OMEPRAZOLE 40 MG/1
40 CAPSULE, DELAYED RELEASE ORAL DAILY
Qty: 90 CAPSULE | Refills: 1 | Status: SHIPPED | OUTPATIENT
Start: 2017-07-28 | End: 2018-01-15

## 2017-07-28 NOTE — TELEPHONE ENCOUNTER
Message from [employer] [employer]:  Original authorizing provider: MD Iris CAMARGO would like a refill of the following medications:  budesonide-formoterol (SYMBICORT) 160-4.5 MCG/ACT inhaler [NIKOLE BRAVO MD]  omeprazole (PRILOSEC) 40 MG capsule [NIKOLE BRAVO MD]    Preferred pharmacy: [address]    Comment:

## 2017-07-31 NOTE — PROGRESS NOTES
HPI:    Iris is a 71 year old female here for follow-up and also address a new problem:    Palpitations - this has been occurring once per week, lasting about 5 minutes. It causes her to sweat and have shortness of breath. No chest pain. She also gets dizzy with it. Denies weight changes or temperature intolerance.  Evaluation and treatment:   EKG today is normal.   Check CMP, CBC and TSH   She is advised what symptoms would warrant urgent or emergent attention.    HTN with hypokalemia - checks at home sometimes - around 120 systolic.controlled in clinic.    Evaluation and treatment:   Clorthalidone 25 mg qd. KCL 20 meq bid. No side effects.   ACEI or ARB may be a better choice - we will consider once we get labs above back.    Last Basic Metabolic Panel:  NA      140   7/25/2016   POTASSIUM      3.6   7/25/2016  CHLORIDE      103   7/25/2016  MAURISIO      9.1   7/25/2016  CO2       33   7/25/2016  BUN       22   7/25/2016  CR     0.96   7/25/2016  GLC       89   7/25/2016    CBC RESULTS:   Recent Labs   Lab Test  07/25/16   0857   WBC  4.0   RBC  4.26   HGB  13.1   HCT  40.4   MCV  95   MCH  30.8   MCHC  32.4   RDW  13.0   PLT  178       COPD - Has 35 pack history of smoking, quit 2003. Symptoms were cough, shortness of breath and wheezing. But currently feeling well.  Evaluation and treatment:   Spirometry 12/2/13 showed FEV1 42% predicted.    Advair was too expensive.    Symbicort 160/4.5, 2 puffs bid   Spiriva one puff qd.    She seems to need supplemental O 2 after surgeries. Her last surgery in June 2014 was for cholecystectomy.    I previously filled out disability parking form for her.    1) Advanced changes of emphysema.   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 Minimal residual   right pleural fluid or thickening in the posterior aspect of the right mid   hemithorax, both less conspicuous then on 4/13/2011 and likely residual   scarring from the extensive consolidative infiltrate that was  present on   1/7/2011.   3) Evidence of previous granulomatous disease.   4) Small esophageal hiatal hernia.   5) Please see above for additional less significant details.     Yelena Oseguera M.D.  [facility]  CHICO/ben  D:9/13/2011/T:9/13/2011    Metastatic endometrial adenocarcinoma - discovered after she presented with abdominal pain since early July 2015. She follows with oncology.     Gall stone pancreatitis - in June 2014 had cholecystectomy and MRCP to remove common bile duct stone. Fine since then.    Previous poor balance and weakness - no longer using walker. Now balance is fairly good. Has gone to PT.    Preventive -    Immunization History   Administered Date(s) Administered     Influenza (High Dose) 3 valent vaccine 11/28/2015, 09/22/2016     Influenza (IIV3) 10/31/2012, 12/02/2013, 11/02/2014     Pneumococcal (PCV 13) 08/08/2016     Pneumococcal 23 valent 10/18/2009, 07/31/2013     TDAP Vaccine (Adacel) 01/07/2011     Zoster vaccine, live 04/21/2015     STD screen: declined previously    Mammogram: Negative 11/14/16    PAP - has had hysterectomy    PAP NIL 12/2/2013  PAP NIL 10/31/2012    Colonoscopy: 12/4/12 - repeat in 5 years    DEXA: ordered previously but she did not get it done - she wants to hold off since she went a number of times and they cancelled.    Hep C: Negative 7/18/15    Vit D: takes 5000 units per day    ROS:    Const: No fevers, weight changes or night sweats recently.  ENT: No runny nose, sore throat or ear pain.  Resp: No cough or shortness of breath.  CV: No chest pain.  GI: No nausea, vomiting, diarrhea or constipation. Denies blood in stools or black stools.  : No dysuria, frequency or hematuria.    SH:    Marital status:   Kids: 2  Employment: retired, takes care of grand kids  Exercise: active  Tobacco: per HPI  Etoh: no  Recreational drugs: no  Caffeine: rarely        Exam:    /70  Pulse 85  Temp 97.4  F (36.3  C) (Oral)  Wt 123 lb  (55.8 kg)  SpO2 97%  BMI 19.86 kg/m2    Gen: Healthy appearing female in no acute distress  Eyes: Conjunctiva and sclera normal. Pupils react normally to light. No nystagmus.  Neck: No enlarged lymph nodes, thyromegally or other masses.  Lungs: Good air movement and otherwise clear.  CV: Heart RRR with no murmurs. No JVD, carotid bruits or leg edema.      Assessment and Plan - Decision Making    1. Palpitations  Per HPI  - CBC with platelets differential  - TSH with free T4 reflex  - EKG 12-lead complete w/read - Clinics  - T4 free    2. Essential hypertension with goal blood pressure less than 140/90  Per HPI  - Comprehensive metabolic panel    3. Hypokalemia  Per HPI  - Comprehensive metabolic panel  - CBC with platelets differential    4. Chronic bronchitis, unspecified chronic bronchitis type (H)  Per HPI  - COPD ACTION PLAN    5. Screening cholesterol level  Per HPI  - Lipid panel reflex to direct LDL      Written instructions given as follows:    Patient Instructions   1. I recommend including veggies, fresh fruits (3 to 5 servings), nuts (unsalted) in your daily diet. Cut down on carbs like bread, pasta, rice, potatoes. Cut down on red meats like burgers etc. Increase healthy proteins like beans, tofu, tuna, chicken and turkey.    2. Get regular exercise like walking at least 3 times per week.      3. Please call [phone] to set up an appointment to discuss advanced directive.    4. Your EKG is normal. I will contact you with the rest of the test results along with further instructions.

## 2017-08-04 ENCOUNTER — OFFICE VISIT (OUTPATIENT)
Dept: FAMILY MEDICINE | Facility: CLINIC | Age: 72
End: 2017-08-04
Payer: COMMERCIAL

## 2017-08-04 VITALS
OXYGEN SATURATION: 97 % | WEIGHT: 123 LBS | HEART RATE: 85 BPM | SYSTOLIC BLOOD PRESSURE: 132 MMHG | BODY MASS INDEX: 19.86 KG/M2 | DIASTOLIC BLOOD PRESSURE: 70 MMHG | TEMPERATURE: 97.4 F

## 2017-08-04 DIAGNOSIS — R00.2 PALPITATIONS: Primary | ICD-10-CM

## 2017-08-04 DIAGNOSIS — Z13.220 SCREENING CHOLESTEROL LEVEL: ICD-10-CM

## 2017-08-04 DIAGNOSIS — I10 ESSENTIAL HYPERTENSION WITH GOAL BLOOD PRESSURE LESS THAN 140/90: ICD-10-CM

## 2017-08-04 DIAGNOSIS — J42 CHRONIC BRONCHITIS, UNSPECIFIED CHRONIC BRONCHITIS TYPE (H): ICD-10-CM

## 2017-08-04 DIAGNOSIS — E87.6 HYPOKALEMIA: ICD-10-CM

## 2017-08-04 LAB
ALBUMIN SERPL-MCNC: 3.9 G/DL (ref 3.4–5)
ALP SERPL-CCNC: 101 U/L (ref 40–150)
ALT SERPL W P-5'-P-CCNC: 19 U/L (ref 0–50)
ANION GAP SERPL CALCULATED.3IONS-SCNC: 6 MMOL/L (ref 3–14)
AST SERPL W P-5'-P-CCNC: 20 U/L (ref 0–45)
BASOPHILS # BLD AUTO: 0.1 10E9/L (ref 0–0.2)
BASOPHILS NFR BLD AUTO: 0.7 %
BILIRUB SERPL-MCNC: 0.4 MG/DL (ref 0.2–1.3)
BUN SERPL-MCNC: 24 MG/DL (ref 7–30)
CALCIUM SERPL-MCNC: 9.5 MG/DL (ref 8.5–10.1)
CHLORIDE SERPL-SCNC: 102 MMOL/L (ref 94–109)
CHOLEST SERPL-MCNC: 238 MG/DL
CO2 SERPL-SCNC: 30 MMOL/L (ref 20–32)
CREAT SERPL-MCNC: 0.92 MG/DL (ref 0.52–1.04)
DIFFERENTIAL METHOD BLD: NORMAL
EOSINOPHIL # BLD AUTO: 0.2 10E9/L (ref 0–0.7)
EOSINOPHIL NFR BLD AUTO: 2.6 %
ERYTHROCYTE [DISTWIDTH] IN BLOOD BY AUTOMATED COUNT: 13 % (ref 10–15)
GFR SERPL CREATININE-BSD FRML MDRD: 60 ML/MIN/1.7M2
GLUCOSE SERPL-MCNC: 102 MG/DL (ref 70–99)
HCT VFR BLD AUTO: 46.2 % (ref 35–47)
HDLC SERPL-MCNC: 62 MG/DL
HGB BLD-MCNC: 15.5 G/DL (ref 11.7–15.7)
LDLC SERPL CALC-MCNC: 146 MG/DL
LYMPHOCYTES # BLD AUTO: 0.8 10E9/L (ref 0.8–5.3)
LYMPHOCYTES NFR BLD AUTO: 10.8 %
MCH RBC QN AUTO: 30.4 PG (ref 26.5–33)
MCHC RBC AUTO-ENTMCNC: 33.5 G/DL (ref 31.5–36.5)
MCV RBC AUTO: 91 FL (ref 78–100)
MONOCYTES # BLD AUTO: 0.5 10E9/L (ref 0–1.3)
MONOCYTES NFR BLD AUTO: 6.7 %
NEUTROPHILS # BLD AUTO: 5.6 10E9/L (ref 1.6–8.3)
NEUTROPHILS NFR BLD AUTO: 79.2 %
NONHDLC SERPL-MCNC: 176 MG/DL
PLATELET # BLD AUTO: 221 10E9/L (ref 150–450)
POTASSIUM SERPL-SCNC: 3.4 MMOL/L (ref 3.4–5.3)
PROT SERPL-MCNC: 7.8 G/DL (ref 6.8–8.8)
RBC # BLD AUTO: 5.1 10E12/L (ref 3.8–5.2)
SODIUM SERPL-SCNC: 138 MMOL/L (ref 133–144)
T4 FREE SERPL-MCNC: 1.16 NG/DL (ref 0.76–1.46)
TRIGL SERPL-MCNC: 152 MG/DL
TSH SERPL DL<=0.005 MIU/L-ACNC: 4.01 MU/L (ref 0.4–4)
WBC # BLD AUTO: 7 10E9/L (ref 4–11)

## 2017-08-04 PROCEDURE — 99214 OFFICE O/P EST MOD 30 MIN: CPT | Performed by: FAMILY MEDICINE

## 2017-08-04 PROCEDURE — 84439 ASSAY OF FREE THYROXINE: CPT | Performed by: FAMILY MEDICINE

## 2017-08-04 PROCEDURE — 93000 ELECTROCARDIOGRAM COMPLETE: CPT | Performed by: FAMILY MEDICINE

## 2017-08-04 PROCEDURE — 80061 LIPID PANEL: CPT | Performed by: FAMILY MEDICINE

## 2017-08-04 PROCEDURE — 36415 COLL VENOUS BLD VENIPUNCTURE: CPT | Performed by: FAMILY MEDICINE

## 2017-08-04 PROCEDURE — 80050 GENERAL HEALTH PANEL: CPT | Performed by: FAMILY MEDICINE

## 2017-08-04 NOTE — MR AVS SNAPSHOT
After Visit Summary   8/4/2017    Iris Carrion    MRN: [id number]           Patient Information     Date Of Birth          1945        Visit Information        Provider Department      8/4/2017 8:50 AM Pillo Koehler MD [facility]        Today's Diagnoses     Palpitations    -  1    Essential hypertension with goal blood pressure less than 140/90        Hypokalemia        Chronic bronchitis, unspecified chronic bronchitis type (H)        Screening cholesterol level          Care Instructions    1. I recommend including veggies, fresh fruits (3 to 5 servings), nuts (unsalted) in your daily diet. Cut down on carbs like bread, pasta, rice, potatoes. Cut down on red meats like burgers etc. Increase healthy proteins like beans, tofu, tuna, chicken and turkey.    2. Get regular exercise like walking at least 3 times per week.      3. Please call [phone] to set up an appointment to discuss advanced directive.    4. Your EKG is normal. I will contact you with the rest of the test results along with further instructions.                Follow-ups after your visit        Your next 10 appointments already scheduled     Aug 30, 2017  8:30 AM CDT   LAB with LAB ONC [facility] ([facility])    [address] [place] [address] [phone]           Patient must bring picture ID. Patient should be prepared to give a urine specimen  Please do not eat 10-12 hours before your appointment if you are coming in fasting for labs on lipids, cholesterol, or glucose (sugar). Pregnant women should follow their Care Team instructions. Water with medications is okay. Do not drink coffee or other fluids. If you have concerns about taking  your medications, please ask at office or if scheduling via Nousco, send a message by clicking on Secure Messaging, Message Your Care Team.            Aug 30, 2017  9:00 AM CDT   Return Visit with Yenny  CHANDLER Soto CNP   [facility] ([facility]    [address] [address] [place] [address] [phone]              Who to contact     If you have questions or need follow up information about today's clinic visit or your schedule please contact [facility] directly at [phone].  Normal or non-critical lab and imaging results will be communicated to you by MyChart, letter or phone within 4 business days after the clinic has received the results. If you do not hear from us within 7 days, please contact the clinic through Lagiarhart or phone. If you have a critical or abnormal lab result, we will notify you by phone as soon as possible.  Submit refill requests through SoundFit or call your pharmacy and they will forward the refill request to us. Please allow 3 business days for your refill to be completed.          Additional Information About Your Visit        Lagiarhart Information     SoundFit gives you secure access to your electronic health record. If you see a primary care provider, you can also send messages to your care team and make appointments. If you have questions, please call your primary care clinic.  If you do not have a primary care provider, please call [phone] and they will assist you.        Care EveryWhere ID     This is your Care EveryWhere ID. This could be used by other organizations to access your [place] medical records  [id number]        Your Vitals Were     Pulse Temperature Pulse Oximetry BMI (Body Mass Index)          85 97.4  F (36.3  C) (Oral) 97% 19.86 kg/m2         Blood Pressure from Last 3 Encounters:   08/04/17 132/70   06/18/17 129/69   05/19/17 148/79    Weight from Last 3 Encounters:   08/04/17 123 lb (55.8 kg)   06/18/17 124 lb 6 oz (56.4 kg)   05/10/17 123 lb 14.4 oz (56.2 kg)              We Performed the Following     CBC with platelets differential     Comprehensive metabolic panel     COPD ACTION PLAN     EKG  12-lead complete w/read - Clinics     Lipid panel reflex to direct LDL     TSH with free T4 reflex        Primary Care Provider Office Phone # Fax #    Pillo Koehler [phone]       [facility] [address] [address] [facility] [address]        Equal Access to Services     [place] RODRIGUEZ : Hadii aad ku hadasho Soomaali, waaxda luqadaha, qaybta kaalmada adeegyada, waxalexsander oconnordebrakal coats. So [facility] [place] [phone].    ATENCIÓN: Si habla español, tiene a pena disposición servicios gratuitos de asistencia lingüística. Llame al [phone].    We comply with applicable federal civil rights laws and [place] laws. We do not discriminate on the basis of race, color, national origin, age, disability sex, sexual orientation or gender identity.            Thank you!     Thank you for choosing [facility]  for your care. Our goal is always to provide you with excellent care. Hearing back from our patients is one way we can continue to improve our services. Please take a few minutes to complete the written survey that you may receive in the mail after your visit with us. Thank you!             Your Updated Medication List - Protect others around you: Learn how to safely use, store and throw away your medicines at www.disposemymeds.org.          This list is accurate as of: 8/4/17  9:52 AM.  Always use your most recent med list.                   Brand Name Dispense Instructions for use Diagnosis    albuterol 108 (90 BASE) MCG/ACT Inhaler    PROAIR HFA/PROVENTIL HFA/VENTOLIN HFA    1 Inhaler    Inhale 2 puffs into the lungs every 6 hours as needed for shortness of breath / dyspnea or wheezing    Chronic bronchitis, unspecified chronic bronchitis type (H)       BIOTIN PO      Take 1 tablet by mouth daily.        budesonide-formoterol 160-4.5 MCG/ACT Inhaler    SYMBICORT    1 Inhaler    Inhale 2 puffs into the lungs 2 times daily    COPD (chronic obstructive pulmonary disease)  (H)       CALCIUM 500 PO      Take 1 tablet by mouth daily.        chlorthalidone 25 MG tablet    HYGROTON    90 tablet    Take 1 tablet (25 mg) by mouth daily    Essential hypertension with goal blood pressure less than 140/90       fluticasone 50 MCG/ACT spray    FLONASE    1 Bottle    Spray 2 sprays into both nostrils daily    Non-seasonal allergic rhinitis due to animal hair and dander       ibuprofen 600 MG tablet    ADVIL/MOTRIN    40 tablet    Take 1 tablet (600 mg) by mouth every 6 hours as needed for moderate pain    S/P LUCILA-BSO       ipratropium - albuterol 0.5 mg/2.5 mg/3 mL 0.5-2.5 (3) MG/3ML neb solution    DUONEB    30 vial    Take 1 vial (3 mLs) by nebulization every 4 hours as needed for shortness of breath / dyspnea or wheezing    COPD (chronic obstructive pulmonary disease) (H)       loratadine 10 MG tablet    CLARITIN     Take 10 mg by mouth daily        MAGNESIUM OXIDE PO      Take 200 mg by mouth 2 times daily        omeprazole 40 MG capsule    priLOSEC    90 capsule    Take 1 capsule (40 mg) by mouth daily Take 30-60 minutes before a meal.    GERD (gastroesophageal reflux disease)       potassium chloride SA 20 MEQ CR tablet    potassium chloride    60 tablet    Take 1 tablet (20 mEq) by mouth 2 times daily    Hypokalemia       tiotropium 18 MCG capsule    SPIRIVA HANDIHALER    90 capsule    Inhale 1 capsule (18 mcg) into the lungs daily Inhale contents of one capsule. .    COPD (chronic obstructive pulmonary disease) (H)       traMADol 50 MG tablet    ULTRAM    20 tablet    Take 0.5-1 tablets (25-50 mg) by mouth every 8 hours as needed for pain    Right-sided thoracic back pain, unspecified chronicity, Scoliosis of thoracic spine, unspecified scoliosis type       Vitamin D-3 5000 UNITS Tabs      Take 1 tablet by mouth daily.        ZINC 15 PO      Take by mouth daily

## 2017-08-04 NOTE — PATIENT INSTRUCTIONS
1. I recommend including veggies, fresh fruits (3 to 5 servings), nuts (unsalted) in your daily diet. Cut down on carbs like bread, pasta, rice, potatoes. Cut down on red meats like burgers etc. Increase healthy proteins like beans, tofu, tuna, chicken and turkey.    2. Get regular exercise like walking at least 3 times per week.      3. Please call [phone] to set up an appointment to discuss advanced directive.    4. Your EKG is normal. I will contact you with the rest of the test results along with further instructions.

## 2017-08-04 NOTE — NURSING NOTE
"Chief Complaint   Patient presents with     Hypertension       Initial /90 (Cuff Size: Adult Small)  Pulse 85  Temp 97.4  F (36.3  C) (Oral)  Wt 123 lb (55.8 kg)  SpO2 97%  BMI 19.86 kg/m2 Estimated body mass index is 19.86 kg/(m^2) as calculated from the following:    Height as of 5/10/17: 5' 5.98\" (1.676 m).    Weight as of this encounter: 123 lb (55.8 kg).  Medication Reconciliation: complete    Porsha Lares LPN    "

## 2017-08-04 NOTE — LETTER
My COPD Action Plan   Name: Iris Carrion    YOB: 1945   Date: 7/31/2017    My doctor: NIKOLE BRAVO MD   My clinic: [address] [address] [phone]  My Controller Medicine: { :918418}   Dose: ***     My Rescue Medicine: { :621740}   Dose: ***     My Flare Up Medicine: { :122930}   Dose: ***  My COPD Severity: { :565868}     Use of Oxygen: { :590669}        GREEN ZONE       Doing well today      Usual level of activity and exercise    Usual amount of cough and mucus    No shortness of breath    Usual level of health (thinking clearly, sleeping well, feel like eating) Actions:      Take daily medicines    Use oxygen as prescribed    Follow regular exercise and diet plan    Avoid cigarette smoke and other irritants that harm the lungs           YELLOW ZONE          Having a bad day or flare up      Short of breath more than usual    A lot more sputum (mucus) than usual    Sputum looks yellow, green, tan, brown or bloody    More coughing or wheezing    Fever or chills    Less energy; trouble completing activities    Trouble thinking or focusing    Using quick relief inhaler or nebulizer more often    Poor sleep; symptoms wake me up    Do not feel like eating Actions:      Get plenty of rest    Take daily medicines    Use quick relief inhaler every *** hours    If you use oxygen, call you doctor to see if you should adjust your oxygen    Do breathing exercises or other things to help you relax    Let a loved one, friend or neighbor know you are feeling worse    Call your care team if you have 2 or more symptoms.  Start taking steroids or antibiotics if directed by your care team           RED ZONE       Need medical care now      Severe shortness of breath (feel you can't breathe)    Fever, chills    Not enough breath to do any activity    Trouble coughing up mucus, walking or talking    Blood in mucus    Frequent coughing   Rescue medicines are  not working    Not able to sleep because of breathing    Feel confused or drowsy    Chest pain    Actions:      Call your health care team.  If you cannot reach your care team, call 911 or go to the emergency room.        Annual Reminders:  Meet with Care Team, Flu Shot every Fall and Pneumonia Shot at least once  Pharmacy:    [id number]  [place], [place] - [address] [facility] [address] - [place], [place] - [address] [facility]  [place], [place] - [address] [address]

## 2017-08-06 ENCOUNTER — TELEPHONE (OUTPATIENT)
Dept: FAMILY MEDICINE | Facility: CLINIC | Age: 72
End: 2017-08-06

## 2017-08-06 DIAGNOSIS — R00.2 PALPITATIONS: Primary | ICD-10-CM

## 2017-08-07 NOTE — TELEPHONE ENCOUNTER
Pt notified of provider message as written.  Pt verbalized good understanding.  Appointment made for Zio placement.  My Perry RN

## 2017-08-07 NOTE — PROGRESS NOTES
Iraj Simmons,    Your cholesterol was a bit high. Your estimated 10 year risk of a heart attack or stroke is 9.3%. Statin drugs are recommended at 7.5% or greater.     At this time I would like to see if you can lower your risk via diet and exercise first. If that is not effective then, I would recommend a statin drug.    I recommend including veggies, fresh fruits (3 to 5 servings), nuts (unsalted) in your daily diet. Cut down on carbs like bread, pasta, rice, potatoes. Cut down on red meats like burgers etc. Increase healthy proteins like beans, tofu, tuna, chicken and turkey.    Get regular exercise like walking for about 10 minutes daily.      In 3 months, make a lab only appointment to give a fasting blood blood sample (nothing except water for 10-12 hours).    All other results were fine.    In addition, I think we should have you carry a cardiac monitor - I will have my nurse contact you about this.    Regards,    Pillo Koehler M.D.

## 2017-08-07 NOTE — TELEPHONE ENCOUNTER
I sent Results Note but please call her:     1. Your cholesterol was a bit high. Your estimated 10 year risk of a heart attack or stroke is 9.3%. Statin drugs are recommended at 7.5% or greater.      2. At this time I would like to see if you can lower your risk via diet and exercise first. If that is not effective then, I would recommend a statin drug.     3. I recommend including veggies, fresh fruits (3 to 5 servings), nuts (unsalted) in your daily diet. Cut down on carbs like bread, pasta, rice, potatoes. Cut down on red meats like burgers etc. Increase healthy proteins like beans, tofu, tuna,  chicken and turkey.     4. Get regular exercise like walking for about 10 minutes daily.       5. In 3 months, make a lab only appointment to give a fasting blood blood sample (nothing except water for 10-12 hours).     6. All other results were fine.     7. In addition, I think we should have you carry a cardiac monitor -  RN to help her schedule for Zio Patch.    Thanks,    Pillo Koehler M.D.

## 2017-08-11 ENCOUNTER — ALLIED HEALTH/NURSE VISIT (OUTPATIENT)
Dept: NURSING | Facility: CLINIC | Age: 72
End: 2017-08-11
Payer: COMMERCIAL

## 2017-08-11 DIAGNOSIS — R00.2 PALPITATIONS: ICD-10-CM

## 2017-08-11 PROCEDURE — 0296T ZIO PATCH HOLTER: CPT

## 2017-08-11 PROCEDURE — 99207 ZZC NO CHARGE NURSE ONLY: CPT

## 2017-08-11 PROCEDURE — 0298T ZZC EXT ECG > 48HR TO 21 DAY REVIEW AND INTERPRETATN: CPT | Performed by: INTERNAL MEDICINE

## 2017-08-11 NOTE — MR AVS SNAPSHOT
After Visit Summary   8/11/2017    Iris Carrion    MRN: [id number]           Patient Information     Date Of Birth          1945        Visit Information        Provider Department      8/11/2017 9:45 AM AN [facility]        Today's Diagnoses     Palpitations           Follow-ups after your visit        Your next 10 appointments already scheduled     Aug 30, 2017  8:30 AM CDT   LAB with LAB ONC [facility] ([facility])    [address] [address] [place] [address] [phone]           Patient must bring picture ID. Patient should be prepared to give a urine specimen  Please do not eat 10-12 hours before your appointment if you are coming in fasting for labs on lipids, cholesterol, or glucose (sugar). Pregnant women should follow their Care Team instructions. Water with medications is okay. Do not drink coffee or other fluids. If you have concerns about taking  your medications, please ask at office or if scheduling via [employer], send a message by clicking on Secure Messaging, Message Your Care Team.            Aug 30, 2017  9:00 AM CDT   Return Visit with CHANDLER Linares [facility])    [address] [address] [place] [address] [phone]              Who to contact     If you have questions or need follow up information about today's clinic visit or your schedule please contact [facility] directly at [phone].  Normal or non-critical lab and imaging results will be communicated to you by MyChart, letter or phone within 4 business days after the clinic has received the results. If you do not hear from us within 7 days, please contact the clinic through MyChart or phone. If you have a critical or abnormal lab result, we will notify you by phone as soon as possible.  Submit refill requests through [employer] or call your pharmacy and  they will forward the refill request to us. Please allow 3 business days for your refill to be completed.          Additional Information About Your Visit        [employer] Information     At[employer] gives you secure access to your electronic health record. If you see a primary care provider, you can also send messages to your care team and make appointments. If you have questions, please call your primary care clinic.  If you do not have a primary care provider, please call [phone] and they will assist you.        Care EveryWhere ID     This is your Care EveryWhere ID. This could be used by other organizations to access your [place] medical records  [id number]         Blood Pressure from Last 3 Encounters:   08/04/17 132/70   06/18/17 129/69   05/19/17 148/79    Weight from Last 3 Encounters:   08/04/17 123 lb (55.8 kg)   06/18/17 124 lb 6 oz (56.4 kg)   05/10/17 123 lb 14.4 oz (56.2 kg)              We Performed the Following     Zio Patch Holter        Primary Care Provider Office Phone # Fax #    Pillo Koehler [phone] [address] [facility] [address]        Equal Access to Services     [place] RODRIGUEZ : Hadii aad ku hadasho Sopat, waaxda luqadaha, qaybta kaalmada adeegyada, vignesh coats. So [facility] [phone].    ATENCIÓN: Si habla español, tiene a pena disposición servicios gratuitos de asistencia lingüística. [facility] [phone].    We comply with applicable federal civil rights laws and [place] laws. We do not discriminate on the basis of race, color, national origin, age, disability sex, sexual orientation or gender identity.            Thank you!     Thank you for choosing [facility]  for your care. Our goal is always to provide you with excellent care. Hearing back from our patients is one way we can continue to improve our services. Please take a few minutes to complete the written survey that you may receive in the mail  after your visit with us. Thank you!             Your Updated Medication List - Protect others around you: Learn how to safely use, store and throw away your medicines at www.disposemymeds.org.          This list is accurate as of: 8/11/17 10:03 AM.  Always use your most recent med list.                   Brand Name Dispense Instructions for use Diagnosis    albuterol 108 (90 BASE) MCG/ACT Inhaler    PROAIR HFA/PROVENTIL HFA/VENTOLIN HFA    1 Inhaler    Inhale 2 puffs into the lungs every 6 hours as needed for shortness of breath / dyspnea or wheezing    Chronic bronchitis, unspecified chronic bronchitis type (H)       BIOTIN PO      Take 1 tablet by mouth daily.        budesonide-formoterol 160-4.5 MCG/ACT Inhaler    SYMBICORT    1 Inhaler    Inhale 2 puffs into the lungs 2 times daily    COPD (chronic obstructive pulmonary disease) (H)       CALCIUM 500 PO      Take 1 tablet by mouth daily.        chlorthalidone 25 MG tablet    HYGROTON    90 tablet    Take 1 tablet (25 mg) by mouth daily    Essential hypertension with goal blood pressure less than 140/90       fluticasone 50 MCG/ACT spray    FLONASE    1 Bottle    Spray 2 sprays into both nostrils daily    Non-seasonal allergic rhinitis due to animal hair and dander       ibuprofen 600 MG tablet    ADVIL/MOTRIN    40 tablet    Take 1 tablet (600 mg) by mouth every 6 hours as needed for moderate pain    S/P LUCILA-BSO       ipratropium - albuterol 0.5 mg/2.5 mg/3 mL 0.5-2.5 (3) MG/3ML neb solution    DUONEB    30 vial    Take 1 vial (3 mLs) by nebulization every 4 hours as needed for shortness of breath / dyspnea or wheezing    COPD (chronic obstructive pulmonary disease) (H)       loratadine 10 MG tablet    CLARITIN     Take 10 mg by mouth daily        MAGNESIUM OXIDE PO      Take 200 mg by mouth 2 times daily        omeprazole 40 MG capsule    priLOSEC    90 capsule    Take 1 capsule (40 mg) by mouth daily Take 30-60 minutes before a meal.    GERD (gastroesophageal  reflux disease)       potassium chloride SA 20 MEQ CR tablet    potassium chloride    60 tablet    Take 1 tablet (20 mEq) by mouth 2 times daily    Hypokalemia       tiotropium 18 MCG capsule    SPIRIVA HANDIHALER    90 capsule    Inhale 1 capsule (18 mcg) into the lungs daily Inhale contents of one capsule. .    COPD (chronic obstructive pulmonary disease) (H)       traMADol 50 MG tablet    ULTRAM    20 tablet    Take 0.5-1 tablets (25-50 mg) by mouth every 8 hours as needed for pain    Right-sided thoracic back pain, unspecified chronicity, Scoliosis of thoracic spine, unspecified scoliosis type       Vitamin D-3 5000 UNITS Tabs      Take 1 tablet by mouth daily.        ZINC 15 PO      Take by mouth daily

## 2017-08-29 NOTE — PROGRESS NOTES
Follow Up Notes on Referred Patient    Date: 2017       Dr. Emma Cabrera MD  [address] [place] [address]       RE: Iris Carrion  : 1945  ANGELES: 2017    Dear Dr. 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 She completed treatment 2016. She is here today for a follow up visit.     Today she comes to clinic feeling well and denies any new issues or concerns. Notes a chronic cough related to COPD. She denies any vaginal bleeding, no changes in her bowel or bladder habits, no nausea/emesis, no lower extremity edema, and no difficulties eating or sleeping. She denies any abdominal discomfort/bloating, no fevers or chills, and no chest pain or new shortness of breath. She is up to date with mammogram and colonoscopy is due end of the year and is planning to schedule. Needs to schedule bone density test. She is not sexually active and denies any vaginal or vulvar dryness. She checks her BP at home and reports readings of 130's/65. She has been having her chest port flushed every month.      Treatment History:  Ms Carrion started having her symptoms of constipation and bloating (May/2015). Her doctor recommended miralax. Symptoms continued to occurred so they followed with a CT scan which showed extensive peritoneal metastatic disease.   8/17/15: CT c/a/p IMPRESSION:   1. Left renal cortical cysts. No enhancing renal mass. No urinary tract calculi or hydronephrosis. Collecting systems, ureters and bladder are unremarkable.  2. Extensive peritoneal metastatic disease with moderate ascites likely related to malignant ascites. Followup as clinically warranted. An obvious etiology for this metastatic disease is not  visualized.  3. No bowel obstruction or diverticulitis. Serosal metastatic disease is present. No obvious colonic mass. Followup colonoscopy as needed for further  assessment.  4. Moderate-sized hiatal hernia. No gastric obstruction.      8/22/15:  1950      8/27/15: New patient visit. she continues to have constipation and bloating. She has lost about 5 pounds. She also admits to having lood in urine as well as urinary urgenrgy. She has loose bm bm every other day.       Plan: chemotherapy with Taxol/Carbo. To be considered for these clinical trials: PARP, avatar circulating tumor study, small neogman.       8/31/15: CT guided omental nodule biopsy. Final diagnosis:  Adenocarcinoma, with features consistent with endometrioid adenocarcinoma       COMMENT:  Immunohistochemical staining was obtained with appropriate control slides for ER, [place] 8 and WT-1. Malignant cells are positive for all three markers confirming their endometrioid cell differentiation.       Plan: treat like an ovarian cancer but will do dose dense Taxol carbo for 3 cycles then be re-evalated with scan       9/4/15-10/14/15: Cycle #1-3 dose dense Taxol/Carbo.  2008, 470, 30.       11/19/15: CT c/a/p IMPRESSION:   1. Decreased peritoneal carcinomatosis and malignant ascites.  2. Severe emphysema and scattered areas of likely postinfectious scarring. Several of these areas particularly along the right minor fissure appear more nodular malignancy cannot be excluded. Recommend 3-six-month interval followup.  3. Diverticulosis.  4. Osteopenia and numerous compression deformities, none of which appear acute.  5. Stable large hiatal hernia.      11/23/15:  17      11/25/15: Exploratory Laparotomy, Repair Umbilical Hernia, Total Abdominal Hysterectomy, Right Salpingo Oophorectomy, Left Salpingectomy, Appendectomy, Complete Omentectomy, CUSA Tumor Debulking,Cystoscopy, Left PeriAortc Lymph Node Biopsy, Insertion Peritoneal Port, Mobilization of Liver, Ablation of Liver Nodules Open Repair Hiatal Hernia Repair      Surgical pathology report:  FINAL DIAGNOSIS:  A: Umbilical hernia sac, repair:  -  "Adenocarcinoma  - Fibroadipose tissue, partially surfaced by mesothelium, consistent with hernia sac  B: Hepatic ligament, biopsy:  - One benign lymph node (0/1)  C: Splenic ligament implant, biopsy:  - Adenocarcinoma  D: Inferior vena cava implant, biopsy:  - Adenocarcinoma  E: Omentum, omentectomy:  - Adenocarcinoma  F: \"Liver nodule\", biopsy:  - Fibro-adipose tissue with adenocarcinoma  G: Right pelvic peritoneum, biopsy:  - Adenocarcinoma  H: Cul-de-sac peritoneum, biopsy:  - Adenocarcinoma  I: Uterus, cervix, right ovary and fallopian tube, hysterectomy with right salpingo-oophorectomy:  - Adenocarcinoma present in the soft tissue at the left adnexal area,cervix and right fallopian tube, most likely endometrioid adenocarcinoma  - Atrophic endometrium  - Benign endometrial polyp  - Myometrium with no significant histologic abnormality  - Atrophic cervix and nabothian cysts  - Tumor present on the external aspect of the cervix  - Right ovary with numerous corpora albicantia and simple epithelial cysts  - Right fallopian tube with a single focus of carcinoma  - See comment  J: Large bowel epiploica, biopsy:  - Adenocarcinoma  K: Appendix, appendectomy:  - Appendix with fibrous replacement  L: Lymph nodes, left periaortic, excision:  - One benign lymph node (0/1)      12/14/15:  46. post op visit. reviewed the results of her surgical pathology copy of report was given to pt. She will start IV/IP taxol/Cddp chemo tomorrow. IV fluids on Thursday and Friday at [facility] and Saturday and Sunday at the [facility]. Day 8 taxol will be on dec 22nd. She will need to have fluids again on 23rd and 24th. She will also need Neupogen.       Plan: 3 cycles IV/IP chemo.       12/15/15: Cycle #1 IV/IP chemo.    1/6/16: Cycle #2 IV/IP chemo.   18. Delayed d/t neutropenia; given 1/13. Neupogen and Neulasta added.    2/1/16: Cycle #3 IV/IP chemo.  23  2/29/16:  41. CT cap  Findings:  Right " internal jugular Port-A-Cath with tip at atriocaval junction. The heart is not enlarged. No significant pericardial effusion. The mediastinal great vessels are normal in caliber. No central pulmonary embolus. Thoracic aortic atherosclerotic calcifications. Calcified mediastinal and hilar lymph nodes. No lymphadenopathy in the chest by size criteria. The central tracheobronchial tree is patent. New nodular pleural-based consolidative opacity in the left lower lobe measuring 26 x 29 mm (image 110, series 7). There are a few other new subcentimeter nodules in the left lower lobe. For example, 7 mm nodule on image 104, series 4. Severe centrilobular emphysematous changes. Likely nodular scarring in the right upper lobe (image 32, series 7) is not significantly changed. Other scattered fibrotic changes, most prominent in the right lower lobe, do not appear significantly changed. Calcified granuloma in the right lower lobe. Postsurgical changes of a hernia repair and hysterectomy/bilateral salpingo-oophorectomy. There is no abnormal soft tissue within the  resection bed to suggest locally recurrent disease. No suspicious liver lesions. Unchanged extrahepatic and central intrahepatic biliary dilatation, likely reservoir effect in the setting of cholecystectomy. Atrophic pancreas. Calcified splenic granulomata. Unchanged  hypoattenuating lesions in the kidneys, the largest of which on the left are compatible with cysts. No abnormally dilated or thickened loops of bowel. No free intraperitoneal air or free fluid. Colonic diverticuli without evidence of active inflammation. Duodenal diverticulum. Intra-abdominal port with tip in the left pelvis. Aortoiliac atheromatous plaque without aneurysmal dilatation. No abnormally enlarged abdominal or pelvic lymph nodes. No definite residual peritoneal/or omental nodules. Marked generalized osteopenia. Redemonstration of multiple compression deformities throughout the thoracolumbar  "spine, greatest at T8 with greater than 50% loss of vertebral body height, not significantly changed. There is mild increased associated sclerosis. Associated kyphosis of the upper thoracic spine. There are 6 lumbar type vertebral bodies. No new aggressive osseous lesions.  Impression:  1. Continued positive response to therapy in the abdomen/pelvis with no definite residual peritoneal/omental metastasis.  2. New pleural-based consolidative opacity in the left lower lobe. There are a few other new subcentimeter nodules in the left lower lobe. This may be secondary to infection/inflammation, but metastases should be considered and short-term interval followup is recommended.      4/6/16 CT/PET IMPRESSION: Patient's history of ovarian carcinoma.  1. Left lower lobe 2 cm irregular pulmonary nodular density is minimally metabolic and argues that it is benign such as rounded atelectasis and less likely rounded pneumonia.  2. Underlying COPD with scattered scars within both lungs but no hypermetabolic structures to indicate malignancy.  3. No evidence of carcinomatosis, peritoneal or abdominal metastasis, or metastatic disease to any of the organs of the abdomen or pelvis.  4. Possible mild esophagitis at the GE junction.      4/11/16:  8. \"Had been unclear as to why  up to 41 from 23-this is concerning in light of recent completion of cddp/taxol, however the  is 8. CT PET is negative for metastatic disease. Recommend q 3 month visits x 2 years, then every 6 mos for 3 additional years.\"      7/25/16:   9.  7/27/16: CT cap Impression:    1. Interval resolution of left lower lobe spiculated pulmonary nodule. This likely represented atelectasis or consolidation.  2. Significant panlobular emphysematous changes of both lungs.  3. Spiculated right upper lobe pulmonary nodule measuring up to 8 mm, unchanged since 11/19/2015, although progressed since 2/4/2011. This may represent scarring. Recommend " attention on followup.  4. New right hip fracture, possibly pathologic.  5. New sclerotic lesions of the sternum which are indeterminate. This may represent metastatic disease.      Of note, patient tripped over a cord at home 5/30/16 and fell and had an avulsed fx of her right hip. She underwent PT for this. She subsequently fell backwards 6/13/16 and fractured her left wrist and seeing OT for this.       She was recommended to have a bone scan done. This was scheduled and the patient cancelled the appt and has not yet rescheduled this; she has been contacted several times to have this done.       10/31/16:  15.  12/5/16:  11.  2/13/17:  12.  5/8/17:  11.    8/30/17:  pending     Review of Systems:     Systemic                         no weight changes; no fever; no chills; no night sweats; no appetite changes  Skin                         no rashes, or lesions  Eye                         no irritation; no changes in vision  Katarzyna-Laryngeal                         no dysphagia; no hoarseness   Pulmonary                         + chronic cough; no shortness of breath; + COPD  Cardiovascular                         no chest pain; no palpitations  Gastrointestinal                         no diarrhea; no constipation; no abdominal pain; no changes in bowel habits; no blood in stool  Genitourinary                        no urinary frequency; no urinary urgency; no dysuria; no pain; no abnormal vaginal discharge; no abnormal vaginal bleeding  Musculoskeletal                         no myalgias; no arthralgias; no back pain  Psychiatric                         no depressed mood; no anxiety    Hematologic               no tender lymph nodes; no noticeable swellings or lumps   Endocrine                         no hot flashes; no heat/cold intolerance         Neurological                        no tremor; no numbness and tingling; no headaches; no difficulty sleeping    Past Medical  History:    Past Medical History:   Diagnosis Date     Cervical high risk HPV (human papillomavirus) test positive 10/31/12    type 18     COPD (chronic obstructive pulmonary disease) (H)      GERD (gastroesophageal reflux disease)      Hx of colposcopy with cervical biopsy 11/2012    negative     Hypertension 2013     Osteoporosis      Peritoneal carcinomatosis (H) 8/24/2015     Pneumonia      Uncomplicated asthma 1980         Past Surgical History:    Past Surgical History:   Procedure Laterality Date     CHOLECYSTECTOMY  6/2014     COLONOSCOPY  12/4/2012    Procedure: COLONOSCOPY;  COLONOSCOPY SCREEN;  Surgeon: Mushtaq Lara MD;  Location: [facility] OR     GYN SURGERY       HERNIORRHAPHY HIATAL N/A 11/25/2015    Procedure: HERNIORRHAPHY HIATAL;  Surgeon: Chip Carty MD;  Location: [facility] OR     HYSTERECTOMY TOTAL ABD, ALVIN SALPINGO-OOPHORECTOMY, NODE DISSECTION, TUMOR DEBULKING, COMBINED Bilateral 11/25/2015    Procedure: COMBINED HYSTERECTOMY TOTAL ABDOMINAL, SALPINGO-OOPHORECTOMY, NODE DISSECTION, TUMOR DEBULKING;  Surgeon: Emma Cabrera MD;  Location: [facility] OR         Health Maintenance Due   Topic Date Due     DEXA SCAN SCREENING (SYSTEM ASSIGNED)  08/11/2012       Current Medications:     Current Outpatient Prescriptions   Medication Sig Dispense Refill     chlorthalidone (HYGROTON) 25 MG tablet Take 1 tablet (25 mg) by mouth daily 90 tablet 0     budesonide-formoterol (SYMBICORT) 160-4.5 MCG/ACT Inhaler Inhale 2 puffs into the lungs 2 times daily 1 Inhaler 5     omeprazole (PRILOSEC) 40 MG capsule Take 1 capsule (40 mg) by mouth daily Take 30-60 minutes before a meal. 90 capsule 1     traMADol (ULTRAM) 50 MG tablet Take 0.5-1 tablets (25-50 mg) by mouth every 8 hours as needed for pain 20 tablet 0     tiotropium (SPIRIVA HANDIHALER) 18 MCG capsule Inhale 1 capsule (18 mcg) into the lungs daily Inhale contents of one capsule. . 90 capsule 2     loratadine (CLARITIN) 10 MG tablet Take 10 mg by  mouth daily       fluticasone (FLONASE) 50 MCG/ACT spray Spray 2 sprays into both nostrils daily 1 Bottle 1     ipratropium - albuterol 0.5 mg/2.5 mg/3 mL (DUONEB) 0.5-2.5 (3) MG/3ML neb solution Take 1 vial (3 mLs) by nebulization every 4 hours as needed for shortness of breath / dyspnea or wheezing 30 vial 5     potassium chloride SA (POTASSIUM CHLORIDE) 20 MEQ tablet Take 1 tablet (20 mEq) by mouth 2 times daily 60 tablet 0     albuterol (PROAIR HFA, PROVENTIL HFA, VENTOLIN HFA) 108 (90 BASE) MCG/ACT inhaler Inhale 2 puffs into the lungs every 6 hours as needed for shortness of breath / dyspnea or wheezing 1 Inhaler 3     ibuprofen (ADVIL,MOTRIN) 600 MG tablet Take 1 tablet (600 mg) by mouth every 6 hours as needed for moderate pain 40 tablet 0     MAGNESIUM OXIDE PO Take 200 mg by mouth 2 times daily       Zinc Sulfate (ZINC 15 PO) Take by mouth daily       Cholecalciferol (VITAMIN D-3) 5000 UNITS TABS Take 1 tablet by mouth daily.       Calcium Carbonate (CALCIUM 500 PO) Take 1 tablet by mouth daily.       BIOTIN PO Take 1 tablet by mouth daily.           Allergies:        Allergies   Allergen Reactions     Levaquin Rash        Social History:     Social History   Substance Use Topics     Smoking status: Former Smoker     Packs/day: 1.00     Years: 30.00     Types: Cigarettes     Start date: 1965     Quit date: 10/10/2003     Smokeless tobacco: Never Used     Alcohol use No       History   Drug Use No         Family History:     The patient's family history is notable for    Family History   Problem Relation Age of Onset     CANCER Brother      testicular     DIABETES Sister      Ovarian Cancer Mother 60     DIABETES Mother           Asthma Father           DIABETES Sister      Depression/Anxiety Daughter      Depression Daughter      OSTEOPOROSIS Sister      Other Cancer Brother      DIABETES Brother      Depression Other      Grandson     Mother had cancer unknown age, brother had  "testicular cancer.     Physical Exam:     /74  Pulse 64  Temp 97.8  F (36.6  C) (Oral)  Resp 20  Ht 1.676 m (5' 5.98\")  Wt 55.3 kg (122 lb)  SpO2 98%  BMI 19.7 kg/m2  Body mass index is 19.7 kg/(m^2).    General Appearance: healthy and alert, no distress     HEENT:  no palpable nodules or masses        Cardiovascular: regular rate and rhythm, no gallops, rubs or murmurs     Respiratory: lungs clear slightly decreased breath sounds, no rales, rhonchi or wheezes    Musculoskeletal: extremities non tender and without edema    Skin: no lesions or rashes     Neurological: normal gait, no gross defects     Psychiatric: appropriate mood and affect                               Hematological: normal cervical, supraclavicular and inguinal lymph nodes     Gastrointestinal:       abdomen soft, non-tender, non-distended, no organomegaly or masses    Genitourinary:           External genitalia and urethral meatus appears normal.  Vagina is smooth without nodularity or masses; atrophic.  Cervix surgically absent.  Bimanual exam reveal no masses, nodularity or fullness.  Recto-vaginal exam confirms these findings.        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 She completed treatment 4/2016. She is here today for a follow up visit.     Plan:      1.)    Stage IIIC endometrioid adenocarcinoma of the left ovary.  pending at todays visit, will send results through [employer] if normal or call her with plan if abnormally increased. If  remains normal plan for patient to RTC in 3 months for her next surveillance visit and . Reviewed remaining surveillance of every 3 months for one more year and then every 6 months x 3 additional years. Reviewed signs and symptoms for when she should contact the clinic or seek additional care. Patient to contact the clinic with any questions or concerns in the interim.      2.)       "            Genetic risk factors were assessed and she is negative for mutations in GINGER, BARD1, BRCA1, BRCA2, BRIP1, CDH1, CHEK2, EPCAM, MLH1, MRE11A, MSH2, MSH6, MUTYH, NBN, NF1, PALB2, PMS2, PTEN, RAD50, RAD51C, RAD51D, SMARCA4, STK11, and TP53.      3.)                  Labs and/or tests ordered include:  .        4.)                  Health maintenance issues addressed today include annual health maintenance and non-gynecologic issues with PCP. Recommend patient to complete bone density test and schedule colonoscopy in November.     Gavi Olivera CNP  8/30/2017 9:20 AM    CC  Patient Care Team:  Pillo Koehler MD as PCP - General (Family Practice)  Jessica Lua, RN as Continuity Care Coordinator (Gyn-Onc)  Iliana Pierre, JANE as   ARLINE SINGH

## 2017-08-30 ENCOUNTER — ONCOLOGY VISIT (OUTPATIENT)
Dept: ONCOLOGY | Facility: CLINIC | Age: 72
End: 2017-08-30
Payer: COMMERCIAL

## 2017-08-30 VITALS
BODY MASS INDEX: 19.61 KG/M2 | RESPIRATION RATE: 20 BRPM | DIASTOLIC BLOOD PRESSURE: 74 MMHG | HEIGHT: 66 IN | OXYGEN SATURATION: 98 % | HEART RATE: 64 BPM | WEIGHT: 122 LBS | SYSTOLIC BLOOD PRESSURE: 123 MMHG | TEMPERATURE: 97.8 F

## 2017-08-30 DIAGNOSIS — C56.2 MALIGNANT NEOPLASM OF OVARY, LEFT (H): ICD-10-CM

## 2017-08-30 DIAGNOSIS — C56.2 MALIGNANT NEOPLASM OF OVARY, LEFT (H): Primary | ICD-10-CM

## 2017-08-30 LAB — CANCER AG125 SERPL-ACNC: 13 U/ML (ref 0–30)

## 2017-08-30 PROCEDURE — 86304 IMMUNOASSAY TUMOR CA 125: CPT | Performed by: NURSE PRACTITIONER

## 2017-08-30 PROCEDURE — 99213 OFFICE O/P EST LOW 20 MIN: CPT | Performed by: NURSE PRACTITIONER

## 2017-08-30 PROCEDURE — 36415 COLL VENOUS BLD VENIPUNCTURE: CPT | Performed by: NURSE PRACTITIONER

## 2017-08-30 ASSESSMENT — PAIN SCALES - GENERAL: PAINLEVEL: NO PAIN (0)

## 2017-08-30 NOTE — MR AVS SNAPSHOT
After Visit Summary   8/30/2017    Iris Carrion    MRN: [id number]           Patient Information     Date Of Birth          1945        Visit Information        Provider Department      8/30/2017 9:00 AM Gavi Olivera APRN CNP [facility]        Care Instructions    Please follow up with Yenny Pak CNP in 3 months          Follow-ups after your visit        Follow-up notes from your care team     Return in about 3 months (around 11/30/2017).      Your next 10 appointments already scheduled     Dec 04, 2017 11:00 AM CST   Return Visit with CHANDLER Linares CNP   [facility] ([facility])    [id number] [place] [address] [phone]              Who to contact     If you have questions or need follow up information about today's clinic visit or your schedule please contact [facility] directly at [phone].  Normal or non-critical lab and imaging results will be communicated to you by Qbakahart, letter or phone within 4 business days after the clinic has received the results. If you do not hear from us within 7 days, please contact the clinic through TigerTextt or phone. If you have a critical or abnormal lab result, we will notify you by phone as soon as possible.  Submit refill requests through Recordant or call your pharmacy and they will forward the refill request to us. Please allow 3 business days for your refill to be completed.          Additional Information About Your Visit        Qbakahart Information     Recordant gives you secure access to your electronic health record. If you see a primary care provider, you can also send messages to your care team and make appointments. If you have questions, please call your primary care clinic.  If you do not have a primary care provider, please call [phone] and they will assist you.      Recordant is an electronic gateway that provides easy,  "online access to your medical records. With [employer], you can request a clinic appointment, read your test results, renew a prescription or communicate with your care team.     To access your existing account, please contact your [facility] or call [phone] for assistance.        Care EveryWhere ID     This is your Care EveryWhere ID. This could be used by other organizations to access your [place] medical records  [id number]        Your Vitals Were     Pulse Temperature Respirations Height Pulse Oximetry BMI (Body Mass Index)    64 97.8  F (36.6  C) (Oral) 20 1.676 m (5' 5.98\") 98% 19.7 kg/m2       Blood Pressure from Last 3 Encounters:   08/30/17 123/74   08/04/17 132/70   06/18/17 129/69    Weight from Last 3 Encounters:   08/30/17 55.3 kg (122 lb)   08/04/17 55.8 kg (123 lb)   06/18/17 56.4 kg (124 lb 6 oz)              Today, you had the following     No orders found for display       Primary Care Provider Office Phone # Fax #    Pillo Koehler [phone] [address] [facility] [address]        Equal Access to Services     [place] RODRIGUEZ : Hadii aad ku hadasho Soomaali, waaxda luqadaha, qaybta kaalmada adeegyada, vignesh serrain hayjordann martin coats. So [facility] [phone].    ATENCIÓN: Si habla español, tiene a pena disposición servicios gratuitos de asistencia lingüística. Llame al [phone].    We comply with applicable federal civil rights laws and [place] laws. We do not discriminate on the basis of race, color, national origin, age, disability sex, sexual orientation or gender identity.            Thank you!     Thank you for choosing [facility]  for your care. Our goal is always to provide you with excellent care. Hearing back from our patients is one way we can continue to improve our services. Please take a few minutes to complete the written survey that you may receive in the mail after your visit with us. Thank " you!             Your Updated Medication List - Protect others around you: Learn how to safely use, store and throw away your medicines at www.disposemymeds.org.          This list is accurate as of: 8/30/17  9:06 AM.  Always use your most recent med list.                   Brand Name Dispense Instructions for use Diagnosis    albuterol 108 (90 BASE) MCG/ACT Inhaler    PROAIR HFA/PROVENTIL HFA/VENTOLIN HFA    1 Inhaler    Inhale 2 puffs into the lungs every 6 hours as needed for shortness of breath / dyspnea or wheezing    Chronic bronchitis, unspecified chronic bronchitis type (H)       BIOTIN PO      Take 1 tablet by mouth daily.        budesonide-formoterol 160-4.5 MCG/ACT Inhaler    SYMBICORT    1 Inhaler    Inhale 2 puffs into the lungs 2 times daily    COPD (chronic obstructive pulmonary disease) (H)       CALCIUM 500 PO      Take 1 tablet by mouth daily.        chlorthalidone 25 MG tablet    HYGROTON    90 tablet    Take 1 tablet (25 mg) by mouth daily    Essential hypertension with goal blood pressure less than 140/90       fluticasone 50 MCG/ACT spray    FLONASE    1 Bottle    Spray 2 sprays into both nostrils daily    Non-seasonal allergic rhinitis due to animal hair and dander       ibuprofen 600 MG tablet    ADVIL/MOTRIN    40 tablet    Take 1 tablet (600 mg) by mouth every 6 hours as needed for moderate pain    S/P LUCILA-BSO       ipratropium - albuterol 0.5 mg/2.5 mg/3 mL 0.5-2.5 (3) MG/3ML neb solution    DUONEB    30 vial    Take 1 vial (3 mLs) by nebulization every 4 hours as needed for shortness of breath / dyspnea or wheezing    COPD (chronic obstructive pulmonary disease) (H)       loratadine 10 MG tablet    CLARITIN     Take 10 mg by mouth daily        MAGNESIUM OXIDE PO      Take 200 mg by mouth 2 times daily        omeprazole 40 MG capsule    priLOSEC    90 capsule    Take 1 capsule (40 mg) by mouth daily Take 30-60 minutes before a meal.    GERD (gastroesophageal reflux disease)       potassium  chloride SA 20 MEQ CR tablet    potassium chloride    60 tablet    Take 1 tablet (20 mEq) by mouth 2 times daily    Hypokalemia       tiotropium 18 MCG capsule    SPIRIVA HANDIHALER    90 capsule    Inhale 1 capsule (18 mcg) into the lungs daily Inhale contents of one capsule. .    COPD (chronic obstructive pulmonary disease) (H)       traMADol 50 MG tablet    ULTRAM    20 tablet    Take 0.5-1 tablets (25-50 mg) by mouth every 8 hours as needed for pain    Right-sided thoracic back pain, unspecified chronicity, Scoliosis of thoracic spine, unspecified scoliosis type       Vitamin D-3 5000 UNITS Tabs      Take 1 tablet by mouth daily.        ZINC 15 PO      Take by mouth daily

## 2017-08-30 NOTE — NURSING NOTE
"Oncology Rooming Note    August 30, 2017 8:43 AM   Iris Carrion is a 72 year old female who presents for:    Chief Complaint   Patient presents with     Oncology Clinic Visit     3 mo f/u ovarian     Initial Vitals: There were no vitals taken for this visit. Estimated body mass index is 19.86 kg/(m^2) as calculated from the following:    Height as of 5/10/17: 1.676 m (5' 5.98\").    Weight as of 8/4/17: 55.8 kg (123 lb). There is no height or weight on file to calculate BSA.  Data Unavailable Comment: Data Unavailable   No LMP recorded. Patient is postmenopausal.  Allergies reviewed: Yes  Medications reviewed: Yes    Medications: Medication refills not needed today.  Pharmacy name entered into Countdown: [facility] [place], [place] - [address]    Clinical concerns:     8 minutes for nursing intake (face to face time)     LEÓN FERRARO LPN              "

## 2017-09-06 ENCOUNTER — TELEPHONE (OUTPATIENT)
Dept: FAMILY MEDICINE | Facility: CLINIC | Age: 72
End: 2017-09-06

## 2017-09-06 DIAGNOSIS — I47.10 PAROXYSMAL SUPRAVENTRICULAR TACHYCARDIA (H): Primary | ICD-10-CM

## 2017-09-06 NOTE — TELEPHONE ENCOUNTER
Patient informed of provider note as below. Patient verbalized understanding..Emma BIANCHIN, RN, CPN

## 2017-09-06 NOTE — TELEPHONE ENCOUNTER
Please call patient:     1. The cardiac monitor showed SVT. This causes a fast heart rate off and on.   2. This is not a dangerous rhythm but I still would like you to visit with a cardiologist.   3. Please set that up by calling - [phone].    Thanks.    Pillo Koehler M.D.

## 2017-09-06 NOTE — TELEPHONE ENCOUNTER
Left message on answering machine for patient to call back. [phone]  Emma BIANCHIN, RN, CPN

## 2017-09-12 ENCOUNTER — PRE VISIT (OUTPATIENT)
Dept: CARDIOLOGY | Facility: CLINIC | Age: 72
End: 2017-09-12

## 2017-09-27 ENCOUNTER — OFFICE VISIT (OUTPATIENT)
Dept: CARDIOLOGY | Facility: CLINIC | Age: 72
End: 2017-09-27
Payer: COMMERCIAL

## 2017-09-27 VITALS — HEART RATE: 71 BPM | DIASTOLIC BLOOD PRESSURE: 80 MMHG | SYSTOLIC BLOOD PRESSURE: 131 MMHG | OXYGEN SATURATION: 98 %

## 2017-09-27 DIAGNOSIS — I47.10 PAROXYSMAL SUPRAVENTRICULAR TACHYCARDIA (H): Primary | ICD-10-CM

## 2017-09-27 PROCEDURE — 0298T ZZC EXT ECG > 48HR TO 21 DAY REVIEW AND INTERPRETATN: CPT | Performed by: INTERNAL MEDICINE

## 2017-09-27 PROCEDURE — 99204 OFFICE O/P NEW MOD 45 MIN: CPT | Mod: 25 | Performed by: INTERNAL MEDICINE

## 2017-09-27 PROCEDURE — 0296T ZZHC  EXT ECG > 48HR TO 21 DAY RCRD W/CONECT INTL RCRD: CPT | Performed by: INTERNAL MEDICINE

## 2017-09-27 ASSESSMENT — PAIN SCALES - GENERAL: PAINLEVEL: NO PAIN (0)

## 2017-09-27 NOTE — NURSING NOTE
Cardiac Monitors: Patient was instructed regarding the indication, function, care and prompt return of a holter 2 week zio monitor. The monitor was placed on the patient with instructions regarding care of the skin electrodes and monitor, as well as documentation in the patient diary. Patient demonstrated understanding of this information and agreed to call with further questions or concerns.  PATIENT WILL NEED TO REPEAT ANOTHER 2 WEEK ZIO IF SHE DOES NOT ENCOUNTER ANY SYMPTOMS WHILE WEARING THE FIRST ZIO SHE WILL NEED TO REPEAT ANOTHER 2 WEEK ZIO    Cardiac Testing: Patient given instructions regarding  echocardiogram . Discussed purpose, preparation, procedure and when to expect results reported back to the patient. Patient demonstrated understanding of this information and agreed to call with further questions or concerns.    Med Reconcile: Reviewed and verified all current medications with the patient. The updated medication list was printed and given to the patient.    Return Appointment: Patient given instructions regarding scheduling next clinic visit. Patient demonstrated understanding of this information and agreed to call with further questions or concerns.  DEPENDING ON ZIO RESULTS    Patient stated she understood all health information given and agreed to call with further questions or concerns.    Ml Garcia RN  Care Coordinator  [facility] [place] [facility]  [phone]

## 2017-09-27 NOTE — MR AVS SNAPSHOT
After Visit Summary   9/27/2017    Iris Carrion    MRN: [id number]           Patient Information     Date Of Birth          1945        Visit Information        Provider Department      9/27/2017 1:30 PM Yolanda Rogers MD [facility] AT [facility]        Today's Diagnoses     Paroxysmal supraventricular tachycardia (H)    -  1      Care Instructions    1.  Dr. Yolanda Rogers would like for you to wear a 2 week zio patch (heart monitor). If you have any symptoms during these two weeks, we will not need to put the 2nd, 2 week patch on.    2. ECHO scheduled for 10/11/2017 at 2:00pm. [address].   Come to the clinic at 1:45 and I will take the patch off for you, and then you will have the ECHO at 2pm.            [facility] [place] Location    If you have any questions regarding to your visit please contact your care team:     Cardiology  Telephone Number   Elicia Renae  Cardiology RN's.    Elisha Real CMA [phone]    After hours: [phone].  [phone]   For scheduling appts:     [phone] or  [phone]    After hours: [phone]   For the Device Clinic (Pacemakers and ICD's)  RN's :  Melissa Williamson   During business hours: [phone]  After business hours:  [phone]- select option 4.      If you need a medication refill please contact your pharmacy.  Please allow 3 business days for your refill to be completed.    As always, Thank you for trusting us with your health care needs!  _____________________________________________________________________              Follow-ups after your visit        Your next 10 appointments already scheduled     Oct 11, 2017  1:45 PM CDT   Nurse Only with FK ANCILLARY CARDS   [facility] AT [facility] ([facility])    [address] [place] [id number]   [phone]            Oct 11,  2017  2:00 PM CDT   Ech Complete with FKECHR1   [facility] [place] ([facility])    [address] [facility] 2nd Floor  [facility] [id number] [phone]           1. Please bring or wear a comfortable two-piece outfit. 2. You may eat, drink and take your normal medicines. 3. For any questions that cannot be answered, please contact the ordering physician            Dec 04, 2017 11:00 AM CST   Return Visit with CHANDLER Linares CNP   [facility] ([facility])    [address] [place] [address] [phone]              Future tests that were ordered for you today     Open Future Orders        Priority Expected Expires Ordered    Echocardiogram Complete Routine  9/27/2018 9/27/2017            Who to contact     If you have questions or need follow up information about today's clinic visit or your schedule please contact [facility] AT [facility] directly at [phone].  Normal or non-critical lab and imaging results will be communicated to you by [address] [employer] Restaurantshart, letter or phone within 4 business days after the clinic has received the results. If you do not hear from us within 7 days, please contact the clinic through [address] [employer] or phone. If you have a critical or abnormal lab result, we will notify you by phone as soon as possible.  Submit refill requests through [employer] or call your pharmacy and they will forward the refill request to us. Please allow 3 business days for your refill to be completed.          Additional Information About Your Visit        [employer] Information     [employer] gives you secure access to your electronic health record. If you see a primary care provider, you can also send messages to your care team and make appointments. If you have questions, please call your primary care clinic.  If you do not have a primary care provider, please call [phone] and they will assist  you.        Care EveryWhere ID     This is your Care EveryWhere ID. This could be used by other organizations to access your [place] medical records  [id number]        Your Vitals Were     Pulse Pulse Oximetry                71 98%           Blood Pressure from Last 3 Encounters:   09/27/17 131/80   08/30/17 123/74   08/04/17 132/70    Weight from Last 3 Encounters:   08/30/17 55.3 kg (122 lb)   08/04/17 55.8 kg (123 lb)   06/18/17 56.4 kg (124 lb 6 oz)               Primary Care Provider Office Phone # Fax #    Pillo Koehler [phone] [address] [facility] [address]        Equal Access to Services     [place] RODRIGUEZ : Hadii aad ku hadasho Sopat, waaxda luqadaha, qaybta kaalmada adeegyada, waxalexsander coats. So [facility] [phone].    ATENCIÓN: Si habla español, tiene a pena disposición servicios gratuitos de asistencia lingüística. [facility] [phone].    We comply with applicable federal civil rights laws and [place] laws. We do not discriminate on the basis of race, color, national origin, age, disability sex, sexual orientation or gender identity.            Thank you!     Thank you for choosing [facility] PHYSICIANS HE[facility] AT [facility]  for your care. Our goal is always to provide you with excellent care. Hearing back from our patients is one way we can continue to improve our services. Please take a few minutes to complete the written survey that you may receive in the mail after your visit with us. Thank you!             Your Updated Medication List - Protect others around you: Learn how to safely use, store and throw away your medicines at www.disposemymeds.org.          This list is accurate as of: 9/27/17  2:34 PM.  Always use your most recent med list.                   Brand Name Dispense Instructions for use Diagnosis    albuterol 108 (90 BASE) MCG/ACT Inhaler    PROAIR HFA/PROVENTIL HFA/VENTOLIN HFA    1 Inhaler    Inhale 2  puffs into the lungs every 6 hours as needed for shortness of breath / dyspnea or wheezing    Chronic bronchitis, unspecified chronic bronchitis type (H)       BIOTIN PO      Take 1 tablet by mouth daily.        budesonide-formoterol 160-4.5 MCG/ACT Inhaler    SYMBICORT    1 Inhaler    Inhale 2 puffs into the lungs 2 times daily    COPD (chronic obstructive pulmonary disease) (H)       CALCIUM 500 PO      Take 1 tablet by mouth daily.        chlorthalidone 25 MG tablet    HYGROTON    90 tablet    Take 1 tablet (25 mg) by mouth daily    Essential hypertension with goal blood pressure less than 140/90       fluticasone 50 MCG/ACT spray    FLONASE    1 Bottle    Spray 2 sprays into both nostrils daily    Non-seasonal allergic rhinitis due to animal hair and dander       ibuprofen 600 MG tablet    ADVIL/MOTRIN    40 tablet    Take 1 tablet (600 mg) by mouth every 6 hours as needed for moderate pain    S/P LUCILA-BSO       ipratropium - albuterol 0.5 mg/2.5 mg/3 mL 0.5-2.5 (3) MG/3ML neb solution    DUONEB    30 vial    Take 1 vial (3 mLs) by nebulization every 4 hours as needed for shortness of breath / dyspnea or wheezing    COPD (chronic obstructive pulmonary disease) (H)       loratadine 10 MG tablet    CLARITIN     Take 10 mg by mouth daily        MAGNESIUM OXIDE PO      Take 200 mg by mouth 2 times daily        omeprazole 40 MG capsule    priLOSEC    90 capsule    Take 1 capsule (40 mg) by mouth daily Take 30-60 minutes before a meal.    GERD (gastroesophageal reflux disease)       potassium chloride SA 20 MEQ CR tablet    KLOR-CON    60 tablet    Take 1 tablet (20 mEq) by mouth 2 times daily    Hypokalemia       tiotropium 18 MCG capsule    SPIRIVA HANDIHALER    90 capsule    Inhale 1 capsule (18 mcg) into the lungs daily Inhale contents of one capsule. .    COPD (chronic obstructive pulmonary disease) (H)       ZINC 15 PO      Take by mouth daily

## 2017-09-27 NOTE — PATIENT INSTRUCTIONS
1.  Dr. Guerrero Can would like for you to wear a 2 week zio patch (heart monitor). If you have any symptoms during these two weeks, we will not need to put the 2nd, 2 week patch on.    2. ECHO scheduled for 10/11/2017 at 2:00pm. [address].   Come to the clinic at 1:45 and I will take the patch off for you, and then you will have the ECHO at 2pm.            [facility] Location    If you have any questions regarding to your visit please contact your care team:     Cardiology  Telephone Number   Elicia Renae  Cardiology RN's.    Elisah Real CMA [phone]    After hours: [phone].  [phone]   For scheduling appts:     [phone] or  [phone]    After hours: [phone]   For the Device Clinic (Pacemakers and ICD's)  RN's :  Melissa Williamson   During business hours: [phone]  After business hours:  [phone]- select option 4.      If you need a medication refill please contact your pharmacy.  Please allow 3 business days for your refill to be completed.    As always, Thank you for trusting us with your health care needs!  _____________________________________________________________________

## 2017-09-27 NOTE — LETTER
9/27/2017      RE: Iris Carrion  [id number] [facility] [id number]       Dear Colleague,    Thank you for the opportunity to participate in the care of your patient, Iris Carrion, at the [facility] AT [facility] at [facility]. Please see a copy of my visit note below.    Chief complaint: Palpitations    HPI: Ms. Iris Carrion is a 72 year old  female with  HT, COPD and metastatic endometrial carcinoma presents today with palpitations for the last 4-5 months. She feels them about 2 times a month. She feels dizzy when it starts and she has to sit down until it stops. She denies syncope.She feels that her heart rate goes really fast and make her feel sweaty. She is also aware of the offset as well. Her episodes can last between 5min to 20 min. No chest pain during palpitations.  Her last episode was a few days ago lasting for about 5 min. She has COPD and she says she feels SOB all the time.  Her ROS is negative except abdominal pain due to her IBS. She presented with these symptoms to , her PCP on 7/31/2017. Her TSH, CBC and BMP were all normal. She wore a 2 week Zio patch ECG monitor during which time she has told me today that she did not have any of the episodes that she is complaining about. Her Zio Patch showed up to 16 beats of regular SVT.      She doesnot have a hx of CAD or structural heart disease. HT+, DM-, smoking+ (30 pack years), FH of CAD or SCD (-).             Medications, personal, family, and social history reviewed with patient and revised.    PAST MEDICAL HISTORY:  Past Medical History:   Diagnosis Date     Cervical high risk HPV (human papillomavirus) test positive 10/31/12    type 18     COPD (chronic obstructive pulmonary disease) (H)      GERD (gastroesophageal reflux disease)      Hx of colposcopy with cervical biopsy 11/2012    negative     Hypertension 2013     Osteoporosis      Paroxysmal  supraventricular tachycardia (H) 9/6/2017     Peritoneal carcinomatosis (H) 8/24/2015     Pneumonia      Uncomplicated asthma 1980       CURRENT MEDICATIONS:  Current Outpatient Prescriptions   Medication Sig Dispense Refill     chlorthalidone (HYGROTON) 25 MG tablet Take 1 tablet (25 mg) by mouth daily 90 tablet 0     budesonide-formoterol (SYMBICORT) 160-4.5 MCG/ACT Inhaler Inhale 2 puffs into the lungs 2 times daily 1 Inhaler 5     omeprazole (PRILOSEC) 40 MG capsule Take 1 capsule (40 mg) by mouth daily Take 30-60 minutes before a meal. 90 capsule 1     tiotropium (SPIRIVA HANDIHALER) 18 MCG capsule Inhale 1 capsule (18 mcg) into the lungs daily Inhale contents of one capsule. . 90 capsule 2     loratadine (CLARITIN) 10 MG tablet Take 10 mg by mouth daily       fluticasone (FLONASE) 50 MCG/ACT spray Spray 2 sprays into both nostrils daily 1 Bottle 1     ipratropium - albuterol 0.5 mg/2.5 mg/3 mL (DUONEB) 0.5-2.5 (3) MG/3ML neb solution Take 1 vial (3 mLs) by nebulization every 4 hours as needed for shortness of breath / dyspnea or wheezing 30 vial 5     potassium chloride SA (POTASSIUM CHLORIDE) 20 MEQ tablet Take 1 tablet (20 mEq) by mouth 2 times daily 60 tablet 0     albuterol (PROAIR HFA, PROVENTIL HFA, VENTOLIN HFA) 108 (90 BASE) MCG/ACT inhaler Inhale 2 puffs into the lungs every 6 hours as needed for shortness of breath / dyspnea or wheezing 1 Inhaler 3     ibuprofen (ADVIL,MOTRIN) 600 MG tablet Take 1 tablet (600 mg) by mouth every 6 hours as needed for moderate pain 40 tablet 0     MAGNESIUM OXIDE PO Take 200 mg by mouth 2 times daily       Calcium Carbonate (CALCIUM 500 PO) Take 1 tablet by mouth daily.       BIOTIN PO Take 1 tablet by mouth daily.       Zinc Sulfate (ZINC 15 PO) Take by mouth daily         PAST SURGICAL HISTORY:  Past Surgical History:   Procedure Laterality Date     CHOLECYSTECTOMY  6/2014     COLONOSCOPY  12/4/2012    Procedure: COLONOSCOPY;  COLONOSCOPY SCREEN;  Surgeon: Marco  Mushtaq Corbin MD;  Location: [facility] OR     GYN SURGERY       HERNIORRHAPHY HIATAL N/A 2015    Procedure: HERNIORRHAPHY HIATAL;  Surgeon: Chip Carty MD;  Location: [facility] OR     HYSTERECTOMY TOTAL ABD, ALVIN SALPINGO-OOPHORECTOMY, NODE DISSECTION, TUMOR DEBULKING, COMBINED Bilateral 2015    Procedure: COMBINED HYSTERECTOMY TOTAL ABDOMINAL, SALPINGO-OOPHORECTOMY, NODE DISSECTION, TUMOR DEBULKING;  Surgeon: Emma Cabrera MD;  Location: [facility] OR       ALLERGIES:     Allergies   Allergen Reactions     Levaquin Rash       FAMILY HISTORY:  Family History   Problem Relation Age of Onset     CANCER Brother      testicular     DIABETES Sister      Ovarian Cancer Mother 60     DIABETES Mother           Asthma Father           DIABETES Sister      Depression/Anxiety Daughter      Depression Daughter      OSTEOPOROSIS Sister      Other Cancer Brother      DIABETES Brother      Depression Other      Grandson         SOCIAL HISTORY:  Social History   Substance Use Topics     Smoking status: Former Smoker     Packs/day: 1.00     Years: 30.00     Types: Cigarettes     Start date: 1965     Quit date: 10/10/2003     Smokeless tobacco: Never Used     Alcohol use No       ROS:   Constitutional: No fever, chills, or sweats. Weight stable.   ENT: No visual disturbance, ear ache, epistaxis, sore throat.   Cardiovascular: As per HPI.   Respiratory: No cough, hemoptysis.    GI: No nausea, vomiting, hematemesis, melena, or hematochezia, abdominal pain +  : No hematuria.   Integument: Negative.   Psychiatric: Negative.   Hematologic:  Easy bruising, no easy bleeding.  Neuro: Negative.   Endocrinology: No significant heat or cold intolerance   Musculoskeletal: No myalgia.    Exam:  /80 (BP Location: Left arm, Patient Position: Chair, Cuff Size: Adult Regular)  Pulse 71  SpO2 98%  GENERAL APPEARANCE: healthy, alert and no distress  HEENT: no icterus, no central cyanosis  LYMPH/NECK: no  adenopathy, no asymmetry, JVP not elevated, no carotid bruits.  RESPIRATORY: lungs clear to auscultation - no rales, rhonchi or wheezes, no use of accessory muscles, no retractions, respirations are unlabored, normal respiratory rate  CARDIOVASCULAR: regular rhythm, normal S1, S2, no S3 or S4 and no murmur, click or rub, precordium quiet with normal PMI.  GI: soft, non tender  EXTREMITIES: peripheral pulses normal, no edema  NEURO: alert and oriented to person/place/time, normal speech,and affect  VASC: Radial, dorsalis pedis and posterior tibialis pulses are normal in volumes and symmetric bilaterally.   SKIN: no ecchymoses, no rashes     I have reviewed the labs and personally reviewed the imaging below (EKG and Zio Patch) and made my comment in the assessment and plan.    Labs:  CBC RESULTS:   Lab Results   Component Value Date    WBC 7.0 08/04/2017    RBC 5.10 08/04/2017    HGB 15.5 08/04/2017    HCT 46.2 08/04/2017    MCV 91 08/04/2017    MCH 30.4 08/04/2017    MCHC 33.5 08/04/2017    RDW 13.0 08/04/2017     08/04/2017       BMP RESULTS:  Lab Results   Component Value Date     08/04/2017    POTASSIUM 3.4 08/04/2017    CHLORIDE 102 08/04/2017    CO2 30 08/04/2017    ANIONGAP 6 08/04/2017     (H) 08/04/2017    BUN 24 08/04/2017    CR 0.92 08/04/2017    GFRESTIMATED 60 (L) 08/04/2017    GFRESTBLACK 73 08/04/2017    MAURISIO 9.5 08/04/2017          Procedures:    Zio-Patch (8/11/2017): I personally reviewed 2 week Zio-Patch that showed SVT episodes max 16 bpm. The SVT appears regular.  She reported no symptoms during the entire time.      EKG 8/4/2017 normal ECG from [facility].      Assessment and Plan:      is a 72 year old lady with PMH of metastatic endometrial carcinoma, HT, and COPD presents today with short SVT (asymptomatic) episodes on her Zio Patch monitor and palpitations for the last 4 months. No prior hx of cardiac disease.    1. Palpitations: Recently she had a 2 week Zio patch  with short SVTs up to 16 bpm but she was asymptomatic during the recorded episodes. I have discussed the options to try to capture those symptomatic episodes with repeat ECG monitoring versus low dose prophylactic beta blocker (since she has a hint of SVT in Zio Patch) versus catheter ablation. She feels dizzy during her episodes. We decided to do a longer ECG monitoring to capture one of her symptomatic episodes to decide on the treatment plan. She is happy with the plan.    2.COPD: On inhalers.    3. Hypertension: She is on 25 mg chlorthalidone qday and K replacement. Her BP is under control.    4. Metastatic endometrial carcinoma: Followed by oncology.    Planned tests: TT echocardiogram                          2 week Zio Patch    I will follow her up with the results.    It was my absolute pleasure to meet Mrs. Casillas in the office today. Please donot hesitate to contact me if you have any questions or concerns.    Yolanda VEGA MD  [facility] Division of Cardiology  Pager [phone]

## 2017-09-27 NOTE — NURSING NOTE
"Chief Complaint   Patient presents with     Heart Problem      referra from Dr. Koehler for: paraxoysmal SVT. Hx of HTN, hyperlipidemia. Pt reports she gets panic attacks 1-2 times per month and then will feel her heart racing. States she will get dizzy if standing too fast. Has COPD so always sob. NO chest pain  or abnormal fatigue.       Initial /80 (BP Location: Left arm, Patient Position: Chair, Cuff Size: Adult Regular)  Pulse 71  SpO2 98% Estimated body mass index is 19.7 kg/(m^2) as calculated from the following:    Height as of 8/30/17: 5' 5.98\" (1.676 m).    Weight as of 8/30/17: 122 lb (55.3 kg)..  BP completed using cuff size: regular    Ritu Real CMA    "

## 2017-09-28 NOTE — NURSING NOTE
2 week ZioXT applied to patient today. Instructions on use and removal given and mail back instructions discussed. All questions answered. Consent form signed. Device registered. Form faxed to [employer].  Device number: [id number].    Ritu Real CMA.

## 2017-10-01 NOTE — PROGRESS NOTES
Chief complaint: Palpitations    HPI: Ms. Iris Carrion is a 72 year old  female with  HT, COPD and metastatic endometrial carcinoma presents today with palpitations for the last 4-5 months. She feels them about 2 times a month. She feels dizzy when it starts and she has to sit down until it stops. She denies syncope.She feels that her heart rate goes really fast and make her feel sweaty. She is also aware of the offset as well. Her episodes can last between 5min to 20 min. No chest pain during palpitations.  Her last episode was a few days ago lasting for about 5 min. She has COPD and she says she feels SOB all the time.  Her ROS is negative except abdominal pain due to her IBS. She presented with these symptoms to , her PCP on 7/31/2017. Her TSH, CBC and BMP were all normal. She wore a 2 week Zio patch ECG monitor during which time she has told me today that she did not have any of the episodes that she is complaining about. Her Zio Patch showed up to 16 beats of regular SVT.      She doesnot have a hx of CAD or structural heart disease. HT+, DM-, smoking+ (30 pack years), FH of CAD or SCD (-).             Medications, personal, family, and social history reviewed with patient and revised.    PAST MEDICAL HISTORY:  Past Medical History:   Diagnosis Date     Cervical high risk HPV (human papillomavirus) test positive 10/31/12    type 18     COPD (chronic obstructive pulmonary disease) (H)      GERD (gastroesophageal reflux disease)      Hx of colposcopy with cervical biopsy 11/2012    negative     Hypertension 2013     Osteoporosis      Paroxysmal supraventricular tachycardia (H) 9/6/2017     Peritoneal carcinomatosis (H) 8/24/2015     Pneumonia      Uncomplicated asthma 1980       CURRENT MEDICATIONS:  Current Outpatient Prescriptions   Medication Sig Dispense Refill     chlorthalidone (HYGROTON) 25 MG tablet Take 1 tablet (25 mg) by mouth daily 90 tablet 0     budesonide-formoterol (SYMBICORT) 160-4.5  MCG/ACT Inhaler Inhale 2 puffs into the lungs 2 times daily 1 Inhaler 5     omeprazole (PRILOSEC) 40 MG capsule Take 1 capsule (40 mg) by mouth daily Take 30-60 minutes before a meal. 90 capsule 1     tiotropium (SPIRIVA HANDIHALER) 18 MCG capsule Inhale 1 capsule (18 mcg) into the lungs daily Inhale contents of one capsule. . 90 capsule 2     loratadine (CLARITIN) 10 MG tablet Take 10 mg by mouth daily       fluticasone (FLONASE) 50 MCG/ACT spray Spray 2 sprays into both nostrils daily 1 Bottle 1     ipratropium - albuterol 0.5 mg/2.5 mg/3 mL (DUONEB) 0.5-2.5 (3) MG/3ML neb solution Take 1 vial (3 mLs) by nebulization every 4 hours as needed for shortness of breath / dyspnea or wheezing 30 vial 5     potassium chloride SA (POTASSIUM CHLORIDE) 20 MEQ tablet Take 1 tablet (20 mEq) by mouth 2 times daily 60 tablet 0     albuterol (PROAIR HFA, PROVENTIL HFA, VENTOLIN HFA) 108 (90 BASE) MCG/ACT inhaler Inhale 2 puffs into the lungs every 6 hours as needed for shortness of breath / dyspnea or wheezing 1 Inhaler 3     ibuprofen (ADVIL,MOTRIN) 600 MG tablet Take 1 tablet (600 mg) by mouth every 6 hours as needed for moderate pain 40 tablet 0     MAGNESIUM OXIDE PO Take 200 mg by mouth 2 times daily       Calcium Carbonate (CALCIUM 500 PO) Take 1 tablet by mouth daily.       BIOTIN PO Take 1 tablet by mouth daily.       Zinc Sulfate (ZINC 15 PO) Take by mouth daily         PAST SURGICAL HISTORY:  Past Surgical History:   Procedure Laterality Date     CHOLECYSTECTOMY  6/2014     COLONOSCOPY  12/4/2012    Procedure: COLONOSCOPY;  COLONOSCOPY SCREEN;  Surgeon: Mushtaq Lara MD;  Location: [facility] OR     GYN SURGERY       HERNIORRHAPHY HIATAL N/A 11/25/2015    Procedure: HERNIORRHAPHY HIATAL;  Surgeon: Chip Carty MD;  Location: [facility] OR     HYSTERECTOMY TOTAL ABD, ALVIN SALPINGO-OOPHORECTOMY, NODE DISSECTION, TUMOR DEBULKING, COMBINED Bilateral 11/25/2015    Procedure: COMBINED HYSTERECTOMY TOTAL ABDOMINAL,  SALPINGO-OOPHORECTOMY, NODE DISSECTION, TUMOR DEBULKING;  Surgeon: Emma Cabrera MD;  Location: [facility] OR       ALLERGIES:     Allergies   Allergen Reactions     Levaquin Rash       FAMILY HISTORY:  Family History   Problem Relation Age of Onset     CANCER Brother      testicular     DIABETES Sister      Ovarian Cancer Mother 60     DIABETES Mother           Asthma Father           DIABETES Sister      Depression/Anxiety Daughter      Depression Daughter      OSTEOPOROSIS Sister      Other Cancer Brother      DIABETES Brother      Depression Other      Grandson         SOCIAL HISTORY:  Social History   Substance Use Topics     Smoking status: Former Smoker     Packs/day: 1.00     Years: 30.00     Types: Cigarettes     Start date: 1965     Quit date: 10/10/2003     Smokeless tobacco: Never Used     Alcohol use No       ROS:   Constitutional: No fever, chills, or sweats. Weight stable.   ENT: No visual disturbance, ear ache, epistaxis, sore throat.   Cardiovascular: As per HPI.   Respiratory: No cough, hemoptysis.    GI: No nausea, vomiting, hematemesis, melena, or hematochezia, abdominal pain +  : No hematuria.   Integument: Negative.   Psychiatric: Negative.   Hematologic:  Easy bruising, no easy bleeding.  Neuro: Negative.   Endocrinology: No significant heat or cold intolerance   Musculoskeletal: No myalgia.    Exam:  /80 (BP Location: Left arm, Patient Position: Chair, Cuff Size: Adult Regular)  Pulse 71  SpO2 98%  GENERAL APPEARANCE: healthy, alert and no distress  HEENT: no icterus, no central cyanosis  LYMPH/NECK: no adenopathy, no asymmetry, JVP not elevated, no carotid bruits.  RESPIRATORY: lungs clear to auscultation - no rales, rhonchi or wheezes, no use of accessory muscles, no retractions, respirations are unlabored, normal respiratory rate  CARDIOVASCULAR: regular rhythm, normal S1, S2, no S3 or S4 and no murmur, click or rub, precordium quiet with normal PMI.  GI:  soft, non tender  EXTREMITIES: peripheral pulses normal, no edema  NEURO: alert and oriented to person/place/time, normal speech,and affect  VASC: Radial, dorsalis pedis and posterior tibialis pulses are normal in volumes and symmetric bilaterally.   SKIN: no ecchymoses, no rashes     I have reviewed the labs and personally reviewed the imaging below (EKG and Zio Patch) and made my comment in the assessment and plan.    Labs:  CBC RESULTS:   Lab Results   Component Value Date    WBC 7.0 08/04/2017    RBC 5.10 08/04/2017    HGB 15.5 08/04/2017    HCT 46.2 08/04/2017    MCV 91 08/04/2017    MCH 30.4 08/04/2017    MCHC 33.5 08/04/2017    RDW 13.0 08/04/2017     08/04/2017       BMP RESULTS:  Lab Results   Component Value Date     08/04/2017    POTASSIUM 3.4 08/04/2017    CHLORIDE 102 08/04/2017    CO2 30 08/04/2017    ANIONGAP 6 08/04/2017     (H) 08/04/2017    BUN 24 08/04/2017    CR 0.92 08/04/2017    GFRESTIMATED 60 (L) 08/04/2017    GFRESTBLACK 73 08/04/2017    MAURISIO 9.5 08/04/2017          Procedures:    Zio-Patch (8/11/2017): I personally reviewed 2 week Zio-Patch that showed SVT episodes max 16 bpm. The SVT appears regular.  She reported no symptoms during the entire time.      EKG 8/4/2017 normal ECG from [facility].      Assessment and Plan:      is a 72 year old lady with PMH of metastatic endometrial carcinoma, HT, and COPD presents today with short SVT (asymptomatic) episodes on her Zio Patch monitor and palpitations for the last 4 months. No prior hx of cardiac disease.    1. Palpitations: Recently she had a 2 week Zio patch with short SVTs up to 16 bpm but she was asymptomatic during the recorded episodes. I have discussed the options to try to capture those symptomatic episodes with repeat ECG monitoring versus low dose prophylactic beta blocker (since she has a hint of SVT in Zio Patch) versus catheter ablation. She feels dizzy during her episodes. We decided to do a longer ECG  monitoring to capture one of her symptomatic episodes to decide on the treatment plan. She is happy with the plan.    2.COPD: On inhalers.    3. Hypertension: She is on 25 mg chlorthalidone qday and K replacement. Her BP is under control.    4. Metastatic endometrial carcinoma: Followed by oncology.    Planned tests: TT echocardiogram                          2 week Zio Patch    I will follow her up with the results.    It was my absolute pleasure to meet Mrs. Casillas in the office today. Please donot hesitate to contact me if you have any questions or concerns.    Yolanda VEGA MD  [facility] Division of Cardiology  Pager [phone]

## 2017-10-11 ENCOUNTER — ALLIED HEALTH/NURSE VISIT (OUTPATIENT)
Dept: CARDIOLOGY | Facility: CLINIC | Age: 72
End: 2017-10-11

## 2017-10-11 ENCOUNTER — RADIANT APPOINTMENT (OUTPATIENT)
Dept: CARDIOLOGY | Facility: CLINIC | Age: 72
End: 2017-10-11
Attending: INTERNAL MEDICINE
Payer: COMMERCIAL

## 2017-10-11 DIAGNOSIS — I47.10 PAROXYSMAL SUPRAVENTRICULAR TACHYCARDIA (H): ICD-10-CM

## 2017-10-11 DIAGNOSIS — I47.10 PAROXYSMAL SUPRAVENTRICULAR TACHYCARDIA (H): Primary | ICD-10-CM

## 2017-10-11 PROCEDURE — 93306 TTE W/DOPPLER COMPLETE: CPT | Performed by: INTERNAL MEDICINE

## 2017-10-11 NOTE — NURSING NOTE
Patient presents today for resting echo ordered by MD.   Echo Tech provided patient education.    Echo technician completed resting echo. Data transferred to [facility] for final interpretation through "[employer].   Patient education provided about cardiology interpretation and primary provider will be notified of results.    Dahiana Puente RDCS

## 2017-10-24 ENCOUNTER — TELEPHONE (OUTPATIENT)
Dept: CARDIOLOGY | Facility: CLINIC | Age: 72
End: 2017-10-24

## 2017-10-24 NOTE — TELEPHONE ENCOUNTER
Results for the zio patch holter was reviewed with the patient and patient is agreeable with MD's response and states she feels well.  Patient added to waitlist for her yearly appointment.  Ml Garcia RN

## 2017-10-24 NOTE — TELEPHONE ENCOUNTER
----- Message from Wyatt Rogers MD sent at 10/23/2017 10:41 AM CDT -----  Hi,    She has brief asymptomatic svt episodes. I would like to see her in one year time in clinic if she is not very symptomatic with these episodes. Otherwise we can make an 30 min appointment to discuss the results. Can you call and let me know please ?  Thanks,  WYATT

## 2017-11-14 ENCOUNTER — MYC REFILL (OUTPATIENT)
Dept: FAMILY MEDICINE | Facility: CLINIC | Age: 72
End: 2017-11-14

## 2017-11-14 DIAGNOSIS — J44.9 COPD (CHRONIC OBSTRUCTIVE PULMONARY DISEASE) (H): ICD-10-CM

## 2017-11-14 RX ORDER — IPRATROPIUM BROMIDE AND ALBUTEROL SULFATE 2.5; .5 MG/3ML; MG/3ML
1 SOLUTION RESPIRATORY (INHALATION) EVERY 4 HOURS PRN
Qty: 30 VIAL | Refills: 5 | Status: SHIPPED | OUTPATIENT
Start: 2017-11-14 | End: 2018-04-18

## 2017-11-14 NOTE — TELEPHONE ENCOUNTER
Message from [facility]:  Original authorizing provider: MD Iris CAMARGO would like a refill of the following medications:  ipratropium - albuterol 0.5 mg/2.5 mg/3 mL (DUONEB) 0.5-2.5 (3) MG/3ML neb solution [NIKOLE BRAVO MD]    Preferred pharmacy: [address] [address] [address]    Comment:

## 2017-12-04 ENCOUNTER — ONCOLOGY VISIT (OUTPATIENT)
Dept: ONCOLOGY | Facility: CLINIC | Age: 72
End: 2017-12-04
Payer: COMMERCIAL

## 2017-12-04 VITALS
TEMPERATURE: 98.1 F | HEIGHT: 66 IN | RESPIRATION RATE: 18 BRPM | DIASTOLIC BLOOD PRESSURE: 72 MMHG | BODY MASS INDEX: 20.09 KG/M2 | OXYGEN SATURATION: 100 % | SYSTOLIC BLOOD PRESSURE: 143 MMHG | HEART RATE: 72 BPM | WEIGHT: 125 LBS

## 2017-12-04 DIAGNOSIS — C56.2 MALIGNANT NEOPLASM OF OVARY, LEFT (H): Primary | ICD-10-CM

## 2017-12-04 DIAGNOSIS — Z23 NEED FOR PROPHYLACTIC VACCINATION AND INOCULATION AGAINST INFLUENZA: ICD-10-CM

## 2017-12-04 DIAGNOSIS — C56.2 MALIGNANT NEOPLASM OF OVARY, LEFT (H): ICD-10-CM

## 2017-12-04 LAB — CANCER AG125 SERPL-ACNC: 11 U/ML (ref 0–30)

## 2017-12-04 PROCEDURE — 90686 IIV4 VACC NO PRSV 0.5 ML IM: CPT | Performed by: NURSE PRACTITIONER

## 2017-12-04 PROCEDURE — 86304 IMMUNOASSAY TUMOR CA 125: CPT | Performed by: NURSE PRACTITIONER

## 2017-12-04 PROCEDURE — 36415 COLL VENOUS BLD VENIPUNCTURE: CPT | Performed by: NURSE PRACTITIONER

## 2017-12-04 PROCEDURE — 99213 OFFICE O/P EST LOW 20 MIN: CPT | Mod: 25 | Performed by: NURSE PRACTITIONER

## 2017-12-04 PROCEDURE — G0008 ADMIN INFLUENZA VIRUS VAC: HCPCS | Performed by: NURSE PRACTITIONER

## 2017-12-04 ASSESSMENT — PAIN SCALES - GENERAL: PAINLEVEL: NO PAIN (0)

## 2017-12-04 NOTE — NURSING NOTE
"Oncology Rooming Note    December 4, 2017 11:04 AM   Iris Carrion is a 72 year old female who presents for:    Chief Complaint   Patient presents with     Oncology Clinic Visit     3 mo f/u ovarian     Initial Vitals: There were no vitals taken for this visit. Estimated body mass index is 19.7 kg/(m^2) as calculated from the following:    Height as of 8/30/17: 1.676 m (5' 5.98\").    Weight as of 8/30/17: 55.3 kg (122 lb). There is no height or weight on file to calculate BSA.  Data Unavailable Comment: Data Unavailable   No LMP recorded. Patient is postmenopausal.  Allergies reviewed: Yes  Medications reviewed: Yes    Medications: Medication refills not needed today.  Pharmacy name entered into [employer]: [facility] [address] [place], [place] - [address]    Clinical concerns:     8 minutes for nursing intake (face to face time)     LEÓN FERRARO LPN              "

## 2017-12-04 NOTE — PROGRESS NOTES
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 She completed treatment 2016. She is here today for a follow up visit.      Treatment History:  Ms Carrion started having her symptoms of constipation and bloating (May/2015). Her doctor recommended miralax. Symptoms continued to occurred so they followed with a CT scan which showed extensive peritoneal metastatic disease.   8/17/15: CT c/a/p IMPRESSION:   1. Left renal cortical cysts. No enhancing renal mass. No urinary tract calculi or hydronephrosis. Collecting systems, ureters and bladder are unremarkable.  2. Extensive peritoneal metastatic disease with moderate ascites likely related to malignant ascites. Followup as clinically warranted. An obvious etiology for this metastatic disease is not  visualized.  3. No bowel obstruction or diverticulitis. Serosal metastatic disease is present. No obvious colonic mass. Followup colonoscopy as needed for further assessment.  4. Moderate-sized hiatal hernia. No gastric obstruction.      8/22/15:  1950      8/27/15: New patient visit. she continues to have constipation and bloating. She has lost about 5 pounds. She also admits to having lood in urine as well as urinary urgenrgy. She has loose bm bm every other day.       Plan: chemotherapy with Taxol/Carbo. To be considered for these clinical trials: PARP, avatar circulating tumor study, small neogman.       8/31/15: CT guided omental nodule biopsy. Final diagnosis:  Adenocarcinoma, with features consistent with endometrioid adenocarcinoma       COMMENT:  Immunohistochemical staining was obtained with appropriate control slides for ER, [place] 8 and WT-1. Malignant cells are positive for all three markers confirming their endometrioid cell differentiation.       Plan:  "treat like an ovarian cancer but will do dose dense Taxol carbo for 3 cycles then be re-evalated with scan       9/4/15-10/14/15: Cycle #1-3 dose dense Taxol/Carbo.  2008, 470, 30.       11/19/15: CT c/a/p IMPRESSION:   1. Decreased peritoneal carcinomatosis and malignant ascites.  2. Severe emphysema and scattered areas of likely postinfectious scarring. Several of these areas particularly along the right minor fissure appear more nodular malignancy cannot be excluded. Recommend 3-six-month interval followup.  3. Diverticulosis.  4. Osteopenia and numerous compression deformities, none of which appear acute.  5. Stable large hiatal hernia.      11/23/15:  17      11/25/15: Exploratory Laparotomy, Repair Umbilical Hernia, Total Abdominal Hysterectomy, Right Salpingo Oophorectomy, Left Salpingectomy, Appendectomy, Complete Omentectomy, CUSA Tumor Debulking,Cystoscopy, Left PeriAortc Lymph Node Biopsy, Insertion Peritoneal Port, Mobilization of Liver, Ablation of Liver Nodules Open Repair Hiatal Hernia Repair      Surgical pathology report:  FINAL DIAGNOSIS:  A: Umbilical hernia sac, repair:  - Adenocarcinoma  - Fibroadipose tissue, partially surfaced by mesothelium, consistent with hernia sac  B: Hepatic ligament, biopsy:  - One benign lymph node (0/1)  C: Splenic ligament implant, biopsy:  - Adenocarcinoma  D: Inferior vena cava implant, biopsy:  - Adenocarcinoma  E: Omentum, omentectomy:  - Adenocarcinoma  F: \"Liver nodule\", biopsy:  - Fibro-adipose tissue with adenocarcinoma  G: Right pelvic peritoneum, biopsy:  - Adenocarcinoma  H: Cul-de-sac peritoneum, biopsy:  - Adenocarcinoma  I: Uterus, cervix, right ovary and fallopian tube, hysterectomy with right salpingo-oophorectomy:  - Adenocarcinoma present in the soft tissue at the left adnexal area,cervix and right fallopian tube, most likely endometrioid adenocarcinoma  - Atrophic endometrium  - Benign endometrial polyp  - Myometrium with no significant " histologic abnormality  - Atrophic cervix and nabothian cysts  - Tumor present on the external aspect of the cervix  - Right ovary with numerous corpora albicantia and simple epithelial cysts  - Right fallopian tube with a single focus of carcinoma  - See comment  J: Large bowel epiploica, biopsy:  - Adenocarcinoma  K: Appendix, appendectomy:  - Appendix with fibrous replacement  L: Lymph nodes, left periaortic, excision:  - One benign lymph node (0/1)      12/14/15:  46. post op visit. reviewed the results of her surgical pathology copy of report was given to pt. She will start IV/IP taxol/Cddp chemo tomorrow. IV fluids on Thursday and Friday at [place] and Saturday and Sunday at the [facility]. Day 8 taxol will be on dec 22nd. She will need to have fluids again on 23rd and 24th. She will also need Neupogen.       Plan: 3 cycles IV/IP chemo.       12/15/15: Cycle #1 IV/IP chemo.    1/6/16: Cycle #2 IV/IP chemo.   18. Delayed d/t neutropenia; given 1/13. Neupogen and Neulasta added.    2/1/16: Cycle #3 IV/IP chemo.  23  2/29/16:  41. CT cap  Findings:  Right internal jugular Port-A-Cath with tip at atriocaval junction. The heart is not enlarged. No significant pericardial effusion. The mediastinal great vessels are normal in caliber. No central pulmonary embolus. Thoracic aortic atherosclerotic calcifications. Calcified mediastinal and hilar lymph nodes. No lymphadenopathy in the chest by size criteria. The central tracheobronchial tree is patent. New nodular pleural-based consolidative opacity in the left lower lobe measuring 26 x 29 mm (image 110, series 7). There are a few other new subcentimeter nodules in the left lower lobe. For example, 7 mm nodule on image 104, series 4. Severe centrilobular emphysematous changes. Likely nodular scarring in the right upper lobe (image 32, series 7) is not significantly changed. Other scattered fibrotic changes, most prominent in the  right lower lobe, do not appear significantly changed. Calcified granuloma in the right lower lobe. Postsurgical changes of a hernia repair and hysterectomy/bilateral salpingo-oophorectomy. There is no abnormal soft tissue within the  resection bed to suggest locally recurrent disease. No suspicious liver lesions. Unchanged extrahepatic and central intrahepatic biliary dilatation, likely reservoir effect in the setting of cholecystectomy. Atrophic pancreas. Calcified splenic granulomata. Unchanged  hypoattenuating lesions in the kidneys, the largest of which on the left are compatible with cysts. No abnormally dilated or thickened loops of bowel. No free intraperitoneal air or free fluid. Colonic diverticuli without evidence of active inflammation. Duodenal diverticulum. Intra-abdominal port with tip in the left pelvis. Aortoiliac atheromatous plaque without aneurysmal dilatation. No abnormally enlarged abdominal or pelvic lymph nodes. No definite residual peritoneal/or omental nodules. Marked generalized osteopenia. Redemonstration of multiple compression deformities throughout the thoracolumbar spine, greatest at T8 with greater than 50% loss of vertebral body height, not significantly changed. There is mild increased associated sclerosis. Associated kyphosis of the upper thoracic spine. There are 6 lumbar type vertebral bodies. No new aggressive osseous lesions.  Impression:  1. Continued positive response to therapy in the abdomen/pelvis with no definite residual peritoneal/omental metastasis.  2. New pleural-based consolidative opacity in the left lower lobe. There are a few other new subcentimeter nodules in the left lower lobe. This may be secondary to infection/inflammation, but metastases should be considered and short-term interval followup is recommended.      4/6/16 CT/PET IMPRESSION: Patient's history of ovarian carcinoma.  1. Left lower lobe 2 cm irregular pulmonary nodular density is minimally  "metabolic and argues that it is benign such as rounded atelectasis and less likely rounded pneumonia.  2. Underlying COPD with scattered scars within both lungs but no hypermetabolic structures to indicate malignancy.  3. No evidence of carcinomatosis, peritoneal or abdominal metastasis, or metastatic disease to any of the organs of the abdomen or pelvis.  4. Possible mild esophagitis at the GE junction.      4/11/16:  8. \"Had been unclear as to why  up to 41 from 23-this is concerning in light of recent completion of cddp/taxol, however the  is 8. CT PET is negative for metastatic disease. Recommend q 3 month visits x 2 years, then every 6 mos for 3 additional years.\"      7/25/16:   9.  7/27/16: CT cap Impression:    1. Interval resolution of left lower lobe spiculated pulmonary nodule. This likely represented atelectasis or consolidation.  2. Significant panlobular emphysematous changes of both lungs.  3. Spiculated right upper lobe pulmonary nodule measuring up to 8 mm, unchanged since 11/19/2015, although progressed since 2/4/2011. This may represent scarring. Recommend attention on followup.  4. New right hip fracture, possibly pathologic.  5. New sclerotic lesions of the sternum which are indeterminate. This may represent metastatic disease.      Of note, patient tripped over a cord at home 5/30/16 and fell and had an avulsed fx of her right hip. She underwent PT for this. She subsequently fell backwards 6/13/16 and fractured her left wrist and seeing OT for this.       She was recommended to have a bone scan done. This was scheduled and the patient cancelled the appt and has not yet rescheduled this; she has been contacted several times to have this done.       10/31/16:  15.  12/5/16:  11.  2/13/17:  12.  5/8/17:  11. Port removed.   8/30/17:  13.  12/4/17:  pending.        Today she comes to clinic feeling well and denies any concerns. She denies " any vaginal bleeding, no changes in her bowel or bladder habits, no nausea/emesis, no lower extremity edema, and no difficulties eating or sleeping. She denies any abdominal discomfort/bloating, no fevers or chills, and no chest pain or shortness of breath but will have some QUIROS at times from her COPD. She gets HA at times but takes Tylenol or they resolve on their own. She sees her PCP for management of her COPD. She is current with her annual exam but is due for her mammogram and colonoscopy. She is not sexually active. She would like a flu shot today.         Review of Systems:    Systemic           no weight changes; no fever; no chills; no night sweats; no appetite changes  Skin           no rashes, or lesions  Eye           no irritation; no changes in vision  Katarzyna-Laryngeal           no dysphagia; no hoarseness   Pulmonary    no cough; no shortness of breath; + COPD  Cardiovascular    no chest pain; no palpitations; + HTN  Gastrointestinal    no diarrhea; no constipation; no abdominal pain; no changes in bowel habits; no blood in stool  Genitourinary   no urinary frequency; no urinary urgency; no dysuria; no pain; no abnormal vaginal discharge; no abnormal vaginal bleeding  Breast    no breast discharge; no breast changes; no breast pain  Musculoskeletal    no myalgias; no arthralgias; no back pain  Psychiatric           no depressed mood; no anxiety    Hematologic              no tender lymph nodes; no noticeable swellings or lumps   Endocrine    no hot flashes; no heat/cold intolerance         Neurological   no tremor; no numbness and tingling; no headaches; no difficulty sleeping      Past Medical History:    Past Medical History:   Diagnosis Date     Cervical high risk HPV (human papillomavirus) test positive 10/31/12    type 18     COPD (chronic obstructive pulmonary disease) (H)      GERD (gastroesophageal reflux disease)      Hx of colposcopy with cervical biopsy 11/2012    negative     Hypertension  2013     Osteoporosis      Paroxysmal supraventricular tachycardia (H) 9/6/2017     Peritoneal carcinomatosis (H) 8/24/2015     Pneumonia      Uncomplicated asthma 1980         Past Surgical History:    Past Surgical History:   Procedure Laterality Date     CHOLECYSTECTOMY  6/2014     COLONOSCOPY  12/4/2012    Procedure: COLONOSCOPY;  COLONOSCOPY SCREEN;  Surgeon: Mushtaq Lara MD;  Location: [facility] OR     GYN SURGERY       HERNIORRHAPHY HIATAL N/A 11/25/2015    Procedure: HERNIORRHAPHY HIATAL;  Surgeon: Chip Carty MD;  Location: [facility] OR     HYSTERECTOMY TOTAL ABD, ALVIN SALPINGO-OOPHORECTOMY, NODE DISSECTION, TUMOR DEBULKING, COMBINED Bilateral 11/25/2015    Procedure: COMBINED HYSTERECTOMY TOTAL ABDOMINAL, SALPINGO-OOPHORECTOMY, NODE DISSECTION, TUMOR DEBULKING;  Surgeon: Emma Cabrera MD;  Location: [facility] OR         Health Maintenance Due   Topic Date Due     DEXA SCAN SCREENING (SYSTEM ASSIGNED)  08/11/2012     INFLUENZA VACCINE (SYSTEM ASSIGNED)  09/01/2017     ADVANCE DIRECTIVE PLANNING Q5 YRS  10/31/2017     MAMMO Q1 YR  11/14/2017     COLONOSCOPY Q5 YR  12/04/2017       Current Medications:     Current Outpatient Prescriptions   Medication Sig Dispense Refill     ipratropium - albuterol 0.5 mg/2.5 mg/3 mL (DUONEB) 0.5-2.5 (3) MG/3ML neb solution Take 1 vial (3 mLs) by nebulization every 4 hours as needed for shortness of breath / dyspnea or wheezing 30 vial 5     chlorthalidone (HYGROTON) 25 MG tablet Take 1 tablet (25 mg) by mouth daily 90 tablet 0     budesonide-formoterol (SYMBICORT) 160-4.5 MCG/ACT Inhaler Inhale 2 puffs into the lungs 2 times daily 1 Inhaler 5     omeprazole (PRILOSEC) 40 MG capsule Take 1 capsule (40 mg) by mouth daily Take 30-60 minutes before a meal. 90 capsule 1     tiotropium (SPIRIVA HANDIHALER) 18 MCG capsule Inhale 1 capsule (18 mcg) into the lungs daily Inhale contents of one capsule. . 90 capsule 2     loratadine (CLARITIN) 10 MG tablet Take 10 mg by mouth  "daily       fluticasone (FLONASE) 50 MCG/ACT spray Spray 2 sprays into both nostrils daily 1 Bottle 1     potassium chloride SA (POTASSIUM CHLORIDE) 20 MEQ tablet Take 1 tablet (20 mEq) by mouth 2 times daily 60 tablet 0     albuterol (PROAIR HFA, PROVENTIL HFA, VENTOLIN HFA) 108 (90 BASE) MCG/ACT inhaler Inhale 2 puffs into the lungs every 6 hours as needed for shortness of breath / dyspnea or wheezing 1 Inhaler 3     ibuprofen (ADVIL,MOTRIN) 600 MG tablet Take 1 tablet (600 mg) by mouth every 6 hours as needed for moderate pain 40 tablet 0     MAGNESIUM OXIDE PO Take 200 mg by mouth 2 times daily       Zinc Sulfate (ZINC 15 PO) Take by mouth daily       Calcium Carbonate (CALCIUM 500 PO) Take 1 tablet by mouth daily.       BIOTIN PO Take 1 tablet by mouth daily.           Allergies:        Allergies   Allergen Reactions     Levaquin Rash        Social History:     Social History   Substance Use Topics     Smoking status: Former Smoker     Packs/day: 1.00     Years: 30.00     Types: Cigarettes     Start date: 1965     Quit date: 10/10/2003     Smokeless tobacco: Never Used     Alcohol use No       History   Drug Use No         Family History:     The patient's family history is notable for:    Family History   Problem Relation Age of Onset     CANCER Brother      testicular     DIABETES Sister      Ovarian Cancer Mother 60     DIABETES Mother           Asthma Father           DIABETES Sister      Depression/Anxiety Daughter      Depression Daughter      OSTEOPOROSIS Sister      Other Cancer Brother      DIABETES Brother      Depression Other      Grandson         Physical Exam:     /72  Pulse 72  Temp 98.1  F (36.7  C) (Oral)  Resp 18  Ht 1.676 m (5' 5.98\")  Wt 56.7 kg (125 lb)  SpO2 100%  BMI 20.19 kg/m2  Body mass index is 20.19 kg/(m^2).    General Appearance: healthy and alert, no distress     HEENT: no thyromegaly, no palpable nodules or masses        Cardiovascular: regular " rate and rhythm, no gallops, rubs or murmurs     Respiratory: lungs clear, no rales, rhonchi or wheezes, normal diaphragmatic excursion    Musculoskeletal: extremities non tender and without edema    Skin: no lesions or rashes     Neurological: normal gait, no gross defects     Psychiatric: appropriate mood and affect                               Hematological: normal cervical, supraclavicular and inguinal lymph nodes     Gastrointestinal:       abdomen soft, non-tender, non-distended, no organomegaly or masses    Genitourinary: External genitalia and urethral meatus appears normal.  Vagina is smooth without nodularity or masses.  Cervix surgically absent  Bimanual exam reveal no masses, nodularity or fullness.  Recto-vaginal exam confirms these findings.      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 She completed treatment 4/2016. She is here today for a follow up visit.     20 minutes were spent with this patient, over 50% of that time was spent in symptom management, treatment planning and in counseling and coordination of care.      Plan:     1.)        Patient to RTC in 3 months for her next surveillance visit and ; today's is pending. Reviewed signs and symptoms for when she should contact the clinic or seek additional care. Patient to contact the clinic with any questions or concerns in the interim.     2.) Genetic risk factors were assessed and she is negative for mutations in GINGER, BARD1, BRCA1, BRCA2, BRIP1, CDH1, CHEK2, EPCAM, MLH1, MRE11A, MSH2, MSH6, MUTYH, NBN, NF1, PALB2, PMS2, PTEN, RAD50, RAD51C, RAD51D, SMARCA4, STK11, and TP53.     3.) Labs and/or tests ordered include:  .      4.) Health maintenance issues addressed today include annual health maintenance and non-gynecologic issues with PCP.    CHANDLER Jay, WHNP-BC, ANP-BC  Women's Health Nurse Practitioner  Adult Nurse  Pracitioner  Gynecologic Oncology          CC  Patient Care Team:  Pillo Koehler MD as PCP - General (Family Practice)  Jessica Lua, RN as Continuity Care Coordinator (Gyn-Onc)  Iliana Pierre, RN as

## 2017-12-04 NOTE — LETTER
2017         RE: Iris Carrion  [id number] [facility] [id number]        Dear Colleague,    Thank you for referring your patient, Iris Carrion, to the [facility]. Please see a copy of my visit note below.                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 She completed treatment 2016. She is here today for a follow up visit.      Treatment History:  Ms Carrion started having her symptoms of constipation and bloating (May/2015). Her doctor recommended miralax. Symptoms continued to occurred so they followed with a CT scan which showed extensive peritoneal metastatic disease.   8/17/15: CT c/a/p IMPRESSION:   1. Left renal cortical cysts. No enhancing renal mass. No urinary tract calculi or hydronephrosis. Collecting systems, ureters and bladder are unremarkable.  2. Extensive peritoneal metastatic disease with moderate ascites likely related to malignant ascites. Followup as clinically warranted. An obvious etiology for this metastatic disease is not  visualized.  3. No bowel obstruction or diverticulitis. Serosal metastatic disease is present. No obvious colonic mass. Followup colonoscopy as needed for further assessment.  4. Moderate-sized hiatal hernia. No gastric obstruction.      8/22/15:  2      8/27/15: New patient visit. she continues to have constipation and bloating. She has lost about 5 pounds. She also admits to having lood in urine as well as urinary urgenrgy. She has loose bm bm every other day.       Plan: chemotherapy with Taxol/Carbo. To be considered for these clinical trials: PARP, avatar circulating tumor study, small neogman.       8/31/15: CT guided omental nodule biopsy. Final diagnosis:  Adenocarcinoma, with features consistent with  "endometrioid adenocarcinoma       COMMENT:  Immunohistochemical staining was obtained with appropriate control slides for ER, [place] 8 and WT-1. Malignant cells are positive for all three markers confirming their endometrioid cell differentiation.       Plan: treat like an ovarian cancer but will do dose dense Taxol carbo for 3 cycles then be re-evalated with scan       9/4/15-10/14/15: Cycle #1-3 dose dense Taxol/Carbo.  2008, 470, 30.       11/19/15: CT c/a/p IMPRESSION:   1. Decreased peritoneal carcinomatosis and malignant ascites.  2. Severe emphysema and scattered areas of likely postinfectious scarring. Several of these areas particularly along the right minor fissure appear more nodular malignancy cannot be excluded. Recommend 3-six-month interval followup.  3. Diverticulosis.  4. Osteopenia and numerous compression deformities, none of which appear acute.  5. Stable large hiatal hernia.      11/23/15:  17      11/25/15: Exploratory Laparotomy, Repair Umbilical Hernia, Total Abdominal Hysterectomy, Right Salpingo Oophorectomy, Left Salpingectomy, Appendectomy, Complete Omentectomy, CUSA Tumor Debulking,Cystoscopy, Left PeriAortc Lymph Node Biopsy, Insertion Peritoneal Port, Mobilization of Liver, Ablation of Liver Nodules Open Repair Hiatal Hernia Repair      Surgical pathology report:  FINAL DIAGNOSIS:  A: Umbilical hernia sac, repair:  - Adenocarcinoma  - Fibroadipose tissue, partially surfaced by mesothelium, consistent with hernia sac  B: Hepatic ligament, biopsy:  - One benign lymph node (0/1)  C: Splenic ligament implant, biopsy:  - Adenocarcinoma  D: Inferior vena cava implant, biopsy:  - Adenocarcinoma  E: Omentum, omentectomy:  - Adenocarcinoma  F: \"Liver nodule\", biopsy:  - Fibro-adipose tissue with adenocarcinoma  G: Right pelvic peritoneum, biopsy:  - Adenocarcinoma  H: Cul-de-sac peritoneum, biopsy:  - Adenocarcinoma  I: Uterus, cervix, right ovary and fallopian tube, hysterectomy with " right salpingo-oophorectomy:  - Adenocarcinoma present in the soft tissue at the left adnexal area,cervix and right fallopian tube, most likely endometrioid adenocarcinoma  - Atrophic endometrium  - Benign endometrial polyp  - Myometrium with no significant histologic abnormality  - Atrophic cervix and nabothian cysts  - Tumor present on the external aspect of the cervix  - Right ovary with numerous corpora albicantia and simple epithelial cysts  - Right fallopian tube with a single focus of carcinoma  - See comment  J: Large bowel epiploica, biopsy:  - Adenocarcinoma  K: Appendix, appendectomy:  - Appendix with fibrous replacement  L: Lymph nodes, left periaortic, excision:  - One benign lymph node (0/1)      12/14/15:  46. post op visit. reviewed the results of her surgical pathology copy of report was given to pt. She will start IV/IP taxol/Cddp chemo tomorrow. IV fluids on Thursday and Friday at [facility] and [facility]. Day 8 taxol will be on dec 22nd. She will need to have fluids again on 23rd and 24th. She will also need Neupogen.       Plan: 3 cycles IV/IP chemo.       12/15/15: Cycle #1 IV/IP chemo.    1/6/16: Cycle #2 IV/IP chemo.   18. Delayed d/t neutropenia; given 1/13. Neupogen and Neulasta added.    2/1/16: Cycle #3 IV/IP chemo.  23  2/29/16:  41. CT cap  Findings:  Right internal jugular Port-A-Cath with tip at atriocaval junction. The heart is not enlarged. No significant pericardial effusion. The mediastinal great vessels are normal in caliber. No central pulmonary embolus. Thoracic aortic atherosclerotic calcifications. Calcified mediastinal and hilar lymph nodes. No lymphadenopathy in the chest by size criteria. The central tracheobronchial tree is patent. New nodular pleural-based consolidative opacity in the left lower lobe measuring 26 x 29 mm (image 110, series 7). There are a few other new subcentimeter nodules in the left  lower lobe. For example, 7 mm nodule on image 104, series 4. Severe centrilobular emphysematous changes. Likely nodular scarring in the right upper lobe (image 32, series 7) is not significantly changed. Other scattered fibrotic changes, most prominent in the right lower lobe, do not appear significantly changed. Calcified granuloma in the right lower lobe. Postsurgical changes of a hernia repair and hysterectomy/bilateral salpingo-oophorectomy. There is no abnormal soft tissue within the  resection bed to suggest locally recurrent disease. No suspicious liver lesions. Unchanged extrahepatic and central intrahepatic biliary dilatation, likely reservoir effect in the setting of cholecystectomy. Atrophic pancreas. Calcified splenic granulomata. Unchanged  hypoattenuating lesions in the kidneys, the largest of which on the left are compatible with cysts. No abnormally dilated or thickened loops of bowel. No free intraperitoneal air or free fluid. Colonic diverticuli without evidence of active inflammation. Duodenal diverticulum. Intra-abdominal port with tip in the left pelvis. Aortoiliac atheromatous plaque without aneurysmal dilatation. No abnormally enlarged abdominal or pelvic lymph nodes. No definite residual peritoneal/or omental nodules. Marked generalized osteopenia. Redemonstration of multiple compression deformities throughout the thoracolumbar spine, greatest at T8 with greater than 50% loss of vertebral body height, not significantly changed. There is mild increased associated sclerosis. Associated kyphosis of the upper thoracic spine. There are 6 lumbar type vertebral bodies. No new aggressive osseous lesions.  Impression:  1. Continued positive response to therapy in the abdomen/pelvis with no definite residual peritoneal/omental metastasis.  2. New pleural-based consolidative opacity in the left lower lobe. There are a few other new subcentimeter nodules in the left lower lobe. This may be secondary to  "infection/inflammation, but metastases should be considered and short-term interval followup is recommended.      4/6/16 CT/PET IMPRESSION: Patient's history of ovarian carcinoma.  1. Left lower lobe 2 cm irregular pulmonary nodular density is minimally metabolic and argues that it is benign such as rounded atelectasis and less likely rounded pneumonia.  2. Underlying COPD with scattered scars within both lungs but no hypermetabolic structures to indicate malignancy.  3. No evidence of carcinomatosis, peritoneal or abdominal metastasis, or metastatic disease to any of the organs of the abdomen or pelvis.  4. Possible mild esophagitis at the GE junction.      4/11/16:  8. \"Had been unclear as to why  up to 41 from 23-this is concerning in light of recent completion of cddp/taxol, however the  is 8. CT PET is negative for metastatic disease. Recommend q 3 month visits x 2 years, then every 6 mos for 3 additional years.\"      7/25/16:   9.  7/27/16: CT cap Impression:    1. Interval resolution of left lower lobe spiculated pulmonary nodule. This likely represented atelectasis or consolidation.  2. Significant panlobular emphysematous changes of both lungs.  3. Spiculated right upper lobe pulmonary nodule measuring up to 8 mm, unchanged since 11/19/2015, although progressed since 2/4/2011. This may represent scarring. Recommend attention on followup.  4. New right hip fracture, possibly pathologic.  5. New sclerotic lesions of the sternum which are indeterminate. This may represent metastatic disease.      Of note, patient tripped over a cord at home 5/30/16 and fell and had an avulsed fx of her right hip. She underwent PT for this. She subsequently fell backwards 6/13/16 and fractured her left wrist and seeing OT for this.       She was recommended to have a bone scan done. This was scheduled and the patient cancelled the appt and has not yet rescheduled this; she has been contacted several " times to have this done.       10/31/16:  15.  12/5/16:  11.  2/13/17:  12.  5/8/17:  11. Port removed.   8/30/17:  13.  12/4/17:  pending.        Today she comes to clinic feeling well and denies any concerns. She denies any vaginal bleeding, no changes in her bowel or bladder habits, no nausea/emesis, no lower extremity edema, and no difficulties eating or sleeping. She denies any abdominal discomfort/bloating, no fevers or chills, and no chest pain or shortness of breath but will have some QUIROS at times from her COPD. She gets HA at times but takes Tylenol or they resolve on their own. She sees her PCP for management of her COPD. She is current with her annual exam but is due for her mammogram and colonoscopy. She is not sexually active. She would like a flu shot today.         Review of Systems:    Systemic           no weight changes; no fever; no chills; no night sweats; no appetite changes  Skin           no rashes, or lesions  Eye           no irritation; no changes in vision  Katarzyna-Laryngeal           no dysphagia; no hoarseness   Pulmonary    no cough; no shortness of breath; + COPD  Cardiovascular    no chest pain; no palpitations; + HTN  Gastrointestinal    no diarrhea; no constipation; no abdominal pain; no changes in bowel habits; no blood in stool  Genitourinary   no urinary frequency; no urinary urgency; no dysuria; no pain; no abnormal vaginal discharge; no abnormal vaginal bleeding  Breast    no breast discharge; no breast changes; no breast pain  Musculoskeletal    no myalgias; no arthralgias; no back pain  Psychiatric           no depressed mood; no anxiety    Hematologic              no tender lymph nodes; no noticeable swellings or lumps   Endocrine    no hot flashes; no heat/cold intolerance         Neurological   no tremor; no numbness and tingling; no headaches; no difficulty sleeping      Past Medical History:    Past Medical History:   Diagnosis Date      Cervical high risk HPV (human papillomavirus) test positive 10/31/12    type 18     COPD (chronic obstructive pulmonary disease) (H)      GERD (gastroesophageal reflux disease)      Hx of colposcopy with cervical biopsy 11/2012    negative     Hypertension 2013     Osteoporosis      Paroxysmal supraventricular tachycardia (H) 9/6/2017     Peritoneal carcinomatosis (H) 8/24/2015     Pneumonia      Uncomplicated asthma 1980         Past Surgical History:    Past Surgical History:   Procedure Laterality Date     CHOLECYSTECTOMY  6/2014     COLONOSCOPY  12/4/2012    Procedure: COLONOSCOPY;  COLONOSCOPY SCREEN;  Surgeon: Mushtaq Lara MD;  Location: [facility] OR     GYN SURGERY       HERNIORRHAPHY HIATAL N/A 11/25/2015    Procedure: HERNIORRHAPHY HIATAL;  Surgeon: PodChip Marte MD;  Location: [facility] OR     HYSTERECTOMY TOTAL ABD, ALVIN SALPINGO-OOPHORECTOMY, NODE DISSECTION, TUMOR DEBULKING, COMBINED Bilateral 11/25/2015    Procedure: COMBINED HYSTERECTOMY TOTAL ABDOMINAL, SALPINGO-OOPHORECTOMY, NODE DISSECTION, TUMOR DEBULKING;  Surgeon: Emma Cabrera MD;  Location: [facility] OR         Health Maintenance Due   Topic Date Due     DEXA SCAN SCREENING (SYSTEM ASSIGNED)  08/11/2012     INFLUENZA VACCINE (SYSTEM ASSIGNED)  09/01/2017     ADVANCE DIRECTIVE PLANNING Q5 YRS  10/31/2017     MAMMO Q1 YR  11/14/2017     COLONOSCOPY Q5 YR  12/04/2017       Current Medications:     Current Outpatient Prescriptions   Medication Sig Dispense Refill     ipratropium - albuterol 0.5 mg/2.5 mg/3 mL (DUONEB) 0.5-2.5 (3) MG/3ML neb solution Take 1 vial (3 mLs) by nebulization every 4 hours as needed for shortness of breath / dyspnea or wheezing 30 vial 5     chlorthalidone (HYGROTON) 25 MG tablet Take 1 tablet (25 mg) by mouth daily 90 tablet 0     budesonide-formoterol (SYMBICORT) 160-4.5 MCG/ACT Inhaler Inhale 2 puffs into the lungs 2 times daily 1 Inhaler 5     omeprazole (PRILOSEC) 40 MG capsule Take 1 capsule (40 mg) by mouth  daily Take 30-60 minutes before a meal. 90 capsule 1     tiotropium (SPIRIVA HANDIHALER) 18 MCG capsule Inhale 1 capsule (18 mcg) into the lungs daily Inhale contents of one capsule. . 90 capsule 2     loratadine (CLARITIN) 10 MG tablet Take 10 mg by mouth daily       fluticasone (FLONASE) 50 MCG/ACT spray Spray 2 sprays into both nostrils daily 1 Bottle 1     potassium chloride SA (POTASSIUM CHLORIDE) 20 MEQ tablet Take 1 tablet (20 mEq) by mouth 2 times daily 60 tablet 0     albuterol (PROAIR HFA, PROVENTIL HFA, VENTOLIN HFA) 108 (90 BASE) MCG/ACT inhaler Inhale 2 puffs into the lungs every 6 hours as needed for shortness of breath / dyspnea or wheezing 1 Inhaler 3     ibuprofen (ADVIL,MOTRIN) 600 MG tablet Take 1 tablet (600 mg) by mouth every 6 hours as needed for moderate pain 40 tablet 0     MAGNESIUM OXIDE PO Take 200 mg by mouth 2 times daily       Zinc Sulfate (ZINC 15 PO) Take by mouth daily       Calcium Carbonate (CALCIUM 500 PO) Take 1 tablet by mouth daily.       BIOTIN PO Take 1 tablet by mouth daily.           Allergies:        Allergies   Allergen Reactions     Levaquin Rash        Social History:     Social History   Substance Use Topics     Smoking status: Former Smoker     Packs/day: 1.00     Years: 30.00     Types: Cigarettes     Start date: 1965     Quit date: 10/10/2003     Smokeless tobacco: Never Used     Alcohol use No       History   Drug Use No         Family History:     The patient's family history is notable for:    Family History   Problem Relation Age of Onset     CANCER Brother      testicular     DIABETES Sister      Ovarian Cancer Mother 60     DIABETES Mother           Asthma Father           DIABETES Sister      Depression/Anxiety Daughter      Depression Daughter      OSTEOPOROSIS Sister      Other Cancer Brother      DIABETES Brother      Depression Other      Grandson         Physical Exam:     /72  Pulse 72  Temp 98.1  F (36.7  C) (Oral)  Resp  "18  Ht 1.676 m (5' 5.98\")  Wt 56.7 kg (125 lb)  SpO2 100%  BMI 20.19 kg/m2  Body mass index is 20.19 kg/(m^2).    General Appearance: healthy and alert, no distress     HEENT: no thyromegaly, no palpable nodules or masses        Cardiovascular: regular rate and rhythm, no gallops, rubs or murmurs     Respiratory: lungs clear, no rales, rhonchi or wheezes, normal diaphragmatic excursion    Musculoskeletal: extremities non tender and without edema    Skin: no lesions or rashes     Neurological: normal gait, no gross defects     Psychiatric: appropriate mood and affect                               Hematological: normal cervical, supraclavicular and inguinal lymph nodes     Gastrointestinal:       abdomen soft, non-tender, non-distended, no organomegaly or masses    Genitourinary: External genitalia and urethral meatus appears normal.  Vagina is smooth without nodularity or masses.  Cervix surgically absent  Bimanual exam reveal no masses, nodularity or fullness.  Recto-vaginal exam confirms these findings.      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 She completed treatment 4/2016. She is here today for a follow up visit.     20 minutes were spent with this patient, over 50% of that time was spent in symptom management, treatment planning and in counseling and coordination of care.      Plan:     1.)        Patient to RTC in 3 months for her next surveillance visit and ; today's is pending. Reviewed signs and symptoms for when she should contact the clinic or seek additional care. Patient to contact the clinic with any questions or concerns in the interim.     2.) Genetic risk factors were assessed and she is negative for mutations in GINGER, BARD1, BRCA1, BRCA2, BRIP1, CDH1, CHEK2, EPCAM, MLH1, MRE11A, MSH2, MSH6, MUTYH, NBN, NF1, PALB2, PMS2, PTEN, RAD50, RAD51C, RAD51D, SMARCA4, STK11, and TP53.     3.) Labs and/or " tests ordered include:  .      4.) Health maintenance issues addressed today include annual health maintenance and non-gynecologic issues with PCP.    CHANDLER Jay, WHNP-BC, ANP-BC  Women's Health Nurse Practitioner  Adult Nurse Pracitioner  Gynecologic Oncology          CC  Patient Care Team:  Pillo Koehler MD as PCP - General (Family Practice)  Jessica Lua, RN as Continuity Care Coordinator (Gyn-Onc)  Iliana Pierre, JANE as       Again, thank you for allowing me to participate in the care of your patient.        Sincerely,        CHANDLER Linares CNP

## 2017-12-04 NOTE — MR AVS SNAPSHOT
After Visit Summary   12/4/2017    Iris Carrion    MRN: [id number]           Patient Information     Date Of Birth          1945        Visit Information        Provider Department      12/4/2017 11:00 AM Yenny Pak APRN CNP [facility] [[email or web address]]        Today's Diagnoses     Malignant neoplasm of ovary, left (H)    -  1    Need for prophylactic vaccination and inoculation against influenza           Follow-ups after your visit        Follow-up notes from your care team     Return in about 3 months (around 3/4/2018).      Your next 10 appointments already scheduled     Mar 05, 2018 10:30 AM CST   LAB with LAB ONC [facility] ([facility] [[email or web address]])    [id number] [place] [id number] [phone]           Please do not eat 10-12 hours before your appointment if you are coming in fasting for labs on lipids, cholesterol, or glucose (sugar). This does not apply to pregnant women. Water, hot tea and black coffee (with nothing added) are okay. Do not drink other fluids, diet soda or chew gum.            Mar 05, 2018 11:00 AM CST   Return Visit with CHANDLER Linares CNP   [facility] [[email or web address]] ([facility] [[email or web address]])    [address] [place] [id number] [phone]              Future tests that were ordered for you today     Open Standing Orders        Priority Remaining Interval Expires Ordered     Routine 3/4  12/4/2018 12/4/2017            Who to contact     If you have questions or need follow up information about today's clinic visit or your schedule please contact [facility] directly at [phone].  Normal or non-critical lab and imaging results will be communicated to you by MyChart, letter or phone within 4 business days after the clinic has received the results. If you do not hear from us within 7 days, please contact the clinic through MyChart or phone. If  "you have a critical or abnormal lab result, we will notify you by phone as soon as possible.  Submit refill requests through [employer] or call your pharmacy and they will forward the refill request to us. Please allow 3 business days for your refill to be completed.          Additional Information About Your Visit        [employer] Information     [employer] gives you secure access to your electronic health record. If you see a primary care provider, you can also send messages to your care team and make appointments. If you have questions, please call your primary care clinic.  If you do not have a primary care provider, please call [phone] and they will assist you.      [employer] is an electronic gateway that provides easy, online access to your medical records. With [employer], you can request a clinic appointment, read your test results, renew a prescription or communicate with your care team.     To access your existing account, please contact your [facility] or call [phone] for assistance.        Care EveryWhere ID     This is your Care EveryWhere ID. This could be used by other organizations to access your [place] medical records  [id number]        Your Vitals Were     Pulse Temperature Respirations Height Pulse Oximetry BMI (Body Mass Index)    72 98.1  F (36.7  C) (Oral) 18 1.676 m (5' 5.98\") 100% 20.19 kg/m2       Blood Pressure from Last 3 Encounters:   12/04/17 143/72   09/27/17 131/80   08/30/17 123/74    Weight from Last 3 Encounters:   12/04/17 56.7 kg (125 lb)   08/30/17 55.3 kg (122 lb)   08/04/17 55.8 kg (123 lb)               Primary Care Provider Office Phone # Fax #    Pillo Koehler [phone] [address] [facility] [address]        Equal Access to Services     [place] RODRIGUEZ : Hadii bouchra holdero Greta, waaxda luqadaha, qaybta kaalmada [place], vignesh coats. So [facility] [phone].    ATENCIÓN: Si habla " español, tiene a pena disposición servicios gratuitos de asistencia lingüística. Li bazzi [phone].    We comply with applicable federal civil rights laws and [place] laws. We do not discriminate on the basis of race, color, national origin, age, disability, sex, sexual orientation, or gender identity.            Thank you!     Thank you for choosing [facility]  for your care. Our goal is always to provide you with excellent care. Hearing back from our patients is one way we can continue to improve our services. Please take a few minutes to complete the written survey that you may receive in the mail after your visit with us. Thank you!             Your Updated Medication List - Protect others around you: Learn how to safely use, store and throw away your medicines at www.disposemymeds.org.          This list is accurate as of: 12/4/17 11:35 AM.  Always use your most recent med list.                   Brand Name Dispense Instructions for use Diagnosis    albuterol 108 (90 BASE) MCG/ACT Inhaler    PROAIR HFA/PROVENTIL HFA/VENTOLIN HFA    1 Inhaler    Inhale 2 puffs into the lungs every 6 hours as needed for shortness of breath / dyspnea or wheezing    Chronic bronchitis, unspecified chronic bronchitis type (H)       BIOTIN PO      Take 1 tablet by mouth daily.        budesonide-formoterol 160-4.5 MCG/ACT Inhaler    SYMBICORT    1 Inhaler    Inhale 2 puffs into the lungs 2 times daily    COPD (chronic obstructive pulmonary disease) (H)       CALCIUM 500 PO      Take 1 tablet by mouth daily.        chlorthalidone 25 MG tablet    HYGROTON    90 tablet    Take 1 tablet (25 mg) by mouth daily    Essential hypertension with goal blood pressure less than 140/90       fluticasone 50 MCG/ACT spray    FLONASE    1 Bottle    Spray 2 sprays into both nostrils daily    Non-seasonal allergic rhinitis due to animal hair and dander       ibuprofen 600 MG tablet    ADVIL/MOTRIN    40 tablet    Take 1 tablet (600  mg) by mouth every 6 hours as needed for moderate pain    S/P LUCILA-BSO       ipratropium - albuterol 0.5 mg/2.5 mg/3 mL 0.5-2.5 (3) MG/3ML neb solution    DUONEB    30 vial    Take 1 vial (3 mLs) by nebulization every 4 hours as needed for shortness of breath / dyspnea or wheezing    COPD (chronic obstructive pulmonary disease) (H)       loratadine 10 MG tablet    CLARITIN     Take 10 mg by mouth daily        MAGNESIUM OXIDE PO      Take 200 mg by mouth 2 times daily        omeprazole 40 MG capsule    priLOSEC    90 capsule    Take 1 capsule (40 mg) by mouth daily Take 30-60 minutes before a meal.    GERD (gastroesophageal reflux disease)       potassium chloride SA 20 MEQ CR tablet    KLOR-CON    60 tablet    Take 1 tablet (20 mEq) by mouth 2 times daily    Hypokalemia       tiotropium 18 MCG capsule    SPIRIVA HANDIHALER    90 capsule    Inhale 1 capsule (18 mcg) into the lungs daily Inhale contents of one capsule. .    COPD (chronic obstructive pulmonary disease) (H)       ZINC 15 PO      Take by mouth daily

## 2017-12-04 NOTE — NURSING NOTE
"Injectable Influenza Immunization Documentation    1.  Has the patient received the information for the injectable influenza vaccine? YES     2. Is the patient 6 months of age or older? YES     3. Does the patient have any of the following contraindications?         Severe allergy to eggs? No     Severe allergic reaction to previous influenza vaccines? No   Severe allergy to latex? No       History of Guillain-Ravena syndrome? No     Currently have a temperature greater than 100.4F? No        4.  Severely egg allergic patients should have flu vaccine eligibility assessed by an MD, RN, or pharmacist, and those who received flu vaccine should be observed for 15 min by an MD, RN, Pharmacist, Medical Technician, or member of clinic staff.\": YES    5. Latex-allergic patients should be given latex-free influenza vaccine Yes. Please reference the Vaccine latex table to determine if your clinic s product is latex-containing.       Vaccination given by Gloria Wright LPN          "

## 2018-01-05 ENCOUNTER — MYC REFILL (OUTPATIENT)
Dept: FAMILY MEDICINE | Facility: CLINIC | Age: 73
End: 2018-01-05

## 2018-01-05 DIAGNOSIS — J44.9 COPD (CHRONIC OBSTRUCTIVE PULMONARY DISEASE) (H): ICD-10-CM

## 2018-01-05 RX ORDER — TIOTROPIUM BROMIDE 18 UG/1
18 CAPSULE ORAL; RESPIRATORY (INHALATION) DAILY
Qty: 90 CAPSULE | Refills: 0 | Status: SHIPPED | OUTPATIENT
Start: 2018-01-05 | End: 2018-04-02

## 2018-01-05 NOTE — TELEPHONE ENCOUNTER
Message from [employer]:  Original authorizing provider: MD Iris CAMARGO would like a refill of the following medications:  tiotropium (SPIRIVA HANDIHALER) 18 MCG capsule [NIKOLE BRAVO MD]    Preferred pharmacy: [facility] [address] [facility], [place] - [address]    Comment:

## 2018-01-15 ENCOUNTER — MYC REFILL (OUTPATIENT)
Dept: FAMILY MEDICINE | Facility: CLINIC | Age: 73
End: 2018-01-15

## 2018-01-15 DIAGNOSIS — K21.9 GERD (GASTROESOPHAGEAL REFLUX DISEASE): ICD-10-CM

## 2018-01-16 ENCOUNTER — MYC REFILL (OUTPATIENT)
Dept: FAMILY MEDICINE | Facility: CLINIC | Age: 73
End: 2018-01-16

## 2018-01-16 DIAGNOSIS — K21.9 GERD (GASTROESOPHAGEAL REFLUX DISEASE): ICD-10-CM

## 2018-01-16 RX ORDER — OMEPRAZOLE 40 MG/1
40 CAPSULE, DELAYED RELEASE ORAL DAILY
Qty: 90 CAPSULE | Refills: 1 | Status: CANCELLED | OUTPATIENT
Start: 2018-01-16

## 2018-01-17 RX ORDER — OMEPRAZOLE 40 MG/1
40 CAPSULE, DELAYED RELEASE ORAL DAILY
Qty: 90 CAPSULE | Refills: 1 | Status: SHIPPED | OUTPATIENT
Start: 2018-01-17 | End: 2018-10-08

## 2018-01-17 NOTE — TELEPHONE ENCOUNTER
Message from [employer]:  Original authorizing provider: MD Iris CAMARGO would like a refill of the following medications:  omeprazole (PRILOSEC) 40 MG capsule [NIKOLE BRAVO MD]    Preferred pharmacy: [facility] [address] [place] [address] [address]    Comment:

## 2018-01-17 NOTE — TELEPHONE ENCOUNTER
Message from [employer]:  Original authorizing provider: MD Iris CAMARGO would like a refill of the following medications:  omeprazole (PRILOSEC) 40 MG capsule [NIKOLE BRAVO MD]    Preferred pharmacy: [facility] [address] [place] [address] [address]    Comment:

## 2018-01-21 ENCOUNTER — HEALTH MAINTENANCE LETTER (OUTPATIENT)
Age: 73
End: 2018-01-21

## 2018-01-28 ENCOUNTER — MYC REFILL (OUTPATIENT)
Dept: FAMILY MEDICINE | Facility: CLINIC | Age: 73
End: 2018-01-28

## 2018-01-28 DIAGNOSIS — I10 ESSENTIAL HYPERTENSION WITH GOAL BLOOD PRESSURE LESS THAN 140/90: ICD-10-CM

## 2018-01-29 RX ORDER — CHLORTHALIDONE 25 MG/1
25 TABLET ORAL DAILY
Qty: 90 TABLET | Refills: 0 | Status: SHIPPED | OUTPATIENT
Start: 2018-01-29 | End: 2018-04-08

## 2018-01-29 NOTE — TELEPHONE ENCOUNTER
Message from [employer]:  Original authorizing provider: MD Iris CAMARGO would like a refill of the following medications:  chlorthalidone (HYGROTON) 25 MG tablet [NIKOLE BRAVO MD]    Preferred pharmacy: [facility] [address] [place] [address] [address]    Comment:

## 2018-02-04 ENCOUNTER — HEALTH MAINTENANCE LETTER (OUTPATIENT)
Age: 73
End: 2018-02-04

## 2018-02-09 ENCOUNTER — TRANSFERRED RECORDS (OUTPATIENT)
Dept: HEALTH INFORMATION MANAGEMENT | Facility: CLINIC | Age: 73
End: 2018-02-09

## 2018-02-14 PROBLEM — M54.50 BILATERAL LOW BACK PAIN WITHOUT SCIATICA, UNSPECIFIED CHRONICITY: Status: RESOLVED | Noted: 2017-07-07 | Resolved: 2018-02-14

## 2018-02-14 NOTE — PROGRESS NOTES
Patient has not returned to therapy, so current status is unknown. Please refer to the progress note completed on 7/14/17 for discharge information.

## 2018-02-27 ENCOUNTER — OFFICE VISIT (OUTPATIENT)
Dept: URGENT CARE | Facility: URGENT CARE | Age: 73
End: 2018-02-27
Payer: COMMERCIAL

## 2018-02-27 ENCOUNTER — RADIANT APPOINTMENT (OUTPATIENT)
Dept: GENERAL RADIOLOGY | Facility: CLINIC | Age: 73
End: 2018-02-27
Attending: PHYSICIAN ASSISTANT
Payer: COMMERCIAL

## 2018-02-27 VITALS
SYSTOLIC BLOOD PRESSURE: 126 MMHG | HEART RATE: 82 BPM | WEIGHT: 118.4 LBS | DIASTOLIC BLOOD PRESSURE: 70 MMHG | TEMPERATURE: 97.9 F | OXYGEN SATURATION: 94 % | BODY MASS INDEX: 19.12 KG/M2

## 2018-02-27 DIAGNOSIS — J44.1 CHRONIC OBSTRUCTIVE PULMONARY DISEASE WITH ACUTE EXACERBATION (H): Primary | ICD-10-CM

## 2018-02-27 PROCEDURE — 71046 X-RAY EXAM CHEST 2 VIEWS: CPT | Mod: FY

## 2018-02-27 PROCEDURE — 99214 OFFICE O/P EST MOD 30 MIN: CPT | Performed by: PHYSICIAN ASSISTANT

## 2018-02-27 RX ORDER — AZITHROMYCIN 250 MG/1
TABLET, FILM COATED ORAL
Qty: 6 TABLET | Refills: 0 | Status: SHIPPED | OUTPATIENT
Start: 2018-02-27 | End: 2018-04-18

## 2018-02-27 RX ORDER — BENZONATATE 200 MG/1
200 CAPSULE ORAL 3 TIMES DAILY PRN
Qty: 30 CAPSULE | Refills: 0 | Status: SHIPPED | OUTPATIENT
Start: 2018-02-27 | End: 2018-03-09

## 2018-02-27 RX ORDER — PREDNISONE 20 MG/1
20 TABLET ORAL 2 TIMES DAILY
Qty: 14 TABLET | Refills: 0 | Status: SHIPPED | OUTPATIENT
Start: 2018-02-27 | End: 2018-03-06

## 2018-02-27 ASSESSMENT — ENCOUNTER SYMPTOMS
HEMOPTYSIS: 0
EYE PAIN: 0
SPUTUM PRODUCTION: 1
WHEEZING: 1
WEIGHT LOSS: 0
CONSTITUTIONAL NEGATIVE: 1
COUGH: 1
FEVER: 0
GASTROINTESTINAL NEGATIVE: 1
DIAPHORESIS: 0
CARDIOVASCULAR NEGATIVE: 1
SHORTNESS OF BREATH: 1
PALPITATIONS: 0

## 2018-02-27 NOTE — MR AVS SNAPSHOT
After Visit Summary   2/27/2018    Iris Carrion    MRN: [id number]           Patient Information     Date Of Birth          1945        Visit Information        Provider Department      2/27/2018 5:55 PM Madelyn Carrington PA-C [facility]        Today's Diagnoses     Chronic obstructive pulmonary disease with acute exacerbation (H)    -  1       Follow-ups after your visit        Your next 10 appointments already scheduled     Mar 05, 2018 10:30 AM CST   LAB with LAB ONC [facility])    [address] [place] [id number] [phone]           Please do not eat 10-12 hours before your appointment if you are coming in fasting for labs on lipids, cholesterol, or glucose (sugar). This does not apply to pregnant women. Water, hot tea and black coffee (with nothing added) are okay. Do not drink other fluids, diet soda or chew gum.            Mar 05, 2018 11:00 AM CST   Return Visit with CHANDLER Linares [facility])    [address] [place] [id number] [phone]              Who to contact     If you have questions or need follow up information about today's clinic visit or your schedule please contact [facility] directly at [phone].  Normal or non-critical lab and imaging results will be communicated to you by MyChart, letter or phone within 4 business days after the clinic has received the results. If you do not hear from us within 7 days, please contact the clinic through MyChart or phone. If you have a critical or abnormal lab result, we will notify you by phone as soon as possible.  Submit refill requests through [employer] or call your pharmacy and they will forward the refill request to us. Please allow 3 business days for your refill to be completed.          Additional Information About Your Visit        [employer]  Information     [employer] gives you secure access to your electronic health record. If you see a primary care provider, you can also send messages to your care team and make appointments. If you have questions, please call your primary care clinic.  If you do not have a primary care provider, please call [phone] and they will assist you.        Care EveryWhere ID     This is your Care EveryWhere ID. This could be used by other organizations to access your [place] medical records  [id number]        Your Vitals Were     Pulse Temperature Pulse Oximetry BMI (Body Mass Index)          82 97.9  F (36.6  C) (Tympanic) 94% 19.12 kg/m2         Blood Pressure from Last 3 Encounters:   02/27/18 126/70   12/04/17 143/72   09/27/17 131/80    Weight from Last 3 Encounters:   02/27/18 118 lb 6.4 oz (53.7 kg)   12/04/17 125 lb (56.7 kg)   08/30/17 122 lb (55.3 kg)              We Performed the Following     XR Chest 2 Views          Today's Medication Changes          These changes are accurate as of 2/27/18  6:36 PM.  If you have any questions, ask your nurse or doctor.               Start taking these medicines.        Dose/Directions    azithromycin 250 MG tablet   Commonly known as:  ZITHROMAX   Used for:  Chronic obstructive pulmonary disease with acute exacerbation (H)   Started by:  Madelyn Carrington PA-C        2 tablets the first day, then 1 tablet daily for the next 4 days   Quantity:  6 tablet   Refills:  0       benzonatate 200 MG capsule   Commonly known as:  TESSALON   Used for:  Chronic obstructive pulmonary disease with acute exacerbation (H)   Started by:  Madelyn Carrington PA-C        Dose:  200 mg   Take 1 capsule (200 mg) by mouth 3 times daily as needed for cough   Quantity:  30 capsule   Refills:  0       predniSONE 20 MG tablet   Commonly known as:  DELTASONE   Used for:  Chronic obstructive pulmonary disease with acute exacerbation (H)   Started by:  Madelyn Carrington PA-C        Dose:  20 mg   Take 1 tablet  (20 mg) by mouth 2 times daily for 7 days   Quantity:  14 tablet   Refills:  0            Where to get your medicines      These medications were sent to [id number]  [place], [place] - [address]  [address], [place] [address]     Phone:  [phone]     azithromycin 250 MG tablet    benzonatate 200 MG capsule    predniSONE 20 MG tablet                Primary Care Provider Office Phone # Fax #    Pillo Koehler [phone] [address] [facility] [address]        Equal Access to Services     [facility]: Hadii aad ku hadasho Soomaali, waaxda luqadaha, qaybta kaalmada adeegyada, waxay marysein hayjordann martin nichols . So [facility] [phone].    ATENCIÓN: Si habla español, tiene a pena disposición servicios gratuitos de asistencia lingüística. [facility] [phone].    We comply with applicable federal civil rights laws and [place] laws. We do not discriminate on the basis of race, color, national origin, age, disability, sex, sexual orientation, or gender identity.            Thank you!     Thank you for choosing [facility]  for your care. Our goal is always to provide you with excellent care. Hearing back from our patients is one way we can continue to improve our services. Please take a few minutes to complete the written survey that you may receive in the mail after your visit with us. Thank you!             Your Updated Medication List - Protect others around you: Learn how to safely use, store and throw away your medicines at www.disposemymeds.org.          This list is accurate as of 2/27/18  6:36 PM.  Always use your most recent med list.                   Brand Name Dispense Instructions for use Diagnosis    albuterol 108 (90 BASE) MCG/ACT Inhaler    PROAIR HFA/PROVENTIL HFA/VENTOLIN HFA    1 Inhaler    Inhale 2 puffs into the lungs every 6 hours as needed for shortness of breath / dyspnea or wheezing    Chronic bronchitis, unspecified  chronic bronchitis type (H)       azithromycin 250 MG tablet    ZITHROMAX    6 tablet    2 tablets the first day, then 1 tablet daily for the next 4 days    Chronic obstructive pulmonary disease with acute exacerbation (H)       benzonatate 200 MG capsule    TESSALON    30 capsule    Take 1 capsule (200 mg) by mouth 3 times daily as needed for cough    Chronic obstructive pulmonary disease with acute exacerbation (H)       BIOTIN PO      Take 1 tablet by mouth daily.        budesonide-formoterol 160-4.5 MCG/ACT Inhaler    SYMBICORT    1 Inhaler    Inhale 2 puffs into the lungs 2 times daily    COPD (chronic obstructive pulmonary disease) (H)       CALCIUM 500 PO      Take 1 tablet by mouth daily.        chlorthalidone 25 MG tablet    HYGROTON    90 tablet    Take 1 tablet (25 mg) by mouth daily    Essential hypertension with goal blood pressure less than 140/90       fluticasone 50 MCG/ACT spray    FLONASE    1 Bottle    Spray 2 sprays into both nostrils daily    Non-seasonal allergic rhinitis due to animal hair and dander       ibuprofen 600 MG tablet    ADVIL/MOTRIN    40 tablet    Take 1 tablet (600 mg) by mouth every 6 hours as needed for moderate pain    S/P LUCILA-BSO       ipratropium - albuterol 0.5 mg/2.5 mg/3 mL 0.5-2.5 (3) MG/3ML neb solution    DUONEB    30 vial    Take 1 vial (3 mLs) by nebulization every 4 hours as needed for shortness of breath / dyspnea or wheezing    COPD (chronic obstructive pulmonary disease) (H)       loratadine 10 MG tablet    CLARITIN     Take 10 mg by mouth daily        MAGNESIUM OXIDE PO      Take 200 mg by mouth 2 times daily        omeprazole 40 MG capsule    priLOSEC    90 capsule    Take 1 capsule (40 mg) by mouth daily Take 30-60 minutes before a meal.    GERD (gastroesophageal reflux disease)       potassium chloride SA 20 MEQ CR tablet    KLOR-CON    60 tablet    Take 1 tablet (20 mEq) by mouth 2 times daily    Hypokalemia       predniSONE 20 MG tablet    DELTASONE    14  tablet    Take 1 tablet (20 mg) by mouth 2 times daily for 7 days    Chronic obstructive pulmonary disease with acute exacerbation (H)       tiotropium 18 MCG capsule    SPIRIVA HANDIHALER    90 capsule    Inhale 1 capsule (18 mcg) into the lungs daily Inhale contents of one capsule. .    COPD (chronic obstructive pulmonary disease) (H)       ZINC 15 PO      Take by mouth daily

## 2018-02-28 ENCOUNTER — TELEPHONE (OUTPATIENT)
Dept: FAMILY MEDICINE | Facility: CLINIC | Age: 73
End: 2018-02-28

## 2018-02-28 NOTE — TELEPHONE ENCOUNTER
Notes Recorded by Madelyn Carrington PA-C on 2/28/2018 at 1:10 PM    Please inform patient that CXR showed thickening of the lining around the R lung base.    F/u with PCP regarding this in order to determine if further testing (CT scan) needs to be done.    Will also send [employer] message as well.      Madelyn Carrington PA-C

## 2018-02-28 NOTE — PROGRESS NOTES
SUBJECTIVE:                                                       HPI   Iris Carrion is a 72 year old female who presents to clinic today for the following health issues:  RESPIRATORY SYMPTOMS    Duration: 1.5 week    Description  Productive cough along with shortness of breath and wheezing.  No hemoptysis or fevers.    Severity: mild    Accompanying signs and symptoms: No sore throat or sinus congestion/pain/pressure.  No chest pain, palpitations, orthopnea, PND or peripheral edema.  No HA, visual disturbances, dizziness, one sided weakness or slurred speech.  No bodyaches or fatigue.     History (predisposing factors):  COPD    Precipitating or alleviating factors: None    Therapies tried and outcome:  Duonebs with some relief.  Continues with her albuterol inhaler, Symbicort Inhaler, and OTC cough medications.       Reviewed PMH.  Patient Active Problem List   Diagnosis     GERD (gastroesophageal reflux disease)     Osteoporosis     Advanced directives, counseling/discussion     CARDIOVASCULAR SCREENING; LDL GOAL LESS THAN 160     Cervical high risk HPV (human papillomavirus) test positive     Hearing loss     Acute pancreatitis     Hypokalemia     Peritoneal carcinomatosis (H)     Hypomagnesemia     Chemotherapy-induced neutropenia (H)     Chronic bronchitis, unspecified chronic bronchitis type (H)     S/P LUCILA-BSO     Malignant neoplasm of ovary, left (H)     Family history of malignant neoplasm of ovary     Essential hypertension with goal blood pressure less than 140/90     Pulmonary nodules     Paroxysmal supraventricular tachycardia (H)     Current Outpatient Prescriptions   Medication Sig Dispense Refill     chlorthalidone (HYGROTON) 25 MG tablet Take 1 tablet (25 mg) by mouth daily 90 tablet 0     omeprazole (PRILOSEC) 40 MG capsule Take 1 capsule (40 mg) by mouth daily Take 30-60 minutes before a meal. 90 capsule 1     tiotropium (SPIRIVA HANDIHALER) 18 MCG capsule Inhale 1 capsule (18 mcg) into  the lungs daily Inhale contents of one capsule. . 90 capsule 0     ipratropium - albuterol 0.5 mg/2.5 mg/3 mL (DUONEB) 0.5-2.5 (3) MG/3ML neb solution Take 1 vial (3 mLs) by nebulization every 4 hours as needed for shortness of breath / dyspnea or wheezing 30 vial 5     budesonide-formoterol (SYMBICORT) 160-4.5 MCG/ACT Inhaler Inhale 2 puffs into the lungs 2 times daily 1 Inhaler 5     loratadine (CLARITIN) 10 MG tablet Take 10 mg by mouth daily       fluticasone (FLONASE) 50 MCG/ACT spray Spray 2 sprays into both nostrils daily 1 Bottle 1     potassium chloride SA (POTASSIUM CHLORIDE) 20 MEQ tablet Take 1 tablet (20 mEq) by mouth 2 times daily 60 tablet 0     albuterol (PROAIR HFA, PROVENTIL HFA, VENTOLIN HFA) 108 (90 BASE) MCG/ACT inhaler Inhale 2 puffs into the lungs every 6 hours as needed for shortness of breath / dyspnea or wheezing 1 Inhaler 3     ibuprofen (ADVIL,MOTRIN) 600 MG tablet Take 1 tablet (600 mg) by mouth every 6 hours as needed for moderate pain 40 tablet 0     MAGNESIUM OXIDE PO Take 200 mg by mouth 2 times daily       Zinc Sulfate (ZINC 15 PO) Take by mouth daily       Calcium Carbonate (CALCIUM 500 PO) Take 1 tablet by mouth daily.       BIOTIN PO Take 1 tablet by mouth daily.       Allergies   Allergen Reactions     Levaquin Rash       Review of Systems   Constitutional: Negative.  Negative for diaphoresis, fever and weight loss.   HENT: Negative.    Eyes: Negative for pain.   Respiratory: Positive for cough, sputum production, shortness of breath and wheezing. Negative for hemoptysis.    Cardiovascular: Negative.  Negative for chest pain and palpitations.   Gastrointestinal: Negative.    Skin: Negative.    Endo/Heme/Allergies: Negative for environmental allergies.   All other systems reviewed and are negative.      /70  Pulse 82  Temp 97.9  F (36.6  C) (Tympanic)  Wt 118 lb 6.4 oz (53.7 kg)  SpO2 94%  BMI 19.12 kg/m2  Physical Exam   Constitutional: She is oriented to person,  place, and time and well-developed, well-nourished, and in no distress. No distress.   HENT:   Head: Normocephalic and atraumatic.   Nose: Nose normal.   Mouth/Throat: Oropharynx is clear and moist. No oropharyngeal exudate.   TMs are intact without any erythema or bulging bilaterally.  Airway is patent.     Eyes: Conjunctivae and EOM are normal. Pupils are equal, round, and reactive to light. No scleral icterus.   Neck: Normal range of motion. Neck supple. No thyromegaly present.   Cardiovascular: Normal rate, regular rhythm, normal heart sounds and intact distal pulses.  Exam reveals no gallop and no friction rub.    No murmur heard.  Pulmonary/Chest: Effort normal. No accessory muscle usage. No respiratory distress. She has decreased breath sounds. She has no wheezes. She has no rhonchi. She has no rales.   Lymphadenopathy:     She has no cervical adenopathy.   Neurological: She is alert and oriented to person, place, and time.   Skin: Skin is warm and dry. No cyanosis. Nails show no clubbing.   Psychiatric: Mood and affect normal.   Nursing note and vitals reviewed.  CXR PA/lateral:  No infiltrates, effusions or pneumothorax.  No suspicious nodules or lesions. No fractures.   Will send for overread.      Assessment/Plan:  Chronic obstructive pulmonary disease with acute exacerbation (H):  CXR was negative for pneumonia.  Will treat with zithromax X5days, prednisone X7days, and tessalon perles as needed for cough.  Recommend that she increase her duonebs to every 4hours as needed for shortness of breath/wheezing.  Continue with all inhalers as usual.  Recommend treatment with rest, fluids and chicken soup. Tylenol/ibuprofen prn fever/pain.  Recheck in clinic if symptoms worsen or if symptoms do not improve.  To the ER if she develops hemoptysis, fevers >102, worsening shortness of breath/wheezing.  -     XR Chest 2 Views  -     azithromycin (ZITHROMAX) 250 MG tablet; 2 tablets the first day, then 1 tablet daily  for the next 4 days  -     predniSONE (DELTASONE) 20 MG tablet; Take 1 tablet (20 mg) by mouth 2 times daily for 7 days  -     benzonatate (TESSALON) 200 MG capsule; Take 1 capsule (200 mg) by mouth 3 times daily as needed for cough          Madelyn See KEVIN Carrington

## 2018-02-28 NOTE — TELEPHONE ENCOUNTER
Left message on voicemail for patient to call back.   Direct number given to call back: [phone].     Sharita Pfeiffer RN

## 2018-02-28 NOTE — TELEPHONE ENCOUNTER
Patient called back and below message given to patient.   She understands this and will contact her PCP for direction.    Sharita Pfeiffer RN

## 2018-03-05 ENCOUNTER — ONCOLOGY VISIT (OUTPATIENT)
Dept: ONCOLOGY | Facility: CLINIC | Age: 73
End: 2018-03-05
Payer: COMMERCIAL

## 2018-03-05 VITALS
SYSTOLIC BLOOD PRESSURE: 154 MMHG | TEMPERATURE: 98.1 F | WEIGHT: 116.6 LBS | RESPIRATION RATE: 16 BRPM | HEIGHT: 66 IN | BODY MASS INDEX: 18.74 KG/M2 | HEART RATE: 76 BPM | DIASTOLIC BLOOD PRESSURE: 72 MMHG | OXYGEN SATURATION: 98 %

## 2018-03-05 DIAGNOSIS — C56.2 MALIGNANT NEOPLASM OF OVARY, LEFT (H): ICD-10-CM

## 2018-03-05 DIAGNOSIS — C56.2 MALIGNANT NEOPLASM OF OVARY, LEFT (H): Primary | ICD-10-CM

## 2018-03-05 DIAGNOSIS — C78.6 PERITONEAL CARCINOMATOSIS (H): ICD-10-CM

## 2018-03-05 LAB — CANCER AG125 SERPL-ACNC: 12 U/ML (ref 0–30)

## 2018-03-05 PROCEDURE — 36415 COLL VENOUS BLD VENIPUNCTURE: CPT | Performed by: NURSE PRACTITIONER

## 2018-03-05 PROCEDURE — 86304 IMMUNOASSAY TUMOR CA 125: CPT | Performed by: NURSE PRACTITIONER

## 2018-03-05 PROCEDURE — 99213 OFFICE O/P EST LOW 20 MIN: CPT | Performed by: NURSE PRACTITIONER

## 2018-03-05 NOTE — PROGRESS NOTES
"Oncology Rooming Note    March 5, 2018 10:30 AM   Iris Carrion is a 72 year old female who presents for:    Chief Complaint   Patient presents with     Oncology Clinic Visit     Initial Vitals: There were no vitals taken for this visit. Estimated body mass index is 19.12 kg/(m^2) as calculated from the following:    Height as of 12/4/17: 1.676 m (5' 5.98\").    Weight as of 2/27/18: 53.7 kg (118 lb 6.4 oz). There is no height or weight on file to calculate BSA.  Data Unavailable Comment: Data Unavailable   No LMP recorded. Patient is postmenopausal.  Allergies reviewed: Yes  Medications reviewed: Yes    Medications: Medication refills not needed today.  Pharmacy name entered into [employer]: [facility] [id number] [facility] [address] [address]    Clinical concerns: f/u    Preventive Care:    Breast Cancer Screening: During our visit today, we discussed that it is recommended you receive breast cancer screening. Please call or make an appointment with your primary care provider to discuss this with them. You may also call the [facility] scheduling line ([phone]) to set up a mammography appointment at the [facility] within the [facility].    Colorectal Cancer Screening: During our visit today, we discussed that it is recommended you receive colorectal cancer screening. Please call or make an appointment with your primary care provider to discuss this. You may also call the [facility] scheduling line ([phone]) to set up a colonoscopy appointment.        Tigist Noble RN              "

## 2018-03-05 NOTE — PROGRESS NOTES
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 She completed treatment 2016. She is here today for a surveillance visit.       Treatment History:  Ms Carrion started having her symptoms of constipation and bloating (May/2015). Her doctor recommended miralax. Symptoms continued to occurred so they followed with a CT scan which showed extensive peritoneal metastatic disease.   8/17/15: CT c/a/p IMPRESSION:   1. Left renal cortical cysts. No enhancing renal mass. No urinary tract calculi or hydronephrosis. Collecting systems, ureters and bladder are unremarkable.  2. Extensive peritoneal metastatic disease with moderate ascites likely related to malignant ascites. Followup as clinically warranted. An obvious etiology for this metastatic disease is not  visualized.  3. No bowel obstruction or diverticulitis. Serosal metastatic disease is present. No obvious colonic mass. Followup colonoscopy as needed for further assessment.  4. Moderate-sized hiatal hernia. No gastric obstruction.      8/22/15:  1950      8/27/15: New patient visit. she continues to have constipation and bloating. She has lost about 5 pounds. She also admits to having lood in urine as well as urinary urgenrgy. She has loose bm bm every other day.       Plan: chemotherapy with Taxol/Carbo. To be considered for these clinical trials: PARP, avatar circulating tumor study, small neogman.       8/31/15: CT guided omental nodule biopsy. Final diagnosis:  Adenocarcinoma, with features consistent with endometrioid adenocarcinoma       COMMENT:  Immunohistochemical staining was obtained with appropriate control slides for ER, Engelhard 8 and WT-1. Malignant cells are positive for all three markers confirming their endometrioid cell differentiation.  "      Plan: treat like an ovarian cancer but will do dose dense Taxol carbo for 3 cycles then be re-evalated with scan       9/4/15-10/14/15: Cycle #1-3 dose dense Taxol/Carbo.  2008, 470, 30.       11/19/15: CT c/a/p IMPRESSION:   1. Decreased peritoneal carcinomatosis and malignant ascites.  2. Severe emphysema and scattered areas of likely postinfectious scarring. Several of these areas particularly along the right minor fissure appear more nodular malignancy cannot be excluded. Recommend 3-six-month interval followup.  3. Diverticulosis.  4. Osteopenia and numerous compression deformities, none of which appear acute.  5. Stable large hiatal hernia.      11/23/15:  17      11/25/15: Exploratory Laparotomy, Repair Umbilical Hernia, Total Abdominal Hysterectomy, Right Salpingo Oophorectomy, Left Salpingectomy, Appendectomy, Complete Omentectomy, CUSA Tumor Debulking,Cystoscopy, Left PeriAortc Lymph Node Biopsy, Insertion Peritoneal Port, Mobilization of Liver, Ablation of Liver Nodules Open Repair Hiatal Hernia Repair      Surgical pathology report:  FINAL DIAGNOSIS:  A: Umbilical hernia sac, repair:  - Adenocarcinoma  - Fibroadipose tissue, partially surfaced by mesothelium, consistent with hernia sac  B: Hepatic ligament, biopsy:  - One benign lymph node (0/1)  C: Splenic ligament implant, biopsy:  - Adenocarcinoma  D: Inferior vena cava implant, biopsy:  - Adenocarcinoma  E: Omentum, omentectomy:  - Adenocarcinoma  F: \"Liver nodule\", biopsy:  - Fibro-adipose tissue with adenocarcinoma  G: Right pelvic peritoneum, biopsy:  - Adenocarcinoma  H: Cul-de-sac peritoneum, biopsy:  - Adenocarcinoma  I: Uterus, cervix, right ovary and fallopian tube, hysterectomy with right salpingo-oophorectomy:  - Adenocarcinoma present in the soft tissue at the left adnexal area,cervix and right fallopian tube, most likely endometrioid adenocarcinoma  - Atrophic endometrium  - Benign endometrial polyp  - Myometrium with no " significant histologic abnormality  - Atrophic cervix and nabothian cysts  - Tumor present on the external aspect of the cervix  - Right ovary with numerous corpora albicantia and simple epithelial cysts  - Right fallopian tube with a single focus of carcinoma  - See comment  J: Large bowel epiploica, biopsy:  - Adenocarcinoma  K: Appendix, appendectomy:  - Appendix with fibrous replacement  L: Lymph nodes, left periaortic, excision:  - One benign lymph node (0/1)      12/14/15:  46. post op visit. reviewed the results of her surgical pathology copy of report was given to pt. She will start IV/IP taxol/Cddp chemo tomorrow. IV fluids on Thursday and Friday at [place] and Saturday and Sunday at the [facility]. Day 8 taxol will be on dec 22nd. She will need to have fluids again on 23rd and 24th. She will also need Neupogen.       Plan: 3 cycles IV/IP chemo.       12/15/15: Cycle #1 IV/IP chemo.    1/6/16: Cycle #2 IV/IP chemo.   18. Delayed d/t neutropenia; given 1/13. Neupogen and Neulasta added.    2/1/16: Cycle #3 IV/IP chemo.  23  2/29/16:  41. CT cap  Findings:  Right internal jugular Port-A-Cath with tip at atriocaval junction. The heart is not enlarged. No significant pericardial effusion. The mediastinal great vessels are normal in caliber. No central pulmonary embolus. Thoracic aortic atherosclerotic calcifications. Calcified mediastinal and hilar lymph nodes. No lymphadenopathy in the chest by size criteria. The central tracheobronchial tree is patent. New nodular pleural-based consolidative opacity in the left lower lobe measuring 26 x 29 mm (image 110, series 7). There are a few other new subcentimeter nodules in the left lower lobe. For example, 7 mm nodule on image 104, series 4. Severe centrilobular emphysematous changes. Likely nodular scarring in the right upper lobe (image 32, series 7) is not significantly changed. Other scattered fibrotic changes, most  prominent in the right lower lobe, do not appear significantly changed. Calcified granuloma in the right lower lobe. Postsurgical changes of a hernia repair and hysterectomy/bilateral salpingo-oophorectomy. There is no abnormal soft tissue within the  resection bed to suggest locally recurrent disease. No suspicious liver lesions. Unchanged extrahepatic and central intrahepatic biliary dilatation, likely reservoir effect in the setting of cholecystectomy. Atrophic pancreas. Calcified splenic granulomata. Unchanged  hypoattenuating lesions in the kidneys, the largest of which on the left are compatible with cysts. No abnormally dilated or thickened loops of bowel. No free intraperitoneal air or free fluid. Colonic diverticuli without evidence of active inflammation. Duodenal diverticulum. Intra-abdominal port with tip in the left pelvis. Aortoiliac atheromatous plaque without aneurysmal dilatation. No abnormally enlarged abdominal or pelvic lymph nodes. No definite residual peritoneal/or omental nodules. Marked generalized osteopenia. Redemonstration of multiple compression deformities throughout the thoracolumbar spine, greatest at T8 with greater than 50% loss of vertebral body height, not significantly changed. There is mild increased associated sclerosis. Associated kyphosis of the upper thoracic spine. There are 6 lumbar type vertebral bodies. No new aggressive osseous lesions.  Impression:  1. Continued positive response to therapy in the abdomen/pelvis with no definite residual peritoneal/omental metastasis.  2. New pleural-based consolidative opacity in the left lower lobe. There are a few other new subcentimeter nodules in the left lower lobe. This may be secondary to infection/inflammation, but metastases should be considered and short-term interval followup is recommended.      4/6/16 CT/PET IMPRESSION: Patient's history of ovarian carcinoma.  1. Left lower lobe 2 cm irregular pulmonary nodular density is  "minimally metabolic and argues that it is benign such as rounded atelectasis and less likely rounded pneumonia.  2. Underlying COPD with scattered scars within both lungs but no hypermetabolic structures to indicate malignancy.  3. No evidence of carcinomatosis, peritoneal or abdominal metastasis, or metastatic disease to any of the organs of the abdomen or pelvis.  4. Possible mild esophagitis at the GE junction.      4/11/16:  8. \"Had been unclear as to why  up to 41 from 23-this is concerning in light of recent completion of cddp/taxol, however the  is 8. CT PET is negative for metastatic disease. Recommend q 3 month visits x 2 years, then every 6 mos for 3 additional years.\"      7/25/16:   9.  7/27/16: CT cap Impression:    1. Interval resolution of left lower lobe spiculated pulmonary nodule. This likely represented atelectasis or consolidation.  2. Significant panlobular emphysematous changes of both lungs.  3. Spiculated right upper lobe pulmonary nodule measuring up to 8 mm, unchanged since 11/19/2015, although progressed since 2/4/2011. This may represent scarring. Recommend attention on followup.  4. New right hip fracture, possibly pathologic.  5. New sclerotic lesions of the sternum which are indeterminate. This may represent metastatic disease.      Of note, patient tripped over a cord at home 5/30/16 and fell and had an avulsed fx of her right hip. She underwent PT for this. She subsequently fell backwards 6/13/16 and fractured her left wrist and seeing OT for this.       She was recommended to have a bone scan done. This was scheduled and the patient delayed her scan until 11/2016 which showed uptake compatible with healing fractures.       10/31/16:  15.  12/5/16:  11.  2/13/17:  12.  5/8/17:  11. Port removed.   8/30/17:  13.  12/4/17:   11.  3/5/18:  pending.         Today she comes to clinic feeling well and denies any concerning " symptoms. She denies any vaginal bleeding, no changes in her bowel or bladder habits, no nausea/emesis, no lower extremity edema, and no difficulties eating or sleeping. She denies any abdominal discomfort/bloating, no fevers or chills, and no chest pain or shortness of breath but does have QUIROS at times as well as a cough related to her COPD. She is not sexually active and denies any dryness. She is due for her health screenings and will be seeing her PCP soon for her annual exam. She does monitor her BP at home and reports readings of 140's.         Review of Systems:    Systemic           no weight changes; no fever; no chills; no night sweats; no appetite changes  Skin           no rashes, or lesions  Eye           no irritation; no changes in vision  Katarzyna-Laryngeal           no dysphagia; no hoarseness   Pulmonary    no cough; no shortness of breath; + QUIROS  Cardiovascular    no chest pain; no palpitations; + HTN  Gastrointestinal    no diarrhea; no constipation; no abdominal pain; no changes in bowel habits; no blood in stool  Genitourinary   no urinary frequency; no urinary urgency; no dysuria; no pain; no abnormal vaginal discharge; no abnormal vaginal bleeding  Breast    no breast discharge; no breast changes; no breast pain  Musculoskeletal    no myalgias; no arthralgias; no back pain  Psychiatric           no depressed mood; no anxiety    Hematologic               no tender lymph nodes; no noticeable swellings or lumps   Endocrine    no hot flashes; no heat/cold intolerance         Neurological   no tremor; no numbness and tingling; no headaches; no difficulty sleeping      Past Medical History:    Past Medical History:   Diagnosis Date     Cervical high risk HPV (human papillomavirus) test positive 10/31/12    type 18     COPD (chronic obstructive pulmonary disease) (H)      GERD (gastroesophageal reflux disease)      Hx of colposcopy with cervical biopsy 11/2012    negative     Hypertension 2013      Osteoporosis      Paroxysmal supraventricular tachycardia (H) 9/6/2017     Peritoneal carcinomatosis (H) 8/24/2015     Pneumonia      Uncomplicated asthma 1980         Past Surgical History:    Past Surgical History:   Procedure Laterality Date     CHOLECYSTECTOMY  6/2014     COLONOSCOPY  12/4/2012    Procedure: COLONOSCOPY;  COLONOSCOPY SCREEN;  Surgeon: Mushtaq Lara MD;  Location: [facility] OR     GYN SURGERY       HERNIORRHAPHY HIATAL N/A 11/25/2015    Procedure: HERNIORRHAPHY HIATAL;  Surgeon: Chip Carty MD;  Location: [facility] OR     HYSTERECTOMY TOTAL ABD, ALVIN SALPINGO-OOPHORECTOMY, NODE DISSECTION, TUMOR DEBULKING, COMBINED Bilateral 11/25/2015    Procedure: COMBINED HYSTERECTOMY TOTAL ABDOMINAL, SALPINGO-OOPHORECTOMY, NODE DISSECTION, TUMOR DEBULKING;  Surgeon: Emma Cabrera MD;  Location: [facility] OR         Health Maintenance Due   Topic Date Due     DEXA SCAN SCREENING (SYSTEM ASSIGNED)  08/11/2012     ADVANCE DIRECTIVE PLANNING Q5 YRS  10/31/2017     MAMMO Q1 YR  11/14/2017     COLONOSCOPY Q5 YR  12/04/2017     JUHI QUESTIONNAIRE 1 YEAR  01/13/2018     PHQ-9 Q1YR  01/13/2018       Current Medications:     Current Outpatient Prescriptions   Medication Sig Dispense Refill     azithromycin (ZITHROMAX) 250 MG tablet 2 tablets the first day, then 1 tablet daily for the next 4 days 6 tablet 0     predniSONE (DELTASONE) 20 MG tablet Take 1 tablet (20 mg) by mouth 2 times daily for 7 days 14 tablet 0     benzonatate (TESSALON) 200 MG capsule Take 1 capsule (200 mg) by mouth 3 times daily as needed for cough 30 capsule 0     chlorthalidone (HYGROTON) 25 MG tablet Take 1 tablet (25 mg) by mouth daily 90 tablet 0     omeprazole (PRILOSEC) 40 MG capsule Take 1 capsule (40 mg) by mouth daily Take 30-60 minutes before a meal. 90 capsule 1     tiotropium (SPIRIVA HANDIHALER) 18 MCG capsule Inhale 1 capsule (18 mcg) into the lungs daily Inhale contents of one capsule. . 90 capsule 0     ipratropium -  albuterol 0.5 mg/2.5 mg/3 mL (DUONEB) 0.5-2.5 (3) MG/3ML neb solution Take 1 vial (3 mLs) by nebulization every 4 hours as needed for shortness of breath / dyspnea or wheezing 30 vial 5     budesonide-formoterol (SYMBICORT) 160-4.5 MCG/ACT Inhaler Inhale 2 puffs into the lungs 2 times daily 1 Inhaler 5     loratadine (CLARITIN) 10 MG tablet Take 10 mg by mouth daily       fluticasone (FLONASE) 50 MCG/ACT spray Spray 2 sprays into both nostrils daily 1 Bottle 1     potassium chloride SA (POTASSIUM CHLORIDE) 20 MEQ tablet Take 1 tablet (20 mEq) by mouth 2 times daily 60 tablet 0     albuterol (PROAIR HFA, PROVENTIL HFA, VENTOLIN HFA) 108 (90 BASE) MCG/ACT inhaler Inhale 2 puffs into the lungs every 6 hours as needed for shortness of breath / dyspnea or wheezing 1 Inhaler 3     ibuprofen (ADVIL,MOTRIN) 600 MG tablet Take 1 tablet (600 mg) by mouth every 6 hours as needed for moderate pain 40 tablet 0     MAGNESIUM OXIDE PO Take 200 mg by mouth 2 times daily       Zinc Sulfate (ZINC 15 PO) Take by mouth daily       Calcium Carbonate (CALCIUM 500 PO) Take 1 tablet by mouth daily.       BIOTIN PO Take 1 tablet by mouth daily.           Allergies:        Allergies   Allergen Reactions     Levaquin Rash        Social History:     Social History   Substance Use Topics     Smoking status: Former Smoker     Packs/day: 1.00     Years: 30.00     Types: Cigarettes     Start date: 1965     Quit date: 10/10/2003     Smokeless tobacco: Never Used     Alcohol use No       History   Drug Use No         Family History:     The patient's family history is notable for:    Family History   Problem Relation Age of Onset     CANCER Brother      testicular     DIABETES Sister      Ovarian Cancer Mother 60     DIABETES Mother           Asthma Father           DIABETES Sister      Depression/Anxiety Daughter      Depression Daughter      OSTEOPOROSIS Sister      Other Cancer Brother      DIABETES Brother      Depression  "Other      Grandson         Physical Exam:     /72 (BP Location: Right arm)  Pulse 76  Temp 98.1  F (36.7  C) (Oral)  Resp 16  Ht 1.676 m (5' 6\")  Wt 52.9 kg (116 lb 9.6 oz)  SpO2 98%  BMI 18.82 kg/m2  Body mass index is 18.82 kg/(m^2).    General Appearance: healthy and alert, no distress     HEENT: no thyromegaly, no palpable nodules or masses        Cardiovascular: regular rate and rhythm, no gallops, rubs or murmurs     Respiratory: lungs clear, no rales, rhonchi or wheezes, normal diaphragmatic excursion    Musculoskeletal: extremities non tender and without edema    Skin: no lesions or rashes     Neurological: normal gait, no gross defects     Psychiatric: appropriate mood and affect                               Hematological: normal cervical, supraclavicular and inguinal lymph nodes     Gastrointestinal:       abdomen soft, non-tender, non-distended, no organomegaly or masses    Genitourinary: External genitalia and urethral meatus appears normal.  Vagina is smooth without nodularity or masses.  Cervix surgically absent.  Bimanual exam reveal no masses, nodularity or fullness.  Recto-vaginal exam limited secondary to stool.      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 She completed treatment 4/2016. She is here today for a surveillance visit.     20 minutes were spent with this patient, over 50% of that time was spent in symptom management, treatment planning and in counseling and coordination of care.    Plan:     1.)        She is now 2 years post treatment and can extend her surveillance to every 6 months x 3 years; she will continue to have a  and pelvic/rectal exam at each visit. Reviewed signs and symptoms for when she should contact the clinic or seek additional care. Patient to contact the clinic with any questions or concerns in the interim.     2.) Genetic risk factors were assessed and " she is negative for mutations in GINGER, BARD1, BRCA1, BRCA2, BRIP1, CDH1, CHEK2, EPCAM, MLH1, MRE11A, MSH2, MSH6, MUTYH, NBN, NF1, PALB2, PMS2, PTEN, RAD50, RAD51C, RAD51D, SMARCA4, STK11, and TP53.      3.) Labs and/or tests ordered include:  .      4.) Health maintenance issues addressed today include annual health maintenance and non-gynecologic issues with PCP. Continue to monitor BP at home and follow up with PCP.     CHANDLER Jay, WHNP-BC, ANP-BC  Women's Health Nurse Practitioner  Adult Nurse Pracitioner  Division of Gynecologic Oncology          CC  Patient Care Team:  Pillo Koehler MD as PCP - General (Family Practice)  Jessica Lua, JANE as Continuity Care Coordinator (Gyn-Onc)  Iliana Pierre, JANE as

## 2018-03-05 NOTE — LETTER
3/5/2018         RE: Iris Carrion  [id number] [facility] [address]        Dear Colleague,    Thank you for referring your patient, Iris Carrion, to the [facility]. Please see a copy of my visit note below.                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 She completed treatment 2016. She is here today for a surveillance visit.       Treatment History:  Ms Carrion started having her symptoms of constipation and bloating (May/2015). Her doctor recommended miralax. Symptoms continued to occurred so they followed with a CT scan which showed extensive peritoneal metastatic disease.   8/17/15: CT c/a/p IMPRESSION:   1. Left renal cortical cysts. No enhancing renal mass. No urinary tract calculi or hydronephrosis. Collecting systems, ureters and bladder are unremarkable.  2. Extensive peritoneal metastatic disease with moderate ascites likely related to malignant ascites. Followup as clinically warranted. An obvious etiology for this metastatic disease is not  visualized.  3. No bowel obstruction or diverticulitis. Serosal metastatic disease is present. No obvious colonic mass. Followup colonoscopy as needed for further assessment.  4. Moderate-sized hiatal hernia. No gastric obstruction.      8/22/15:  1950      8/27/15: New patient visit. she continues to have constipation and bloating. She has lost about 5 pounds. She also admits to having lood in urine as well as urinary urgenrgy. She has loose bm bm every other day.       Plan: chemotherapy with Taxol/Carbo. To be considered for these clinical trials: PARP, avatar circulating tumor study, small neogman.       8/31/15: CT guided omental nodule biopsy. Final diagnosis:  Adenocarcinoma, with features consistent with  "endometrioid adenocarcinoma       COMMENT:  Immunohistochemical staining was obtained with appropriate control slides for ER, [place] 8 and WT-1. Malignant cells are positive for all three markers confirming their endometrioid cell differentiation.       Plan: treat like an ovarian cancer but will do dose dense Taxol carbo for 3 cycles then be re-evalated with scan       9/4/15-10/14/15: Cycle #1-3 dose dense Taxol/Carbo.  2008, 470, 30.       11/19/15: CT c/a/p IMPRESSION:   1. Decreased peritoneal carcinomatosis and malignant ascites.  2. Severe emphysema and scattered areas of likely postinfectious scarring. Several of these areas particularly along the right minor fissure appear more nodular malignancy cannot be excluded. Recommend 3-six-month interval followup.  3. Diverticulosis.  4. Osteopenia and numerous compression deformities, none of which appear acute.  5. Stable large hiatal hernia.      11/23/15:  17      11/25/15: Exploratory Laparotomy, Repair Umbilical Hernia, Total Abdominal Hysterectomy, Right Salpingo Oophorectomy, Left Salpingectomy, Appendectomy, Complete Omentectomy, CUSA Tumor Debulking,Cystoscopy, Left PeriAortc Lymph Node Biopsy, Insertion Peritoneal Port, Mobilization of Liver, Ablation of Liver Nodules Open Repair Hiatal Hernia Repair      Surgical pathology report:  FINAL DIAGNOSIS:  A: Umbilical hernia sac, repair:  - Adenocarcinoma  - Fibroadipose tissue, partially surfaced by mesothelium, consistent with hernia sac  B: Hepatic ligament, biopsy:  - One benign lymph node (0/1)  C: Splenic ligament implant, biopsy:  - Adenocarcinoma  D: Inferior vena cava implant, biopsy:  - Adenocarcinoma  E: Omentum, omentectomy:  - Adenocarcinoma  F: \"Liver nodule\", biopsy:  - Fibro-adipose tissue with adenocarcinoma  G: Right pelvic peritoneum, biopsy:  - Adenocarcinoma  H: Cul-de-sac peritoneum, biopsy:  - Adenocarcinoma  I: Uterus, cervix, right ovary and fallopian tube, hysterectomy with " right salpingo-oophorectomy:  - Adenocarcinoma present in the soft tissue at the left adnexal area,cervix and right fallopian tube, most likely endometrioid adenocarcinoma  - Atrophic endometrium  - Benign endometrial polyp  - Myometrium with no significant histologic abnormality  - Atrophic cervix and nabothian cysts  - Tumor present on the external aspect of the cervix  - Right ovary with numerous corpora albicantia and simple epithelial cysts  - Right fallopian tube with a single focus of carcinoma  - See comment  J: Large bowel epiploica, biopsy:  - Adenocarcinoma  K: Appendix, appendectomy:  - Appendix with fibrous replacement  L: Lymph nodes, left periaortic, excision:  - One benign lymph node (0/1)      12/14/15:  46. post op visit. reviewed the results of her surgical pathology copy of report was given to pt. She will start IV/IP taxol/Cddp chemo tomorrow. IV fluids on Thursday and Friday at [facility] and Saturday and Sunday at the [facility]. Day 8 taxol will be on dec 22nd. She will need to have fluids again on 23rd and 24th. She will also need Neupogen.       Plan: 3 cycles IV/IP chemo.       12/15/15: Cycle #1 IV/IP chemo.    1/6/16: Cycle #2 IV/IP chemo.   18. Delayed d/t neutropenia; given 1/13. Neupogen and Neulasta added.    2/1/16: Cycle #3 IV/IP chemo.  23  2/29/16:  41. CT cap  Findings:  Right internal jugular Port-A-Cath with tip at atriocaval junction. The heart is not enlarged. No significant pericardial effusion. The mediastinal great vessels are normal in caliber. No central pulmonary embolus. Thoracic aortic atherosclerotic calcifications. Calcified mediastinal and hilar lymph nodes. No lymphadenopathy in the chest by size criteria. The central tracheobronchial tree is patent. New nodular pleural-based consolidative opacity in the left lower lobe measuring 26 x 29 mm (image 110, series 7). There are a few other new subcentimeter nodules in the left  lower lobe. For example, 7 mm nodule on image 104, series 4. Severe centrilobular emphysematous changes. Likely nodular scarring in the right upper lobe (image 32, series 7) is not significantly changed. Other scattered fibrotic changes, most prominent in the right lower lobe, do not appear significantly changed. Calcified granuloma in the right lower lobe. Postsurgical changes of a hernia repair and hysterectomy/bilateral salpingo-oophorectomy. There is no abnormal soft tissue within the  resection bed to suggest locally recurrent disease. No suspicious liver lesions. Unchanged extrahepatic and central intrahepatic biliary dilatation, likely reservoir effect in the setting of cholecystectomy. Atrophic pancreas. Calcified splenic granulomata. Unchanged  hypoattenuating lesions in the kidneys, the largest of which on the left are compatible with cysts. No abnormally dilated or thickened loops of bowel. No free intraperitoneal air or free fluid. Colonic diverticuli without evidence of active inflammation. Duodenal diverticulum. Intra-abdominal port with tip in the left pelvis. Aortoiliac atheromatous plaque without aneurysmal dilatation. No abnormally enlarged abdominal or pelvic lymph nodes. No definite residual peritoneal/or omental nodules. Marked generalized osteopenia. Redemonstration of multiple compression deformities throughout the thoracolumbar spine, greatest at T8 with greater than 50% loss of vertebral body height, not significantly changed. There is mild increased associated sclerosis. Associated kyphosis of the upper thoracic spine. There are 6 lumbar type vertebral bodies. No new aggressive osseous lesions.  Impression:  1. Continued positive response to therapy in the abdomen/pelvis with no definite residual peritoneal/omental metastasis.  2. New pleural-based consolidative opacity in the left lower lobe. There are a few other new subcentimeter nodules in the left lower lobe. This may be secondary to  "infection/inflammation, but metastases should be considered and short-term interval followup is recommended.      4/6/16 CT/PET IMPRESSION: Patient's history of ovarian carcinoma.  1. Left lower lobe 2 cm irregular pulmonary nodular density is minimally metabolic and argues that it is benign such as rounded atelectasis and less likely rounded pneumonia.  2. Underlying COPD with scattered scars within both lungs but no hypermetabolic structures to indicate malignancy.  3. No evidence of carcinomatosis, peritoneal or abdominal metastasis, or metastatic disease to any of the organs of the abdomen or pelvis.  4. Possible mild esophagitis at the GE junction.      4/11/16:  8. \"Had been unclear as to why  up to 41 from 23-this is concerning in light of recent completion of cddp/taxol, however the  is 8. CT PET is negative for metastatic disease. Recommend q 3 month visits x 2 years, then every 6 mos for 3 additional years.\"      7/25/16:   9.  7/27/16: CT cap Impression:    1. Interval resolution of left lower lobe spiculated pulmonary nodule. This likely represented atelectasis or consolidation.  2. Significant panlobular emphysematous changes of both lungs.  3. Spiculated right upper lobe pulmonary nodule measuring up to 8 mm, unchanged since 11/19/2015, although progressed since 2/4/2011. This may represent scarring. Recommend attention on followup.  4. New right hip fracture, possibly pathologic.  5. New sclerotic lesions of the sternum which are indeterminate. This may represent metastatic disease.      Of note, patient tripped over a cord at home 5/30/16 and fell and had an avulsed fx of her right hip. She underwent PT for this. She subsequently fell backwards 6/13/16 and fractured her left wrist and seeing OT for this.       She was recommended to have a bone scan done. This was scheduled and the patient delayed her scan until 11/2016 which showed uptake compatible with healing fractures. "       10/31/16:  15.  12/5/16:  11.  2/13/17:  12.  5/8/17:  11. Port removed.   8/30/17:  13.  12/4/17:   11.  3/5/18:  pending.         Today she comes to clinic feeling well and denies any concerning symptoms. She denies any vaginal bleeding, no changes in her bowel or bladder habits, no nausea/emesis, no lower extremity edema, and no difficulties eating or sleeping. She denies any abdominal discomfort/bloating, no fevers or chills, and no chest pain or shortness of breath but does have QUIROS at times as well as a cough related to her COPD. She is not sexually active and denies any dryness. She is due for her health screenings and will be seeing her PCP soon for her annual exam. She does monitor her BP at home and reports readings of 140's.         Review of Systems:    Systemic           no weight changes; no fever; no chills; no night sweats; no appetite changes  Skin           no rashes, or lesions  Eye           no irritation; no changes in vision  Katarzyna-Laryngeal           no dysphagia; no hoarseness   Pulmonary    no cough; no shortness of breath; + QUIROS  Cardiovascular    no chest pain; no palpitations; + HTN  Gastrointestinal    no diarrhea; no constipation; no abdominal pain; no changes in bowel habits; no blood in stool  Genitourinary   no urinary frequency; no urinary urgency; no dysuria; no pain; no abnormal vaginal discharge; no abnormal vaginal bleeding  Breast    no breast discharge; no breast changes; no breast pain  Musculoskeletal    no myalgias; no arthralgias; no back pain  Psychiatric           no depressed mood; no anxiety    Hematologic               no tender lymph nodes; no noticeable swellings or lumps   Endocrine    no hot flashes; no heat/cold intolerance         Neurological   no tremor; no numbness and tingling; no headaches; no difficulty sleeping      Past Medical History:    Past Medical History:   Diagnosis Date     Cervical high risk HPV  (human papillomavirus) test positive 10/31/12    type 18     COPD (chronic obstructive pulmonary disease) (H)      GERD (gastroesophageal reflux disease)      Hx of colposcopy with cervical biopsy 11/2012    negative     Hypertension 2013     Osteoporosis      Paroxysmal supraventricular tachycardia (H) 9/6/2017     Peritoneal carcinomatosis (H) 8/24/2015     Pneumonia      Uncomplicated asthma 1980         Past Surgical History:    Past Surgical History:   Procedure Laterality Date     CHOLECYSTECTOMY  6/2014     COLONOSCOPY  12/4/2012    Procedure: COLONOSCOPY;  COLONOSCOPY SCREEN;  Surgeon: Mushtaq Lara MD;  Location: [facility] OR     GYN SURGERY       HERNIORRHAPHY HIATAL N/A 11/25/2015    Procedure: HERNIORRHAPHY HIATAL;  Surgeon: Chip Carty MD;  Location: [facility] OR     HYSTERECTOMY TOTAL ABD, ALVIN SALPINGO-OOPHORECTOMY, NODE DISSECTION, TUMOR DEBULKING, COMBINED Bilateral 11/25/2015    Procedure: COMBINED HYSTERECTOMY TOTAL ABDOMINAL, SALPINGO-OOPHORECTOMY, NODE DISSECTION, TUMOR DEBULKING;  Surgeon: Emma Cabrera MD;  Location: [facility] OR         Health Maintenance Due   Topic Date Due     DEXA SCAN SCREENING (SYSTEM ASSIGNED)  08/11/2012     ADVANCE DIRECTIVE PLANNING Q5 YRS  10/31/2017     MAMMO Q1 YR  11/14/2017     COLONOSCOPY Q5 YR  12/04/2017     JUHI QUESTIONNAIRE 1 YEAR  01/13/2018     PHQ-9 Q1YR  01/13/2018       Current Medications:     Current Outpatient Prescriptions   Medication Sig Dispense Refill     azithromycin (ZITHROMAX) 250 MG tablet 2 tablets the first day, then 1 tablet daily for the next 4 days 6 tablet 0     predniSONE (DELTASONE) 20 MG tablet Take 1 tablet (20 mg) by mouth 2 times daily for 7 days 14 tablet 0     benzonatate (TESSALON) 200 MG capsule Take 1 capsule (200 mg) by mouth 3 times daily as needed for cough 30 capsule 0     chlorthalidone (HYGROTON) 25 MG tablet Take 1 tablet (25 mg) by mouth daily 90 tablet 0     omeprazole (PRILOSEC) 40 MG capsule Take 1  capsule (40 mg) by mouth daily Take 30-60 minutes before a meal. 90 capsule 1     tiotropium (SPIRIVA HANDIHALER) 18 MCG capsule Inhale 1 capsule (18 mcg) into the lungs daily Inhale contents of one capsule. . 90 capsule 0     ipratropium - albuterol 0.5 mg/2.5 mg/3 mL (DUONEB) 0.5-2.5 (3) MG/3ML neb solution Take 1 vial (3 mLs) by nebulization every 4 hours as needed for shortness of breath / dyspnea or wheezing 30 vial 5     budesonide-formoterol (SYMBICORT) 160-4.5 MCG/ACT Inhaler Inhale 2 puffs into the lungs 2 times daily 1 Inhaler 5     loratadine (CLARITIN) 10 MG tablet Take 10 mg by mouth daily       fluticasone (FLONASE) 50 MCG/ACT spray Spray 2 sprays into both nostrils daily 1 Bottle 1     potassium chloride SA (POTASSIUM CHLORIDE) 20 MEQ tablet Take 1 tablet (20 mEq) by mouth 2 times daily 60 tablet 0     albuterol (PROAIR HFA, PROVENTIL HFA, VENTOLIN HFA) 108 (90 BASE) MCG/ACT inhaler Inhale 2 puffs into the lungs every 6 hours as needed for shortness of breath / dyspnea or wheezing 1 Inhaler 3     ibuprofen (ADVIL,MOTRIN) 600 MG tablet Take 1 tablet (600 mg) by mouth every 6 hours as needed for moderate pain 40 tablet 0     MAGNESIUM OXIDE PO Take 200 mg by mouth 2 times daily       Zinc Sulfate (ZINC 15 PO) Take by mouth daily       Calcium Carbonate (CALCIUM 500 PO) Take 1 tablet by mouth daily.       BIOTIN PO Take 1 tablet by mouth daily.           Allergies:        Allergies   Allergen Reactions     Levaquin Rash        Social History:     Social History   Substance Use Topics     Smoking status: Former Smoker     Packs/day: 1.00     Years: 30.00     Types: Cigarettes     Start date: 8/11/1965     Quit date: 10/10/2003     Smokeless tobacco: Never Used     Alcohol use No       History   Drug Use No         Family History:     The patient's family history is notable for:    Family History   Problem Relation Age of Onset     CANCER Brother      testicular     DIABETES Sister      Ovarian Cancer  "Mother 60     DIABETES Mother           Asthma Father           DIABETES Sister      Depression/Anxiety Daughter      Depression Daughter      OSTEOPOROSIS Sister      Other Cancer Brother      DIABETES Brother      Depression Other      Grandson         Physical Exam:     /72 (BP Location: Right arm)  Pulse 76  Temp 98.1  F (36.7  C) (Oral)  Resp 16  Ht 1.676 m (5' 6\")  Wt 52.9 kg (116 lb 9.6 oz)  SpO2 98%  BMI 18.82 kg/m2  Body mass index is 18.82 kg/(m^2).    General Appearance: healthy and alert, no distress     HEENT: no thyromegaly, no palpable nodules or masses        Cardiovascular: regular rate and rhythm, no gallops, rubs or murmurs     Respiratory: lungs clear, no rales, rhonchi or wheezes, normal diaphragmatic excursion    Musculoskeletal: extremities non tender and without edema    Skin: no lesions or rashes     Neurological: normal gait, no gross defects     Psychiatric: appropriate mood and affect                               Hematological: normal cervical, supraclavicular and inguinal lymph nodes     Gastrointestinal:       abdomen soft, non-tender, non-distended, no organomegaly or masses    Genitourinary: External genitalia and urethral meatus appears normal.  Vagina is smooth without nodularity or masses.  Cervix surgically absent.  Bimanual exam reveal no masses, nodularity or fullness.  Recto-vaginal exam limited secondary to stool.      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 She completed treatment 2016. She is here today for a surveillance visit.     20 minutes were spent with this patient, over 50% of that time was spent in symptom management, treatment planning and in counseling and coordination of care.    Plan:     1.)        She is now 2 years post treatment and can extend her surveillance to every 6 months x 3 years; she will continue to have a  and " "pelvic/rectal exam at each visit. Reviewed signs and symptoms for when she should contact the clinic or seek additional care. Patient to contact the clinic with any questions or concerns in the interim.     2.) Genetic risk factors were assessed and she is negative for mutations in GINGER, BARD1, BRCA1, BRCA2, BRIP1, CDH1, CHEK2, EPCAM, MLH1, MRE11A, MSH2, MSH6, MUTYH, NBN, NF1, PALB2, PMS2, PTEN, RAD50, RAD51C, RAD51D, SMARCA4, STK11, and TP53.      3.) Labs and/or tests ordered include:  .      4.) Health maintenance issues addressed today include annual health maintenance and non-gynecologic issues with PCP. Continue to monitor BP at home and follow up with PCP.     CHANDLER Jay, WHNP-BC, ANP-BC  Women's Health Nurse Practitioner  Adult Nurse Pracitioner  Division of Gynecologic Oncology          CC  Patient Care Team:  Pillo Koehler MD as PCP - General (Family Practice)  Jessica Lua, RN as Continuity Care Coordinator (Gyn-Onc)  Iliana Pierre RN as       Oncology Rooming Note    March 5, 2018 10:30 AM   Iris Carrion is a 72 year old female who presents for:    Chief Complaint   Patient presents with     Oncology Clinic Visit     Initial Vitals: There were no vitals taken for this visit. Estimated body mass index is 19.12 kg/(m^2) as calculated from the following:    Height as of 12/4/17: 1.676 m (5' 5.98\").    Weight as of 2/27/18: 53.7 kg (118 lb 6.4 oz). There is no height or weight on file to calculate BSA.  Data Unavailable Comment: Data Unavailable   No LMP recorded. Patient is postmenopausal.  Allergies reviewed: Yes  Medications reviewed: Yes    Medications: Medication refills not needed today.  Pharmacy name entered into Chef: [facility] [address] [facility] [address]    Clinical concerns: f/u    Preventive Care:    Breast Cancer Screening: During our visit today, we discussed that it is recommended you receive breast cancer screening. Please call or make an " appointment with your primary care provider to discuss this with them. You may also call the [facility] scheduling line ([phone]) to set up a mammography appointment at the Breast Center within the [facility].    Colorectal Cancer Screening: During our visit today, we discussed that it is recommended you receive colorectal cancer screening. Please call or make an appointment with your primary care provider to discuss this. You may also call the [facility] scheduling line ([phone]) to set up a colonoscopy appointment.        Tigist Noble RN                Again, thank you for allowing me to participate in the care of your patient.        Sincerely,        CHANDLER Linares CNP

## 2018-03-05 NOTE — MR AVS SNAPSHOT
After Visit Summary   3/5/2018    Iris Carrion    MRN: [id number]           Patient Information     Date Of Birth          1945        Visit Information        Provider Department      3/5/2018 11:00 AM Yenny Pak APRN CNP [facility]        Today's Diagnoses     Malignant neoplasm of ovary, left (H)    -  1    Peritoneal carcinomatosis (H)           Follow-ups after your visit        Follow-up notes from your care team     Return in about 6 months (around 9/5/2018).      Your next 10 appointments already scheduled     Mar 05, 2018 11:00 AM CST   Return Visit with CHANDLER Linares CNP   [facility]    [address] [place] [address] [id number] [phone]            Sep 05, 2018 10:30 AM CDT   LAB with LAB ONC [facility] ([facility])    [address] [place] [address] [id number] [phone]           Please do not eat 10-12 hours before your appointment if you are coming in fasting for labs on lipids, cholesterol, or glucose (sugar). This does not apply to pregnant women. Water, hot tea and black coffee (with nothing added) are okay. Do not drink other fluids, diet soda or chew gum.            Sep 05, 2018 11:00 AM CDT   Return Visit with CHANDLER Linares CNP   [facility] ([facility]    [address] [place] [id number] [phone]              Who to contact     If you have questions or need follow up information about today's clinic visit or your schedule please contact [facility] directly at [phone].  Normal or non-critical lab and imaging results will be communicated to you by MyChart, letter or phone within 4 business days after the clinic has received the results. If you do not hear from us within 7 days, please contact the clinic through MyChart or phone. If  "you have a critical or abnormal lab result, we will notify you by phone as soon as possible.  Submit refill requests through [employer] or call your pharmacy and they will forward the refill request to us. Please allow 3 business days for your refill to be completed.          Additional Information About Your Visit        SendtoNewshart Information     [employer] gives you secure access to your electronic health record. If you see a primary care provider, you can also send messages to your care team and make appointments. If you have questions, please call your primary care clinic.  If you do not have a primary care provider, please call [phone] and they will assist you.      [employer] is an electronic gateway that provides easy, online access to your medical records. With [employer], you can request a clinic appointment, read your test results, renew a prescription or communicate with your care team.     To access your existing account, please contact your [facility] or call [phone] for assistance.        Care EveryWhere ID     This is your Care EveryWhere ID. This could be used by other organizations to access your [facility] medical records  [id number]        Your Vitals Were     Pulse Temperature Respirations Height Pulse Oximetry BMI (Body Mass Index)    76 98.1  F (36.7  C) (Oral) 16 1.676 m (5' 6\") 98% 18.82 kg/m2       Blood Pressure from Last 3 Encounters:   03/05/18 154/72   02/27/18 126/70   12/04/17 143/72    Weight from Last 3 Encounters:   03/05/18 52.9 kg (116 lb 9.6 oz)   02/27/18 53.7 kg (118 lb 6.4 oz)   12/04/17 56.7 kg (125 lb)              Today, you had the following     No orders found for display       Primary Care Provider Office Phone # Fax #    Pillo Koehler [phone] [address] [facility] [address]        Equal Access to Services     [place] RODRIGUEZ AH: Junior Hernandes, maryda khoa, qamindyta vignesh sandhu " merlin fieldsangy rodriguez'aan ah. So [facility] [phone].    ATENCIÓN: Si dharmeshla gildardo, tiene a pena disposición servicios gratuitos de asistencia lingüística. Li al [phone].    We comply with applicable federal civil rights laws and [place] laws. We do not discriminate on the basis of race, color, national origin, age, disability, sex, sexual orientation, or gender identity.            Thank you!     Thank you for choosing [facility]  for your care. Our goal is always to provide you with excellent care. Hearing back from our patients is one way we can continue to improve our services. Please take a few minutes to complete the written survey that you may receive in the mail after your visit with us. Thank you!             Your Updated Medication List - Protect others around you: Learn how to safely use, store and throw away your medicines at www.disposemymeds.org.          This list is accurate as of 3/5/18 10:59 AM.  Always use your most recent med list.                   Brand Name Dispense Instructions for use Diagnosis    albuterol 108 (90 BASE) MCG/ACT Inhaler    PROAIR HFA/PROVENTIL HFA/VENTOLIN HFA    1 Inhaler    Inhale 2 puffs into the lungs every 6 hours as needed for shortness of breath / dyspnea or wheezing    Chronic bronchitis, unspecified chronic bronchitis type (H)       azithromycin 250 MG tablet    ZITHROMAX    6 tablet    2 tablets the first day, then 1 tablet daily for the next 4 days    Chronic obstructive pulmonary disease with acute exacerbation (H)       benzonatate 200 MG capsule    TESSALON    30 capsule    Take 1 capsule (200 mg) by mouth 3 times daily as needed for cough    Chronic obstructive pulmonary disease with acute exacerbation (H)       BIOTIN PO      Take 1 tablet by mouth daily.        budesonide-formoterol 160-4.5 MCG/ACT Inhaler    SYMBICORT    1 Inhaler    Inhale 2 puffs into the lungs 2 times daily    COPD (chronic obstructive pulmonary disease)  (H)       CALCIUM 500 PO      Take 1 tablet by mouth daily.        chlorthalidone 25 MG tablet    HYGROTON    90 tablet    Take 1 tablet (25 mg) by mouth daily    Essential hypertension with goal blood pressure less than 140/90       fluticasone 50 MCG/ACT spray    FLONASE    1 Bottle    Spray 2 sprays into both nostrils daily    Non-seasonal allergic rhinitis due to animal hair and dander       ibuprofen 600 MG tablet    ADVIL/MOTRIN    40 tablet    Take 1 tablet (600 mg) by mouth every 6 hours as needed for moderate pain    S/P LUCILA-BSO       ipratropium - albuterol 0.5 mg/2.5 mg/3 mL 0.5-2.5 (3) MG/3ML neb solution    DUONEB    30 vial    Take 1 vial (3 mLs) by nebulization every 4 hours as needed for shortness of breath / dyspnea or wheezing    COPD (chronic obstructive pulmonary disease) (H)       loratadine 10 MG tablet    CLARITIN     Take 10 mg by mouth daily        MAGNESIUM OXIDE PO      Take 200 mg by mouth 2 times daily        omeprazole 40 MG capsule    priLOSEC    90 capsule    Take 1 capsule (40 mg) by mouth daily Take 30-60 minutes before a meal.    GERD (gastroesophageal reflux disease)       potassium chloride SA 20 MEQ CR tablet    KLOR-CON    60 tablet    Take 1 tablet (20 mEq) by mouth 2 times daily    Hypokalemia       predniSONE 20 MG tablet    DELTASONE    14 tablet    Take 1 tablet (20 mg) by mouth 2 times daily for 7 days    Chronic obstructive pulmonary disease with acute exacerbation (H)       tiotropium 18 MCG capsule    SPIRIVA HANDIHALER    90 capsule    Inhale 1 capsule (18 mcg) into the lungs daily Inhale contents of one capsule. .    COPD (chronic obstructive pulmonary disease) (H)       ZINC 15 PO      Take by mouth daily

## 2018-04-02 ENCOUNTER — MYC REFILL (OUTPATIENT)
Dept: FAMILY MEDICINE | Facility: CLINIC | Age: 73
End: 2018-04-02

## 2018-04-02 DIAGNOSIS — J44.9 COPD (CHRONIC OBSTRUCTIVE PULMONARY DISEASE) (H): ICD-10-CM

## 2018-04-02 RX ORDER — TIOTROPIUM BROMIDE 18 UG/1
18 CAPSULE ORAL; RESPIRATORY (INHALATION) DAILY
Qty: 90 CAPSULE | Refills: 0 | Status: SHIPPED | OUTPATIENT
Start: 2018-04-02 | End: 2018-07-09

## 2018-04-02 NOTE — TELEPHONE ENCOUNTER
Message from [employer]:  Original authorizing provider: MD Iirs CAMARGO would like a refill of the following medications:  tiotropium (SPIRIVA HANDIHALER) 18 MCG capsule [NIKOLE BRAVO MD]    Preferred pharmacy: [facility] [address] [facility] [address] [address]    Comment:

## 2018-04-15 ASSESSMENT — ACTIVITIES OF DAILY LIVING (ADL)
I_NEED_ASSISTANCE_FOR_THE_FOLLOWING_DAILY_ACTIVITIES:: NO ASSISTANCE IS NEEDED
CURRENT_FUNCTION: NO ASSISTANCE NEEDED

## 2018-04-18 ENCOUNTER — OFFICE VISIT (OUTPATIENT)
Dept: FAMILY MEDICINE | Facility: CLINIC | Age: 73
End: 2018-04-18
Payer: COMMERCIAL

## 2018-04-18 VITALS
OXYGEN SATURATION: 95 % | BODY MASS INDEX: 19.25 KG/M2 | TEMPERATURE: 97.4 F | WEIGHT: 119.8 LBS | SYSTOLIC BLOOD PRESSURE: 122 MMHG | HEIGHT: 66 IN | DIASTOLIC BLOOD PRESSURE: 70 MMHG | HEART RATE: 95 BPM

## 2018-04-18 DIAGNOSIS — Z00.00 ROUTINE GENERAL MEDICAL EXAMINATION AT A HEALTH CARE FACILITY: Primary | ICD-10-CM

## 2018-04-18 DIAGNOSIS — E78.5 DYSLIPIDEMIA: ICD-10-CM

## 2018-04-18 DIAGNOSIS — I10 ESSENTIAL HYPERTENSION WITH GOAL BLOOD PRESSURE LESS THAN 140/90: ICD-10-CM

## 2018-04-18 DIAGNOSIS — Z12.31 VISIT FOR SCREENING MAMMOGRAM: ICD-10-CM

## 2018-04-18 DIAGNOSIS — Z12.11 SCREEN FOR COLON CANCER: ICD-10-CM

## 2018-04-18 DIAGNOSIS — J42 CHRONIC BRONCHITIS, UNSPECIFIED CHRONIC BRONCHITIS TYPE (H): ICD-10-CM

## 2018-04-18 DIAGNOSIS — Z78.0 POST-MENOPAUSAL: ICD-10-CM

## 2018-04-18 LAB
ANION GAP SERPL CALCULATED.3IONS-SCNC: 6 MMOL/L (ref 3–14)
BUN SERPL-MCNC: 24 MG/DL (ref 7–30)
CALCIUM SERPL-MCNC: 9.2 MG/DL (ref 8.5–10.1)
CHLORIDE SERPL-SCNC: 102 MMOL/L (ref 94–109)
CHOLEST SERPL-MCNC: 244 MG/DL
CO2 SERPL-SCNC: 31 MMOL/L (ref 20–32)
CREAT SERPL-MCNC: 0.9 MG/DL (ref 0.52–1.04)
GFR SERPL CREATININE-BSD FRML MDRD: 62 ML/MIN/1.7M2
GLUCOSE SERPL-MCNC: 99 MG/DL (ref 70–99)
HBA1C MFR BLD: 5.5 % (ref 0–5.6)
HDLC SERPL-MCNC: 64 MG/DL
LDLC SERPL CALC-MCNC: 151 MG/DL
NONHDLC SERPL-MCNC: 180 MG/DL
POTASSIUM SERPL-SCNC: 2.9 MMOL/L (ref 3.4–5.3)
SODIUM SERPL-SCNC: 139 MMOL/L (ref 133–144)
TRIGL SERPL-MCNC: 143 MG/DL
TSH SERPL DL<=0.005 MIU/L-ACNC: 4 MU/L (ref 0.4–4)

## 2018-04-18 PROCEDURE — 80048 BASIC METABOLIC PNL TOTAL CA: CPT | Performed by: FAMILY MEDICINE

## 2018-04-18 PROCEDURE — 83036 HEMOGLOBIN GLYCOSYLATED A1C: CPT | Performed by: FAMILY MEDICINE

## 2018-04-18 PROCEDURE — G0439 PPPS, SUBSEQ VISIT: HCPCS | Performed by: FAMILY MEDICINE

## 2018-04-18 PROCEDURE — 36415 COLL VENOUS BLD VENIPUNCTURE: CPT | Performed by: FAMILY MEDICINE

## 2018-04-18 PROCEDURE — 84443 ASSAY THYROID STIM HORMONE: CPT | Performed by: FAMILY MEDICINE

## 2018-04-18 PROCEDURE — 80061 LIPID PANEL: CPT | Performed by: FAMILY MEDICINE

## 2018-04-18 RX ORDER — CHLORTHALIDONE 25 MG/1
25 TABLET ORAL DAILY
Qty: 30 TABLET | Refills: 0 | Status: CANCELLED | OUTPATIENT
Start: 2018-04-18

## 2018-04-18 RX ORDER — IPRATROPIUM BROMIDE AND ALBUTEROL SULFATE 2.5; .5 MG/3ML; MG/3ML
1 SOLUTION RESPIRATORY (INHALATION) EVERY 4 HOURS PRN
Qty: 30 VIAL | Refills: 5 | Status: SHIPPED | OUTPATIENT
Start: 2018-04-18 | End: 2018-11-13

## 2018-04-18 ASSESSMENT — ACTIVITIES OF DAILY LIVING (ADL): CURRENT_FUNCTION: NO ASSISTANCE NEEDED

## 2018-04-18 NOTE — MR AVS SNAPSHOT
After Visit Summary   4/18/2018    Iris Carrion    MRN: [id number]           Patient Information     Date Of Birth          1945        Visit Information        Provider Department      4/18/2018 10:50 AM Pillo Koehler MD [facility]        Today's Diagnoses     Routine general medical examination at a health care facility    -  1    Essential hypertension with goal blood pressure less than 140/90        Visit for screening mammogram        Post-menopausal        Screen for colon cancer        Chronic bronchitis, unspecified chronic bronchitis type (H)        Dyslipidemia          Care Instructions    1. I recommend including veggies, fresh fruits (3 to 5 servings), nuts (unsalted) in your daily diet. Cut down on carbs like bread, pasta, rice, potatoes. Cut down on red meats like burgers etc. Increase healthy proteins like beans, tofu, tuna, chicken and turkey.    2. Get regular exercise like walking at least 3 times per week.      3. Set up colonoscopy with [place] [facility] [phone].    4. Do self breast exams monthly. Report any lumps. Keep your mammogram appointment.    5. Avoid the sun or otherwise use sun screen - please look at the insert I gave you about melanoma.    6. See the dentist and eye doctor regularly.     7. Please call [phone] to set up an appointment to discuss advanced directive.     8. I will contact you with results along with further instructions.                      Follow-ups after your visit        Additional Services     GASTROENTEROLOGY ADULT REF PROCEDURE ONLY       Last Lab Result: Creatinine (mg/dL)       Date                     Value                 08/04/2017               0.92             ----------  There is no height or weight on file to calculate BMI.     Needed:  No  Language:  English    Patient will be contacted to schedule procedure.     Please be aware that coverage of these services is subject to the terms and limitations  of your health insurance plan.  Call member services at your health plan with any benefit or coverage questions.  Any procedures must be performed at a [place] facility OR coordinated by your clinic's referral office.    Please bring the following with you to your appointment:    (1) Any X-Rays, CTs or MRIs which have been performed.  Contact the facility where they were done to arrange for  prior to your scheduled appointment.    (2) List of current medications   (3) This referral request   (4) Any documents/labs given to you for this referral                  Your next 10 appointments already scheduled     Apr 26, 2018  2:00 PM CDT   (Arrive by 1:45 PM)   MA SCREENING DIGITAL BILATERAL with ANDMA1   [facility]    [address] [facility] [address] [phone]           Do not use any powder, lotion or deodorant under your arms or on your breast. If you do, we will ask you to remove it before your exam.  Wear comfortable, two-piece clothing.  If you have any allergies, tell your care team.  Bring any previous mammograms from other facilities or have them mailed to the breast center.            Sep 05, 2018 10:30 AM CDT   LAB with LAB ONC [facility] ([facility])    [address] [place] [id number] [phone]           Please do not eat 10-12 hours before your appointment if you are coming in fasting for labs on lipids, cholesterol, or glucose (sugar). This does not apply to pregnant women. Water, hot tea and black coffee (with nothing added) are okay. Do not drink other fluids, diet soda or chew gum.            Sep 05, 2018 11:00 AM CDT   Return Visit with CHANDLER Linares CNP   [facility] ([facility])    [address] [place] [phone] [phone]              Future tests that were ordered for you today     Open Future Orders      "   Priority Expected Expires Ordered    DEXA HIP/PELVIS/SPINE - Future Routine  4/10/2019 4/18/2018    MA SCREENING DIGITAL BILAT - Future  (s+30) Routine  4/10/2019 4/18/2018            Who to contact     If you have questions or need follow up information about today's clinic visit or your schedule please contact [facility] directly at [phone].  Normal or non-critical lab and imaging results will be communicated to you by Mailan[employer]art, letter or phone within 4 business days after the clinic has received the results. If you do not hear from us within 7 days, please contact the clinic through [employer] or phone. If you have a critical or abnormal lab result, we will notify you by phone as soon as possible.  Submit refill requests through [employer] or call your pharmacy and they will forward the refill request to us. Please allow 3 business days for your refill to be completed.          Additional Information About Your Visit        [employer] Information     [employer] gives you secure access to your electronic health record. If you see a primary care provider, you can also send messages to your care team and make appointments. If you have questions, please call your primary care clinic.  If you do not have a primary care provider, please call [phone] and they will assist you.        Care EveryWhere ID     This is your Care EveryWhere ID. This could be used by other organizations to access your [place] medical records  [id number]        Your Vitals Were     Pulse Temperature Height Pulse Oximetry BMI (Body Mass Index)       95 97.4  F (36.3  C) 5' 6\" (1.676 m) 95% 19.34 kg/m2        Blood Pressure from Last 3 Encounters:   04/18/18 122/70   03/05/18 154/72   02/27/18 126/70    Weight from Last 3 Encounters:   04/18/18 119 lb 12.8 oz (54.3 kg)   03/05/18 116 lb 9.6 oz (52.9 kg)   02/27/18 118 lb 6.4 oz (53.7 kg)              We Performed the Following     Basic metabolic panel     GASTROENTEROLOGY ADULT " REF PROCEDURE ONLY     Lipid panel reflex to direct LDL Fasting     TSH with free T4 reflex          Today's Medication Changes          These changes are accurate as of 4/18/18 11:24 AM.  If you have any questions, ask your nurse or doctor.               Stop taking these medicines if you haven't already. Please contact your care team if you have questions.     potassium chloride SA 20 MEQ CR tablet   Commonly known as:  KLOR-CON   Stopped by:  Pillo Koehler MD                Where to get your medicines      These medications were sent to [id number]  [place] - [address]  [address], [facility] [address]     Phone:  [phone]     ipratropium - albuterol 0.5 mg/2.5 mg/3 mL 0.5-2.5 (3) MG/3ML neb solution                Primary Care Provider Office Phone # Fax #    Pillo Koehler [phone] [address] [facility] [address]        Equal Access to Services     [facility] Hospital[facility] : Hadii aad ku hadasho Soomaali, waaxda luqadaha, qaybta kaalmada adeegyada, waxay idiin hayaan adeeg nadia rodriguez'richard . So [facility] [phone].    ATENCIÓN: Si habla español, tiene a pena disposición servicios gratuitos de asistencia lingüística. [facility] [phone].    We comply with applicable federal civil rights laws and [place] laws. We do not discriminate on the basis of race, color, national origin, age, disability, sex, sexual orientation, or gender identity.            Thank you!     Thank you for choosing [facility]  for your care. Our goal is always to provide you with excellent care. Hearing back from our patients is one way we can continue to improve our services. Please take a few minutes to complete the written survey that you may receive in the mail after your visit with us. Thank you!             Your Updated Medication List - Protect others around you: Learn how to safely use, store and throw away your medicines at www.disposemymeds.org.          This  list is accurate as of 4/18/18 11:24 AM.  Always use your most recent med list.                   Brand Name Dispense Instructions for use Diagnosis    albuterol 108 (90 Base) MCG/ACT Inhaler    PROAIR HFA/PROVENTIL HFA/VENTOLIN HFA    1 Inhaler    Inhale 2 puffs into the lungs every 6 hours as needed for shortness of breath / dyspnea or wheezing    Chronic bronchitis, unspecified chronic bronchitis type (H)       BIOTIN PO      Take 1 tablet by mouth daily.        budesonide-formoterol 160-4.5 MCG/ACT Inhaler    SYMBICORT    1 Inhaler    Inhale 2 puffs into the lungs 2 times daily    COPD (chronic obstructive pulmonary disease) (H)       CALCIUM 500 PO      Take 1 tablet by mouth daily.        chlorthalidone 25 MG tablet    HYGROTON    30 tablet    Take 1 tablet (25 mg) by mouth daily    Essential hypertension with goal blood pressure less than 140/90       fluticasone 50 MCG/ACT spray    FLONASE    1 Bottle    Spray 2 sprays into both nostrils daily    Non-seasonal allergic rhinitis due to animal hair and dander       ibuprofen 600 MG tablet    ADVIL/MOTRIN    40 tablet    Take 1 tablet (600 mg) by mouth every 6 hours as needed for moderate pain    S/P LUCILA-BSO       ipratropium - albuterol 0.5 mg/2.5 mg/3 mL 0.5-2.5 (3) MG/3ML neb solution    DUONEB    30 vial    Take 1 vial (3 mLs) by nebulization every 4 hours as needed for shortness of breath / dyspnea or wheezing    Chronic bronchitis, unspecified chronic bronchitis type (H)       loratadine 10 MG tablet    CLARITIN     Take 10 mg by mouth daily        MAGNESIUM OXIDE PO      Take 200 mg by mouth 2 times daily        omeprazole 40 MG capsule    priLOSEC    90 capsule    Take 1 capsule (40 mg) by mouth daily Take 30-60 minutes before a meal.    GERD (gastroesophageal reflux disease)       tiotropium 18 MCG capsule    SPIRIVA HANDIHALER    90 capsule    Inhale 1 capsule (18 mcg) into the lungs daily Inhale contents of one capsule. .    COPD (chronic obstructive  pulmonary disease) (H)       ZINC 15 PO      Take by mouth daily

## 2018-04-18 NOTE — PATIENT INSTRUCTIONS
1. I recommend including veggies, fresh fruits (3 to 5 servings), nuts (unsalted) in your daily diet. Cut down on carbs like bread, pasta, rice, potatoes. Cut down on red meats like burgers etc. Increase healthy proteins like beans, tofu, tuna, chicken and turkey.    2. Get regular exercise like walking at least 3 times per week.      3. Set up colonoscopy with [facility] [phone].    4. Do self breast exams monthly. Report any lumps. Keep your mammogram appointment.    5. Avoid the sun or otherwise use sun screen - please look at the insert I gave you about melanoma.    6. See the dentist and eye doctor regularly.     7. Please call [phone] to set up an appointment to discuss advanced directive.     8. I will contact you with results along with further instructions.

## 2018-04-18 NOTE — PROGRESS NOTES
SUBJECTIVE:   CC: Iris Carrion is an 72 year old woman who presents for preventive health visit.     Physical   Annual:     Getting at least 3 servings of Calcium per day::  NO    Bi-annual eye exam::  NO    Dental care twice a year::  NO    Sleep apnea or symptoms of sleep apnea::  None    Diet::  Regular (no restrictions)    Frequency of exercise::  2-3 days/week    Duration of exercise::  15-30 minutes    Taking medications regularly::  Yes    Medication side effects::  None    Additional concerns today::  No    Ability to successfully perform activities of daily living: no assistance needed  Home Safety:  No safety concerns identified  Hearing Impairment: no hearing concerns      Paroxysmal SVT - used to be once per week but now less frequently lasting about 5 minutes. It causes her to be dizzy, sweat and have shortness of breath. No chest pain. Denies weight changes or temperature intolerance.  Evaluation and treatment:   EKG 8/4/18 was normal.   Previous labs were fine including CMP, CBC and TSH   Saw cardiology 9/27/17 - no further evaluation was suggested.   Echo 10/11/17 negative.    FH of diabetes - she also wanted an A1c which I ordered.    HTN with hypokalemia - checks at home sometimes - around 120 systolic.controlled in clinic.    Evaluation and treatment:   Clorthalidone 25 mg qd. KCL 20 meq bid - not taking. No side effects.   AV blocking agent may be a better choice given h/o SVT.   ACEI or ARB may be a better choice given hypokalemia - we will consider once we get labs above back.  BP Readings from Last 6 Encounters:   04/18/18 122/70   03/05/18 154/72   02/27/18 126/70   12/04/17 143/72   09/27/17 131/80   08/30/17 123/74     Last Basic Metabolic Panel:  Lab Results   Component Value Date     08/04/2017      Lab Results   Component Value Date    POTASSIUM 3.4 08/04/2017     Lab Results   Component Value Date    CHLORIDE 102 08/04/2017     Lab Results   Component Value Date    MAURISIO 9.5  08/04/2017     Lab Results   Component Value Date    CO2 30 08/04/2017     Lab Results   Component Value Date    BUN 24 08/04/2017     Lab Results   Component Value Date    CR 0.92 08/04/2017     Lab Results   Component Value Date     08/04/2017       COPD - Has 35 pack history of smoking, quit 2003. Symptoms were cough, shortness of breath and wheezing. But currently feeling well.  Evaluation and treatment:   Spirometry 12/2/13 showed FEV1 42% predicted.    Advair was too expensive.    Symbicort 160/4.5, 2 puffs bid   Spiriva one puff qd.    She seems to need supplemental O 2 after surgeries. Her last surgery in June 2014 was for cholecystectomy.    I previously filled out disability parking form for her.    1) Advanced changes of emphysema.   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 Minimal residual   right pleural fluid or thickening in the posterior aspect of the right mid   hemithorax, both less conspicuous then on 4/13/2011 and likely residual   scarring from the extensive consolidative infiltrate that was present on   1/7/2011.   3) Evidence of previous granulomatous disease.   4) Small esophageal hiatal hernia.   5) Please see above for additional less significant details.     Yelena Oseguera M.D.  [facility]  CHICO/ben  D:9/13/2011/T:9/13/2011    Metastatic endometrial adenocarcinoma - discovered after she presented with abdominal pain since early July 2015.   Evaluation and treatment:    She follows with oncology.     Gall stone pancreatitis - in June 2014 had cholecystectomy and MRCP to remove common bile duct stone. Fine since then.    Previous poor balance and weakness - no longer using walker. Now balance is fairly good. Has gone to PT.    Preventive - advised Shingrix at our pharmacy.    Immunization History   Administered Date(s) Administered     Influenza (High Dose) 3 valent vaccine 11/28/2015, 09/22/2016, 12/04/2017     Influenza (IIV3) PF 10/31/2012,  12/02/2013, 11/02/2014     Pneumo Conj 13-V (2010&after) 08/08/2016     Pneumococcal 23 valent 10/18/2009, 07/31/2013     TDAP Vaccine (Adacel) 01/07/2011     Zoster vaccine, live 04/21/2015     STD screen: declined previously    Mammogram: Negative 11/14/16 - reordered.    PAP - has had hysterectomy    PAP NIL 12/2/2013  PAP NIL 10/31/2012    Colonoscopy: 12/4/12 - repeat in 5 years - reordered at [facility] for convenience due to location.    DEXA: ordered previously but she told me they canceled on her more than once. She is willing to try one more time - I reordered it.     Hep C screen: Negative 7/18/15    Vit D: takes 5000 units per day    Advanced directive: referred today      SH:    Marital status:   Kids: 2  Employment: retired, takes care of grand kids  Exercise: active  Tobacco: per HPI  Etoh: no  Recreational drugs: no  Caffeine: rarely      Today's PHQ-2 Score:   PHQ-2 ( 1999 Pfizer) 4/15/2018   Q1: Little interest or pleasure in doing things 0   Q2: Feeling down, depressed or hopeless 0   PHQ-2 Score 0   Q1: Little interest or pleasure in doing things Not at all   Q2: Feeling down, depressed or hopeless Not at all   PHQ-2 Score 0       Abuse: Current or Past(Physical, Sexual or Emotional)- No  Do you feel safe in your environment - Yes    Social History   Substance Use Topics     Smoking status: Former Smoker     Packs/day: 1.00     Years: 30.00     Types: Cigarettes     Start date: 8/11/1965     Quit date: 10/10/2003     Smokeless tobacco: Never Used     Alcohol use No     Alcohol Use 4/15/2018   If you drink alcohol do you typically have greater than 3 drinks per day OR greater than 7 drinks per week? Not Applicable   No flowsheet data found.    Reviewed orders with patient.  Reviewed health maintenance and updated orders accordingly - Yes      Review of Systems  CONSTITUTIONAL: NEGATIVE for fever, chills, change in weight  INTEGUMENTARY/SKIN: NEGATIVE for worrisome rashes, moles or  "lesions  EYES: NEGATIVE for vision changes or irritation  ENT: NEGATIVE for ear, mouth and throat problems  RESP: NEGATIVE for significant cough or SOB  BREAST: NEGATIVE for masses, tenderness or discharge  CV: NEGATIVE for chest pain, palpitations or peripheral edema  GI: NEGATIVE for nausea, abdominal pain, heartburn, or change in bowel habits  : NEGATIVE for unusual urinary or vaginal symptoms. No vaginal bleeding.  MUSCULOSKELETAL: NEGATIVE for significant arthralgias or myalgia  NEURO: NEGATIVE for weakness, dizziness or paresthesias  PSYCHIATRIC: NEGATIVE for changes in mood or affect      OBJECTIVE:   /86  Pulse 95  Temp 97.4  F (36.3  C)  Ht 5' 6\" (1.676 m)  Wt 119 lb 12.8 oz (54.3 kg)  SpO2 95%  BMI 19.34 kg/m2  Physical Exam  GENERAL APPEARANCE: healthy, alert and no distress  EYES: Eyes grossly normal to inspection, PERRL and conjunctivae and sclerae normal  HENT: ear canals and TM's normal, nose and mouth without ulcers or lesions, oropharynx clear and oral mucous membranes moist  NECK: no adenopathy, no asymmetry, masses, or scars and thyroid normal to palpation  RESP: lungs clear to auscultation - no rales, rhonchi or wheezes  BREAST: normal without masses, tenderness or nipple discharge and no palpable axillary masses or adenopathy  CV: regular rate and rhythm, normal S1 S2, no S3 or S4, no murmur, click or rub, no peripheral edema and peripheral pulses strong  ABDOMEN: soft, nontender, no hepatosplenomegaly, no masses and bowel sounds normal  MS: no musculoskeletal defects are noted and gait is age appropriate without ataxia  SKIN: no suspicious lesions or rashes  NEURO: Normal strength and tone, sensory exam grossly normal, mentation intact and speech normal  PSYCH: mentation appears normal and affect normal/bright    ASSESSMENT/PLAN:       COUNSELING:  Reviewed preventive health counseling, as reflected in patient instructions       Regular exercise       Healthy " "diet/nutrition         reports that she quit smoking about 14 years ago. Her smoking use included Cigarettes. She started smoking about 52 years ago. She has a 30.00 pack-year smoking history. She has never used smokeless tobacco.    Estimated body mass index is 18.82 kg/(m^2) as calculated from the following:    Height as of 3/5/18: 5' 6\" (1.676 m).    Weight as of 3/5/18: 116 lb 9.6 oz (52.9 kg).       Assessment and Plan - Decision Making    1. Routine general medical examination at a health care facility    Results of today's exam given to patient verbally along with age appropriate preventive measures. Written instructions reviewed and handed to the patient.    - TSH with free T4 reflex  - Hemoglobin A1c    2. Essential hypertension with goal blood pressure less than 140/90  Per [facility]  - Basic metabolic panel    3. Visit for screening mammogram  Per [facility]  - MA SCREENING DIGITAL BILAT - Future  (s+30); Future    4. Post-menopausal  Per [facility]  - DEXA HIP/PELVIS/SPINE - Future; Future    5. Screen for colon cancer  Per [facility]  - GASTROENTEROLOGY ADULT REF PROCEDURE ONLY    6. Chronic bronchitis, unspecified chronic bronchitis type (H)  Per [facility]  - ipratropium - albuterol 0.5 mg/2.5 mg/3 mL (DUONEB) 0.5-2.5 (3) MG/3ML neb solution; Take 1 vial (3 mLs) by nebulization every 4 hours as needed for shortness of breath / dyspnea or wheezing  Dispense: 30 vial; Refill: 5    7. Dyslipidemia  Per HPI  - Lipid panel reflex to direct LDL Fasting      Written instructions given as follows:    Patient Instructions   1. I recommend including veggies, fresh fruits (3 to 5 servings), nuts (unsalted) in your daily diet. Cut down on carbs like bread, pasta, rice, potatoes. Cut down on red meats like burgers etc. Increase healthy proteins like beans, tofu, tuna, chicken and turkey.    2. Get regular exercise like walking at least 3 times per week.      3. Set up colonoscopy with [facility] [phone].    4. Do self breast exams monthly. " Report any lumps. Keep your mammogram appointment.    5. Avoid the sun or otherwise use sun screen - please look at the insert I gave you about melanoma.    6. See the dentist and eye doctor regularly.     7. Please call [phone] to set up an appointment to discuss advanced directive.     8. I will contact you with results along with further instructions.

## 2018-04-19 ENCOUNTER — TELEPHONE (OUTPATIENT)
Dept: FAMILY MEDICINE | Facility: CLINIC | Age: 73
End: 2018-04-19

## 2018-04-19 DIAGNOSIS — E78.5 DYSLIPIDEMIA: ICD-10-CM

## 2018-04-19 DIAGNOSIS — I10 ESSENTIAL HYPERTENSION WITH GOAL BLOOD PRESSURE LESS THAN 140/90: ICD-10-CM

## 2018-04-19 DIAGNOSIS — E87.6 HYPOKALEMIA: Primary | ICD-10-CM

## 2018-04-19 RX ORDER — LOSARTAN POTASSIUM 50 MG/1
50 TABLET ORAL DAILY
Qty: 90 TABLET | Refills: 3 | Status: SHIPPED | OUTPATIENT
Start: 2018-04-19 | End: 2019-04-05

## 2018-04-20 RX ORDER — ATORVASTATIN CALCIUM 20 MG/1
20 TABLET, FILM COATED ORAL DAILY
Qty: 90 TABLET | Refills: 0 | Status: SHIPPED | OUTPATIENT
Start: 2018-04-20 | End: 2018-07-13

## 2018-04-20 NOTE — TELEPHONE ENCOUNTER
Iraj Simmons,     The potassium was low. Let's stop the Chlorthalidone and try Losartan 50 mg daily for blood pressure. I'm hoping this will raise your potassium as well. See if you can also eat one banana per day. We should then recheck the potassium in one week - make a lab appointment.     Your cholesterol was high. In order to prevent a heart attack or stroke, I would like you to start Lipitor 20 mg daily (sent to your pharmacy). Possible side effects include liver enzyme elevation, muscle aches. Increase in glucose and memory problems have been reported. But I believe the benefits outweigh the risks.     In 3 months, make a lab only appointment to give a fasting blood blood sample (nothing except water for 10-12 hours).     Regards,     Pillo Koehler M.D.

## 2018-04-26 ENCOUNTER — RADIANT APPOINTMENT (OUTPATIENT)
Dept: MAMMOGRAPHY | Facility: CLINIC | Age: 73
End: 2018-04-26
Attending: FAMILY MEDICINE
Payer: COMMERCIAL

## 2018-04-26 DIAGNOSIS — Z12.31 VISIT FOR SCREENING MAMMOGRAM: ICD-10-CM

## 2018-04-26 DIAGNOSIS — I10 ESSENTIAL HYPERTENSION WITH GOAL BLOOD PRESSURE LESS THAN 140/90: ICD-10-CM

## 2018-04-26 DIAGNOSIS — E87.6 HYPOKALEMIA: ICD-10-CM

## 2018-04-26 LAB
CREAT SERPL-MCNC: 0.85 MG/DL (ref 0.52–1.04)
GFR SERPL CREATININE-BSD FRML MDRD: 66 ML/MIN/1.7M2
POTASSIUM SERPL-SCNC: 3.5 MMOL/L (ref 3.4–5.3)

## 2018-04-26 PROCEDURE — 77067 SCR MAMMO BI INCL CAD: CPT | Mod: TC

## 2018-04-26 PROCEDURE — 84132 ASSAY OF SERUM POTASSIUM: CPT | Performed by: FAMILY MEDICINE

## 2018-04-26 PROCEDURE — 82565 ASSAY OF CREATININE: CPT | Performed by: FAMILY MEDICINE

## 2018-04-26 PROCEDURE — 36415 COLL VENOUS BLD VENIPUNCTURE: CPT | Performed by: FAMILY MEDICINE

## 2018-05-09 ENCOUNTER — OFFICE VISIT (OUTPATIENT)
Dept: FAMILY MEDICINE | Facility: CLINIC | Age: 73
End: 2018-05-09
Payer: COMMERCIAL

## 2018-05-09 VITALS
SYSTOLIC BLOOD PRESSURE: 137 MMHG | RESPIRATION RATE: 22 BRPM | WEIGHT: 121 LBS | TEMPERATURE: 97.7 F | HEART RATE: 105 BPM | DIASTOLIC BLOOD PRESSURE: 82 MMHG | OXYGEN SATURATION: 97 % | BODY MASS INDEX: 19.53 KG/M2

## 2018-05-09 DIAGNOSIS — J44.1 COPD EXACERBATION (H): Primary | ICD-10-CM

## 2018-05-09 PROCEDURE — 99213 OFFICE O/P EST LOW 20 MIN: CPT | Performed by: PHYSICIAN ASSISTANT

## 2018-05-09 RX ORDER — PREDNISONE 10 MG/1
TABLET ORAL
Qty: 30 TABLET | Refills: 0 | Status: SHIPPED | OUTPATIENT
Start: 2018-05-09 | End: 2018-06-21

## 2018-05-09 ASSESSMENT — ANXIETY QUESTIONNAIRES
1. FEELING NERVOUS, ANXIOUS, OR ON EDGE: SEVERAL DAYS
3. WORRYING TOO MUCH ABOUT DIFFERENT THINGS: NOT AT ALL
7. FEELING AFRAID AS IF SOMETHING AWFUL MIGHT HAPPEN: NOT AT ALL
6. BECOMING EASILY ANNOYED OR IRRITABLE: NOT AT ALL
5. BEING SO RESTLESS THAT IT IS HARD TO SIT STILL: NOT AT ALL
GAD7 TOTAL SCORE: 2
IF YOU CHECKED OFF ANY PROBLEMS ON THIS QUESTIONNAIRE, HOW DIFFICULT HAVE THESE PROBLEMS MADE IT FOR YOU TO DO YOUR WORK, TAKE CARE OF THINGS AT HOME, OR GET ALONG WITH OTHER PEOPLE: NOT DIFFICULT AT ALL
2. NOT BEING ABLE TO STOP OR CONTROL WORRYING: NOT AT ALL

## 2018-05-09 ASSESSMENT — PAIN SCALES - GENERAL: PAINLEVEL: NO PAIN (0)

## 2018-05-09 ASSESSMENT — PATIENT HEALTH QUESTIONNAIRE - PHQ9: 5. POOR APPETITE OR OVEREATING: SEVERAL DAYS

## 2018-05-09 NOTE — PROGRESS NOTES
SUBJECTIVE:   Iris Carrion is a 72 year old female who presents to clinic today for the following health issues:    Iris has COPD and uses her daily spiriva inhaler and albuterol as needed. She recently developed a viral URI and the cough increased for a short time. She started to feel better, but now is needing the albuterol 4 times / day and waking at night due to the tightness and wheezing. She has a cat and using allergy medication. She is sneezing, but feels that her allergies are well controlled. No fever. Sputum is clear.     ENT Symptoms             Symptoms: cc Present Absent Comment   Fever/Chills   x    Fatigue  x     Muscle Aches   x    Eye Irritation   x    Sneezing  x     Nasal Eleazar/Drg   x    Sinus Pressure/Pain   x    Loss of smell   x    Dental pain   x    Sore Throat   x    Swollen Glands   x    Ear Pain/Fullness   x    Cough  x     Wheeze  x     Chest Pain  x  tightness   Shortness of breath  x     Rash   x    Other   x      Symptom duration:  off and on for 2 weeks   Symptom severity:  worse at night, trouble breathing    Treatments tried:  neb   Contacts:  daughter had a cold for two weeks, better now.            Problem list and histories reviewed & adjusted, as indicated.  Additional history: as documented    Patient Active Problem List   Diagnosis     GERD (gastroesophageal reflux disease)     Osteoporosis     Advanced directives, counseling/discussion     CARDIOVASCULAR SCREENING; LDL GOAL LESS THAN 160     Cervical high risk HPV (human papillomavirus) test positive     Hearing loss     Acute pancreatitis     Hypokalemia     Peritoneal carcinomatosis (H)     Hypomagnesemia     Chemotherapy-induced neutropenia (H)     Chronic bronchitis, unspecified chronic bronchitis type (H)     S/P [id number]     Malignant neoplasm of ovary, left (H)     Family history of malignant neoplasm of ovary     Essential hypertension with goal blood pressure less than 140/90     Pulmonary nodules      Paroxysmal supraventricular tachycardia (H)     Post-menopausal     Dyslipidemia     Past Surgical History:   Procedure Laterality Date     CHOLECYSTECTOMY  2014     COLONOSCOPY  2012    Procedure: COLONOSCOPY;  COLONOSCOPY SCREEN;  Surgeon: Mushtaq Lara MD;  Location: [facility] OR     GYN SURGERY       HERNIORRHAPHY HIATAL N/A 2015    Procedure: HERNIORRHAPHY HIATAL;  Surgeon: Chip Carty MD;  Location: [facility] OR     HYSTERECTOMY TOTAL ABD, ALVIN SALPINGO-OOPHORECTOMY, NODE DISSECTION, TUMOR DEBULKING, COMBINED Bilateral 2015    Procedure: COMBINED HYSTERECTOMY TOTAL ABDOMINAL, SALPINGO-OOPHORECTOMY, NODE DISSECTION, TUMOR DEBULKING;  Surgeon: Emma Cabrera MD;  Location: [facility] OR       Social History   Substance Use Topics     Smoking status: Former Smoker     Packs/day: 1.00     Years: 30.00     Types: Cigarettes     Start date: 1965     Quit date: 10/10/2003     Smokeless tobacco: Never Used     Alcohol use No     Family History   Problem Relation Age of Onset     CANCER Brother      testicular     DIABETES Sister      Ovarian Cancer Mother 60     DIABETES Mother           Asthma Father           DIABETES Sister      Depression/Anxiety Daughter      Depression Daughter      OSTEOPOROSIS Sister      Other Cancer Brother      DIABETES Brother      Depression Other      Grandson         Allergies   Allergen Reactions     Levaquin Rash       Reviewed and updated as needed this visit by clinical staff       Reviewed and updated as needed this visit by Provider         ROS:  Constitutional, HEENT, cardiovascular, pulmonary, gi and gu systems are negative, except as otherwise noted.    OBJECTIVE:     /82  Pulse 105  Temp 97.7  F (36.5  C) (Oral)  Resp 22  Wt 121 lb (54.9 kg)  SpO2 97%  BMI 19.53 kg/m2  Body mass index is 19.53 kg/(m^2).  GENERAL: healthy, alert and no distress  EYES: Eyes grossly normal to inspection, PERRL and conjunctivae and sclerae  normal  HENT: ear canals and TM's normal, nose and mouth without ulcers or lesions  NECK: no adenopathy, no asymmetry, masses, or scars and thyroid normal to palpation  RESP: lungs clear to auscultation - no rales, rhonchi or wheezes  CV: regular rate and rhythm, normal S1 S2, no S3 or S4, no murmur, click or rub, no peripheral edema and peripheral pulses strong  SKIN: no suspicious lesions or rashes  NEURO: Normal strength and tone, mentation intact and speech normal  PSYCH: mentation appears normal, affect normal/bright    Diagnostic Test Results:  none     ASSESSMENT/PLAN:       1. COPD exacerbation (H)  Viral URI caused exacerbation.   Continue inhalers and neb.   Treat with prednisone taper.   Side effects and how to take the medication discussed.  She will follow up with her primary provider is symptoms worsen or persist.   - predniSONE (DELTASONE) 10 MG tablet; Take 4 tablets daily x 3 days, then 3 tablets daily x 3 days, then 2 tablets daily x 3 days, then 1 tablet daily x 3 days  Dispense: 30 tablet; Refill: 0      Kristen M. Kehr, PA-C  [facility]

## 2018-05-09 NOTE — NURSING NOTE
"Chief Complaint   Patient presents with     URI       Initial /82  Pulse 105  Temp 97.7  F (36.5  C) (Oral)  Resp 22  Wt 121 lb (54.9 kg)  SpO2 97%  BMI 19.53 kg/m2 Estimated body mass index is 19.53 kg/(m^2) as calculated from the following:    Height as of 4/18/18: 5' 6\" (1.676 m).    Weight as of this encounter: 121 lb (54.9 kg).  Medication Reconciliation: complete    RACHEL Cooley MA    "

## 2018-05-09 NOTE — MR AVS SNAPSHOT
After Visit Summary   5/9/2018    Iris Carrion    MRN: [id number]           Patient Information     Date Of Birth          1945        Visit Information        Provider Department      5/9/2018 2:10 PM Kehr, Kristen M, PA-C [facility]        Today's Diagnoses     COPD exacerbation (H)    -  1       Follow-ups after your visit        Your next 10 appointments already scheduled     May 30, 2018 10:30 AM CDT   DX HIP/PELVIS/SPINE with FKDX1   [facility] ([facility])    [address] [place] [id number] [phone]           Please do not take any of the following 24 hours prior to the day of your exam: vitamins, calcium tablets, antacids.  If possible, please wear clothes without metal (snaps, zippers). A sweatsuit works well.            Sep 05, 2018 10:30 AM CDT   LAB with LAB ONC [facility] ([facility])    [address] [place] [id number] [phone]           Please do not eat 10-12 hours before your appointment if you are coming in fasting for labs on lipids, cholesterol, or glucose (sugar). This does not apply to pregnant women. Water, hot tea and black coffee (with nothing added) are okay. Do not drink other fluids, diet soda or chew gum.            Sep 05, 2018 11:00 AM CDT   Return Visit with CHANDLER Linares CNP   [facility] ([facility])    [address] [place] [address] [phone]              Who to contact     If you have questions or need follow up information about today's clinic visit or your schedule please contact [facility] directly at [phone].  Normal or non-critical lab and imaging results will be communicated to you by MyChart, letter or phone within 4 business days after the clinic has received the results. If you do not hear from us within 7 days, please contact the clinic  through [employer] or phone. If you have a critical or abnormal lab result, we will notify you by phone as soon as possible.  Submit refill requests through [employer] or call your pharmacy and they will forward the refill request to us. Please allow 3 business days for your refill to be completed.          Additional Information About Your Visit        [employer] Information     [employer] gives you secure access to your electronic health record. If you see a primary care provider, you can also send messages to your care team and make appointments. If you have questions, please call your primary care clinic.  If you do not have a primary care provider, please call [phone] and they will assist you.        Care EveryWhere ID     This is your Care EveryWhere ID. This could be used by other organizations to access your [facility] medical records  [id number]        Your Vitals Were     Pulse Temperature Respirations Pulse Oximetry BMI (Body Mass Index)       105 97.7  F (36.5  C) (Oral) 22 97% 19.53 kg/m2        Blood Pressure from Last 3 Encounters:   05/09/18 137/82   04/18/18 122/70   03/05/18 154/72    Weight from Last 3 Encounters:   05/09/18 121 lb (54.9 kg)   04/18/18 119 lb 12.8 oz (54.3 kg)   03/05/18 116 lb 9.6 oz (52.9 kg)              Today, you had the following     No orders found for display         Today's Medication Changes          These changes are accurate as of 5/9/18  2:35 PM.  If you have any questions, ask your nurse or doctor.               Start taking these medicines.        Dose/Directions    predniSONE 10 MG tablet   Commonly known as:  DELTASONE   Used for:  COPD exacerbation (H)   Started by:  Kehr, Kristen M, PA-C        Take 4 tablets daily x 3 days, then 3 tablets daily x 3 days, then 2 tablets daily x 3 days, then 1 tablet daily x 3 days   Quantity:  30 tablet   Refills:  0            Where to get your medicines      These medications were sent to [facility] [place] -  [address] [address] [facility] [address]  [address] [address], [address], [facility] [address]     Phone:  [phone]     predniSONE 10 MG tablet                Primary Care Provider Office Phone # Fax #    Pillo Koehelr [phone]       [address] [facility] [address]        Equal Access to Services     [facility]: Hadii aad ku hadasho Soomaali, waaxda luqadaha, qaybta kaalmada adeegyada, waxay idiin hayaan adeeg khdebrash lacolbyn . So [facility] [phone].    ATENCIÓN: Si habla español, tiene a pena disposición servicios gratuitos de asistencia lingüística. Llame al [phone].    We comply with applicable federal civil rights laws and [place] laws. We do not discriminate on the basis of race, color, national origin, age, disability, sex, sexual orientation, or gender identity.            Thank you!     Thank you for choosing [facility]  for your care. Our goal is always to provide you with excellent care. Hearing back from our patients is one way we can continue to improve our services. Please take a few minutes to complete the written survey that you may receive in the mail after your visit with us. Thank you!             Your Updated Medication List - Protect others around you: Learn how to safely use, store and throw away your medicines at www.disposemymeds.org.          This list is accurate as of 5/9/18  2:35 PM.  Always use your most recent med list.                   Brand Name Dispense Instructions for use Diagnosis    albuterol 108 (90 Base) MCG/ACT Inhaler    PROAIR HFA/PROVENTIL HFA/VENTOLIN HFA    1 Inhaler    Inhale 2 puffs into the lungs every 6 hours as needed for shortness of breath / dyspnea or wheezing    Chronic bronchitis, unspecified chronic bronchitis type (H)       atorvastatin 20 MG tablet    LIPITOR    90 tablet    Take 1 tablet (20 mg) by mouth daily    Dyslipidemia       BIOTIN PO      Take 1 tablet by mouth daily.        budesonide-formoterol 160-4.5 MCG/ACT  Inhaler    SYMBICORT    1 Inhaler    Inhale 2 puffs into the lungs 2 times daily    COPD (chronic obstructive pulmonary disease) (H)       CALCIUM 500 PO      Take 1 tablet by mouth daily.        fluticasone 50 MCG/ACT spray    FLONASE    1 Bottle    Spray 2 sprays into both nostrils daily    Non-seasonal allergic rhinitis due to animal hair and dander       ibuprofen 600 MG tablet    ADVIL/MOTRIN    40 tablet    Take 1 tablet (600 mg) by mouth every 6 hours as needed for moderate pain    S/P LUCILA-BSO       ipratropium - albuterol 0.5 mg/2.5 mg/3 mL 0.5-2.5 (3) MG/3ML neb solution    DUONEB    30 vial    Take 1 vial (3 mLs) by nebulization every 4 hours as needed for shortness of breath / dyspnea or wheezing    Chronic bronchitis, unspecified chronic bronchitis type (H)       loratadine 10 MG tablet    CLARITIN     Take 10 mg by mouth daily        losartan 50 MG tablet    COZAAR    90 tablet    Take 1 tablet (50 mg) by mouth daily    Essential hypertension with goal blood pressure less than 140/90, Hypokalemia       MAGNESIUM OXIDE PO      Take 200 mg by mouth 2 times daily        omeprazole 40 MG capsule    priLOSEC    90 capsule    Take 1 capsule (40 mg) by mouth daily Take 30-60 minutes before a meal.    GERD (gastroesophageal reflux disease)       predniSONE 10 MG tablet    DELTASONE    30 tablet    Take 4 tablets daily x 3 days, then 3 tablets daily x 3 days, then 2 tablets daily x 3 days, then 1 tablet daily x 3 days    COPD exacerbation (H)       tiotropium 18 MCG capsule    SPIRIVA HANDIHALER    90 capsule    Inhale 1 capsule (18 mcg) into the lungs daily Inhale contents of one capsule. .    COPD (chronic obstructive pulmonary disease) (H)       ZINC 15 PO      Take by mouth daily

## 2018-05-10 ASSESSMENT — ANXIETY QUESTIONNAIRES: GAD7 TOTAL SCORE: 2

## 2018-05-10 ASSESSMENT — PATIENT HEALTH QUESTIONNAIRE - PHQ9: SUM OF ALL RESPONSES TO PHQ QUESTIONS 1-9: 2

## 2018-05-25 ENCOUNTER — TRANSFERRED RECORDS (OUTPATIENT)
Dept: HEALTH INFORMATION MANAGEMENT | Facility: CLINIC | Age: 73
End: 2018-05-25

## 2018-05-30 ENCOUNTER — TELEPHONE (OUTPATIENT)
Dept: FAMILY MEDICINE | Facility: CLINIC | Age: 73
End: 2018-05-30

## 2018-05-30 NOTE — TELEPHONE ENCOUNTER
Please call patient to see if she set up colonoscopy at [facility].    Then close encounter.    Pillo Koehler M.D.

## 2018-05-30 NOTE — TELEPHONE ENCOUNTER
Please abstract the following data from this visit with this patient into the appropriate field in Epic:    Colonoscopy done on this date: 5/25/18 (approximately), by this group: [facility], results were normal.

## 2018-06-19 NOTE — PROGRESS NOTES
HPI:    Iris is a 72 year old female here to discuss:    Abscess -     Duration: one week  Location: right labia majora  Swelling: yes  Pain: yes  Redness: yes  Drainage: no  Fevers or chills: no  Diabetes: no  Obesity: no  Plan for day: I&D performed today. See below for procedure. We will place her on [place].    Exam:    /74 (Cuff Size: Adult Small)  Pulse 114  Temp 97.7  F (36.5  C) (Oral)  Wt 121 lb (54.9 kg)  SpO2 95%  Breastfeeding? No  BMI 19.53 kg/m2    Gen: Healthy appearing female in no acute distress  Skin: the right labia majora has a large area of swelling about 4 cm with slight fluctuance, redness, tenderness.    Assessment and Plan - Decision Making    1. Abscess of labia majora    Small risk of infection spread, bleeding and poor cosmetic outcome discussed. she gave verbal and written consent. I had her lie supine on the exam table. Aseptic technique was used. I cleaned the area with alcohol and betadine.     About 2 cc of lidocaine with epinephrine was used for local anesthesia using 27 gauge needle. I then made a stab incision using 11 blade. A small amount of purulent material was expressed. The wound was further explored using curved hemostat to break apart inflammatory soft tissue. I made a small skin defect to allow continued drainage. There was minimal bleeding controlled with local pressure. Packing gauze was placed. Written instructions reviewed with the patient.    - clindamycin (CLEOCIN) 300 MG capsule; Take 1 capsule (300 mg) by mouth 3 times daily for 7 days  Dispense: 21 capsule; Refill: 0  - DRAIN SKIN ABSCESS SIMPLE/SINGLE    Lidocaine 1% with epinephrine-Lot # -[id number], Exp 03/01/2019, NDC# 5523-5405-92          Written instructions given as follows:    Patient Instructions   1. Please take Clindamycin 300 mg three times per day for 7 days.    2. Let me see you tomorrow for follow-up.

## 2018-06-21 ENCOUNTER — OFFICE VISIT (OUTPATIENT)
Dept: FAMILY MEDICINE | Facility: CLINIC | Age: 73
End: 2018-06-21
Payer: COMMERCIAL

## 2018-06-21 VITALS
OXYGEN SATURATION: 95 % | SYSTOLIC BLOOD PRESSURE: 132 MMHG | WEIGHT: 121 LBS | BODY MASS INDEX: 19.53 KG/M2 | HEART RATE: 114 BPM | DIASTOLIC BLOOD PRESSURE: 74 MMHG | TEMPERATURE: 97.7 F

## 2018-06-21 DIAGNOSIS — N76.4 ABSCESS OF LABIA MAJORA: Primary | ICD-10-CM

## 2018-06-21 PROCEDURE — 56405 I&D VULVA/PERINEAL ABSCESS: CPT | Performed by: FAMILY MEDICINE

## 2018-06-21 PROCEDURE — 99207 C PAF COMPLETED  NO CHARGE: CPT | Performed by: FAMILY MEDICINE

## 2018-06-21 PROCEDURE — 99207 ZZC NO CHARGE LOS: CPT | Performed by: FAMILY MEDICINE

## 2018-06-21 RX ORDER — DOXYCYCLINE 100 MG/1
100 CAPSULE ORAL 2 TIMES DAILY
Qty: 20 CAPSULE | Refills: 0 | Status: CANCELLED | OUTPATIENT
Start: 2018-06-21

## 2018-06-21 RX ORDER — CLINDAMYCIN HCL 300 MG
300 CAPSULE ORAL 3 TIMES DAILY
Qty: 21 CAPSULE | Refills: 0 | Status: SHIPPED | OUTPATIENT
Start: 2018-06-21 | End: 2018-06-28

## 2018-06-21 NOTE — MR AVS SNAPSHOT
After Visit Summary   6/21/2018    Iris Carrion    MRN: [id number]           Patient Information     Date Of Birth          1945        Visit Information        Provider Department      6/21/2018 10:50 AM Pillo Koehler MD [facility]        Today's Diagnoses     Abscess of labia majora    -  1      Care Instructions    1. Please take Clindamycin 300 mg three times per day for 7 days.    2. Let me see you tomorrow for follow-up.            Follow-ups after your visit        Your next 10 appointments already scheduled     Jun 22, 2018  2:10 PM CDT   Office Visit with Pillo Koehler MD   [facility] ([facility])    [address] [address] [facility] [address] [phone]           Bring a current list of meds and any records pertaining to this visit. For Physicals, please bring immunization records and any forms needing to be filled out. Please arrive 10 minutes early to complete paperwork.            Sep 05, 2018 10:30 AM CDT   LAB with LAB ONC [facility] ([facility])    [address] [place] [address] [phone]           Please do not eat 10-12 hours before your appointment if you are coming in fasting for labs on lipids, cholesterol, or glucose (sugar). This does not apply to pregnant women. Water, hot tea and black coffee (with nothing added) are okay. Do not drink other fluids, diet soda or chew gum.            Sep 05, 2018 11:00 AM CDT   Return Visit with CHANDLER Linares [facility])    [address] [place] [address] [phone]              Who to contact     If you have questions or need follow up information about today's clinic visit or your schedule please contact [facility] directly at [phone].  Normal or non-critical lab and imaging results will be communicated to you by  MyChart, letter or phone within 4 business days after the clinic has received the results. If you do not hear from us within 7 days, please contact the clinic through [employer] or phone. If you have a critical or abnormal lab result, we will notify you by phone as soon as possible.  Submit refill requests through [employer] or call your pharmacy and they will forward the refill request to us. Please allow 3 business days for your refill to be completed.          Additional Information About Your Visit        [employer] Information     [employer] gives you secure access to your electronic health record. If you see a primary care provider, you can also send messages to your care team and make appointments. If you have questions, please call your primary care clinic.  If you do not have a primary care provider, please call [phone] and they will assist you.        Care EveryWhere ID     This is your Care EveryWhere ID. This could be used by other organizations to access your [facility] medical records  [id number]        Your Vitals Were     Pulse Temperature Pulse Oximetry Breastfeeding? BMI (Body Mass Index)       114 97.7  F (36.5  C) (Oral) 95% No 19.53 kg/m2        Blood Pressure from Last 3 Encounters:   06/21/18 132/74   05/09/18 137/82   04/18/18 122/70    Weight from Last 3 Encounters:   06/21/18 121 lb (54.9 kg)   05/09/18 121 lb (54.9 kg)   04/18/18 119 lb 12.8 oz (54.3 kg)              We Performed the Following     PAF COMPLETED          Today's Medication Changes          These changes are accurate as of 6/21/18 11:49 AM.  If you have any questions, ask your nurse or doctor.               Start taking these medicines.        Dose/Directions    clindamycin 300 MG capsule   Commonly known as:  CLEOCIN   Used for:  Abscess of labia majora   Started by:  Pillo Koehler MD        Dose:  300 mg   Take 1 capsule (300 mg) by mouth 3 times daily for 7 days   Quantity:  21 capsule   Refills:  0            Where to get  your medicines      These medications were sent to [facility] [address] [place] [address]  [address], [place] [address]     Phone:  [phone]     clindamycin 300 MG capsule                Primary Care Provider Office Phone # Fax #    Pillo Koehler [phone] [address] [facility] [address]        Equal Access to Services     [facility] : Hadii aad ku hadasho Soomaali, waaxda luqadaha, qaybta kaalmada adeegyada, waxay idiin hayaan adeeg kharash la'jordann . So [facility] [phone].    ATENCIÓN: Si dharmeshla espraymond, tiene a pena disposición servicios gratuitos de asistencia lingüística. [facility] [phone].    We comply with applicable federal civil rights laws and [place] laws. We do not discriminate on the basis of race, color, national origin, age, disability, sex, sexual orientation, or gender identity.            Thank you!     Thank you for choosing [facility]  for your care. Our goal is always to provide you with excellent care. Hearing back from our patients is one way we can continue to improve our services. Please take a few minutes to complete the written survey that you may receive in the mail after your visit with us. Thank you!             Your Updated Medication List - Protect others around you: Learn how to safely use, store and throw away your medicines at www.disposemymeds.org.          This list is accurate as of 6/21/18 11:49 AM.  Always use your most recent med list.                   Brand Name Dispense Instructions for use Diagnosis    albuterol 108 (90 Base) MCG/ACT Inhaler    PROAIR HFA/PROVENTIL HFA/VENTOLIN HFA    1 Inhaler    Inhale 2 puffs into the lungs every 6 hours as needed for shortness of breath / dyspnea or wheezing    Chronic bronchitis, unspecified chronic bronchitis type (H)       atorvastatin 20 MG tablet    LIPITOR    90 tablet    Take 1 tablet (20 mg) by mouth daily    Dyslipidemia       BIOTIN PO      Take  1 tablet by mouth daily.        budesonide-formoterol 160-4.5 MCG/ACT Inhaler    SYMBICORT    1 Inhaler    Inhale 2 puffs into the lungs 2 times daily    COPD (chronic obstructive pulmonary disease) (H)       CALCIUM 500 PO      Take 1 tablet by mouth daily.        clindamycin 300 MG capsule    CLEOCIN    21 capsule    Take 1 capsule (300 mg) by mouth 3 times daily for 7 days    Abscess of labia majora       fluticasone 50 MCG/ACT spray    FLONASE    1 Bottle    Spray 2 sprays into both nostrils daily    Non-seasonal allergic rhinitis due to animal hair and dander       ibuprofen 600 MG tablet    ADVIL/MOTRIN    40 tablet    Take 1 tablet (600 mg) by mouth every 6 hours as needed for moderate pain    S/P LUCILA-BSO       ipratropium - albuterol 0.5 mg/2.5 mg/3 mL 0.5-2.5 (3) MG/3ML neb solution    DUONEB    30 vial    Take 1 vial (3 mLs) by nebulization every 4 hours as needed for shortness of breath / dyspnea or wheezing    Chronic bronchitis, unspecified chronic bronchitis type (H)       loratadine 10 MG tablet    CLARITIN     Take 10 mg by mouth daily        losartan 50 MG tablet    COZAAR    90 tablet    Take 1 tablet (50 mg) by mouth daily    Essential hypertension with goal blood pressure less than 140/90, Hypokalemia       MAGNESIUM OXIDE PO      Take 200 mg by mouth 2 times daily        omeprazole 40 MG capsule    priLOSEC    90 capsule    Take 1 capsule (40 mg) by mouth daily Take 30-60 minutes before a meal.    GERD (gastroesophageal reflux disease)       tiotropium 18 MCG capsule    SPIRIVA HANDIHALER    90 capsule    Inhale 1 capsule (18 mcg) into the lungs daily Inhale contents of one capsule. .    COPD (chronic obstructive pulmonary disease) (H)       ZINC 15 PO      Take by mouth daily

## 2018-06-21 NOTE — PATIENT INSTRUCTIONS
1. Please take Clindamycin 300 mg three times per day for 7 days.    2. Let me see you tomorrow for follow-up.

## 2018-06-21 NOTE — LETTER
"                   Affirmation of Informed Consent for Surgery or Invasive Procedure  1.  I, Iris KRUEGER Sheyla, 1945,   a.  Agree that I will have an abscess lanced.   b.  At [facility].     c.  The reason for this procedure is (medical condition):  To treat infection.   d.  This will be done by:  Pillo Koehler M.D..  2.  I have talked to my doctor or health care team about:   a.  How it may help me (the benefits).   b.  How it may harm me (the most likely and most serious risks). Small risk of                  bleeding, spread of infection, recurrence and poor cosmetic outcome.   c.  My other choices for treatment.  The risks and benefits of those choices.    d.   What will likely happen if I say \"no\" to this procedure.    e.  How I might feel right after and how quickly I might be expected to recover.      f.  What medicines will be used for pain and anesthesia.   3.  I agree that:  (If I do not agree with a statement, I have crossed it out and              initialed next to it.)       a.  I will ask questions.     b.  No one has promised me definite results.    c.  If a staff person is exposed to my blood or body fluids, my blood will be drawn                   and tested for HIV and hepatitis.  I understand that by law, the test results will                    go:         -  In my medical record.                         -  To the Employee Occupational Health Service and/or Infection Control                                  at this facility.    -  To the [place] [employer] officials.                                4.  I understand that:   a.  I can change my mind.  If I do, I must tell my doctor or team as soon as                           possible.  My questions have been answered.  I agree to the procedure.  I have made my special needs and instructions known.  Iris Carrion      6/21/2018 11:15 AM  Patient (or representative)/Relationship to patient             Date  Time    Witness:  I have " verified that the signature is that of the patient or patient's representative and that this has been signed before the procedure:    NAME:        6/21/2018  11:15 AM         Date  Time  Person verifying patient's name or patient representative's signature   Provider:  I have discussed the procedure and the information stated above with the patient (or the patient's representative) and answered their questions. The patient or their representative consented to the procedure:    NIKOLE BRAVO MD    6/21/2018  11:15 AM  Physician or Provider's Signature(s)   Date  Time    Intepreter (if used):       6/21/2018  11:15 AM                                   Name       Language/Organization Date  Time    Consent for procedure valid for 30 days after patient signature date     [facility]  AFFIRMATION OF INFORMED CONSENT FOR SURGERY OR INVASIVE PROCEDURE               Original - Medical Records

## 2018-06-21 NOTE — NURSING NOTE
"Chief Complaint   Patient presents with     Mass     groin area        Initial /74 (Cuff Size: Adult Small)  Pulse 114  Temp 97.7  F (36.5  C) (Oral)  Wt 121 lb (54.9 kg)  SpO2 95%  Breastfeeding? No  BMI 19.53 kg/m2 Estimated body mass index is 19.53 kg/(m^2) as calculated from the following:    Height as of 4/18/18: 5' 6\" (1.676 m).    Weight as of this encounter: 121 lb (54.9 kg).      Porsha Lares LPN    "

## 2018-06-22 ENCOUNTER — OFFICE VISIT (OUTPATIENT)
Dept: FAMILY MEDICINE | Facility: CLINIC | Age: 73
End: 2018-06-22
Payer: COMMERCIAL

## 2018-06-22 VITALS
WEIGHT: 123 LBS | OXYGEN SATURATION: 94 % | HEART RATE: 111 BPM | SYSTOLIC BLOOD PRESSURE: 108 MMHG | BODY MASS INDEX: 19.85 KG/M2 | DIASTOLIC BLOOD PRESSURE: 58 MMHG | TEMPERATURE: 97.5 F

## 2018-06-22 DIAGNOSIS — Z51.89 WOUND CHECK, ABSCESS: Primary | ICD-10-CM

## 2018-06-22 PROCEDURE — 99207 ZZC NO CHARGE LOS: CPT | Performed by: FAMILY MEDICINE

## 2018-06-22 NOTE — NURSING NOTE
"Chief Complaint   Patient presents with     RECHECK       Initial /58 (Cuff Size: Adult Small)  Pulse 111  Temp 97.5  F (36.4  C) (Oral)  Wt 123 lb (55.8 kg)  SpO2 94%  Breastfeeding? No  BMI 19.85 kg/m2 Estimated body mass index is 19.85 kg/(m^2) as calculated from the following:    Height as of 4/18/18: 5' 6\" (1.676 m).    Weight as of this encounter: 123 lb (55.8 kg).      Porsha Lares LPN    "

## 2018-06-22 NOTE — MR AVS SNAPSHOT
After Visit Summary   6/22/2018    Iris Carrion    MRN: [id number]           Patient Information     Date Of Birth          1945        Visit Information        Provider Department      6/22/2018 2:10 PM Pillo Koehler MD [facility]        Today's Diagnoses     Wound check, abscess    -  1       Follow-ups after your visit        Your next 10 appointments already scheduled     Sep 05, 2018 10:30 AM CDT   LAB with [facility])    [address] [place] [id number] [phone]           Please do not eat 10-12 hours before your appointment if you are coming in fasting for labs on lipids, cholesterol, or glucose (sugar). This does not apply to pregnant women. Water, hot tea and black coffee (with nothing added) are okay. Do not drink other fluids, diet soda or chew gum.            Sep 05, 2018 11:00 AM CDT   Return Visit with CHANDLER Linares [facility])    [address] [place] [id number] [phone]              Who to contact     If you have questions or need follow up information about today's clinic visit or your schedule please contact [facility] directly at [phone].  Normal or non-critical lab and imaging results will be communicated to you by MyChart, letter or phone within 4 business days after the clinic has received the results. If you do not hear from us within 7 days, please contact the clinic through MyChart or phone. If you have a critical or abnormal lab result, we will notify you by phone as soon as possible.  Submit refill requests through [employer] or call your pharmacy and they will forward the refill request to us. Please allow 3 business days for your refill to be completed.          Additional Information About Your Visit        [employer] Information     [employer] gives you secure access to  your electronic health record. If you see a primary care provider, you can also send messages to your care team and make appointments. If you have questions, please call your primary care clinic.  If you do not have a primary care provider, please call [phone] and they will assist you.        Care EveryWhere ID     This is your Care EveryWhere ID. This could be used by other organizations to access your [place] medical records  [id number]        Your Vitals Were     Pulse Temperature Pulse Oximetry Breastfeeding? BMI (Body Mass Index)       111 97.5  F (36.4  C) (Oral) 94% No 19.85 kg/m2        Blood Pressure from Last 3 Encounters:   06/22/18 108/58   06/21/18 132/74   05/09/18 137/82    Weight from Last 3 Encounters:   06/22/18 123 lb (55.8 kg)   06/21/18 121 lb (54.9 kg)   05/09/18 121 lb (54.9 kg)              Today, you had the following     No orders found for display       Primary Care Provider Office Phone # Fax #    Pillo Koehler [phone] [address] [facility] [address]        Equal Access to Services     BETH RODRIGUEZ : Hadii aad ku hadasho Soomaali, waaxda luqadaha, qaybta kaalmada adeegyada, vignesh gerbern martin coats. So [facility] [phone].    ATENCIÓN: Si habla español, tiene a pena disposición servicios gratuitos de asistencia lingüística. Llame al [phone].    We comply with applicable federal civil rights laws and [place] laws. We do not discriminate on the basis of race, color, national origin, age, disability, sex, sexual orientation, or gender identity.            Thank you!     Thank you for choosing [facility]  for your care. Our goal is always to provide you with excellent care. Hearing back from our patients is one way we can continue to improve our services. Please take a few minutes to complete the written survey that you may receive in the mail after your visit with us. Thank you!             Your Updated Medication  List - Protect others around you: Learn how to safely use, store and throw away your medicines at www.disposemymeds.org.          This list is accurate as of 6/22/18 11:59 PM.  Always use your most recent med list.                   Brand Name Dispense Instructions for use Diagnosis    albuterol 108 (90 Base) MCG/ACT Inhaler    PROAIR HFA/PROVENTIL HFA/VENTOLIN HFA    1 Inhaler    Inhale 2 puffs into the lungs every 6 hours as needed for shortness of breath / dyspnea or wheezing    Chronic bronchitis, unspecified chronic bronchitis type (H)       atorvastatin 20 MG tablet    LIPITOR    90 tablet    Take 1 tablet (20 mg) by mouth daily    Dyslipidemia       BIOTIN PO      Take 1 tablet by mouth daily.        budesonide-formoterol 160-4.5 MCG/ACT Inhaler    SYMBICORT    1 Inhaler    Inhale 2 puffs into the lungs 2 times daily    COPD (chronic obstructive pulmonary disease) (H)       CALCIUM 500 PO      Take 1 tablet by mouth daily.        clindamycin 300 MG capsule    CLEOCIN    21 capsule    Take 1 capsule (300 mg) by mouth 3 times daily for 7 days    Abscess of labia majora       fluticasone 50 MCG/ACT spray    FLONASE    1 Bottle    Spray 2 sprays into both nostrils daily    Non-seasonal allergic rhinitis due to animal hair and dander       ibuprofen 600 MG tablet    ADVIL/MOTRIN    40 tablet    Take 1 tablet (600 mg) by mouth every 6 hours as needed for moderate pain    S/P LUCILA-BSO       ipratropium - albuterol 0.5 mg/2.5 mg/3 mL 0.5-2.5 (3) MG/3ML neb solution    DUONEB    30 vial    Take 1 vial (3 mLs) by nebulization every 4 hours as needed for shortness of breath / dyspnea or wheezing    Chronic bronchitis, unspecified chronic bronchitis type (H)       loratadine 10 MG tablet    CLARITIN     Take 10 mg by mouth daily        losartan 50 MG tablet    COZAAR    90 tablet    Take 1 tablet (50 mg) by mouth daily    Essential hypertension with goal blood pressure less than 140/90, Hypokalemia       MAGNESIUM OXIDE  PO      Take 200 mg by mouth 2 times daily        omeprazole 40 MG capsule    priLOSEC    90 capsule    Take 1 capsule (40 mg) by mouth daily Take 30-60 minutes before a meal.    GERD (gastroesophageal reflux disease)       tiotropium 18 MCG capsule    SPIRIVA HANDIHALER    90 capsule    Inhale 1 capsule (18 mcg) into the lungs daily Inhale contents of one capsule. .    COPD (chronic obstructive pulmonary disease) (H)       ZINC 15 PO      Take by mouth daily

## 2018-07-13 ENCOUNTER — MYC REFILL (OUTPATIENT)
Dept: FAMILY MEDICINE | Facility: CLINIC | Age: 73
End: 2018-07-13

## 2018-07-13 DIAGNOSIS — E78.5 DYSLIPIDEMIA: ICD-10-CM

## 2018-07-13 RX ORDER — ATORVASTATIN CALCIUM 20 MG/1
20 TABLET, FILM COATED ORAL DAILY
Qty: 30 TABLET | Refills: 0 | Status: SHIPPED | OUTPATIENT
Start: 2018-07-13 | End: 2018-08-12

## 2018-07-13 NOTE — TELEPHONE ENCOUNTER
Message from [employer]:  Original authorizing provider: MD Iris CAMARGO would like a refill of the following medications:  atorvastatin (LIPITOR) 20 MG tablet [NIKOLE BRAVO MD]    Preferred pharmacy: [facility] [address] [place] [address] [id number]    Comment:

## 2018-07-13 NOTE — TELEPHONE ENCOUNTER
Dr. Pillo Koehler had started you on this medication in April for your cholesterol and wanted you to do a fasting lab appointment in 3 months so we can see if it is working well for you.  Please schedule this appointment.  I have sent a 30 day supply to your pharmacy to use while we wait for this result.  My MURRAY, RN  APC Triage

## 2018-07-29 ENCOUNTER — MYC REFILL (OUTPATIENT)
Dept: FAMILY MEDICINE | Facility: CLINIC | Age: 73
End: 2018-07-29

## 2018-07-29 DIAGNOSIS — J42 CHRONIC BRONCHITIS, UNSPECIFIED CHRONIC BRONCHITIS TYPE (H): ICD-10-CM

## 2018-07-30 RX ORDER — ALBUTEROL SULFATE 90 UG/1
2 AEROSOL, METERED RESPIRATORY (INHALATION) EVERY 6 HOURS PRN
Qty: 1 INHALER | Refills: 1 | Status: SHIPPED | OUTPATIENT
Start: 2018-07-30 | End: 2022-05-16

## 2018-07-30 NOTE — TELEPHONE ENCOUNTER
Message from [employer]:  Original authorizing provider: KEVIN Prieto would like a refill of the following medications:  albuterol (PROAIR HFA, PROVENTIL HFA, VENTOLIN HFA) 108 (90 BASE) MCG/ACT inhaler [Rachel Lawrence PA-C]    Preferred pharmacy: [facility] [address] [place] [address] [id number]    Comment:

## 2018-07-31 ENCOUNTER — MYC MEDICAL ADVICE (OUTPATIENT)
Dept: FAMILY MEDICINE | Facility: CLINIC | Age: 73
End: 2018-07-31

## 2018-07-31 DIAGNOSIS — J42 CHRONIC BRONCHITIS, UNSPECIFIED CHRONIC BRONCHITIS TYPE (H): Primary | ICD-10-CM

## 2018-07-31 NOTE — TELEPHONE ENCOUNTER
Pt needs a new nebulizer machine. Order pending. Please review and sign order if appropriate, and route back to team to contact pt regarding obtaining the device from clinic pharmacy through pt's pharmacy insurance benefit vs clinic DME stock through medical benefit.    Anel Talbot RN

## 2018-08-12 ENCOUNTER — MYC REFILL (OUTPATIENT)
Dept: FAMILY MEDICINE | Facility: CLINIC | Age: 73
End: 2018-08-12

## 2018-08-12 DIAGNOSIS — E78.5 DYSLIPIDEMIA: ICD-10-CM

## 2018-08-12 DIAGNOSIS — J44.9 COPD (CHRONIC OBSTRUCTIVE PULMONARY DISEASE) (H): ICD-10-CM

## 2018-08-13 RX ORDER — ATORVASTATIN CALCIUM 20 MG/1
20 TABLET, FILM COATED ORAL DAILY
Qty: 30 TABLET | Refills: 0 | Status: SHIPPED | OUTPATIENT
Start: 2018-08-13 | End: 2018-09-10

## 2018-08-13 RX ORDER — BUDESONIDE AND FORMOTEROL FUMARATE DIHYDRATE 160; 4.5 UG/1; UG/1
2 AEROSOL RESPIRATORY (INHALATION) 2 TIMES DAILY
Qty: 1 INHALER | Refills: 0 | Status: SHIPPED | OUTPATIENT
Start: 2018-08-13 | End: 2018-10-08

## 2018-08-13 NOTE — TELEPHONE ENCOUNTER
Message from [employer]:  Original authorizing provider: MD Iris CAMARGO would like a refill of the following medications:  budesonide-formoterol (SYMBICORT) 160-4.5 MCG/ACT Inhaler [NIKOLE BRAVO MD]  atorvastatin (LIPITOR) 20 MG tablet [NIKOLE BRAVO MD]    Preferred pharmacy: [facility] [address] [place], [place] [id number]    Comment:

## 2018-09-04 NOTE — PROGRESS NOTES
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 She completed treatment 2016. She is here today for a surveillance visit.       Treatment History:  Ms Carrion started having her symptoms of constipation and bloating (May/2015). Her doctor recommended miralax. Symptoms continued to occurred so they followed with a CT scan which showed extensive peritoneal metastatic disease.   8/17/15: CT c/a/p IMPRESSION:   1. Left renal cortical cysts. No enhancing renal mass. No urinary tract calculi or hydronephrosis. Collecting systems, ureters and bladder are unremarkable.  2. Extensive peritoneal metastatic disease with moderate ascites likely related to malignant ascites. Followup as clinically warranted. An obvious etiology for this metastatic disease is not  visualized.  3. No bowel obstruction or diverticulitis. Serosal metastatic disease is present. No obvious colonic mass. Followup colonoscopy as needed for further assessment.  4. Moderate-sized hiatal hernia. No gastric obstruction.      8/22/15:  1950      8/27/15: New patient visit. she continues to have constipation and bloating. She has lost about 5 pounds. She also admits to having lood in urine as well as urinary urgenrgy. She has loose bm bm every other day.       Plan: chemotherapy with Taxol/Carbo. To be considered for these clinical trials: PARP, avatar circulating tumor study, small neogman.       8/31/15: CT guided omental nodule biopsy. Final diagnosis:  Adenocarcinoma, with features consistent with endometrioid adenocarcinoma       COMMENT:  Immunohistochemical staining was obtained with appropriate control slides for ER, [place] 8 and WT-1. Malignant cells are positive for all three markers confirming their endometrioid cell differentiation.  "      Plan: treat like an ovarian cancer but will do dose dense Taxol carbo for 3 cycles then be re-evalated with scan       9/4/15-10/14/15: Cycle #1-3 dose dense Taxol/Carbo.  2008, 470, 30.       11/19/15: CT c/a/p IMPRESSION:   1. Decreased peritoneal carcinomatosis and malignant ascites.  2. Severe emphysema and scattered areas of likely postinfectious scarring. Several of these areas particularly along the right minor fissure appear more nodular malignancy cannot be excluded. Recommend 3-six-month interval followup.  3. Diverticulosis.  4. Osteopenia and numerous compression deformities, none of which appear acute.  5. Stable large hiatal hernia.      11/23/15:  17      11/25/15: Exploratory Laparotomy, Repair Umbilical Hernia, Total Abdominal Hysterectomy, Right Salpingo Oophorectomy, Left Salpingectomy, Appendectomy, Complete Omentectomy, CUSA Tumor Debulking,Cystoscopy, Left PeriAortc Lymph Node Biopsy, Insertion Peritoneal Port, Mobilization of Liver, Ablation of Liver Nodules Open Repair Hiatal Hernia Repair      Surgical pathology report:  FINAL DIAGNOSIS:  A: Umbilical hernia sac, repair:  - Adenocarcinoma  - Fibroadipose tissue, partially surfaced by mesothelium, consistent with hernia sac  B: Hepatic ligament, biopsy:  - One benign lymph node (0/1)  C: Splenic ligament implant, biopsy:  - Adenocarcinoma  D: Inferior vena cava implant, biopsy:  - Adenocarcinoma  E: Omentum, omentectomy:  - Adenocarcinoma  F: \"Liver nodule\", biopsy:  - Fibro-adipose tissue with adenocarcinoma  G: Right pelvic peritoneum, biopsy:  - Adenocarcinoma  H: Cul-de-sac peritoneum, biopsy:  - Adenocarcinoma  I: Uterus, cervix, right ovary and fallopian tube, hysterectomy with right salpingo-oophorectomy:  - Adenocarcinoma present in the soft tissue at the left adnexal area,cervix and right fallopian tube, most likely endometrioid adenocarcinoma  - Atrophic endometrium  - Benign endometrial polyp  - Myometrium with no " significant histologic abnormality  - Atrophic cervix and nabothian cysts  - Tumor present on the external aspect of the cervix  - Right ovary with numerous corpora albicantia and simple epithelial cysts  - Right fallopian tube with a single focus of carcinoma  - See comment  J: Large bowel epiploica, biopsy:  - Adenocarcinoma  K: Appendix, appendectomy:  - Appendix with fibrous replacement  L: Lymph nodes, left periaortic, excision:  - One benign lymph node (0/1)      12/14/15:  48. post op visit. reviewed the results of her surgical pathology copy of report was given to pt. She will start IV/IP taxol/Cddp chemo tomorrow. IV fluids on Thursday and Friday at [place] and Saturday and Sunday at the [facility]. Day 8 taxol will be on dec 22nd. She will need to have fluids again on 23rd and 24th. She will also need Neupogen.       Plan: 3 cycles IV/IP chemo.       12/15/15: Cycle #1 IV/IP chemo.    1/6/16: Cycle #2 IV/IP chemo.   18. Delayed d/t neutropenia; given 1/13. Neupogen and Neulasta added.    2/1/16: Cycle #3 IV/IP chemo.  23  2/29/16:  41. CT cap  Findings:  Right internal jugular Port-A-Cath with tip at atriocaval junction. The heart is not enlarged. No significant pericardial effusion. The mediastinal great vessels are normal in caliber. No central pulmonary embolus. Thoracic aortic atherosclerotic calcifications. Calcified mediastinal and hilar lymph nodes. No lymphadenopathy in the chest by size criteria. The central tracheobronchial tree is patent. New nodular pleural-based consolidative opacity in the left lower lobe measuring 26 x 29 mm (image 110, series 7). There are a few other new subcentimeter nodules in the left lower lobe. For example, 7 mm nodule on image 104, series 4. Severe centrilobular emphysematous changes. Likely nodular scarring in the right upper lobe (image 32, series 7) is not significantly changed. Other scattered fibrotic changes, most  prominent in the right lower lobe, do not appear significantly changed. Calcified granuloma in the right lower lobe. Postsurgical changes of a hernia repair and hysterectomy/bilateral salpingo-oophorectomy. There is no abnormal soft tissue within the  resection bed to suggest locally recurrent disease. No suspicious liver lesions. Unchanged extrahepatic and central intrahepatic biliary dilatation, likely reservoir effect in the setting of cholecystectomy. Atrophic pancreas. Calcified splenic granulomata. Unchanged  hypoattenuating lesions in the kidneys, the largest of which on the left are compatible with cysts. No abnormally dilated or thickened loops of bowel. No free intraperitoneal air or free fluid. Colonic diverticuli without evidence of active inflammation. Duodenal diverticulum. Intra-abdominal port with tip in the left pelvis. Aortoiliac atheromatous plaque without aneurysmal dilatation. No abnormally enlarged abdominal or pelvic lymph nodes. No definite residual peritoneal/or omental nodules. Marked generalized osteopenia. Redemonstration of multiple compression deformities throughout the thoracolumbar spine, greatest at T8 with greater than 50% loss of vertebral body height, not significantly changed. There is mild increased associated sclerosis. Associated kyphosis of the upper thoracic spine. There are 6 lumbar type vertebral bodies. No new aggressive osseous lesions.  Impression:  1. Continued positive response to therapy in the abdomen/pelvis with no definite residual peritoneal/omental metastasis.  2. New pleural-based consolidative opacity in the left lower lobe. There are a few other new subcentimeter nodules in the left lower lobe. This may be secondary to infection/inflammation, but metastases should be considered and short-term interval followup is recommended.      4/6/16 CT/PET IMPRESSION: Patient's history of ovarian carcinoma.  1. Left lower lobe 2 cm irregular pulmonary nodular density is  "minimally metabolic and argues that it is benign such as rounded atelectasis and less likely rounded pneumonia.  2. Underlying COPD with scattered scars within both lungs but no hypermetabolic structures to indicate malignancy.  3. No evidence of carcinomatosis, peritoneal or abdominal metastasis, or metastatic disease to any of the organs of the abdomen or pelvis.  4. Possible mild esophagitis at the GE junction.      4/11/16:  8. \"Had been unclear as to why  up to 41 from 23-this is concerning in light of recent completion of cddp/taxol, however the  is 8. CT PET is negative for metastatic disease. Recommend q 3 month visits x 2 years, then every 6 mos for 3 additional years.\"      7/25/16:   9.  7/27/16: CT cap Impression:    1. Interval resolution of left lower lobe spiculated pulmonary nodule. This likely represented atelectasis or consolidation.  2. Significant panlobular emphysematous changes of both lungs.  3. Spiculated right upper lobe pulmonary nodule measuring up to 8 mm, unchanged since 11/19/2015, although progressed since 2/4/2011. This may represent scarring. Recommend attention on followup.  4. New right hip fracture, possibly pathologic.  5. New sclerotic lesions of the sternum which are indeterminate. This may represent metastatic disease.      Of note, patient tripped over a cord at home 5/30/16 and fell and had an avulsed fx of her right hip. She underwent PT for this. She subsequently fell backwards 6/13/16 and fractured her left wrist and seeing OT for this.       She was recommended to have a bone scan done. This was scheduled and the patient delayed her scan until 11/2016 which showed uptake compatible with healing fractures.       10/31/16:  15.  12/5/16:  11.  2/13/17:  12.  5/8/17:  11. Port removed.   8/30/17:  13.  12/4/17:   11.  3/5/18:  12.  9/5/18:  pending.         Today she comes to clinic and denies any " concerns for this visit. She denies any vaginal bleeding, no changes in her bowel or bladder habits, no nausea/emesis, no lower extremity edema, and no difficulties eating or sleeping. She denies any abdominal discomfort/bloating, no fevers or chills, and no chest pain or shortness of breath. She has noticed a little more issues with her COPD and will be seeing her provider shortly. She is current with her health screenings and is not sexually active.           Review of Systems:    Systemic           no weight changes; no fever; no chills; no night sweats; no appetite changes  Skin           no rashes, or lesions  Eye           no irritation; no changes in vision  Katarzyna-Laryngeal           no dysphagia; no hoarseness   Pulmonary    no cough; no shortness of breath; + COPD  Cardiovascular    no chest pain; no palpitations  Gastrointestinal    no diarrhea; no constipation; no abdominal pain; no changes in bowel habits; no blood in stool  Genitourinary   no urinary frequency; no urinary urgency; no dysuria; no pain; no abnormal vaginal discharge; no abnormal vaginal bleeding  Breast    no breast discharge; no breast changes; no breast pain  Musculoskeletal    no myalgias; no arthralgias; no back pain  Psychiatric           no depressed mood; no anxiety    Hematologic              no tender lymph nodes; no noticeable swellings or lumps   Endocrine    no hot flashes; no heat/cold intolerance         Neurological   no tremor; no numbness and tingling; no headaches; no difficulty sleeping      Past Medical History:    Past Medical History:   Diagnosis Date     Cervical high risk HPV (human papillomavirus) test positive 10/31/12    type 18     COPD (chronic obstructive pulmonary disease) (H)      GERD (gastroesophageal reflux disease)      Hx of colposcopy with cervical biopsy 11/2012    negative     Hypertension 2013     Osteoporosis      Paroxysmal supraventricular tachycardia (H) 9/6/2017     Peritoneal carcinomatosis (H)  8/24/2015     Pneumonia      Uncomplicated asthma 1980         Past Surgical History:    Past Surgical History:   Procedure Laterality Date     CHOLECYSTECTOMY  6/2014     COLONOSCOPY  12/4/2012    Procedure: COLONOSCOPY;  COLONOSCOPY SCREEN;  Surgeon: Mushtaq Lara MD;  Location: [facility] OR     GYN SURGERY       HERNIORRHAPHY HIATAL N/A 11/25/2015    Procedure: HERNIORRHAPHY HIATAL;  Surgeon: Chip Carty MD;  Location: [facility] OR     HYSTERECTOMY TOTAL ABD, ALVIN SALPINGO-OOPHORECTOMY, NODE DISSECTION, TUMOR DEBULKING, COMBINED Bilateral 11/25/2015    Procedure: COMBINED HYSTERECTOMY TOTAL ABDOMINAL, SALPINGO-OOPHORECTOMY, NODE DISSECTION, TUMOR DEBULKING;  Surgeon: Emma Cabrera MD;  Location: [facility] OR         Health Maintenance Due   Topic Date Due     DEXA SCAN SCREENING (SYSTEM ASSIGNED)  08/11/2012     COPD ACTION PLAN Q1 YR  08/04/2018     INFLUENZA VACCINE (1) 09/01/2018       Current Medications:     Current Outpatient Prescriptions   Medication Sig Dispense Refill     albuterol (PROAIR HFA/PROVENTIL HFA/VENTOLIN HFA) 108 (90 Base) MCG/ACT Inhaler Inhale 2 puffs into the lungs every 6 hours as needed for shortness of breath / dyspnea or wheezing 1 Inhaler 1     BIOTIN PO Take 1 tablet by mouth daily.       budesonide-formoterol (SYMBICORT) 160-4.5 MCG/ACT Inhaler Inhale 2 puffs into the lungs 2 times daily 1 Inhaler 0     Calcium Carbonate (CALCIUM 500 PO) Take 1 tablet by mouth daily.       fluticasone (FLONASE) 50 MCG/ACT spray Spray 2 sprays into both nostrils daily 1 Bottle 1     loratadine (CLARITIN) 10 MG tablet Take 10 mg by mouth daily       losartan (COZAAR) 50 MG tablet Take 1 tablet (50 mg) by mouth daily 90 tablet 3     MAGNESIUM OXIDE PO Take 200 mg by mouth 2 times daily       omeprazole (PRILOSEC) 40 MG capsule Take 1 capsule (40 mg) by mouth daily Take 30-60 minutes before a meal. 90 capsule 1     order for DME Equipment being ordered: Nebulizer 1 Device 0     tiotropium  "(SPIRIVA HANDIHALER) 18 MCG capsule Inhale 1 capsule (18 mcg) into the lungs daily Inhale contents of one capsule. . 90 capsule 2     Zinc Sulfate (ZINC 15 PO) Take by mouth daily       atorvastatin (LIPITOR) 20 MG tablet Take 1 tablet (20 mg) by mouth daily 30 tablet 0     ibuprofen (ADVIL,MOTRIN) 600 MG tablet Take 1 tablet (600 mg) by mouth every 6 hours as needed for moderate pain (Patient not taking: Reported on 2018) 40 tablet 0     ipratropium - albuterol 0.5 mg/2.5 mg/3 mL (DUONEB) 0.5-2.5 (3) MG/3ML neb solution Take 1 vial (3 mLs) by nebulization every 4 hours as needed for shortness of breath / dyspnea or wheezing (Patient not taking: Reported on 2018) 30 vial 5         Allergies:        Allergies   Allergen Reactions     Levaquin Rash        Social History:     Social History   Substance Use Topics     Smoking status: Former Smoker     Packs/day: 1.00     Years: 30.00     Types: Cigarettes     Start date: 1965     Quit date: 10/10/2003     Smokeless tobacco: Never Used     Alcohol use No       History   Drug Use No         Family History:     The patient's family history is notable for:    Family History   Problem Relation Age of Onset     Cancer Brother      testicular     Diabetes Sister      Ovarian Cancer Mother 60     Diabetes Mother           Asthma Father           Diabetes Sister      Depression/Anxiety Daughter      Depression Daughter      Osteoporosis Sister      Other Cancer Brother      Diabetes Brother      Depression Other      Grandson         Physical Exam:     /76  Pulse 80  Temp 98.1  F (36.7  C)  Resp 16  Ht 1.676 m (5' 6\")  Wt 54.9 kg (121 lb)  SpO2 94%  BMI 19.53 kg/m2  Body mass index is 19.53 kg/(m^2).    General Appearance: healthy and alert, no distress     HEENT: no thyromegaly, no palpable nodules or masses        Cardiovascular: regular rate and rhythm, no gallops, rubs or murmurs     Respiratory: lungs clear, no rales, rhonchi or " wheezes, normal diaphragmatic excursion    Musculoskeletal: extremities non tender and without edema    Skin: no lesions or rashes     Neurological: normal gait, no gross defects     Psychiatric: appropriate mood and affect                               Hematological: normal cervical, supraclavicular and inguinal lymph nodes     Gastrointestinal:       abdomen soft, non-tender, non-distended, no organomegaly or masses    Genitourinary: External genitalia and urethral meatus appears atrophic  Vagina is smooth without nodularity or masses, stenotic.  Cervix surgically absent.  Bimanual exam reveal no masses, nodularity or fullness.  Recto-vaginal exam limited secondary to patient comfort, confirms these findings.      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 She completed treatment 4/2016. She is here today for a surveillance visit.     15 minutes were spent with this patient, over 50% of that time was spent in symptom management, treatment planning and in counseling and coordination of care.      Plan:     1.)        Patient to RTC in 6 months for her next surveillance visit and ; today's is pending. Reviewed signs and symptoms for when she should contact the clinic or seek additional care. Patient to contact the clinic with any questions or concerns in the interim.     2.) Genetic risk factors were assessed and she is negative for mutations in GINGER, BARD1, BRCA1, BRCA2, BRIP1, CDH1, CHEK2, EPCAM, MLH1, MRE11A, MSH2, MSH6, MUTYH, NBN, NF1, PALB2, PMS2, PTEN, RAD50, RAD51C, RAD51D, SMARCA4, STK11, and TP53.      3.) Labs and/or tests ordered include:  .     4.) Health maintenance issues addressed today include annual health maintenance and non-gynecologic issues with PCP.    CHANDLER aJy, MARTINANP-BC, ANP-BC  Women's Health Nurse Practitioner  Adult Nurse Pracitioner  Division of Gynecologic  Oncology        CC  Patient Care Team:  Pilol Koehler MD as PCP - General (Family Practice)  Jessica Lua, RN as Continuity Care Coordinator (Gyn-Onc)  Iliana Pierre, RN as

## 2018-09-05 ENCOUNTER — ONCOLOGY VISIT (OUTPATIENT)
Dept: ONCOLOGY | Facility: CLINIC | Age: 73
End: 2018-09-05
Payer: COMMERCIAL

## 2018-09-05 VITALS
TEMPERATURE: 98.1 F | RESPIRATION RATE: 16 BRPM | HEART RATE: 80 BPM | SYSTOLIC BLOOD PRESSURE: 134 MMHG | BODY MASS INDEX: 19.44 KG/M2 | HEIGHT: 66 IN | DIASTOLIC BLOOD PRESSURE: 76 MMHG | WEIGHT: 121 LBS | OXYGEN SATURATION: 94 %

## 2018-09-05 DIAGNOSIS — C56.2 MALIGNANT NEOPLASM OF OVARY, LEFT (H): Primary | ICD-10-CM

## 2018-09-05 DIAGNOSIS — C56.2 MALIGNANT NEOPLASM OF OVARY, LEFT (H): ICD-10-CM

## 2018-09-05 LAB — CANCER AG125 SERPL-ACNC: 13 U/ML (ref 0–30)

## 2018-09-05 PROCEDURE — 36415 COLL VENOUS BLD VENIPUNCTURE: CPT | Performed by: NURSE PRACTITIONER

## 2018-09-05 PROCEDURE — 86304 IMMUNOASSAY TUMOR CA 125: CPT | Performed by: NURSE PRACTITIONER

## 2018-09-05 PROCEDURE — 99213 OFFICE O/P EST LOW 20 MIN: CPT | Performed by: NURSE PRACTITIONER

## 2018-09-05 ASSESSMENT — PAIN SCALES - GENERAL: PAINLEVEL: NO PAIN (0)

## 2018-09-05 NOTE — MR AVS SNAPSHOT
After Visit Summary   9/5/2018    Iris Carrion    MRN: [id number]           Patient Information     Date Of Birth          1945        Visit Information        Provider Department      9/5/2018 11:00 AM eYnny Pak APRN CNP [facility]        Today's Diagnoses     Malignant neoplasm of ovary, left (H)    -  1       Follow-ups after your visit        Follow-up notes from your care team     Return in about 6 months (around 3/5/2019).      Your next 10 appointments already scheduled     Mar 06, 2019 10:15 AM CST   LAB with LAB ONC [facility] ([facility])    [address] [place] [id number] [phone]           Please do not eat 10-12 hours before your appointment if you are coming in fasting for labs on lipids, cholesterol, or glucose (sugar). This does not apply to pregnant women. Water, hot tea and black coffee (with nothing added) are okay. Do not drink other fluids, diet soda or chew gum.            Mar 06, 2019 11:00 AM CST   Return Visit with CHANDLER Linares CNP   [facility] ([facility]    [address] [place] [id number] [phone]              Future tests that were ordered for you today     Open Standing Orders        Priority Remaining Interval Expires Ordered     Routine 3/3  9/5/2019 9/5/2018            Who to contact     If you have questions or need follow up information about today's clinic visit or your schedule please contact [facility] directly at [phone].  Normal or non-critical lab and imaging results will be communicated to you by MyChart, letter or phone within 4 business days after the clinic has received the results. If you do not hear from us within 7 days, please contact the clinic through MyChart or phone. If you have a critical or abnormal lab result, we will notify you by phone as soon  "as possible.  Submit refill requests through [employer] or call your pharmacy and they will forward the refill request to us. Please allow 3 business days for your refill to be completed.          Additional Information About Your Visit        [employer] Information     [employer] gives you secure access to your electronic health record. If you see a primary care provider, you can also send messages to your care team and make appointments. If you have questions, please call your primary care clinic.  If you do not have a primary care provider, please call [phone] and they will assist you.      [employer] is an electronic gateway that provides easy, online access to your medical records. With [employer], you can request a clinic appointment, read your test results, renew a prescription or communicate with your care team.     To access your existing account, please contact your [facility] or call [phone] for assistance.        Care EveryWhere ID     This is your Care EveryWhere ID. This could be used by other organizations to access your [facility] medical records  [id number]        Your Vitals Were     Pulse Temperature Respirations Height Pulse Oximetry BMI (Body Mass Index)    80 98.1  F (36.7  C) 16 1.676 m (5' 6\") 94% 19.53 kg/m2       Blood Pressure from Last 3 Encounters:   09/05/18 134/76   06/22/18 108/58   06/21/18 132/74    Weight from Last 3 Encounters:   09/05/18 54.9 kg (121 lb)   06/22/18 55.8 kg (123 lb)   06/21/18 54.9 kg (121 lb)               Primary Care Provider Office Phone # Fax #    Pillo Koehler [phone] [address] [address] [facility] [address]        Equal Access to Services     [place] RODRIGUEZ : Hadii aad ku hadasho Soomaali, waaxda luqadaha, qaybta kaalmada adeegyada, vignesh coats. So [place] -[phone].    ATENCIÓN: Si habla español, tiene a pena disposición servicios gratuitos de asistencia lingüística. Llame al " [phone].    We comply with applicable federal civil rights laws and [place] laws. We do not discriminate on the basis of race, color, national origin, age, disability, sex, sexual orientation, or gender identity.            Thank you!     Thank you for choosing [facility]  for your care. Our goal is always to provide you with excellent care. Hearing back from our patients is one way we can continue to improve our services. Please take a few minutes to complete the written survey that you may receive in the mail after your visit with us. Thank you!             Your Updated Medication List - Protect others around you: Learn how to safely use, store and throw away your medicines at www.disposemymeds.org.          This list is accurate as of 9/5/18 11:12 AM.  Always use your most recent med list.                   Brand Name Dispense Instructions for use Diagnosis    albuterol 108 (90 Base) MCG/ACT inhaler    PROAIR HFA/PROVENTIL HFA/VENTOLIN HFA    1 Inhaler    Inhale 2 puffs into the lungs every 6 hours as needed for shortness of breath / dyspnea or wheezing    Chronic bronchitis, unspecified chronic bronchitis type (H)       atorvastatin 20 MG tablet    LIPITOR    30 tablet    Take 1 tablet (20 mg) by mouth daily    Dyslipidemia       BIOTIN PO      Take 1 tablet by mouth daily.        budesonide-formoterol 160-4.5 MCG/ACT Inhaler    SYMBICORT    1 Inhaler    Inhale 2 puffs into the lungs 2 times daily    COPD (chronic obstructive pulmonary disease) (H)       CALCIUM 500 PO      Take 1 tablet by mouth daily.        fluticasone 50 MCG/ACT spray    FLONASE    1 Bottle    Spray 2 sprays into both nostrils daily    Non-seasonal allergic rhinitis due to animal hair and dander       ibuprofen 600 MG tablet    ADVIL/MOTRIN    40 tablet    Take 1 tablet (600 mg) by mouth every 6 hours as needed for moderate pain    S/P LUCILA-BSO       ipratropium - albuterol 0.5 mg/2.5 mg/3 mL 0.5-2.5 (3) MG/3ML neb  solution    DUONEB    30 vial    Take 1 vial (3 mLs) by nebulization every 4 hours as needed for shortness of breath / dyspnea or wheezing    Chronic bronchitis, unspecified chronic bronchitis type (H)       loratadine 10 MG tablet    CLARITIN     Take 10 mg by mouth daily        losartan 50 MG tablet    COZAAR    90 tablet    Take 1 tablet (50 mg) by mouth daily    Essential hypertension with goal blood pressure less than 140/90, Hypokalemia       MAGNESIUM OXIDE PO      Take 200 mg by mouth 2 times daily        omeprazole 40 MG capsule    priLOSEC    90 capsule    Take 1 capsule (40 mg) by mouth daily Take 30-60 minutes before a meal.    GERD (gastroesophageal reflux disease)       order for DME     1 Device    Equipment being ordered: Nebulizer    Chronic bronchitis, unspecified chronic bronchitis type (H)       tiotropium 18 MCG capsule    SPIRIVA HANDIHALER    90 capsule    Inhale 1 capsule (18 mcg) into the lungs daily Inhale contents of one capsule. .    COPD (chronic obstructive pulmonary disease) (H)       ZINC 15 PO      Take by mouth daily

## 2018-09-05 NOTE — NURSING NOTE
"Oncology Rooming Note    September 5, 2018 10:41 AM   Iris Carrion is a 73 year old female who presents for:    Chief Complaint   Patient presents with     Oncology Clinic Visit     6 month follow up ovarian     Initial Vitals: /76  Pulse 80  Temp 98.1  F (36.7  C)  Resp 16  Ht 1.676 m (5' 6\")  Wt 54.9 kg (121 lb)  SpO2 94%  BMI 19.53 kg/m2 Estimated body mass index is 19.53 kg/(m^2) as calculated from the following:    Height as of this encounter: 1.676 m (5' 6\").    Weight as of this encounter: 54.9 kg (121 lb). Body surface area is 1.6 meters squared.  No Pain (0) Comment: Data Unavailable   No LMP recorded. Patient is postmenopausal.  Allergies reviewed: Yes  Medications reviewed: Yes    Medications: Medication refills not needed today.  Pharmacy name entered into [facility]: [facility] [address] [place] [address] - [id number]        5 minutes for nursing intake (face to face time)     Mireille Bustos LPN              "

## 2018-09-05 NOTE — LETTER
2018         RE: Iris Carrion  [id number] [facility] [address]        Dear Colleague,    Thank you for referring your patient, Iris Carrion, to the [facility]. Please see a copy of my visit note below.                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 She completed treatment 2016. She is here today for a surveillance visit.       Treatment History:  Ms Carrion started having her symptoms of constipation and bloating (May/2015). Her doctor recommended miralax. Symptoms continued to occurred so they followed with a CT scan which showed extensive peritoneal metastatic disease.   8/17/15: CT c/a/p IMPRESSION:   1. Left renal cortical cysts. No enhancing renal mass. No urinary tract calculi or hydronephrosis. Collecting systems, ureters and bladder are unremarkable.  2. Extensive peritoneal metastatic disease with moderate ascites likely related to malignant ascites. Followup as clinically warranted. An obvious etiology for this metastatic disease is not  visualized.  3. No bowel obstruction or diverticulitis. Serosal metastatic disease is present. No obvious colonic mass. Followup colonoscopy as needed for further assessment.  4. Moderate-sized hiatal hernia. No gastric obstruction.      8/22/15:  1950      8/27/15: New patient visit. she continues to have constipation and bloating. She has lost about 5 pounds. She also admits to having lood in urine as well as urinary urgenrgy. She has loose bm bm every other day.       Plan: chemotherapy with Taxol/Carbo. To be considered for these clinical trials: PARP, avatar circulating tumor study, small neogman.       8/31/15: CT guided omental nodule biopsy. Final diagnosis:  Adenocarcinoma, with features consistent with  "endometrioid adenocarcinoma       COMMENT:  Immunohistochemical staining was obtained with appropriate control slides for ER, Silas 8 and WT-1. Malignant cells are positive for all three markers confirming their endometrioid cell differentiation.       Plan: treat like an ovarian cancer but will do dose dense Taxol carbo for 3 cycles then be re-evalated with scan       9/4/15-10/14/15: Cycle #1-3 dose dense Taxol/Carbo.  2008, 470, 30.       11/19/15: CT c/a/p IMPRESSION:   1. Decreased peritoneal carcinomatosis and malignant ascites.  2. Severe emphysema and scattered areas of likely postinfectious scarring. Several of these areas particularly along the right minor fissure appear more nodular malignancy cannot be excluded. Recommend 3-six-month interval followup.  3. Diverticulosis.  4. Osteopenia and numerous compression deformities, none of which appear acute.  5. Stable large hiatal hernia.      11/23/15:  17      11/25/15: Exploratory Laparotomy, Repair Umbilical Hernia, Total Abdominal Hysterectomy, Right Salpingo Oophorectomy, Left Salpingectomy, Appendectomy, Complete Omentectomy, CUSA Tumor Debulking,Cystoscopy, Left PeriAortc Lymph Node Biopsy, Insertion Peritoneal Port, Mobilization of Liver, Ablation of Liver Nodules Open Repair Hiatal Hernia Repair      Surgical pathology report:  FINAL DIAGNOSIS:  A: Umbilical hernia sac, repair:  - Adenocarcinoma  - Fibroadipose tissue, partially surfaced by mesothelium, consistent with hernia sac  B: Hepatic ligament, biopsy:  - One benign lymph node (0/1)  C: Splenic ligament implant, biopsy:  - Adenocarcinoma  D: Inferior vena cava implant, biopsy:  - Adenocarcinoma  E: Omentum, omentectomy:  - Adenocarcinoma  F: \"Liver nodule\", biopsy:  - Fibro-adipose tissue with adenocarcinoma  G: Right pelvic peritoneum, biopsy:  - Adenocarcinoma  H: Cul-de-sac peritoneum, biopsy:  - Adenocarcinoma  I: Uterus, cervix, right ovary and fallopian tube, hysterectomy with " right salpingo-oophorectomy:  - Adenocarcinoma present in the soft tissue at the left adnexal area,cervix and right fallopian tube, most likely endometrioid adenocarcinoma  - Atrophic endometrium  - Benign endometrial polyp  - Myometrium with no significant histologic abnormality  - Atrophic cervix and nabothian cysts  - Tumor present on the external aspect of the cervix  - Right ovary with numerous corpora albicantia and simple epithelial cysts  - Right fallopian tube with a single focus of carcinoma  - See comment  J: Large bowel epiploica, biopsy:  - Adenocarcinoma  K: Appendix, appendectomy:  - Appendix with fibrous replacement  L: Lymph nodes, left periaortic, excision:  - One benign lymph node (0/1)      12/14/15:  46. post op visit. reviewed the results of her surgical pathology copy of report was given to pt. She will start IV/IP taxol/Cddp chemo tomorrow. IV fluids on Thursday and Friday at [place] and Saturday and Sunday at the [facility]. Day 8 taxol will be on dec 22nd. She will need to have fluids again on 23rd and 24th. She will also need Neupogen.       Plan: 3 cycles IV/IP chemo.       12/15/15: Cycle #1 IV/IP chemo.    1/6/16: Cycle #2 IV/IP chemo.   18. Delayed d/t neutropenia; given 1/13. Neupogen and Neulasta added.    2/1/16: Cycle #3 IV/IP chemo.  23  2/29/16:  41. CT cap  Findings:  Right internal jugular Port-A-Cath with tip at atriocaval junction. The heart is not enlarged. No significant pericardial effusion. The mediastinal great vessels are normal in caliber. No central pulmonary embolus. Thoracic aortic atherosclerotic calcifications. Calcified mediastinal and hilar lymph nodes. No lymphadenopathy in the chest by size criteria. The central tracheobronchial tree is patent. New nodular pleural-based consolidative opacity in the left lower lobe measuring 26 x 29 mm (image 110, series 7). There are a few other new subcentimeter nodules in the left  lower lobe. For example, 7 mm nodule on image 104, series 4. Severe centrilobular emphysematous changes. Likely nodular scarring in the right upper lobe (image 32, series 7) is not significantly changed. Other scattered fibrotic changes, most prominent in the right lower lobe, do not appear significantly changed. Calcified granuloma in the right lower lobe. Postsurgical changes of a hernia repair and hysterectomy/bilateral salpingo-oophorectomy. There is no abnormal soft tissue within the  resection bed to suggest locally recurrent disease. No suspicious liver lesions. Unchanged extrahepatic and central intrahepatic biliary dilatation, likely reservoir effect in the setting of cholecystectomy. Atrophic pancreas. Calcified splenic granulomata. Unchanged  hypoattenuating lesions in the kidneys, the largest of which on the left are compatible with cysts. No abnormally dilated or thickened loops of bowel. No free intraperitoneal air or free fluid. Colonic diverticuli without evidence of active inflammation. Duodenal diverticulum. Intra-abdominal port with tip in the left pelvis. Aortoiliac atheromatous plaque without aneurysmal dilatation. No abnormally enlarged abdominal or pelvic lymph nodes. No definite residual peritoneal/or omental nodules. Marked generalized osteopenia. Redemonstration of multiple compression deformities throughout the thoracolumbar spine, greatest at T8 with greater than 50% loss of vertebral body height, not significantly changed. There is mild increased associated sclerosis. Associated kyphosis of the upper thoracic spine. There are 6 lumbar type vertebral bodies. No new aggressive osseous lesions.  Impression:  1. Continued positive response to therapy in the abdomen/pelvis with no definite residual peritoneal/omental metastasis.  2. New pleural-based consolidative opacity in the left lower lobe. There are a few other new subcentimeter nodules in the left lower lobe. This may be secondary to  "infection/inflammation, but metastases should be considered and short-term interval followup is recommended.      4/6/16 CT/PET IMPRESSION: Patient's history of ovarian carcinoma.  1. Left lower lobe 2 cm irregular pulmonary nodular density is minimally metabolic and argues that it is benign such as rounded atelectasis and less likely rounded pneumonia.  2. Underlying COPD with scattered scars within both lungs but no hypermetabolic structures to indicate malignancy.  3. No evidence of carcinomatosis, peritoneal or abdominal metastasis, or metastatic disease to any of the organs of the abdomen or pelvis.  4. Possible mild esophagitis at the GE junction.      4/11/16:  8. \"Had been unclear as to why  up to 41 from 23-this is concerning in light of recent completion of cddp/taxol, however the  is 8. CT PET is negative for metastatic disease. Recommend q 3 month visits x 2 years, then every 6 mos for 3 additional years.\"      7/25/16:   9.  7/27/16: CT cap Impression:    1. Interval resolution of left lower lobe spiculated pulmonary nodule. This likely represented atelectasis or consolidation.  2. Significant panlobular emphysematous changes of both lungs.  3. Spiculated right upper lobe pulmonary nodule measuring up to 8 mm, unchanged since 11/19/2015, although progressed since 2/4/2011. This may represent scarring. Recommend attention on followup.  4. New right hip fracture, possibly pathologic.  5. New sclerotic lesions of the sternum which are indeterminate. This may represent metastatic disease.      Of note, patient tripped over a cord at home 5/30/16 and fell and had an avulsed fx of her right hip. She underwent PT for this. She subsequently fell backwards 6/13/16 and fractured her left wrist and seeing OT for this.       She was recommended to have a bone scan done. This was scheduled and the patient delayed her scan until 11/2016 which showed uptake compatible with healing fractures. "       10/31/16:  15.  12/5/16:  11.  2/13/17:  12.  5/8/17:  11. Port removed.   8/30/17:  13.  12/4/17:   11.  3/5/18:  12.  9/5/18:  pending.         Today she comes to clinic and denies any concerns for this visit. She denies any vaginal bleeding, no changes in her bowel or bladder habits, no nausea/emesis, no lower extremity edema, and no difficulties eating or sleeping. She denies any abdominal discomfort/bloating, no fevers or chills, and no chest pain or shortness of breath. She has noticed a little more issues with her COPD and will be seeing her provider shortly. She is current with her health screenings and is not sexually active.           Review of Systems:    Systemic           no weight changes; no fever; no chills; no night sweats; no appetite changes  Skin           no rashes, or lesions  Eye           no irritation; no changes in vision  Katarzyna-Laryngeal           no dysphagia; no hoarseness   Pulmonary    no cough; no shortness of breath; + COPD  Cardiovascular    no chest pain; no palpitations  Gastrointestinal    no diarrhea; no constipation; no abdominal pain; no changes in bowel habits; no blood in stool  Genitourinary   no urinary frequency; no urinary urgency; no dysuria; no pain; no abnormal vaginal discharge; no abnormal vaginal bleeding  Breast    no breast discharge; no breast changes; no breast pain  Musculoskeletal    no myalgias; no arthralgias; no back pain  Psychiatric           no depressed mood; no anxiety    Hematologic              no tender lymph nodes; no noticeable swellings or lumps   Endocrine    no hot flashes; no heat/cold intolerance         Neurological   no tremor; no numbness and tingling; no headaches; no difficulty sleeping      Past Medical History:    Past Medical History:   Diagnosis Date     Cervical high risk HPV (human papillomavirus) test positive 10/31/12    type 18     COPD (chronic obstructive pulmonary disease)  (H)      GERD (gastroesophageal reflux disease)      Hx of colposcopy with cervical biopsy 11/2012    negative     Hypertension 2013     Osteoporosis      Paroxysmal supraventricular tachycardia (H) 9/6/2017     Peritoneal carcinomatosis (H) 8/24/2015     Pneumonia      Uncomplicated asthma 1980         Past Surgical History:    Past Surgical History:   Procedure Laterality Date     CHOLECYSTECTOMY  6/2014     COLONOSCOPY  12/4/2012    Procedure: COLONOSCOPY;  COLONOSCOPY SCREEN;  Surgeon: Mushtaq Lara MD;  Location: [facility] OR     GYN SURGERY       HERNIORRHAPHY HIATAL N/A 11/25/2015    Procedure: HERNIORRHAPHY HIATAL;  Surgeon: Chip Carty MD;  Location: [facility] OR     HYSTERECTOMY TOTAL ABD, ALVIN SALPINGO-OOPHORECTOMY, NODE DISSECTION, TUMOR DEBULKING, COMBINED Bilateral 11/25/2015    Procedure: COMBINED HYSTERECTOMY TOTAL ABDOMINAL, SALPINGO-OOPHORECTOMY, NODE DISSECTION, TUMOR DEBULKING;  Surgeon: Emma Cabrera MD;  Location: [facility] OR         Health Maintenance Due   Topic Date Due     DEXA SCAN SCREENING (SYSTEM ASSIGNED)  08/11/2012     COPD ACTION PLAN Q1 YR  08/04/2018     INFLUENZA VACCINE (1) 09/01/2018       Current Medications:     Current Outpatient Prescriptions   Medication Sig Dispense Refill     albuterol (PROAIR HFA/PROVENTIL HFA/VENTOLIN HFA) 108 (90 Base) MCG/ACT Inhaler Inhale 2 puffs into the lungs every 6 hours as needed for shortness of breath / dyspnea or wheezing 1 Inhaler 1     BIOTIN PO Take 1 tablet by mouth daily.       budesonide-formoterol (SYMBICORT) 160-4.5 MCG/ACT Inhaler Inhale 2 puffs into the lungs 2 times daily 1 Inhaler 0     Calcium Carbonate (CALCIUM 500 PO) Take 1 tablet by mouth daily.       fluticasone (FLONASE) 50 MCG/ACT spray Spray 2 sprays into both nostrils daily 1 Bottle 1     loratadine (CLARITIN) 10 MG tablet Take 10 mg by mouth daily       losartan (COZAAR) 50 MG tablet Take 1 tablet (50 mg) by mouth daily 90 tablet 3     MAGNESIUM OXIDE PO Take  "200 mg by mouth 2 times daily       omeprazole (PRILOSEC) 40 MG capsule Take 1 capsule (40 mg) by mouth daily Take 30-60 minutes before a meal. 90 capsule 1     order for DME Equipment being ordered: Nebulizer 1 Device 0     tiotropium (SPIRIVA HANDIHALER) 18 MCG capsule Inhale 1 capsule (18 mcg) into the lungs daily Inhale contents of one capsule. . 90 capsule 2     Zinc Sulfate (ZINC 15 PO) Take by mouth daily       atorvastatin (LIPITOR) 20 MG tablet Take 1 tablet (20 mg) by mouth daily 30 tablet 0     ibuprofen (ADVIL,MOTRIN) 600 MG tablet Take 1 tablet (600 mg) by mouth every 6 hours as needed for moderate pain (Patient not taking: Reported on 2018) 40 tablet 0     ipratropium - albuterol 0.5 mg/2.5 mg/3 mL (DUONEB) 0.5-2.5 (3) MG/3ML neb solution Take 1 vial (3 mLs) by nebulization every 4 hours as needed for shortness of breath / dyspnea or wheezing (Patient not taking: Reported on 2018) 30 vial 5         Allergies:        Allergies   Allergen Reactions     Levaquin Rash        Social History:     Social History   Substance Use Topics     Smoking status: Former Smoker     Packs/day: 1.00     Years: 30.00     Types: Cigarettes     Start date: 1965     Quit date: 10/10/2003     Smokeless tobacco: Never Used     Alcohol use No       History   Drug Use No         Family History:     The patient's family history is notable for:    Family History   Problem Relation Age of Onset     Cancer Brother      testicular     Diabetes Sister      Ovarian Cancer Mother 60     Diabetes Mother           Asthma Father           Diabetes Sister      Depression/Anxiety Daughter      Depression Daughter      Osteoporosis Sister      Other Cancer Brother      Diabetes Brother      Depression Other      Grandson         Physical Exam:     /76  Pulse 80  Temp 98.1  F (36.7  C)  Resp 16  Ht 1.676 m (5' 6\")  Wt 54.9 kg (121 lb)  SpO2 94%  BMI 19.53 kg/m2  Body mass index is 19.53 " kg/(m^2).    General Appearance: healthy and alert, no distress     HEENT: no thyromegaly, no palpable nodules or masses        Cardiovascular: regular rate and rhythm, no gallops, rubs or murmurs     Respiratory: lungs clear, no rales, rhonchi or wheezes, normal diaphragmatic excursion    Musculoskeletal: extremities non tender and without edema    Skin: no lesions or rashes     Neurological: normal gait, no gross defects     Psychiatric: appropriate mood and affect                               Hematological: normal cervical, supraclavicular and inguinal lymph nodes     Gastrointestinal:       abdomen soft, non-tender, non-distended, no organomegaly or masses    Genitourinary: External genitalia and urethral meatus appears atrophic  Vagina is smooth without nodularity or masses, stenotic.  Cervix surgically absent.  Bimanual exam reveal no masses, nodularity or fullness.  Recto-vaginal exam limited secondary to patient comfort, confirms these findings.      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 She completed treatment 4/2016. She is here today for a surveillance visit.     15 minutes were spent with this patient, over 50% of that time was spent in symptom management, treatment planning and in counseling and coordination of care.      Plan:     1.)        Patient to RTC in 6 months for her next surveillance visit and ; today's is pending. Reviewed signs and symptoms for when she should contact the clinic or seek additional care. Patient to contact the clinic with any questions or concerns in the interim.     2.) Genetic risk factors were assessed and she is negative for mutations in GINGER, BARD1, BRCA1, BRCA2, BRIP1, CDH1, CHEK2, EPCAM, MLH1, MRE11A, MSH2, MSH6, MUTYH, NBN, NF1, PALB2, PMS2, PTEN, RAD50, RAD51C, RAD51D, SMARCA4, STK11, and TP53.      3.) Labs and/or tests ordered include:  .     4.) Health  maintenance issues addressed today include annual health maintenance and non-gynecologic issues with PCP.    CHANDLER Jay, WHNP-BC, ANP-BC  Women's Health Nurse Practitioner  Adult Nurse Pracitioner  Division of Gynecologic Oncology        CC  Patient Care Team:  Pillo Koehler MD as PCP - General (Family Practice)  Jessica Lua, JANE as Continuity Care Coordinator (Gyn-Onc)  Iliana Pierre, JANE as       Again, thank you for allowing me to participate in the care of your patient.        Sincerely,        CHANDLER Linares CNP

## 2018-09-10 ENCOUNTER — OFFICE VISIT (OUTPATIENT)
Dept: FAMILY MEDICINE | Facility: CLINIC | Age: 73
End: 2018-09-10
Payer: COMMERCIAL

## 2018-09-10 ENCOUNTER — RADIANT APPOINTMENT (OUTPATIENT)
Dept: GENERAL RADIOLOGY | Facility: CLINIC | Age: 73
End: 2018-09-10
Attending: FAMILY MEDICINE
Payer: COMMERCIAL

## 2018-09-10 VITALS
WEIGHT: 121 LBS | TEMPERATURE: 97.9 F | DIASTOLIC BLOOD PRESSURE: 70 MMHG | SYSTOLIC BLOOD PRESSURE: 142 MMHG | OXYGEN SATURATION: 95 % | HEART RATE: 100 BPM | BODY MASS INDEX: 19.53 KG/M2

## 2018-09-10 DIAGNOSIS — Z23 NEED FOR PROPHYLACTIC VACCINATION AND INOCULATION AGAINST INFLUENZA: ICD-10-CM

## 2018-09-10 DIAGNOSIS — I10 ESSENTIAL HYPERTENSION WITH GOAL BLOOD PRESSURE LESS THAN 140/90: ICD-10-CM

## 2018-09-10 DIAGNOSIS — L03.032 CELLULITIS OF TOE OF LEFT FOOT: ICD-10-CM

## 2018-09-10 DIAGNOSIS — E78.5 DYSLIPIDEMIA: ICD-10-CM

## 2018-09-10 DIAGNOSIS — E87.6 HYPOKALEMIA: ICD-10-CM

## 2018-09-10 DIAGNOSIS — J44.1 COPD EXACERBATION (H): Primary | ICD-10-CM

## 2018-09-10 DIAGNOSIS — L60.0 INGROWING LEFT GREAT TOENAIL: ICD-10-CM

## 2018-09-10 LAB
ALT SERPL W P-5'-P-CCNC: 20 U/L (ref 0–50)
ANION GAP SERPL CALCULATED.3IONS-SCNC: 7 MMOL/L (ref 3–14)
BUN SERPL-MCNC: 18 MG/DL (ref 7–30)
CALCIUM SERPL-MCNC: 9.4 MG/DL (ref 8.5–10.1)
CHLORIDE SERPL-SCNC: 104 MMOL/L (ref 94–109)
CHOLEST SERPL-MCNC: 148 MG/DL
CO2 SERPL-SCNC: 30 MMOL/L (ref 20–32)
CREAT SERPL-MCNC: 1.02 MG/DL (ref 0.52–1.04)
GFR SERPL CREATININE-BSD FRML MDRD: 53 ML/MIN/1.7M2
GLUCOSE SERPL-MCNC: 104 MG/DL (ref 70–99)
HDLC SERPL-MCNC: 71 MG/DL
LDLC SERPL CALC-MCNC: 51 MG/DL
NONHDLC SERPL-MCNC: 77 MG/DL
POTASSIUM SERPL-SCNC: 3.8 MMOL/L (ref 3.4–5.3)
SODIUM SERPL-SCNC: 141 MMOL/L (ref 133–144)
TRIGL SERPL-MCNC: 132 MG/DL

## 2018-09-10 PROCEDURE — 80048 BASIC METABOLIC PNL TOTAL CA: CPT | Performed by: FAMILY MEDICINE

## 2018-09-10 PROCEDURE — 84460 ALANINE AMINO (ALT) (SGPT): CPT | Performed by: FAMILY MEDICINE

## 2018-09-10 PROCEDURE — 99215 OFFICE O/P EST HI 40 MIN: CPT | Mod: 25 | Performed by: FAMILY MEDICINE

## 2018-09-10 PROCEDURE — 80061 LIPID PANEL: CPT | Performed by: FAMILY MEDICINE

## 2018-09-10 PROCEDURE — 36415 COLL VENOUS BLD VENIPUNCTURE: CPT | Performed by: FAMILY MEDICINE

## 2018-09-10 PROCEDURE — G0008 ADMIN INFLUENZA VIRUS VAC: HCPCS | Performed by: FAMILY MEDICINE

## 2018-09-10 PROCEDURE — 90662 IIV NO PRSV INCREASED AG IM: CPT | Performed by: FAMILY MEDICINE

## 2018-09-10 PROCEDURE — 71046 X-RAY EXAM CHEST 2 VIEWS: CPT | Mod: FY

## 2018-09-10 RX ORDER — PREDNISONE 20 MG/1
20 TABLET ORAL DAILY
Qty: 5 TABLET | Refills: 0 | Status: SHIPPED | OUTPATIENT
Start: 2018-09-10 | End: 2019-04-01

## 2018-09-10 RX ORDER — DOXYCYCLINE 100 MG/1
100 CAPSULE ORAL 2 TIMES DAILY
Qty: 14 CAPSULE | Refills: 0 | Status: SHIPPED | OUTPATIENT
Start: 2018-09-10 | End: 2019-04-01

## 2018-09-10 RX ORDER — ATORVASTATIN CALCIUM 20 MG/1
20 TABLET, FILM COATED ORAL DAILY
Qty: 90 TABLET | Refills: 3 | Status: SHIPPED | OUTPATIENT
Start: 2018-09-10 | End: 2019-08-02

## 2018-09-10 NOTE — PROGRESS NOTES
HPI:    Iris is a 73 year old female here to discuss:    Metastatic endometrial adenocarcinoma - discovered after she presented with abdominal pain since early July 2015.   Evaluation and treatment:    She follows with oncology.     Left great toe nail - is quite thickened at baseline. But over the last few day there has been redness and pain on the skin around the nail.  Evaluation and treatment:    I asked her to take Doxy for one week.   I asked her to see podiatry - I believe this nail needs removal.    Paroxysmal SVT - used to be once per week but now less frequently lasting about 5 minutes. It causes her to be dizzy, sweat and have shortness of breath. No chest pain. Denies weight changes or temperature intolerance.  Evaluation and treatment:   EKG 8/4/18 was normal.   Previous labs were fine including CMP, CBC and TSH   Saw cardiology 9/27/17 - no further evaluation was suggested.   Echo 10/11/17 negative.    HTN with hypokalemia - checks at home sometimes - around 140 systolic. Fairly controlled in clinic.    Evaluation and treatment:   Clorthalidone and KCL - not taking since not needed. No side effects.   Losartan 50 mg daily - no side effects   Continue same tx.    BP Readings from Last 6 Encounters:   09/10/18 142/70   09/05/18 134/76   06/22/18 108/58   06/21/18 132/74   05/09/18 137/82   04/18/18 122/70       Last Comprehensive Metabolic Panel:  Sodium   Date Value Ref Range Status   09/10/2018 141 133 - 144 mmol/L Final     Potassium   Date Value Ref Range Status   09/10/2018 3.8 3.4 - 5.3 mmol/L Final     Chloride   Date Value Ref Range Status   09/10/2018 104 94 - 109 mmol/L Final     Carbon Dioxide   Date Value Ref Range Status   09/10/2018 30 20 - 32 mmol/L Final     Anion Gap   Date Value Ref Range Status   09/10/2018 7 3 - 14 mmol/L Final     Glucose   Date Value Ref Range Status   09/10/2018 104 (H) 70 - 99 mg/dL Final     Comment:     Fasting specimen     Urea Nitrogen   Date Value Ref  Range Status   09/10/2018 18 7 - 30 mg/dL Final     Creatinine   Date Value Ref Range Status   09/10/2018 1.02 0.52 - 1.04 mg/dL Final     GFR Estimate   Date Value Ref Range Status   09/10/2018 53 (L) >60 mL/min/1.7m2 Final     Comment:     Non  GFR Calc     Calcium   Date Value Ref Range Status   09/10/2018 9.4 8.5 - 10.1 mg/dL Final     COPD - Has 35 pack history of smoking, quit 2003. Symptoms are intermittent cough, shortness of breath and wheezing. This has flared in the last few days with increase in cough, productive of non bloody sputum, shortness of breath. No fevers or chest pain.  Evaluation and treatment:   CT lung 2011 as below.   Spirometry 12/2/13 showed FEV1 42% predicted.    Advair was too expensive.    Symbicort 160/4.5, 2 puffs bid   Spiriva one puff qd.   Nebs 2-3 times per day, sometimes at night lately - helps.    She seems to need supplemental O 2 after surgeries. Her last surgery in June 2014 was for cholecystectomy.    I previously filled out disability parking form for her.    CXR today as below.   I asked her to take Prednisone burst - side effects discussed.   I asked her to take Doxy.    XR CHEST 2 VW 9/10/2018 12:51 PM     COMPARISON: 2/27/2018     HISTORY: COPD exacerbation.         IMPRESSION: Apically predominant emphysema again seen. Calcified  mediastinal and RIGHT hilar lymph nodes again seen. Scarring in the  RIGHT lower lung. Lungs otherwise clear. No pleural effusion or  pneumothorax.     DIMITRI NEWBERRY MD    1) Advanced changes of emphysema.   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 Minimal residual   right pleural fluid or thickening in the posterior aspect of the right mid   hemithorax, both less conspicuous then on 4/13/2011 and likely residual   scarring from the extensive consolidative infiltrate that was present on   1/7/2011.   3) Evidence of previous granulomatous disease.   4) Small esophageal hiatal hernia.   5) Please see above for  additional less significant details.     Yelena Oseguera M.D.  [facility]  CHICO/ben  D:9/13/2011/T:9/13/2011    Dyslipidemia - No history of CAD, CVA, PAD or diabetes.   Evaluation and treatment:    Per ATP4, moderate intensity statin recommended.   Lipitor 20 mg daily - no side effects.   Continue same tx.    Recent Labs   Lab Test  09/10/18   1242  04/18/18   1130   07/18/15   0930  11/03/12   1117   CHOL  148  244*   < >  179  229*   HDL  71  64   < >  48*  58   LDL  51  151*   < >  93  148*   TRIG  132  143   < >  189*  119   CHOLHDLRATIO   --    --    --   3.7  4.0    < > = values in this interval not displayed.     The 10-year ASCVD risk score (Torinjose NASCIMENTO Jr, et al., 2013) is: 15.2%    Values used to calculate the score:      Age: 73 years      Sex: Female      Is Non- : No      Diabetic: No      Tobacco smoker: No      Systolic Blood Pressure: 142 mmHg      Is BP treated: Yes      HDL Cholesterol: 71 mg/dL      Total Cholesterol: 148 mg/dL    Gall stone pancreatitis - in June 2014 had cholecystectomy and MRCP to remove common bile duct stone. Fine since then.    Previous poor balance and weakness - no longer using walker. Now balance is fairly good. Has gone to PT.    Preventive - declines Shingrix vaccine. We gave her a flu shot today.    Immunization History   Administered Date(s) Administered     Influenza (High Dose) 3 valent vaccine 11/28/2015, 09/22/2016, 12/04/2017, 09/10/2018     Influenza (IIV3) PF 10/31/2012, 12/02/2013, 11/02/2014     Pneumo Conj 13-V (2010&after) 08/08/2016     Pneumococcal 23 valent 10/18/2009, 07/31/2013     TDAP Vaccine (Adacel) 01/07/2011     Zoster vaccine, live 04/21/2015     STD screen: declined previously    Mammogram: Negative 4/26/18.    PAP - has had hysterectomy    PAP NIL 12/2/2013  PAP NIL 10/31/2012    Colonoscopy: 5/25/18 at [facility] for convenience due to location - repeat in 3 years.    DEXA: ordered multiple time  previously but she told me they canceled on her more than once.     Hep C screen: Negative 7/18/15    Vit D: takes 5000 units per day    Advanced directive: referred previously.    ROS:    Const: No fevers, weight changes or night sweats recently.  ENT: No runny nose, sore throat or ear pain.  Resp: per HPI  CV: No chest pain, dizziness or cardiac palpitations.  GI: No nausea, vomiting, diarrhea or constipation. Denies blood in stools or black stools.  : No dysuria, frequency or hematuria.  The rest of the ROS negative, other than listed on HPI      SH:    Marital status:   Kids: 2  Employment: retired, takes care of grand kids  Exercise: active  Tobacco: per HPI  Etoh: no  Recreational drugs: no  Caffeine: rarely      Exam:    /80 (Cuff Size: Adult Small)  Pulse 100  Temp 97.9  F (36.6  C) (Oral)  Wt 121 lb (54.9 kg)  SpO2 95%  Breastfeeding? No  BMI 19.53 kg/m2    Gen: Healthy appearing female in no acute distress  ENT: Oropharynx normal. Oral mucosa moist without lesions.  Eyes: Conjunctiva and sclera normal. Pupils react normally to light. No nystagmus.  Neck: No enlarged lymph nodes, thyromegally or other masses.  Lungs: reduced air movement with mild exp wheezing. O2 sat noted. No tachypnea or other signs of respiratory distress.  CV: Heart RRR with no murmurs. No JVD, carotid bruits or leg edema.  Psych/Neuro: alert and oriented x 3. Affect is normal. Speech is fluent. Thought logical. Insight and judgement seem to be intact.  Skin/nail: the left great toenail is dramatically thickened, yellowish. There is skin redness and tenderness surrounding the nail. No discharge. No fluctuance.       Assessment and Plan - Decision Making    1. COPD exacerbation (H)    See HPI for details.    - XR Chest 2 Views  - doxycycline (VIBRAMYCIN) 100 MG capsule; Take 1 capsule (100 mg) by mouth 2 times daily  Dispense: 14 capsule; Refill: 0  - predniSONE (DELTASONE) 20 MG tablet; Take 1 tablet (20 mg) by  mouth daily  Dispense: 5 tablet; Refill: 0    2. Essential hypertension with goal blood pressure less than 140/90    See HPI for details.    - Basic metabolic panel    3. Hypokalemia    See HPI for details.      4. Dyslipidemia    See HPI for details.    - atorvastatin (LIPITOR) 20 MG tablet; Take 1 tablet (20 mg) by mouth daily  Dispense: 90 tablet; Refill: 3  - Lipid panel reflex to direct LDL Fasting  - ALT    5. Ingrowing left great toenail    See HPI for details.    - PODIATRY/FOOT & ANKLE SURGERY REFERRAL    6. Cellulitis of toe of left foot    See HPI for details.    - doxycycline (VIBRAMYCIN) 100 MG capsule; Take 1 capsule (100 mg) by mouth 2 times daily  Dispense: 14 capsule; Refill: 0    7. Need for prophylactic vaccination and inoculation against influenza    - FLU VACCINE, INCREASED ANTIGEN, PRESV FREE, AGE 65+ [00310]  - Vaccine Administration, Initial [14727]      Written instructions given as follows:    Patient Instructions   1. Take Doxycycline 100 mg twice per day for 7 days. Avoid the sun.    2. Take Prednisone as prescribed. This is a steroid anti-inflammatory medication. When used long term it can have many health consequences. But short term use is safe - common side effects are insomnia and anxiety. It should be taken 1st part of the day.    3. Stop by the  and make an appointment with our foot specialist - otherwise call [phone].    4. I will contact you about your test results - if all is well, see you in 6 months for follow-up.    5. I will be gone between Sept 14 and Oct 15, in case you try to get a hold of me. My partners will be answering my messages at that time. You can schedule an appointment with one of them if needed.             Injectable Influenza Immunization Documentation    1.  Is the person to be vaccinated sick today?   No    2. Does the person to be vaccinated have an allergy to a component   of the vaccine?   No  Egg Allergy Algorithm Link    3. Has the  person to be vaccinated ever had a serious reaction   to influenza vaccine in the past?   No    4. Has the person to be vaccinated ever had Guillain-Barré syndrome?   No    Form completed by Porsha Lares LPN

## 2018-09-10 NOTE — MR AVS SNAPSHOT
After Visit Summary   9/10/2018    Iris Carrion    MRN: [id number]           Patient Information     Date Of Birth          1945        Visit Information        Provider Department      9/10/2018 12:10 PM Pillo Koehler MD [facility]        Today's Diagnoses     COPD exacerbation (H)    -  1    Essential hypertension with goal blood pressure less than 140/90        Hypokalemia        Dyslipidemia        Ingrowing left great toenail        Cellulitis of toe of left foot        Need for prophylactic vaccination and inoculation against influenza          Care Instructions    1. Take Doxycycline 100 mg twice per day for 7 days. Avoid the sun.    2. Take Prednisone as prescribed. This is a steroid anti-inflammatory medication. When used long term it can have many health consequences. But short term use is safe - common side effects are insomnia and anxiety. It should be taken 1st part of the day.    3. Stop by the  and make an appointment with our foot specialist - otherwise call [phone].    4. I will contact you about your test results - if all is well, see you in 6 months for follow-up.    5. I will be gone between Sept 14 and Oct 15, in case you try to get a hold of me. My partners will be answering my messages at that time. You can schedule an appointment with one of them if needed.            Follow-ups after your visit        Additional Services     PODIATRY/FOOT & ANKLE SURGERY REFERRAL       Your provider has referred you to: [facility]: [facility] [phone]   [email or web address]    Please be aware that coverage of these services is subject to the terms and limitations of your health insurance plan.  Call member services at your health plan with any benefit or coverage questions.      Please bring the following to your appointment:  >>   Any x-rays, CTs or MRIs which have been performed.  Contact the facility where  they were done to arrange for  prior to your scheduled appointment.    >>   List of current medications   >>   This referral request   >>   Any documents/labs given to you for this referral                  Your next 10 appointments already scheduled     Mar 06, 2019 10:15 AM CST   LAB with LAB ONC [facility] ([facility])    [address] [place] [id number] [phone]           Please do not eat 10-12 hours before your appointment if you are coming in fasting for labs on lipids, cholesterol, or glucose (sugar). This does not apply to pregnant women. Water, hot tea and black coffee (with nothing added) are okay. Do not drink other fluids, diet soda or chew gum.            Mar 06, 2019 11:00 AM CST   Return Visit with CHANDLER Linares [facility] ([facility])    [address] [place] [id number] [phone]              Who to contact     If you have questions or need follow up information about today's clinic visit or your schedule please contact [facility] directly at [phone].  Normal or non-critical lab and imaging results will be communicated to you by MyChart, letter or phone within 4 business days after the clinic has received the results. If you do not hear from us within 7 days, please contact the clinic through MyChart or phone. If you have a critical or abnormal lab result, we will notify you by phone as soon as possible.  Submit refill requests through [employer] or call your pharmacy and they will forward the refill request to us. Please allow 3 business days for your refill to be completed.          Additional Information About Your Visit        [employer] Information     [employer] gives you secure access to your electronic health record. If you see a primary care provider, you can also send messages to your care team and make appointments. If you have  questions, please call your primary care clinic.  If you do not have a primary care provider, please call [phone] and they will assist you.        Care EveryWhere ID     This is your Care EveryWhere ID. This could be used by other organizations to access your [facility] records  [id number]        Your Vitals Were     Pulse Temperature Pulse Oximetry Breastfeeding? BMI (Body Mass Index)       100 97.9  F (36.6  C) (Oral) 95% No 19.53 kg/m2        Blood Pressure from Last 3 Encounters:   09/10/18 142/70   09/05/18 134/76   06/22/18 108/58    Weight from Last 3 Encounters:   09/10/18 121 lb (54.9 kg)   09/05/18 121 lb (54.9 kg)   06/22/18 123 lb (55.8 kg)              We Performed the Following     ALT     Basic metabolic panel     FLU VACCINE, INCREASED ANTIGEN, PRESV FREE, AGE 65+ [64724]     Lipid panel reflex to direct LDL Fasting     PODIATRY/FOOT & ANKLE SURGERY REFERRAL     Vaccine Administration, Initial [88932]     XR Chest 2 Views          Today's Medication Changes          These changes are accurate as of 9/10/18 12:38 PM.  If you have any questions, ask your nurse or doctor.               Start taking these medicines.        Dose/Directions    doxycycline 100 MG capsule   Commonly known as:  VIBRAMYCIN   Used for:  COPD exacerbation (H), Cellulitis of toe of left foot   Started by:  Pillo Koehler MD        Dose:  100 mg   Take 1 capsule (100 mg) by mouth 2 times daily   Quantity:  14 capsule   Refills:  0       predniSONE 20 MG tablet   Commonly known as:  DELTASONE   Used for:  COPD exacerbation (H)   Started by:  Pillo Koehler MD        Dose:  20 mg   Take 1 tablet (20 mg) by mouth daily   Quantity:  5 tablet   Refills:  0            Where to get your medicines      These medications were sent to [facility] [address] [facility] [address]  [address] [address] [address]     Phone:  [phone]     atorvastatin 20 MG tablet    doxycycline 100  MG capsule    predniSONE 20 MG tablet                Primary Care Provider Office Phone # Fax #    Pillo Koehler [phone] [address] [facility] [address]        Equal Access to Services     [place] RODRIGUEZ : Junior dockery gloryo Sopatriceali, waaxda luqadaha, qaybta kaalmada adeangyyada, vignesh zuniga simone coats. So [facility] [phone].    ATENCIÓN: Si habla español, tiene a pena disposición servicios gratuitos de asistencia lingüística. Llame al [phone].    We comply with applicable federal civil rights laws and [place] laws. We do not discriminate on the basis of race, color, national origin, age, disability, sex, sexual orientation, or gender identity.            Thank you!     Thank you for choosing [facility]  for your care. Our goal is always to provide you with excellent care. Hearing back from our patients is one way we can continue to improve our services. Please take a few minutes to complete the written survey that you may receive in the mail after your visit with us. Thank you!             Your Updated Medication List - Protect others around you: Learn how to safely use, store and throw away your medicines at www.disposemymeds.org.          This list is accurate as of 9/10/18 12:38 PM.  Always use your most recent med list.                   Brand Name Dispense Instructions for use Diagnosis    albuterol 108 (90 Base) MCG/ACT inhaler    PROAIR HFA/PROVENTIL HFA/VENTOLIN HFA    1 Inhaler    Inhale 2 puffs into the lungs every 6 hours as needed for shortness of breath / dyspnea or wheezing    Chronic bronchitis, unspecified chronic bronchitis type (H)       atorvastatin 20 MG tablet    LIPITOR    90 tablet    Take 1 tablet (20 mg) by mouth daily    Dyslipidemia       BIOTIN PO      Take 1 tablet by mouth daily.        budesonide-formoterol 160-4.5 MCG/ACT Inhaler    SYMBICORT    1 Inhaler    Inhale 2 puffs into the lungs 2 times daily    COPD  (chronic obstructive pulmonary disease) (H)       CALCIUM 500 PO      Take 1 tablet by mouth daily.        doxycycline 100 MG capsule    VIBRAMYCIN    14 capsule    Take 1 capsule (100 mg) by mouth 2 times daily    COPD exacerbation (H), Cellulitis of toe of left foot       fluticasone 50 MCG/ACT spray    FLONASE    1 Bottle    Spray 2 sprays into both nostrils daily    Non-seasonal allergic rhinitis due to animal hair and dander       ibuprofen 600 MG tablet    ADVIL/MOTRIN    40 tablet    Take 1 tablet (600 mg) by mouth every 6 hours as needed for moderate pain    S/P LUCILA-BSO       ipratropium - albuterol 0.5 mg/2.5 mg/3 mL 0.5-2.5 (3) MG/3ML neb solution    DUONEB    30 vial    Take 1 vial (3 mLs) by nebulization every 4 hours as needed for shortness of breath / dyspnea or wheezing    Chronic bronchitis, unspecified chronic bronchitis type (H)       loratadine 10 MG tablet    CLARITIN     Take 10 mg by mouth daily        losartan 50 MG tablet    COZAAR    90 tablet    Take 1 tablet (50 mg) by mouth daily    Essential hypertension with goal blood pressure less than 140/90, Hypokalemia       MAGNESIUM OXIDE PO      Take 200 mg by mouth 2 times daily        omeprazole 40 MG capsule    priLOSEC    90 capsule    Take 1 capsule (40 mg) by mouth daily Take 30-60 minutes before a meal.    GERD (gastroesophageal reflux disease)       order for DME     1 Device    Equipment being ordered: Nebulizer    Chronic bronchitis, unspecified chronic bronchitis type (H)       predniSONE 20 MG tablet    DELTASONE    5 tablet    Take 1 tablet (20 mg) by mouth daily    COPD exacerbation (H)       tiotropium 18 MCG capsule    SPIRIVA HANDIHALER    90 capsule    Inhale 1 capsule (18 mcg) into the lungs daily Inhale contents of one capsule. .    COPD (chronic obstructive pulmonary disease) (H)       ZINC 15 PO      Take by mouth daily

## 2018-09-10 NOTE — NURSING NOTE
"Chief Complaint   Patient presents with     Ankle/Foot left     possible infection     RECHECK     breathing      Flu Shot       Initial /80 (Cuff Size: Adult Small)  Pulse 100  Temp 97.9  F (36.6  C) (Oral)  Wt 121 lb (54.9 kg)  SpO2 95%  Breastfeeding? No  BMI 19.53 kg/m2 Estimated body mass index is 19.53 kg/(m^2) as calculated from the following:    Height as of 9/5/18: 5' 6\" (1.676 m).    Weight as of this encounter: 121 lb (54.9 kg).      Porsha Lares LPN    "

## 2018-09-12 NOTE — PROGRESS NOTES
Iraj Simmons,    Your cholesterol is now excellent! Please continue your current treatment.    Glucose is borderline high but not worrisome.    All other labs are fine.    Regards,    Pillo Koehler M.D.

## 2018-09-19 ENCOUNTER — OFFICE VISIT (OUTPATIENT)
Dept: PODIATRY | Facility: CLINIC | Age: 73
End: 2018-09-19
Payer: COMMERCIAL

## 2018-09-19 VITALS
SYSTOLIC BLOOD PRESSURE: 122 MMHG | BODY MASS INDEX: 19.37 KG/M2 | OXYGEN SATURATION: 98 % | HEART RATE: 68 BPM | WEIGHT: 120 LBS | DIASTOLIC BLOOD PRESSURE: 68 MMHG

## 2018-09-19 DIAGNOSIS — L60.0 INGROWING NAIL: Primary | ICD-10-CM

## 2018-09-19 PROCEDURE — 11730 AVULSION NAIL PLATE SIMPLE 1: CPT | Performed by: PODIATRIST

## 2018-09-19 PROCEDURE — 99203 OFFICE O/P NEW LOW 30 MIN: CPT | Mod: 25 | Performed by: PODIATRIST

## 2018-09-19 NOTE — LETTER
9/19/2018         RE: Iris Carrion  [address] [facility] [address]        Dear Colleague,    Thank you for referring your patient, Iris Carrion, to the [facility]. Please see a copy of my visit note below.    Subjective:    Pt is seen today in consult form Dr. Koehler w/ the c/c of a painful  left great nail.  This has been problematic for 3 week(s).  negativehistory of drainage from the site. This is slowly getting worse.  Aggravated by activity and relieved by rest.  Has tried soaking which has not helped.   denies history of trauma to the area.  Denies fever and chill, denies numbness and tingling, denies erythema on dorsum of foot.     ROS:  A 10-point review of systems was performed and is positive for that noted in the HPI and as seen below.  All other areas are negative.     Past Medical History:   Diagnosis Date     Cervical high risk HPV (human papillomavirus) test positive 10/31/12    type 18     COPD (chronic obstructive pulmonary disease) (H)      GERD (gastroesophageal reflux disease)      Hx of colposcopy with cervical biopsy 11/2012    negative     Hypertension 2013     Osteoporosis      Paroxysmal supraventricular tachycardia (H) 9/6/2017     Peritoneal carcinomatosis (H) 8/24/2015     Pneumonia      Uncomplicated asthma 1980       Past Surgical History:   Procedure Laterality Date     CHOLECYSTECTOMY  6/2014     COLONOSCOPY  12/4/2012    Procedure: COLONOSCOPY;  COLONOSCOPY SCREEN;  Surgeon: Mushtaq Lara MD;  Location: [facility] OR     GYN SURGERY       HERNIORRHAPHY HIATAL N/A 11/25/2015    Procedure: HERNIORRHAPHY HIATAL;  Surgeon: Chip Carty MD;  Location: [facility] OR     HYSTERECTOMY TOTAL ABD, ALVIN SALPINGO-OOPHORECTOMY, NODE DISSECTION, TUMOR DEBULKING, COMBINED Bilateral 11/25/2015    Procedure: COMBINED HYSTERECTOMY TOTAL ABDOMINAL, SALPINGO-OOPHORECTOMY, NODE DISSECTION, TUMOR DEBULKING;  Surgeon: Emma Cabrera MD;  Location:  OR        Family History   Problem Relation Age of Onset     Cancer Brother      testicular     Diabetes Sister      Ovarian Cancer Mother 60     Diabetes Mother           Asthma Father           Diabetes Sister      Depression/Anxiety Daughter      Depression Daughter      Osteoporosis Sister      Other Cancer Brother      Diabetes Brother      Depression Other      Grandson       Social History   Substance Use Topics     Smoking status: Former Smoker     Packs/day: 1.00     Years: 30.00     Types: Cigarettes     Start date: 1965     Quit date: 10/10/2003     Smokeless tobacco: Never Used     Alcohol use No         Current Outpatient Prescriptions:      albuterol (PROAIR HFA/PROVENTIL HFA/VENTOLIN HFA) 108 (90 Base) MCG/ACT Inhaler, Inhale 2 puffs into the lungs every 6 hours as needed for shortness of breath / dyspnea or wheezing, Disp: 1 Inhaler, Rfl: 1     atorvastatin (LIPITOR) 20 MG tablet, Take 1 tablet (20 mg) by mouth daily, Disp: 90 tablet, Rfl: 3     BIOTIN PO, Take 1 tablet by mouth daily., Disp: , Rfl:      budesonide-formoterol (SYMBICORT) 160-4.5 MCG/ACT Inhaler, Inhale 2 puffs into the lungs 2 times daily, Disp: 1 Inhaler, Rfl: 0     Calcium Carbonate (CALCIUM 500 PO), Take 1 tablet by mouth daily., Disp: , Rfl:      doxycycline (VIBRAMYCIN) 100 MG capsule, Take 1 capsule (100 mg) by mouth 2 times daily, Disp: 14 capsule, Rfl: 0     fluticasone (FLONASE) 50 MCG/ACT spray, Spray 2 sprays into both nostrils daily, Disp: 1 Bottle, Rfl: 1     ibuprofen (ADVIL,MOTRIN) 600 MG tablet, Take 1 tablet (600 mg) by mouth every 6 hours as needed for moderate pain, Disp: 40 tablet, Rfl: 0     ipratropium - albuterol 0.5 mg/2.5 mg/3 mL (DUONEB) 0.5-2.5 (3) MG/3ML neb solution, Take 1 vial (3 mLs) by nebulization every 4 hours as needed for shortness of breath / dyspnea or wheezing, Disp: 30 vial, Rfl: 5     loratadine (CLARITIN) 10 MG tablet, Take 10 mg by mouth daily, Disp: , Rfl:      losartan  (COZAAR) 50 MG tablet, Take 1 tablet (50 mg) by mouth daily, Disp: 90 tablet, Rfl: 3     MAGNESIUM OXIDE PO, Take 200 mg by mouth 2 times daily, Disp: , Rfl:      omeprazole (PRILOSEC) 40 MG capsule, Take 1 capsule (40 mg) by mouth daily Take 30-60 minutes before a meal., Disp: 90 capsule, Rfl: 1     order for DME, Equipment being ordered: Nebulizer, Disp: 1 Device, Rfl: 0     predniSONE (DELTASONE) 20 MG tablet, Take 1 tablet (20 mg) by mouth daily, Disp: 5 tablet, Rfl: 0     tiotropium (SPIRIVA HANDIHALER) 18 MCG capsule, Inhale 1 capsule (18 mcg) into the lungs daily Inhale contents of one capsule. ., Disp: 90 capsule, Rfl: 2     Zinc Sulfate (ZINC 15 PO), Take by mouth daily, Disp: , Rfl:        Allergies   Allergen Reactions     Levaquin Rash       /68  Pulse 68  Wt 120 lb (54.4 kg)  SpO2 98%  BMI 19.37 kg/m2.      Constitutional/ general:  Pt is in no apparent distress, appears well-nourished.  Cooperative with history and physical exam.     Psych:  The patient answered questions appropriately.  Normal affect.  Seems to have reasonable expectations, in terms of treatment.     Eyes:  Visual scanning/ tracking without deficit.     Ears:  Response to auditory stimuli is normal.  No  hearing aid devices.  Auricles in proper alignment.     Lymphatic:  Popliteal lymph nodes not enlarged.     Lungs:  Non labored breathing, non labored speech. No cough.  No audible wheezing. Even, quiet breathing.       Vascular:  Pedal pulses are palpable bilaterally for both the DP and PT arteries.  CFT < 3 sec.  No ankle varicosities or edema.  Pedal hair growth noted.     Neuro:  Alert and oriented x 3. Coordinated gait.  Light touch sensation is intact to the L4, L5, S1 distributions. No obvious deficits.  No evidence of neurological-based weakness, spasticity, or contracture in the lower extremities.     Derm: Normal texture and turgor.  No ecchymosis, or cyanosis.  Hair growth noted.        Musculoskeletal:      Patient is ambulatory without an assistive device or brace.  No gross deformities.  Normal arch with weightbearing.  No forefoot or rear foot deformities noted.  MS 5/5 all compartments.  Normal ROM all fore foot and rearfoot joints.  No equinus.  left great toe nail gryphotic and proximal nail border has localized erythema.  Nail feels loose.  negative active drainage/purulence at this time.  No sinus tracts.  No nailbed masses or exostosis.  No pain with range of motion of IPJ or MTPJ.  No ascending cellulitis.    ASSESSMENT:    Onychocryptosis with paronychia left toe.    Discussed etiology and treatment options in detail w/ the pt.  The potential causes and nature of an ingrown toenail were discussed with the patient.  We reviewed the natural history/prognosis of the condition and potential risks if no treatment is provided.      Treatment options discussed included conservative management (oral antibiotics, soaking of foot, adequate width shoes)  as well as surgical management (partial or total nail removal).  The pros and cons of both forms of treatment were reviewed.  Handout given to patient.      After thorough discussion and answering all questions, the patient elected to have nail avulsion.  Obtained consent, used 3cc of 1% lidocaine plain to block left first toe.  Sterile prep, then avulsed the left hallux nail.  No evidence of deep abscess noted.  Pt tolerated procedure well.  Sterile bandage placed, gave wound care instruction.  Return to clinic prn.  Thank you for allowing me participate in the care of this patient.        Dameon Powers DPM, FACFAS      Again, thank you for allowing me to participate in the care of your patient.        Sincerely,        Dameon Powers DPM

## 2018-09-19 NOTE — MR AVS SNAPSHOT
After Visit Summary   9/19/2018    Iris Carrion    MRN: [id number]           Patient Information     Date Of Birth          1945        Visit Information        Provider Department      9/19/2018 12:15 PM Dameon Powers DPM [facility]        Today's Diagnoses     Ingrowing nail    -  1      Care Instructions    We wish you continued good healing. If you have any questions or concerns, please do not hesitate to contact us at [phone]    Please remember to call and schedule a follow up appointment if one was recommended at your earliest convenience.   PODIATRY CLINIC HOURS  TELEPHONE NUMBER    Dr. Dameon SALMONPANTONIO FAC FAS    Clinics:  [facility]    [facility]   Tuesday 1PM-6PM  [facility]  Wednesday 7AM-2PM  [facility]  Thursday 10AM-6PM  [place]  Friday 7AM-3PM  [place]  Specialty schedulers:   [phone] to make an appointment with any Specialty Provider.        Urgent Care locations:    [facility] Monday-Friday 5 pm - 9 pm. Saturday-Sunday 9 am -5pm    Monday-Friday 11 am - 9 pm Saturday 9 am - 5 pm     Monday-Sunday 12 noon-8PM [phone]     If you need a medication refill, please contact us you may need lab work and/or a follow up visit prior to your refill (i.e. Antifungal medications).    [employer] (secure e-mail communication and access to your chart) to send a message or to make an appointment.    If MRI needed please call Gonzalo Benson at [phone]        Weight management plan: Patient was referred to their PCP to discuss a diet and exercise plan.     Patient to follow up with Primary Care provider regarding elevated blood pressure.    SMOKING CESSATION  What's in cigarette smoke? - Cigarette smoke contains over 4,000 chemicals. Nicotine is one of the main ingredients which is an  insecticide/herbicide. It is poisonous to our nervous system, increases blood clotting risk, and decreases the body's defenses to fight off infection. Another chemical is Carbon Monoxide is an asphyxiating gas that permanently binds to blood cells and blocks the transport of oxygen. This leads to tissue death and decreases your metabolism. Tar is a chemical that coats your lungs and trachea which impairs new oxygen coming in and carbon dioxide getting out of your body.   How does smoking impact surgery? - Smoking is particularly hazardous with regards to surgery. Surgery puts stress on the body and a smoker's body is already under strain from these chemicals. Putting the two together, especially for an elective surgery, could be a recipe for disaster. Smoking before and after surgery increases your risk of heart problems, slow wound healing, delayed bone healing, blood clots, wound infection and anesthesia complications.   What are the benefits of quitting? - In 20 minutes your blood pressure will drop back down to normal. In 8 hours the carbon monoxide (a toxic gas) levels in your blood stream will drop by half, and oxygen levels will return to normal. In 48 hours your chance of having a heart attack will have decreased. All nicotine will have left your body. Your sense of taste and smell will return to a normal level. In 72 hours your bronchial tubes will relax, and your energy levels will increase. In 2 weeks your circulation will increase, and it will continue to improve for the next 10 weeks.    Recommendations for elective surgery - Ideally, patients should quit smoking 8 weeks before and at least 2 weeks after elective surgery in order to avoid complications. Simply cutting back on the amount of cigarettes smoked per day does not offer any benefit or decrease the risk of poor wound healing, heart problems, and infection. Smokers should also start taking Vitamin C and B for two weeks before surgery and two  weeks after surgery.    Ways to Stop Smokin. Nicotine patches, lozenges, or gum  2. Support Groups  3. Medications (see below)    List of Medications:  1. Varenicline Tartrate (CHANTIX)   2. Bupropion HCL (WELLBUTRIN, ZYBAN) - note: make sure Wellbutrin is for smoking cessation and not other issues   3. Nicotine Patch (NICODERM)   4. Nicotine Inhaler (NICOTROL)   5. Nicotine Gum Nicotine Polacrilex   6. Nicotine Lozenge: Nicotine Polacrilex (COMMIT)   * [place] offers a smoking support group as well!  Please visit: [email or web address]  If you are interested in these, ask about getting a prescription or talk to your primary care doctor about what may be the best way for you to quit.                 Follow-ups after your visit        Your next 10 appointments already scheduled     Sep 19, 2018 12:15 PM CDT   [facility]    [address] [facility] [address] [phone]            Mar 06, 2019 10:15 AM CST   LAB with LAB ONC [facility] ([facility])    [address] [place] [address] [phone]           Please do not eat 10-12 hours before your appointment if you are coming in fasting for labs on lipids, cholesterol, or glucose (sugar). This does not apply to pregnant women. Water, hot tea and black coffee (with nothing added) are okay. Do not drink other fluids, diet soda or chew gum.            Mar 06, 2019 11:00 AM CST   Return Visit with CHANDLER Linares [facility] ([facility])    [address] [place] [address] [phone]              Who to contact     If you have questions or need follow up information about today's clinic visit or your schedule please contact [facility] directly at [phone].  Normal or non-critical lab and imaging results will be  communicated to you by [employer], letter or phone within 4 business days after the clinic has received the results. If you do not hear from us within 7 days, please contact the clinic through [employer] or phone. If you have a critical or abnormal lab result, we will notify you by phone as soon as possible.  Submit refill requests through [employer] or call your pharmacy and they will forward the refill request to us. Please allow 3 business days for your refill to be completed.          Additional Information About Your Visit        [employer] Information     [employer] gives you secure access to your electronic health record. If you see a primary care provider, you can also send messages to your care team and make appointments. If you have questions, please call your primary care clinic.  If you do not have a primary care provider, please call [phone] and they will assist you.        Care EveryWhere ID     This is your Care EveryWhere ID. This could be used by other organizations to access your [place] medical records  [id number]        Your Vitals Were     Pulse Pulse Oximetry BMI (Body Mass Index)             68 98% 19.37 kg/m2          Blood Pressure from Last 3 Encounters:   09/19/18 122/68   09/10/18 142/70   09/05/18 134/76    Weight from Last 3 Encounters:   09/19/18 120 lb (54.4 kg)   09/10/18 121 lb (54.9 kg)   09/05/18 121 lb (54.9 kg)              We Performed the Following     REMOVAL OF NAIL PLATE SIMPLE SINGLE        Primary Care Provider Office Phone # Fax #    Pillo Koehler [phone] [address] [facility] [address]        Equal Access to Services     [facility]: Hadii aad ku hadasho Soomaali, waaxda luqadaha, qaybta kaalmada vignesh wallace . So [facility] [phone].    ATENCIÓN: Si habla español, tiene a pena disposición servicios gratuitos de asistencia lingüística. Llame al [phone].    We comply with applicable federal civil  rights laws and [place] laws. We do not discriminate on the basis of race, color, national origin, age, disability, sex, sexual orientation, or gender identity.            Thank you!     Thank you for choosing [facility]  for your care. Our goal is always to provide you with excellent care. Hearing back from our patients is one way we can continue to improve our services. Please take a few minutes to complete the written survey that you may receive in the mail after your visit with us. Thank you!             Your Updated Medication List - Protect others around you: Learn how to safely use, store and throw away your medicines at www.disposemymeds.org.          This list is accurate as of 9/19/18 12:11 PM.  Always use your most recent med list.                   Brand Name Dispense Instructions for use Diagnosis    albuterol 108 (90 Base) MCG/ACT inhaler    PROAIR HFA/PROVENTIL HFA/VENTOLIN HFA    1 Inhaler    Inhale 2 puffs into the lungs every 6 hours as needed for shortness of breath / dyspnea or wheezing    Chronic bronchitis, unspecified chronic bronchitis type (H)       atorvastatin 20 MG tablet    LIPITOR    90 tablet    Take 1 tablet (20 mg) by mouth daily    Dyslipidemia       BIOTIN PO      Take 1 tablet by mouth daily.        budesonide-formoterol 160-4.5 MCG/ACT Inhaler    SYMBICORT    1 Inhaler    Inhale 2 puffs into the lungs 2 times daily    COPD (chronic obstructive pulmonary disease) (H)       CALCIUM 500 PO      Take 1 tablet by mouth daily.        doxycycline 100 MG capsule    VIBRAMYCIN    14 capsule    Take 1 capsule (100 mg) by mouth 2 times daily    COPD exacerbation (H), Cellulitis of toe of left foot       fluticasone 50 MCG/ACT spray    FLONASE    1 Bottle    Spray 2 sprays into both nostrils daily    Non-seasonal allergic rhinitis due to animal hair and dander       ibuprofen 600 MG tablet    ADVIL/MOTRIN    40 tablet    Take 1 tablet (600 mg) by mouth every 6 hours as needed  for moderate pain    S/P LUCILA-BSO       ipratropium - albuterol 0.5 mg/2.5 mg/3 mL 0.5-2.5 (3) MG/3ML neb solution    DUONEB    30 vial    Take 1 vial (3 mLs) by nebulization every 4 hours as needed for shortness of breath / dyspnea or wheezing    Chronic bronchitis, unspecified chronic bronchitis type (H)       loratadine 10 MG tablet    CLARITIN     Take 10 mg by mouth daily        losartan 50 MG tablet    COZAAR    90 tablet    Take 1 tablet (50 mg) by mouth daily    Essential hypertension with goal blood pressure less than 140/90, Hypokalemia       MAGNESIUM OXIDE PO      Take 200 mg by mouth 2 times daily        omeprazole 40 MG capsule    priLOSEC    90 capsule    Take 1 capsule (40 mg) by mouth daily Take 30-60 minutes before a meal.    GERD (gastroesophageal reflux disease)       order for DME     1 Device    Equipment being ordered: Nebulizer    Chronic bronchitis, unspecified chronic bronchitis type (H)       predniSONE 20 MG tablet    DELTASONE    5 tablet    Take 1 tablet (20 mg) by mouth daily    COPD exacerbation (H)       tiotropium 18 MCG capsule    SPIRIVA HANDIHALER    90 capsule    Inhale 1 capsule (18 mcg) into the lungs daily Inhale contents of one capsule. .    COPD (chronic obstructive pulmonary disease) (H)       ZINC 15 PO      Take by mouth daily

## 2018-09-19 NOTE — PATIENT INSTRUCTIONS
We wish you continued good healing. If you have any questions or concerns, please do not hesitate to contact us at [phone]    Please remember to call and schedule a follow up appointment if one was recommended at your earliest convenience.   PODIATRY CLINIC HOURS  TELEPHONE NUMBER    Dr. Dameon Powers D.P.M [facility]    Clinics:  [facility]    [facility]   Tuesday 1PM-6PM  [place]  Wednesday 7AM-2PM  [place]  Thursday 10AM-6PM  [place]  Friday 7AM-3PM  [place]  Specialty schedulers:   [phone] to make an appointment with any Specialty Provider.        Urgent Care locations:    [facility] Monday-Friday 5 pm - 9 pm. Saturday-Sunday 9 am -5pm    Monday-Friday 11 am - 9 pm Saturday 9 am - 5 pm     Monday-Sunday 12 noon-8PM [phone]     If you need a medication refill, please contact us you may need lab work and/or a follow up visit prior to your refill (i.e. Antifungal medications).    [employer] (secure e-mail communication and access to your chart) to send a message or to make an appointment.    If MRI needed please call Gonzalo Benson at [phone]        Weight management plan: Patient was referred to their PCP to discuss a diet and exercise plan.     Patient to follow up with Primary Care provider regarding elevated blood pressure.    SMOKING CESSATION  What's in cigarette smoke? - Cigarette smoke contains over 4,000 chemicals. Nicotine is one of the main ingredients which is an insecticide/herbicide. It is poisonous to our nervous system, increases blood clotting risk, and decreases the body's defenses to fight off infection. Another chemical is Carbon Monoxide is an asphyxiating gas that permanently binds to blood cells and blocks the transport of oxygen. This leads to tissue death and decreases your metabolism. Tar is a chemical that coats  your lungs and trachea which impairs new oxygen coming in and carbon dioxide getting out of your body.   How does smoking impact surgery? - Smoking is particularly hazardous with regards to surgery. Surgery puts stress on the body and a smoker's body is already under strain from these chemicals. Putting the two together, especially for an elective surgery, could be a recipe for disaster. Smoking before and after surgery increases your risk of heart problems, slow wound healing, delayed bone healing, blood clots, wound infection and anesthesia complications.   What are the benefits of quitting? - In 20 minutes your blood pressure will drop back down to normal. In 8 hours the carbon monoxide (a toxic gas) levels in your blood stream will drop by half, and oxygen levels will return to normal. In 48 hours your chance of having a heart attack will have decreased. All nicotine will have left your body. Your sense of taste and smell will return to a normal level. In 72 hours your bronchial tubes will relax, and your energy levels will increase. In 2 weeks your circulation will increase, and it will continue to improve for the next 10 weeks.    Recommendations for elective surgery - Ideally, patients should quit smoking 8 weeks before and at least 2 weeks after elective surgery in order to avoid complications. Simply cutting back on the amount of cigarettes smoked per day does not offer any benefit or decrease the risk of poor wound healing, heart problems, and infection. Smokers should also start taking Vitamin C and B for two weeks before surgery and two weeks after surgery.    Ways to Stop Smokin. Nicotine patches, lozenges, or gum  2. Support Groups  3. Medications (see below)    List of Medications:  1. Varenicline Tartrate (CHANTIX)   2. Bupropion HCL (WELLBUTRIN, ZYBAN) - note: make sure Wellbutrin is for smoking cessation and not other issues   3. Nicotine Patch (NICODERM)   4. Nicotine Inhaler (NICOTROL)    5. Nicotine Gum Nicotine Polacrilex   6. Nicotine Lozenge: Nicotine Polacrilex (COMMIT)   * [place] offers a smoking support group as well!  Please visit: [email or web address]  If you are interested in these, ask about getting a prescription or talk to your primary care doctor about what may be the best way for you to quit.

## 2018-09-19 NOTE — PROGRESS NOTES
Subjective:    Pt is seen today in consult form Dr. Koehler w/ the c/c of a painful  left great nail.  This has been problematic for 3 week(s).  negativehistory of drainage from the site. This is slowly getting worse.  Aggravated by activity and relieved by rest.  Has tried soaking which has not helped.   denies history of trauma to the area.  Denies fever and chill, denies numbness and tingling, denies erythema on dorsum of foot.     ROS:  A 10-point review of systems was performed and is positive for that noted in the HPI and as seen below.  All other areas are negative.     Past Medical History:   Diagnosis Date     Cervical high risk HPV (human papillomavirus) test positive 10/31/12    type 18     COPD (chronic obstructive pulmonary disease) (H)      GERD (gastroesophageal reflux disease)      Hx of colposcopy with cervical biopsy 2012    negative     Hypertension      Osteoporosis      Paroxysmal supraventricular tachycardia (H) 2017     Peritoneal carcinomatosis (H) 2015     Pneumonia      Uncomplicated asthma        Past Surgical History:   Procedure Laterality Date     CHOLECYSTECTOMY  2014     COLONOSCOPY  2012    Procedure: COLONOSCOPY;  COLONOSCOPY SCREEN;  Surgeon: Mushtaq Lara MD;  Location: [facility]     GYN SURGERY       HERNIORRHAPHY HIATAL N/A 2015    Procedure: HERNIORRHAPHY HIATAL;  Surgeon: Chip Carty MD;  Location: [facility]     HYSTERECTOMY TOTAL ABD, ALVIN SALPINGO-OOPHORECTOMY, NODE DISSECTION, TUMOR DEBULKING, COMBINED Bilateral 2015    Procedure: COMBINED HYSTERECTOMY TOTAL ABDOMINAL, SALPINGO-OOPHORECTOMY, NODE DISSECTION, TUMOR DEBULKING;  Surgeon: Emma Cabrera MD;  Location: [facility] OR       Family History   Problem Relation Age of Onset     Cancer Brother      testicular     Diabetes Sister      Ovarian Cancer Mother 60     Diabetes Mother           Asthma Father           Diabetes Sister      Depression/Anxiety  Daughter      Depression Daughter      Osteoporosis Sister      Other Cancer Brother      Diabetes Brother      Depression Other      Grandson       Social History   Substance Use Topics     Smoking status: Former Smoker     Packs/day: 1.00     Years: 30.00     Types: Cigarettes     Start date: 8/11/1965     Quit date: 10/10/2003     Smokeless tobacco: Never Used     Alcohol use No         Current Outpatient Prescriptions:      albuterol (PROAIR HFA/PROVENTIL HFA/VENTOLIN HFA) 108 (90 Base) MCG/ACT Inhaler, Inhale 2 puffs into the lungs every 6 hours as needed for shortness of breath / dyspnea or wheezing, Disp: 1 Inhaler, Rfl: 1     atorvastatin (LIPITOR) 20 MG tablet, Take 1 tablet (20 mg) by mouth daily, Disp: 90 tablet, Rfl: 3     BIOTIN PO, Take 1 tablet by mouth daily., Disp: , Rfl:      budesonide-formoterol (SYMBICORT) 160-4.5 MCG/ACT Inhaler, Inhale 2 puffs into the lungs 2 times daily, Disp: 1 Inhaler, Rfl: 0     Calcium Carbonate (CALCIUM 500 PO), Take 1 tablet by mouth daily., Disp: , Rfl:      doxycycline (VIBRAMYCIN) 100 MG capsule, Take 1 capsule (100 mg) by mouth 2 times daily, Disp: 14 capsule, Rfl: 0     fluticasone (FLONASE) 50 MCG/ACT spray, Spray 2 sprays into both nostrils daily, Disp: 1 Bottle, Rfl: 1     ibuprofen (ADVIL,MOTRIN) 600 MG tablet, Take 1 tablet (600 mg) by mouth every 6 hours as needed for moderate pain, Disp: 40 tablet, Rfl: 0     ipratropium - albuterol 0.5 mg/2.5 mg/3 mL (DUONEB) 0.5-2.5 (3) MG/3ML neb solution, Take 1 vial (3 mLs) by nebulization every 4 hours as needed for shortness of breath / dyspnea or wheezing, Disp: 30 vial, Rfl: 5     loratadine (CLARITIN) 10 MG tablet, Take 10 mg by mouth daily, Disp: , Rfl:      losartan (COZAAR) 50 MG tablet, Take 1 tablet (50 mg) by mouth daily, Disp: 90 tablet, Rfl: 3     MAGNESIUM OXIDE PO, Take 200 mg by mouth 2 times daily, Disp: , Rfl:      omeprazole (PRILOSEC) 40 MG capsule, Take 1 capsule (40 mg) by mouth daily Take 30-60  minutes before a meal., Disp: 90 capsule, Rfl: 1     order for DME, Equipment being ordered: Nebulizer, Disp: 1 Device, Rfl: 0     predniSONE (DELTASONE) 20 MG tablet, Take 1 tablet (20 mg) by mouth daily, Disp: 5 tablet, Rfl: 0     tiotropium (SPIRIVA HANDIHALER) 18 MCG capsule, Inhale 1 capsule (18 mcg) into the lungs daily Inhale contents of one capsule. ., Disp: 90 capsule, Rfl: 2     Zinc Sulfate (ZINC 15 PO), Take by mouth daily, Disp: , Rfl:        Allergies   Allergen Reactions     Levaquin Rash       /68  Pulse 68  Wt 120 lb (54.4 kg)  SpO2 98%  BMI 19.37 kg/m2.      Constitutional/ general:  Pt is in no apparent distress, appears well-nourished.  Cooperative with history and physical exam.     Psych:  The patient answered questions appropriately.  Normal affect.  Seems to have reasonable expectations, in terms of treatment.     Eyes:  Visual scanning/ tracking without deficit.     Ears:  Response to auditory stimuli is normal.  No  hearing aid devices.  Auricles in proper alignment.     Lymphatic:  Popliteal lymph nodes not enlarged.     Lungs:  Non labored breathing, non labored speech. No cough.  No audible wheezing. Even, quiet breathing.       Vascular:  Pedal pulses are palpable bilaterally for both the DP and PT arteries.  CFT < 3 sec.  No ankle varicosities or edema.  Pedal hair growth noted.     Neuro:  Alert and oriented x 3. Coordinated gait.  Light touch sensation is intact to the L4, L5, S1 distributions. No obvious deficits.  No evidence of neurological-based weakness, spasticity, or contracture in the lower extremities.     Derm: Normal texture and turgor.  No ecchymosis, or cyanosis.  Hair growth noted.        Musculoskeletal:     Patient is ambulatory without an assistive device or brace.  No gross deformities.  Normal arch with weightbearing.  No forefoot or rear foot deformities noted.  MS 5/5 all compartments.  Normal ROM all fore foot and rearfoot joints.  No equinus.  left  great toe nail gryphotic and proximal nail border has localized erythema.  Nail feels loose.  negative active drainage/purulence at this time.  No sinus tracts.  No nailbed masses or exostosis.  No pain with range of motion of IPJ or MTPJ.  No ascending cellulitis.    ASSESSMENT:    Onychocryptosis with paronychia left toe.    Discussed etiology and treatment options in detail w/ the pt.  The potential causes and nature of an ingrown toenail were discussed with the patient.  We reviewed the natural history/prognosis of the condition and potential risks if no treatment is provided.      Treatment options discussed included conservative management (oral antibiotics, soaking of foot, adequate width shoes)  as well as surgical management (partial or total nail removal).  The pros and cons of both forms of treatment were reviewed.  Handout given to patient.      After thorough discussion and answering all questions, the patient elected to have nail avulsion.  Obtained consent, used 3cc of 1% lidocaine plain to block left first toe.  Sterile prep, then avulsed the left hallux nail.  No evidence of deep abscess noted.  Pt tolerated procedure well.  Sterile bandage placed, gave wound care instruction.  Return to clinic prn.  Thank you for allowing me participate in the care of this patient.        Dameon Powers DPM, FACFAS

## 2018-10-08 ENCOUNTER — MYC REFILL (OUTPATIENT)
Dept: FAMILY MEDICINE | Facility: CLINIC | Age: 73
End: 2018-10-08

## 2018-10-08 DIAGNOSIS — J44.9 COPD (CHRONIC OBSTRUCTIVE PULMONARY DISEASE) (H): ICD-10-CM

## 2018-10-08 DIAGNOSIS — K21.9 GERD (GASTROESOPHAGEAL REFLUX DISEASE): ICD-10-CM

## 2018-10-08 RX ORDER — BUDESONIDE AND FORMOTEROL FUMARATE DIHYDRATE 160; 4.5 UG/1; UG/1
2 AEROSOL RESPIRATORY (INHALATION) 2 TIMES DAILY
Qty: 3 INHALER | Refills: 1 | Status: SHIPPED | OUTPATIENT
Start: 2018-10-08 | End: 2019-08-02

## 2018-10-08 RX ORDER — OMEPRAZOLE 40 MG/1
40 CAPSULE, DELAYED RELEASE ORAL DAILY
Qty: 90 CAPSULE | Refills: 1 | Status: SHIPPED | OUTPATIENT
Start: 2018-10-08 | End: 2019-04-05

## 2018-10-08 NOTE — TELEPHONE ENCOUNTER
Message from [employer]:  Original authorizing provider: MD Iris CAMARGO would like a refill of the following medications:  budesonide-formoterol (SYMBICORT) 160-4.5 MCG/ACT Inhaler [NIKOLE BRAVO MD]    Preferred pharmacy: [facility] [address] [place] [address] [address]    Comment:

## 2018-10-08 NOTE — TELEPHONE ENCOUNTER
Message from [employer]:  Original authorizing provider: MD Iris CAMARGO would like a refill of the following medications:  omeprazole (PRILOSEC) 40 MG capsule [NIKOLE BRAVO MD]    Preferred pharmacy: [facility] [address] [place] [address] [address]    Comment:

## 2018-11-11 ENCOUNTER — RADIANT APPOINTMENT (OUTPATIENT)
Dept: GENERAL RADIOLOGY | Facility: CLINIC | Age: 73
End: 2018-11-11
Attending: NURSE PRACTITIONER
Payer: COMMERCIAL

## 2018-11-11 ENCOUNTER — OFFICE VISIT (OUTPATIENT)
Dept: URGENT CARE | Facility: URGENT CARE | Age: 73
End: 2018-11-11
Payer: COMMERCIAL

## 2018-11-11 VITALS
OXYGEN SATURATION: 94 % | TEMPERATURE: 98.1 F | DIASTOLIC BLOOD PRESSURE: 80 MMHG | WEIGHT: 117 LBS | SYSTOLIC BLOOD PRESSURE: 153 MMHG | BODY MASS INDEX: 18.88 KG/M2 | HEART RATE: 96 BPM

## 2018-11-11 DIAGNOSIS — R11.0 NAUSEA: ICD-10-CM

## 2018-11-11 DIAGNOSIS — R05.9 COUGH: ICD-10-CM

## 2018-11-11 DIAGNOSIS — J42 CHRONIC BRONCHITIS, UNSPECIFIED CHRONIC BRONCHITIS TYPE (H): Primary | ICD-10-CM

## 2018-11-11 LAB
ALBUMIN UR-MCNC: 100 MG/DL
APPEARANCE UR: CLEAR
BACTERIA #/AREA URNS HPF: ABNORMAL /HPF
BILIRUB UR QL STRIP: ABNORMAL
COLOR UR AUTO: YELLOW
ERYTHROCYTE [DISTWIDTH] IN BLOOD BY AUTOMATED COUNT: 12.9 % (ref 10–15)
GLUCOSE UR STRIP-MCNC: NEGATIVE MG/DL
HCT VFR BLD AUTO: 46.6 % (ref 35–47)
HGB BLD-MCNC: 15.2 G/DL (ref 11.7–15.7)
HGB UR QL STRIP: ABNORMAL
KETONES UR STRIP-MCNC: ABNORMAL MG/DL
LEUKOCYTE ESTERASE UR QL STRIP: NEGATIVE
MCH RBC QN AUTO: 30.1 PG (ref 26.5–33)
MCHC RBC AUTO-ENTMCNC: 32.6 G/DL (ref 31.5–36.5)
MCV RBC AUTO: 92 FL (ref 78–100)
MUCOUS THREADS #/AREA URNS LPF: PRESENT /LPF
NITRATE UR QL: NEGATIVE
NON-SQ EPI CELLS #/AREA URNS LPF: ABNORMAL /LPF
PH UR STRIP: 6 PH (ref 5–7)
PLATELET # BLD AUTO: 309 10E9/L (ref 150–450)
RBC # BLD AUTO: 5.05 10E12/L (ref 3.8–5.2)
RBC #/AREA URNS AUTO: ABNORMAL /HPF
SOURCE: ABNORMAL
SP GR UR STRIP: >1.03 (ref 1–1.03)
UROBILINOGEN UR STRIP-ACNC: 0.2 EU/DL (ref 0.2–1)
WBC # BLD AUTO: 7.1 10E9/L (ref 4–11)
WBC #/AREA URNS AUTO: ABNORMAL /HPF
YEAST #/AREA URNS HPF: ABNORMAL /HPF

## 2018-11-11 PROCEDURE — 71046 X-RAY EXAM CHEST 2 VIEWS: CPT | Mod: FY

## 2018-11-11 PROCEDURE — 81001 URINALYSIS AUTO W/SCOPE: CPT | Performed by: NURSE PRACTITIONER

## 2018-11-11 PROCEDURE — 36415 COLL VENOUS BLD VENIPUNCTURE: CPT | Performed by: NURSE PRACTITIONER

## 2018-11-11 PROCEDURE — 99214 OFFICE O/P EST MOD 30 MIN: CPT | Performed by: NURSE PRACTITIONER

## 2018-11-11 PROCEDURE — 85027 COMPLETE CBC AUTOMATED: CPT | Performed by: NURSE PRACTITIONER

## 2018-11-11 RX ORDER — ONDANSETRON 8 MG/1
8 TABLET, ORALLY DISINTEGRATING ORAL
Qty: 20 TABLET | Refills: 1 | Status: SHIPPED | OUTPATIENT
Start: 2018-11-11 | End: 2019-01-25

## 2018-11-11 RX ORDER — PREDNISONE 20 MG/1
20 TABLET ORAL 2 TIMES DAILY
Qty: 10 TABLET | Refills: 0 | Status: SHIPPED | OUTPATIENT
Start: 2018-11-11 | End: 2019-04-01

## 2018-11-11 ASSESSMENT — PAIN SCALES - GENERAL: PAINLEVEL: MILD PAIN (3)

## 2018-11-11 NOTE — NURSING NOTE
"Chief Complaint   Patient presents with     Cough     C/o for the last 2-3 weeks cough, body aches, headaches, nausea and vomiting last week, chills, feels feverish.       Initial /80  Pulse 96  Temp 98.1  F (36.7  C) (Oral)  Wt 117 lb (53.1 kg)  SpO2 94%  BMI 18.88 kg/m2 Estimated body mass index is 18.88 kg/(m^2) as calculated from the following:    Height as of 9/5/18: 5' 6\" (1.676 m).    Weight as of this encounter: 117 lb (53.1 kg)..  BP completed using cuff size: reggie Real CMA    "

## 2018-11-11 NOTE — MR AVS SNAPSHOT
After Visit Summary   11/11/2018    Iris Carrion    MRN: [id number]           Patient Information     Date Of Birth          1945        Visit Information        Provider Department      11/11/2018 12:35 PM Livia Cohn APRN CNP [facility]        Today's Diagnoses     Chronic bronchitis (H)    -  1    Nausea           Follow-ups after your visit        Your next 10 appointments already scheduled     Mar 06, 2019 10:15 AM CST   LAB with LAB [facility])    [address] [place] [id number] [phone]           Please do not eat 10-12 hours before your appointment if you are coming in fasting for labs on lipids, cholesterol, or glucose (sugar). This does not apply to pregnant women. Water, hot tea and black coffee (with nothing added) are okay. Do not drink other fluids, diet soda or chew gum.            Mar 06, 2019 11:00 AM CST   Return Visit with CHANDLER Linares CNP   [facility])    [address] [place] [id number] [phone]              Who to contact     If you have questions or need follow up information about today's clinic visit or your schedule please contact [facility] directly at [phone].  Normal or non-critical lab and imaging results will be communicated to you by MyChart, letter or phone within 4 business days after the clinic has received the results. If you do not hear from us within 7 days, please contact the clinic through MyChart or phone. If you have a critical or abnormal lab result, we will notify you by phone as soon as possible.  Submit refill requests through [employer] or call your pharmacy and they will forward the refill request to us. Please allow 3 business days for your refill to be completed.          Additional Information About Your Visit        MyChart Information      [employer] gives you secure access to your electronic health record. If you see a primary care provider, you can also send messages to your care team and make appointments. If you have questions, please call your primary care clinic.  If you do not have a primary care provider, please call [phone] and they will assist you.        Care EveryWhere ID     This is your Care EveryWhere ID. This could be used by other organizations to access your [place] medical records  [id number]        Your Vitals Were     Pulse Temperature Pulse Oximetry BMI (Body Mass Index)          96 98.1  F (36.7  C) (Oral) 94% 18.88 kg/m2         Blood Pressure from Last 3 Encounters:   11/11/18 153/80   09/19/18 122/68   09/10/18 142/70    Weight from Last 3 Encounters:   11/11/18 117 lb (53.1 kg)   09/19/18 120 lb (54.4 kg)   09/10/18 121 lb (54.9 kg)              We Performed the Following     *UA reflex to Microscopic     CBC with platelets     Urine Microscopic          Today's Medication Changes          These changes are accurate as of 11/11/18  1:47 PM.  If you have any questions, ask your nurse or doctor.               Start taking these medicines.        Dose/Directions    ondansetron 8 MG ODT tab   Commonly known as:  ZOFRAN ODT   Used for:  Nausea   Started by:  Livia Cohn APRN CNP        Dose:  8 mg   Take 1 tablet (8 mg) by mouth 3 times daily (before meals)   Quantity:  20 tablet   Refills:  1         These medicines have changed or have updated prescriptions.        Dose/Directions    * predniSONE 20 MG tablet   Commonly known as:  DELTASONE   This may have changed:  Another medication with the same name was added. Make sure you understand how and when to take each.   Used for:  COPD exacerbation (H)   Changed by:  Livia Cohn APRN CNP        Dose:  20 mg   Take 1 tablet (20 mg) by mouth daily   Quantity:  5 tablet   Refills:  0       * predniSONE 20 MG tablet   Commonly known as:  DELTASONE   This may have  changed:  You were already taking a medication with the same name, and this prescription was added. Make sure you understand how and when to take each.   Changed by:  Livia Cohn APRN CNP        Dose:  20 mg   Take 1 tablet (20 mg) by mouth 2 times daily   Quantity:  10 tablet   Refills:  0       * Notice:  This list has 2 medication(s) that are the same as other medications prescribed for you. Read the directions carefully, and ask your doctor or other care provider to review them with you.         Where to get your medicines      These medications were sent to [facility] [address] [facility], [place] - [address]  [address], [facility] [address]     Phone:  [phone]     ondansetron 8 MG ODT tab    predniSONE 20 MG tablet                Primary Care Provider Office Phone # Fax #    Pillo Koehler [phone] [address] [facility] [address]        Equal Access to Services     [facility] : Hadii aad ku hadasho Soomaali, waaxda luqadaha, qaybta kaalmada adeegyada, waxay idiin hayaan martin nichols . So [facility] [phone].    ATENCIÓN: Si dharmeshla espraymond, tiene a pena disposición servicios gratuitos de asistencia lingüística. Llame al [phone].    We comply with applicable federal civil rights laws and [place] laws. We do not discriminate on the basis of race, color, national origin, age, disability, sex, sexual orientation, or gender identity.            Thank you!     Thank you for choosing [facility]  for your care. Our goal is always to provide you with excellent care. Hearing back from our patients is one way we can continue to improve our services. Please take a few minutes to complete the written survey that you may receive in the mail after your visit with us. Thank you!             Your Updated Medication List - Protect others around you: Learn how to safely use, store and throw away your medicines at www.disposemymeds.org.           This list is accurate as of 11/11/18  1:47 PM.  Always use your most recent med list.                   Brand Name Dispense Instructions for use Diagnosis    albuterol 108 (90 Base) MCG/ACT inhaler    PROAIR HFA/PROVENTIL HFA/VENTOLIN HFA    1 Inhaler    Inhale 2 puffs into the lungs every 6 hours as needed for shortness of breath / dyspnea or wheezing    Chronic bronchitis, unspecified chronic bronchitis type (H)       atorvastatin 20 MG tablet    LIPITOR    90 tablet    Take 1 tablet (20 mg) by mouth daily    Dyslipidemia       BIOTIN PO      Take 1 tablet by mouth daily.        budesonide-formoterol 160-4.5 MCG/ACT Inhaler    SYMBICORT    3 Inhaler    Inhale 2 puffs into the lungs 2 times daily    COPD (chronic obstructive pulmonary disease) (H)       CALCIUM 500 PO      Take 1 tablet by mouth daily.        doxycycline 100 MG capsule    VIBRAMYCIN    14 capsule    Take 1 capsule (100 mg) by mouth 2 times daily    COPD exacerbation (H), Cellulitis of toe of left foot       fluticasone 50 MCG/ACT spray    FLONASE    1 Bottle    Spray 2 sprays into both nostrils daily    Non-seasonal allergic rhinitis due to animal hair and dander       ibuprofen 600 MG tablet    ADVIL/MOTRIN    40 tablet    Take 1 tablet (600 mg) by mouth every 6 hours as needed for moderate pain    S/P LUCILA-BSO       ipratropium - albuterol 0.5 mg/2.5 mg/3 mL 0.5-2.5 (3) MG/3ML neb solution    DUONEB    30 vial    Take 1 vial (3 mLs) by nebulization every 4 hours as needed for shortness of breath / dyspnea or wheezing    Chronic bronchitis, unspecified chronic bronchitis type (H)       loratadine 10 MG tablet    CLARITIN     Take 10 mg by mouth daily        losartan 50 MG tablet    COZAAR    90 tablet    Take 1 tablet (50 mg) by mouth daily    Essential hypertension with goal blood pressure less than 140/90, Hypokalemia       MAGNESIUM OXIDE PO      Take 200 mg by mouth 2 times daily        omeprazole 40 MG capsule    priLOSEC    90 capsule     Take 1 capsule (40 mg) by mouth daily Take 30-60 minutes before a meal.    GERD (gastroesophageal reflux disease)       ondansetron 8 MG ODT tab    ZOFRAN ODT    20 tablet    Take 1 tablet (8 mg) by mouth 3 times daily (before meals)    Nausea       order for DME     1 Device    Equipment being ordered: Nebulizer    Chronic bronchitis, unspecified chronic bronchitis type (H)       * predniSONE 20 MG tablet    DELTASONE    5 tablet    Take 1 tablet (20 mg) by mouth daily    COPD exacerbation (H)       * predniSONE 20 MG tablet    DELTASONE    10 tablet    Take 1 tablet (20 mg) by mouth 2 times daily        tiotropium 18 MCG capsule    SPIRIVA HANDIHALER    90 capsule    Inhale 1 capsule (18 mcg) into the lungs daily Inhale contents of one capsule. .    COPD (chronic obstructive pulmonary disease) (H)       ZINC 15 PO      Take by mouth daily        * Notice:  This list has 2 medication(s) that are the same as other medications prescribed for you. Read the directions carefully, and ask your doctor or other care provider to review them with you.

## 2018-11-11 NOTE — PROGRESS NOTES
SUBJECTIVE:   Iris Carrion is a 73 year old female presenting with a chief complaint of cough - productive.  Onset of symptoms was 3 week(s) ago.  Course of illness is same.    Severity moderate  Current and Associated symptoms: fever chills abdominal pain bodyaches  Treatment measures tried include Fluids and Rest.Theraflu  Predisposing factors include HX of COPD.    Abdominal pain middle, nausea mostly in the morning   Decreased appetite   No uti sx     Past Medical History:   Diagnosis Date     Cervical high risk HPV (human papillomavirus) test positive 10/31/12    type 18     COPD (chronic obstructive pulmonary disease) (H)      GERD (gastroesophageal reflux disease)      Hx of colposcopy with cervical biopsy 11/2012    negative     Hypertension 2013     Osteoporosis      Paroxysmal supraventricular tachycardia (H) 9/6/2017     Peritoneal carcinomatosis (H) 8/24/2015     Pneumonia      Uncomplicated asthma 1980     Current Outpatient Prescriptions   Medication Sig Dispense Refill     albuterol (PROAIR HFA/PROVENTIL HFA/VENTOLIN HFA) 108 (90 Base) MCG/ACT Inhaler Inhale 2 puffs into the lungs every 6 hours as needed for shortness of breath / dyspnea or wheezing 1 Inhaler 1     atorvastatin (LIPITOR) 20 MG tablet Take 1 tablet (20 mg) by mouth daily 90 tablet 3     BIOTIN PO Take 1 tablet by mouth daily.       budesonide-formoterol (SYMBICORT) 160-4.5 MCG/ACT Inhaler Inhale 2 puffs into the lungs 2 times daily 3 Inhaler 1     Calcium Carbonate (CALCIUM 500 PO) Take 1 tablet by mouth daily.       doxycycline (VIBRAMYCIN) 100 MG capsule Take 1 capsule (100 mg) by mouth 2 times daily 14 capsule 0     fluticasone (FLONASE) 50 MCG/ACT spray Spray 2 sprays into both nostrils daily 1 Bottle 1     ibuprofen (ADVIL,MOTRIN) 600 MG tablet Take 1 tablet (600 mg) by mouth every 6 hours as needed for moderate pain 40 tablet 0     ipratropium - albuterol 0.5 mg/2.5 mg/3 mL (DUONEB) 0.5-2.5 (3) MG/3ML neb solution Take 1 vial  (3 mLs) by nebulization every 4 hours as needed for shortness of breath / dyspnea or wheezing 30 vial 5     loratadine (CLARITIN) 10 MG tablet Take 10 mg by mouth daily       losartan (COZAAR) 50 MG tablet Take 1 tablet (50 mg) by mouth daily 90 tablet 3     MAGNESIUM OXIDE PO Take 200 mg by mouth 2 times daily       omeprazole (PRILOSEC) 40 MG capsule Take 1 capsule (40 mg) by mouth daily Take 30-60 minutes before a meal. 90 capsule 1     order for DME Equipment being ordered: Nebulizer 1 Device 0     predniSONE (DELTASONE) 20 MG tablet Take 1 tablet (20 mg) by mouth daily 5 tablet 0     tiotropium (SPIRIVA HANDIHALER) 18 MCG capsule Inhale 1 capsule (18 mcg) into the lungs daily Inhale contents of one capsule. . 90 capsule 2     Zinc Sulfate (ZINC 15 PO) Take by mouth daily       Social History   Substance Use Topics     Smoking status: Former Smoker     Packs/day: 1.00     Years: 30.00     Types: Cigarettes     Start date: 8/11/1965     Quit date: 10/10/2003     Smokeless tobacco: Never Used     Alcohol use No       ROS:  Review of systems negative except as stated above.    OBJECTIVE:  /80  Pulse 96  Temp 98.1  F (36.7  C) (Oral)  Wt 117 lb (53.1 kg)  SpO2 94%  BMI 18.88 kg/m2  GENERAL APPEARANCE: Alert and no distress  EYES: EOMI,  PERRL, conjunctiva clear  HENT: ear canals and TM's normal.  Nose and mouth without ulcers, erythema or lesions  NECK: supple, nontender, no lymphadenopathy  RESP: lungs clear to auscultation - no rales, rhonchi or wheezes  CV: regular rates and rhythm, normal S1 S2, no murmur noted  SKIN: no suspicious lesions or rashes    Component      Latest Ref Rng & Units 11/11/2018   Color Urine       Yellow   Appearance Urine       Clear   Glucose Urine      NEG:Negative mg/dL Negative   Bilirubin Urine      NEG:Negative Small (A)   Ketones Urine      NEG:Negative mg/dL Trace (A)   Specific Gravity Urine      1.003 - 1.035 >1.030   Blood Urine      NEG:Negative Moderate (A)   pH  Urine      5.0 - 7.0 pH 6.0   Protein Albumin Urine      NEG:Negative mg/dL 100 (A)   Urobilinogen Urine      0.2 - 1.0 EU/dL 0.2   Nitrite Urine      NEG:Negative Negative   Leukocyte Esterase Urine      NEG:Negative Negative   Source       Midstream Urine   WBC      4.0 - 11.0 10e9/L 7.1   RBC Count      3.8 - 5.2 10e12/L 5.05   Hemoglobin      11.7 - 15.7 g/dL 15.2   Hematocrit      35.0 - 47.0 % 46.6   MCV      78 - 100 fl 92   MCH      26.5 - 33.0 pg 30.1   MCHC      31.5 - 36.5 g/dL 32.6   RDW      10.0 - 15.0 % 12.9   Platelet Count      150 - 450 10e9/L 309   WBC Urine      OTO5:0 - 5 /HPF 0 - 5   RBC Urine      OTO2:O - 2 /HPF 5-10 (A)   Squamous Epithelial /LPF Urine      FEW:Few /LPF Few   Bacteria Urine      NEG:Negative /HPF Few (A)   Mucous Urine      NEG:Negative /LPF Present (A)   Yeast Urine      NEG:Negative /HPF Few (A)     Chest xray: IMPRESSION: Lungs are hyperinflated. Scattered basilar opacities are  favored to represent atelectasis. Calcified right lower lobe lung  nodule is unchanged. Heart size is unchanged. Spine is demineralized  with multiple compression deformities, unchanged.      ASSESSMENT:  (J42) Chronic bronchitis (H)  (primary encounter diagnosis)  Plan: Prednisone 20mg BID X 5 days    (R11.0) Nausea  Plan: ondansetron (ZOFRAN ODT) 8 MG ODT tab prn    Home care advised, patient education given  Discussed emergent symptoms if occur to ER  Not improving follow up with pcp    Livia Cohn, APRN CNP

## 2018-11-13 ENCOUNTER — MYC REFILL (OUTPATIENT)
Dept: FAMILY MEDICINE | Facility: CLINIC | Age: 73
End: 2018-11-13

## 2018-11-13 DIAGNOSIS — J42 CHRONIC BRONCHITIS, UNSPECIFIED CHRONIC BRONCHITIS TYPE (H): ICD-10-CM

## 2018-11-14 RX ORDER — IPRATROPIUM BROMIDE AND ALBUTEROL SULFATE 2.5; .5 MG/3ML; MG/3ML
1 SOLUTION RESPIRATORY (INHALATION) EVERY 4 HOURS PRN
Qty: 30 VIAL | Refills: 5 | Status: SHIPPED | OUTPATIENT
Start: 2018-11-14 | End: 2019-06-17

## 2018-11-14 NOTE — TELEPHONE ENCOUNTER
Message from [employer]:  Original authorizing provider: MD Iris CAMARGO would like a refill of the following medications:  ipratropium - albuterol 0.5 mg/2.5 mg/3 mL (DUONEB) 0.5-2.5 (3) MG/3ML neb solution [NIKOLE BRAVO MD]    Preferred pharmacy: [address]    Comment:

## 2019-01-25 ENCOUNTER — E-VISIT (OUTPATIENT)
Dept: FAMILY MEDICINE | Facility: CLINIC | Age: 74
End: 2019-01-25
Payer: COMMERCIAL

## 2019-01-25 DIAGNOSIS — R11.0 NAUSEA: ICD-10-CM

## 2019-01-25 PROCEDURE — 99207 ZZC NO BILLABLE SERVICE THIS VISIT: CPT | Performed by: FAMILY MEDICINE

## 2019-01-25 RX ORDER — ONDANSETRON 8 MG/1
8 TABLET, ORALLY DISINTEGRATING ORAL
Qty: 20 TABLET | Refills: 1 | Status: SHIPPED | OUTPATIENT
Start: 2019-01-25 | End: 2020-07-01

## 2019-03-30 NOTE — PROGRESS NOTES
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 She completed treatment 2016. She is here today for a surveillance visit.       Treatment History:  Ms Carrion started having her symptoms of constipation and bloating (May/2015). Her doctor recommended miralax. Symptoms continued to occurred so they followed with a CT scan which showed extensive peritoneal metastatic disease.   8/17/15: CT c/a/p IMPRESSION:   1. Left renal cortical cysts. No enhancing renal mass. No urinary tract calculi or hydronephrosis. Collecting systems, ureters and bladder are unremarkable.  2. Extensive peritoneal metastatic disease with moderate ascites likely related to malignant ascites. Followup as clinically warranted. An obvious etiology for this metastatic disease is not  visualized.  3. No bowel obstruction or diverticulitis. Serosal metastatic disease is present. No obvious colonic mass. Followup colonoscopy as needed for further assessment.  4. Moderate-sized hiatal hernia. No gastric obstruction.      8/22/15:  1950      8/27/15: New patient visit. she continues to have constipation and bloating. She has lost about 5 pounds. She also admits to having lood in urine as well as urinary urgenrgy. She has loose bm bm every other day.       Plan: chemotherapy with Taxol/Carbo. To be considered for these clinical trials: PARP, avatar circulating tumor study, small neogman.       8/31/15: CT guided omental nodule biopsy. Final diagnosis:  Adenocarcinoma, with features consistent with endometrioid adenocarcinoma       COMMENT:  Immunohistochemical staining was obtained with appropriate control slides for ER, [place] 8 and WT-1. Malignant cells are positive for all three markers confirming their endometrioid cell differentiation.  "      Plan: treat like an ovarian cancer but will do dose dense Taxol carbo for 3 cycles then be re-evalated with scan       9/4/15-10/14/15: Cycle #1-3 dose dense Taxol/Carbo.  2008, 470, 30.       11/19/15: CT c/a/p IMPRESSION:   1. Decreased peritoneal carcinomatosis and malignant ascites.  2. Severe emphysema and scattered areas of likely postinfectious scarring. Several of these areas particularly along the right minor fissure appear more nodular malignancy cannot be excluded. Recommend 3-six-month interval followup.  3. Diverticulosis.  4. Osteopenia and numerous compression deformities, none of which appear acute.  5. Stable large hiatal hernia.      11/23/15:  17      11/25/15: Exploratory Laparotomy, Repair Umbilical Hernia, Total Abdominal Hysterectomy, Right Salpingo Oophorectomy, Left Salpingectomy, Appendectomy, Complete Omentectomy, CUSA Tumor Debulking,Cystoscopy, Left PeriAortc Lymph Node Biopsy, Insertion Peritoneal Port, Mobilization of Liver, Ablation of Liver Nodules Open Repair Hiatal Hernia Repair      Surgical pathology report:  FINAL DIAGNOSIS:  A: Umbilical hernia sac, repair:  - Adenocarcinoma  - Fibroadipose tissue, partially surfaced by mesothelium, consistent with hernia sac  B: Hepatic ligament, biopsy:  - One benign lymph node (0/1)  C: Splenic ligament implant, biopsy:  - Adenocarcinoma  D: Inferior vena cava implant, biopsy:  - Adenocarcinoma  E: Omentum, omentectomy:  - Adenocarcinoma  F: \"Liver nodule\", biopsy:  - Fibro-adipose tissue with adenocarcinoma  G: Right pelvic peritoneum, biopsy:  - Adenocarcinoma  H: Cul-de-sac peritoneum, biopsy:  - Adenocarcinoma  I: Uterus, cervix, right ovary and fallopian tube, hysterectomy with right salpingo-oophorectomy:  - Adenocarcinoma present in the soft tissue at the left adnexal area,cervix and right fallopian tube, most likely endometrioid adenocarcinoma  - Atrophic endometrium  - Benign endometrial polyp  - Myometrium with no " significant histologic abnormality  - Atrophic cervix and nabothian cysts  - Tumor present on the external aspect of the cervix  - Right ovary with numerous corpora albicantia and simple epithelial cysts  - Right fallopian tube with a single focus of carcinoma  - See comment  J: Large bowel epiploica, biopsy:  - Adenocarcinoma  K: Appendix, appendectomy:  - Appendix with fibrous replacement  L: Lymph nodes, left periaortic, excision:  - One benign lymph node (0/1)      12/14/15:  46. post op visit. reviewed the results of her surgical pathology copy of report was given to pt. She will start IV/IP taxol/Cddp chemo tomorrow. IV fluids on Thursday and Friday at [facility] and Saturday and Sunday at the [facility]. Day 8 taxol will be on dec 22nd. She will need to have fluids again on 23rd and 24th. She will also need Neupogen.       Plan: 3 cycles IV/IP chemo.       12/15/15: Cycle #1 IV/IP chemo.    1/6/16: Cycle #2 IV/IP chemo.   18. Delayed d/t neutropenia; given 1/13. Neupogen and Neulasta added.    2/1/16: Cycle #3 IV/IP chemo.  23  2/29/16:  41. CT cap  Findings:  Right internal jugular Port-A-Cath with tip at atriocaval junction. The heart is not enlarged. No significant pericardial effusion. The mediastinal great vessels are normal in caliber. No central pulmonary embolus. Thoracic aortic atherosclerotic calcifications. Calcified mediastinal and hilar lymph nodes. No lymphadenopathy in the chest by size criteria. The central tracheobronchial tree is patent. New nodular pleural-based consolidative opacity in the left lower lobe measuring 26 x 29 mm (image 110, series 7). There are a few other new subcentimeter nodules in the left lower lobe. For example, 7 mm nodule on image 104, series 4. Severe centrilobular emphysematous changes. Likely nodular scarring in the right upper lobe (image 32, series 7) is not significantly changed. Other scattered fibrotic changes, most  prominent in the right lower lobe, do not appear significantly changed. Calcified granuloma in the right lower lobe. Postsurgical changes of a hernia repair and hysterectomy/bilateral salpingo-oophorectomy. There is no abnormal soft tissue within the  resection bed to suggest locally recurrent disease. No suspicious liver lesions. Unchanged extrahepatic and central intrahepatic biliary dilatation, likely reservoir effect in the setting of cholecystectomy. Atrophic pancreas. Calcified splenic granulomata. Unchanged  hypoattenuating lesions in the kidneys, the largest of which on the left are compatible with cysts. No abnormally dilated or thickened loops of bowel. No free intraperitoneal air or free fluid. Colonic diverticuli without evidence of active inflammation. Duodenal diverticulum. Intra-abdominal port with tip in the left pelvis. Aortoiliac atheromatous plaque without aneurysmal dilatation. No abnormally enlarged abdominal or pelvic lymph nodes. No definite residual peritoneal/or omental nodules. Marked generalized osteopenia. Redemonstration of multiple compression deformities throughout the thoracolumbar spine, greatest at T8 with greater than 50% loss of vertebral body height, not significantly changed. There is mild increased associated sclerosis. Associated kyphosis of the upper thoracic spine. There are 6 lumbar type vertebral bodies. No new aggressive osseous lesions.  Impression:  1. Continued positive response to therapy in the abdomen/pelvis with no definite residual peritoneal/omental metastasis.  2. New pleural-based consolidative opacity in the left lower lobe. There are a few other new subcentimeter nodules in the left lower lobe. This may be secondary to infection/inflammation, but metastases should be considered and short-term interval followup is recommended.      4/6/16 CT/PET IMPRESSION: Patient's history of ovarian carcinoma.  1. Left lower lobe 2 cm irregular pulmonary nodular density is  "minimally metabolic and argues that it is benign such as rounded atelectasis and less likely rounded pneumonia.  2. Underlying COPD with scattered scars within both lungs but no hypermetabolic structures to indicate malignancy.  3. No evidence of carcinomatosis, peritoneal or abdominal metastasis, or metastatic disease to any of the organs of the abdomen or pelvis.  4. Possible mild esophagitis at the GE junction.      4/11/16:  8. \"Had been unclear as to why  up to 41 from 23-this is concerning in light of recent completion of cddp/taxol, however the  is 8. CT PET is negative for metastatic disease. Recommend q 3 month visits x 2 years, then every 6 mos for 3 additional years.\"      7/25/16:   9.  7/27/16: CT cap Impression:    1. Interval resolution of left lower lobe spiculated pulmonary nodule. This likely represented atelectasis or consolidation.  2. Significant panlobular emphysematous changes of both lungs.  3. Spiculated right upper lobe pulmonary nodule measuring up to 8 mm, unchanged since 11/19/2015, although progressed since 2/4/2011. This may represent scarring. Recommend attention on followup.  4. New right hip fracture, possibly pathologic.  5. New sclerotic lesions of the sternum which are indeterminate. This may represent metastatic disease.      Of note, patient tripped over a cord at home 5/30/16 and fell and had an avulsed fx of her right hip. She underwent PT for this. She subsequently fell backwards 6/13/16 and fractured her left wrist and seeing OT for this.       She was recommended to have a bone scan done. This was scheduled and the patient delayed her scan until 11/2016 which showed uptake compatible with healing fractures.       10/31/16:  15.  12/5/16:  11.  2/13/17:  12.  5/8/17:  11. Port removed.   8/30/17:  13.  12/4/17:   11.  3/5/18:  12.  9/5/18:  13.  4/1/19:  pending.         Today she comes to clinic " and denies any concerns. She denies any vaginal bleeding, no changes in her bowel or bladder habits, no nausea/emesis, no lower extremity edema, and no difficulties eating or sleeping. She denies any abdominal discomfort/bloating, no fevers or chills, and no chest pain or shortness of breath. She is due for her annual exam and mammogram this month. Her colonoscopy is current. She is not sexually active. She has a BP cuff at home but has not been using it very often.            Review of Systems:    Systemic           no weight changes; no fever; no chills; no night sweats; no appetite changes  Skin           no rashes, or lesions  Eye           no irritation; no changes in vision  Katarzyna-Laryngeal           no dysphagia; no hoarseness   Pulmonary    no cough; no shortness of breath  Cardiovascular    no chest pain; no palpitations; + HTN  Gastrointestinal    no diarrhea; no constipation; no abdominal pain; no changes in bowel habits; no blood in stool  Genitourinary   no urinary frequency; no urinary urgency; no dysuria; no pain; no abnormal vaginal discharge; no abnormal vaginal bleeding  Breast    no breast discharge; no breast changes; no breast pain  Musculoskeletal    no myalgias; no arthralgias; no back pain  Psychiatric           no depressed mood; no anxiety    Hematologic              no tender lymph nodes; no noticeable swellings or lumps   Endocrine    no hot flashes; no heat/cold intolerance         Neurological   no tremor; no numbness and tingling; no headaches; no difficulty sleeping      Past Medical History:    Past Medical History:   Diagnosis Date     Cervical high risk HPV (human papillomavirus) test positive 10/31/12    type 18     COPD (chronic obstructive pulmonary disease) (H)      GERD (gastroesophageal reflux disease)      Hx of colposcopy with cervical biopsy 11/2012    negative     Hypertension 2013     Osteoporosis      Paroxysmal supraventricular tachycardia (H) 9/6/2017     Peritoneal  carcinomatosis (H) 8/24/2015     Pneumonia      Uncomplicated asthma 1980         Past Surgical History:    Past Surgical History:   Procedure Laterality Date     CHOLECYSTECTOMY  6/2014     COLONOSCOPY  12/4/2012    Procedure: COLONOSCOPY;  COLONOSCOPY SCREEN;  Surgeon: Mushtaq Lara MD;  Location: [facility] OR     GYN SURGERY       HERNIORRHAPHY HIATAL N/A 11/25/2015    Procedure: HERNIORRHAPHY HIATAL;  Surgeon: Chip Carty MD;  Location: [facility] OR     HYSTERECTOMY TOTAL ABD, ALVIN SALPINGO-OOPHORECTOMY, NODE DISSECTION, TUMOR DEBULKING, COMBINED Bilateral 11/25/2015    Procedure: COMBINED HYSTERECTOMY TOTAL ABDOMINAL, SALPINGO-OOPHORECTOMY, NODE DISSECTION, TUMOR DEBULKING;  Surgeon: Emma Cabrera MD;  Location: [facility] OR         Health Maintenance Due   Topic Date Due     DEXA SCAN SCREENING (SYSTEM ASSIGNED)  08/11/2012     ZOSTER IMMUNIZATION (2 of 3) 06/16/2015     MEDICARE ANNUAL WELLNESS VISIT  04/18/2019     MAMMO Q1 YR  04/26/2019       Current Medications:     Current Outpatient Medications   Medication Sig Dispense Refill     albuterol (PROAIR HFA/PROVENTIL HFA/VENTOLIN HFA) 108 (90 Base) MCG/ACT Inhaler Inhale 2 puffs into the lungs every 6 hours as needed for shortness of breath / dyspnea or wheezing 1 Inhaler 1     atorvastatin (LIPITOR) 20 MG tablet Take 1 tablet (20 mg) by mouth daily 90 tablet 3     BIOTIN PO Take 1 tablet by mouth daily.       budesonide-formoterol (SYMBICORT) 160-4.5 MCG/ACT Inhaler Inhale 2 puffs into the lungs 2 times daily 3 Inhaler 1     Calcium Carbonate (CALCIUM 500 PO) Take 1 tablet by mouth daily.       fluticasone (FLONASE) 50 MCG/ACT spray Spray 2 sprays into both nostrils daily 1 Bottle 1     ibuprofen (ADVIL,MOTRIN) 600 MG tablet Take 1 tablet (600 mg) by mouth every 6 hours as needed for moderate pain 40 tablet 0     ipratropium - albuterol 0.5 mg/2.5 mg/3 mL (DUONEB) 0.5-2.5 (3) MG/3ML neb solution Take 1 vial (3 mLs) by nebulization every 4 hours as  needed for shortness of breath / dyspnea or wheezing 30 vial 5     loratadine (CLARITIN) 10 MG tablet Take 10 mg by mouth daily       losartan (COZAAR) 50 MG tablet Take 1 tablet (50 mg) by mouth daily 90 tablet 3     MAGNESIUM OXIDE PO Take 200 mg by mouth 2 times daily       omeprazole (PRILOSEC) 40 MG capsule Take 1 capsule (40 mg) by mouth daily Take 30-60 minutes before a meal. 90 capsule 1     ondansetron (ZOFRAN ODT) 8 MG ODT tab Take 1 tablet (8 mg) by mouth 3 times daily (before meals) 20 tablet 1     order for DME Equipment being ordered: Nebulizer 1 Device 0     tiotropium (SPIRIVA HANDIHALER) 18 MCG capsule Inhale 1 capsule (18 mcg) into the lungs daily Inhale contents of one capsule. . 90 capsule 2     Zinc Sulfate (ZINC 15 PO) Take by mouth daily       doxycycline (VIBRAMYCIN) 100 MG capsule Take 1 capsule (100 mg) by mouth 2 times daily (Patient not taking: Reported on 2019) 14 capsule 0     predniSONE (DELTASONE) 20 MG tablet Take 1 tablet (20 mg) by mouth 2 times daily (Patient not taking: Reported on 2019.) 10 tablet 0     predniSONE (DELTASONE) 20 MG tablet Take 1 tablet (20 mg) by mouth daily (Patient not taking: Reported on 2019.) 5 tablet 0         Allergies:        Allergies   Allergen Reactions     Levaquin Rash        Social History:     Social History     Tobacco Use     Smoking status: Former Smoker     Packs/day: 1.00     Years: 30.00     Pack years: 30.00     Types: Cigarettes     Start date: 1965     Last attempt to quit: 10/10/2003     Years since quitting: 15.4     Smokeless tobacco: Never Used   Substance Use Topics     Alcohol use: No       History   Drug Use No         Family History:     The patient's family history is notable for:    Family History   Problem Relation Age of Onset     Cancer Brother         testicular     Diabetes Sister      Ovarian Cancer Mother 60     Diabetes Mother              Asthma Father              Diabetes Sister       "Depression/Anxiety Daughter      Depression Daughter      Osteoporosis Sister      Other Cancer Brother      Diabetes Brother      Depression Other         Grandson         Physical Exam:     /75 (BP Location: Left arm)   Pulse 72   Temp 97.5  F (36.4  C) (Oral)   Resp 16   Ht 1.676 m (5' 5.98\")   Wt 53.6 kg (118 lb 2 oz)   SpO2 99%   BMI 19.08 kg/m    Body mass index is 19.08 kg/m .    General Appearance: healthy and alert, no distress     HEENT: no thyromegaly, no palpable nodules or masses        Cardiovascular: regular rate and rhythm, no gallops, rubs or murmurs     Respiratory: lungs clear, no rales, rhonchi or wheezes, normal diaphragmatic excursion    Musculoskeletal: extremities non tender and without edema    Skin: no lesions or rashes     Neurological: normal gait, no gross defects     Psychiatric: appropriate mood and affect                               Hematological: normal cervical, supraclavicular and inguinal lymph nodes     Gastrointestinal:       abdomen soft, non-tender, non-distended, no organomegaly or masses    Genitourinary: External genitalia and urethral meatus appears atrophic.  Vagina is smooth without nodularity or masses; stenotic.  Cervix surgically absent.  Bimanual exam reveal no masses, nodularity or fullness.  Recto-vaginal exam confirms these findings.      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 She completed treatment 2/2016. She is here today for a surveillance visit.    20 minutes were spent with this patient, over 50% of that time was spent in symptom management, treatment planning and in counseling and coordination of care.      Plan:     1.)        Patient to RTC in 6 months for her next surveillance visit and ; today's is pending. Reviewed signs and symptoms for when she should contact the clinic or seek additional care. Patient to contact the clinic with any " questions or concerns in the interim.     2.) Genetic risk factors were assessed and she is negative for mutations in GINGER, BARD1, BRCA1, BRCA2, BRIP1, CDH1, CHEK2, EPCAM, MLH1, MRE11A, MSH2, MSH6, MUTYH, NBN, NF1, PALB2, PMS2, PTEN, RAD50, RAD51C, RAD51D, SMARCA4, STK11, and TP53.       3.) Labs and/or tests ordered include:  .      4.) Health maintenance issues addressed today include annual health maintenance and non-gynecologic issues with PCP.    CHANDLER Jay, WHNP-BC, ANP-BC  Women's Health Nurse Practitioner  Adult Nurse Pracitioner  Division of Gynecologic Oncology          CC  Patient Care Team:  Pillo Koehler MD as PCP - General (Family Practice)  Jessica Lua, JANE as Continuity Care Coordinator (Gyn-Onc)  Iliana Pierre RN as   Pillo Koehler MD as Assigned PCP

## 2019-04-01 ENCOUNTER — ONCOLOGY VISIT (OUTPATIENT)
Dept: ONCOLOGY | Facility: CLINIC | Age: 74
End: 2019-04-01
Payer: COMMERCIAL

## 2019-04-01 VITALS
TEMPERATURE: 97.5 F | RESPIRATION RATE: 16 BRPM | DIASTOLIC BLOOD PRESSURE: 75 MMHG | WEIGHT: 118.13 LBS | HEIGHT: 66 IN | HEART RATE: 72 BPM | SYSTOLIC BLOOD PRESSURE: 139 MMHG | OXYGEN SATURATION: 99 % | BODY MASS INDEX: 18.98 KG/M2

## 2019-04-01 DIAGNOSIS — C78.6 PERITONEAL CARCINOMATOSIS (H): Primary | ICD-10-CM

## 2019-04-01 DIAGNOSIS — C56.2 MALIGNANT NEOPLASM OF OVARY, LEFT (H): ICD-10-CM

## 2019-04-01 LAB — CANCER AG125 SERPL-ACNC: 14 U/ML (ref 0–30)

## 2019-04-01 PROCEDURE — 36415 COLL VENOUS BLD VENIPUNCTURE: CPT | Performed by: NURSE PRACTITIONER

## 2019-04-01 PROCEDURE — 86304 IMMUNOASSAY TUMOR CA 125: CPT | Performed by: NURSE PRACTITIONER

## 2019-04-01 PROCEDURE — 99213 OFFICE O/P EST LOW 20 MIN: CPT | Performed by: NURSE PRACTITIONER

## 2019-04-01 ASSESSMENT — PAIN SCALES - GENERAL: PAINLEVEL: NO PAIN (0)

## 2019-04-01 ASSESSMENT — MIFFLIN-ST. JEOR: SCORE: 1067.31

## 2019-04-01 NOTE — NURSING NOTE
"Oncology Rooming Note    April 1, 2019 9:50 AM   Iris Carrion is a 73 year old female who presents for:    Chief Complaint   Patient presents with     Oncology Clinic Visit     6 month follow up     Initial Vitals: /75 (BP Location: Left arm)   Pulse 72   Temp 97.5  F (36.4  C) (Oral)   Resp 16   Ht 1.676 m (5' 5.98\")   Wt 53.6 kg (118 lb 2 oz)   SpO2 99%   BMI 19.08 kg/m   Estimated body mass index is 19.08 kg/m  as calculated from the following:    Height as of this encounter: 1.676 m (5' 5.98\").    Weight as of this encounter: 53.6 kg (118 lb 2 oz). Body surface area is 1.58 meters squared.  No Pain (0) Comment: Data Unavailable   No LMP recorded. Patient is postmenopausal.  Allergies reviewed: Yes  Medications reviewed: Yes    Medications: Medication refills not needed today.  Pharmacy name entered into [facility]: [facility] [id number] [facility], [place] - [address]    Razia Marin LPN            "

## 2019-04-01 NOTE — LETTER
2019         RE: Iris Carrion  [id number] [facility] [address]        Dear Colleague,    Thank you for referring your patient, Iris Carrion, to the [facility]. Please see a copy of my visit note below.                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 She completed treatment 2016. She is here today for a surveillance visit.       Treatment History:  Ms Carrion started having her symptoms of constipation and bloating (May/2015). Her doctor recommended miralax. Symptoms continued to occurred so they followed with a CT scan which showed extensive peritoneal metastatic disease.   8/17/15: CT c/a/p IMPRESSION:   1. Left renal cortical cysts. No enhancing renal mass. No urinary tract calculi or hydronephrosis. Collecting systems, ureters and bladder are unremarkable.  2. Extensive peritoneal metastatic disease with moderate ascites likely related to malignant ascites. Followup as clinically warranted. An obvious etiology for this metastatic disease is not  visualized.  3. No bowel obstruction or diverticulitis. Serosal metastatic disease is present. No obvious colonic mass. Followup colonoscopy as needed for further assessment.  4. Moderate-sized hiatal hernia. No gastric obstruction.      8/22/15:  1950      8/27/15: New patient visit. she continues to have constipation and bloating. She has lost about 5 pounds. She also admits to having lood in urine as well as urinary urgenrgy. She has loose bm bm every other day.       Plan: chemotherapy with Taxol/Carbo. To be considered for these clinical trials: PARP, avatar circulating tumor study, small neogman.       8/31/15: CT guided omental nodule biopsy. Final diagnosis:  Adenocarcinoma, with features consistent with  "endometrioid adenocarcinoma       COMMENT:  Immunohistochemical staining was obtained with appropriate control slides for ER, [place] 8 and WT-1. Malignant cells are positive for all three markers confirming their endometrioid cell differentiation.       Plan: treat like an ovarian cancer but will do dose dense Taxol carbo for 3 cycles then be re-evalated with scan       9/4/15-10/14/15: Cycle #1-3 dose dense Taxol/Carbo.  2008, 470, 30.       11/19/15: CT c/a/p IMPRESSION:   1. Decreased peritoneal carcinomatosis and malignant ascites.  2. Severe emphysema and scattered areas of likely postinfectious scarring. Several of these areas particularly along the right minor fissure appear more nodular malignancy cannot be excluded. Recommend 3-six-month interval followup.  3. Diverticulosis.  4. Osteopenia and numerous compression deformities, none of which appear acute.  5. Stable large hiatal hernia.      11/23/15:  17      11/25/15: Exploratory Laparotomy, Repair Umbilical Hernia, Total Abdominal Hysterectomy, Right Salpingo Oophorectomy, Left Salpingectomy, Appendectomy, Complete Omentectomy, CUSA Tumor Debulking,Cystoscopy, Left PeriAortc Lymph Node Biopsy, Insertion Peritoneal Port, Mobilization of Liver, Ablation of Liver Nodules Open Repair Hiatal Hernia Repair      Surgical pathology report:  FINAL DIAGNOSIS:  A: Umbilical hernia sac, repair:  - Adenocarcinoma  - Fibroadipose tissue, partially surfaced by mesothelium, consistent with hernia sac  B: Hepatic ligament, biopsy:  - One benign lymph node (0/1)  C: Splenic ligament implant, biopsy:  - Adenocarcinoma  D: Inferior vena cava implant, biopsy:  - Adenocarcinoma  E: Omentum, omentectomy:  - Adenocarcinoma  F: \"Liver nodule\", biopsy:  - Fibro-adipose tissue with adenocarcinoma  G: Right pelvic peritoneum, biopsy:  - Adenocarcinoma  H: Cul-de-sac peritoneum, biopsy:  - Adenocarcinoma  I: Uterus, cervix, right ovary and fallopian tube, hysterectomy with " right salpingo-oophorectomy:  - Adenocarcinoma present in the soft tissue at the left adnexal area,cervix and right fallopian tube, most likely endometrioid adenocarcinoma  - Atrophic endometrium  - Benign endometrial polyp  - Myometrium with no significant histologic abnormality  - Atrophic cervix and nabothian cysts  - Tumor present on the external aspect of the cervix  - Right ovary with numerous corpora albicantia and simple epithelial cysts  - Right fallopian tube with a single focus of carcinoma  - See comment  J: Large bowel epiploica, biopsy:  - Adenocarcinoma  K: Appendix, appendectomy:  - Appendix with fibrous replacement  L: Lymph nodes, left periaortic, excision:  - One benign lymph node (0/1)      12/14/15:  46. post op visit. reviewed the results of her surgical pathology copy of report was given to pt. She will start IV/IP taxol/Cddp chemo tomorrow. IV fluids on Thursday and Friday at [facility] and Saturday and Sunday at the [facility]. Day 8 taxol will be on dec 22nd. She will need to have fluids again on 23rd and 24th. She will also need Neupogen.       Plan: 3 cycles IV/IP chemo.       12/15/15: Cycle #1 IV/IP chemo.    1/6/16: Cycle #2 IV/IP chemo.   18. Delayed d/t neutropenia; given 1/13. Neupogen and Neulasta added.    2/1/16: Cycle #3 IV/IP chemo.  23  2/29/16:  41. CT cap  Findings:  Right internal jugular Port-A-Cath with tip at atriocaval junction. The heart is not enlarged. No significant pericardial effusion. The mediastinal great vessels are normal in caliber. No central pulmonary embolus. Thoracic aortic atherosclerotic calcifications. Calcified mediastinal and hilar lymph nodes. No lymphadenopathy in the chest by size criteria. The central tracheobronchial tree is patent. New nodular pleural-based consolidative opacity in the left lower lobe measuring 26 x 29 mm (image 110, series 7). There are a few other new subcentimeter nodules in the left  lower lobe. For example, 7 mm nodule on image 104, series 4. Severe centrilobular emphysematous changes. Likely nodular scarring in the right upper lobe (image 32, series 7) is not significantly changed. Other scattered fibrotic changes, most prominent in the right lower lobe, do not appear significantly changed. Calcified granuloma in the right lower lobe. Postsurgical changes of a hernia repair and hysterectomy/bilateral salpingo-oophorectomy. There is no abnormal soft tissue within the  resection bed to suggest locally recurrent disease. No suspicious liver lesions. Unchanged extrahepatic and central intrahepatic biliary dilatation, likely reservoir effect in the setting of cholecystectomy. Atrophic pancreas. Calcified splenic granulomata. Unchanged  hypoattenuating lesions in the kidneys, the largest of which on the left are compatible with cysts. No abnormally dilated or thickened loops of bowel. No free intraperitoneal air or free fluid. Colonic diverticuli without evidence of active inflammation. Duodenal diverticulum. Intra-abdominal port with tip in the left pelvis. Aortoiliac atheromatous plaque without aneurysmal dilatation. No abnormally enlarged abdominal or pelvic lymph nodes. No definite residual peritoneal/or omental nodules. Marked generalized osteopenia. Redemonstration of multiple compression deformities throughout the thoracolumbar spine, greatest at T8 with greater than 50% loss of vertebral body height, not significantly changed. There is mild increased associated sclerosis. Associated kyphosis of the upper thoracic spine. There are 6 lumbar type vertebral bodies. No new aggressive osseous lesions.  Impression:  1. Continued positive response to therapy in the abdomen/pelvis with no definite residual peritoneal/omental metastasis.  2. New pleural-based consolidative opacity in the left lower lobe. There are a few other new subcentimeter nodules in the left lower lobe. This may be secondary to  "infection/inflammation, but metastases should be considered and short-term interval followup is recommended.      4/6/16 CT/PET IMPRESSION: Patient's history of ovarian carcinoma.  1. Left lower lobe 2 cm irregular pulmonary nodular density is minimally metabolic and argues that it is benign such as rounded atelectasis and less likely rounded pneumonia.  2. Underlying COPD with scattered scars within both lungs but no hypermetabolic structures to indicate malignancy.  3. No evidence of carcinomatosis, peritoneal or abdominal metastasis, or metastatic disease to any of the organs of the abdomen or pelvis.  4. Possible mild esophagitis at the GE junction.      4/11/16:  8. \"Had been unclear as to why  up to 41 from 23-this is concerning in light of recent completion of cddp/taxol, however the  is 8. CT PET is negative for metastatic disease. Recommend q 3 month visits x 2 years, then every 6 mos for 3 additional years.\"      7/25/16:   9.  7/27/16: CT cap Impression:    1. Interval resolution of left lower lobe spiculated pulmonary nodule. This likely represented atelectasis or consolidation.  2. Significant panlobular emphysematous changes of both lungs.  3. Spiculated right upper lobe pulmonary nodule measuring up to 8 mm, unchanged since 11/19/2015, although progressed since 2/4/2011. This may represent scarring. Recommend attention on followup.  4. New right hip fracture, possibly pathologic.  5. New sclerotic lesions of the sternum which are indeterminate. This may represent metastatic disease.      Of note, patient tripped over a cord at home 5/30/16 and fell and had an avulsed fx of her right hip. She underwent PT for this. She subsequently fell backwards 6/13/16 and fractured her left wrist and seeing OT for this.       She was recommended to have a bone scan done. This was scheduled and the patient delayed her scan until 11/2016 which showed uptake compatible with healing " fractures.       10/31/16:  15.  12/5/16:  11.  2/13/17:  12.  5/8/17:  11. Port removed.   8/30/17:  13.  12/4/17:   11.  3/5/18:  12.  9/5/18:  13.  4/1/19:  pending.         Today she comes to clinic and denies any concerns. She denies any vaginal bleeding, no changes in her bowel or bladder habits, no nausea/emesis, no lower extremity edema, and no difficulties eating or sleeping. She denies any abdominal discomfort/bloating, no fevers or chills, and no chest pain or shortness of breath. She is due for her annual exam and mammogram this month. Her colonoscopy is current. She is not sexually active. She has a BP cuff at home but has not been using it very often.            Review of Systems:    Systemic           no weight changes; no fever; no chills; no night sweats; no appetite changes  Skin           no rashes, or lesions  Eye           no irritation; no changes in vision  Katarzyna-Laryngeal           no dysphagia; no hoarseness   Pulmonary    no cough; no shortness of breath  Cardiovascular    no chest pain; no palpitations; + HTN  Gastrointestinal    no diarrhea; no constipation; no abdominal pain; no changes in bowel habits; no blood in stool  Genitourinary   no urinary frequency; no urinary urgency; no dysuria; no pain; no abnormal vaginal discharge; no abnormal vaginal bleeding  Breast    no breast discharge; no breast changes; no breast pain  Musculoskeletal    no myalgias; no arthralgias; no back pain  Psychiatric           no depressed mood; no anxiety    Hematologic              no tender lymph nodes; no noticeable swellings or lumps   Endocrine    no hot flashes; no heat/cold intolerance         Neurological   no tremor; no numbness and tingling; no headaches; no difficulty sleeping      Past Medical History:    Past Medical History:   Diagnosis Date     Cervical high risk HPV (human papillomavirus) test positive 10/31/12    type 18     COPD (chronic  obstructive pulmonary disease) (H)      GERD (gastroesophageal reflux disease)      Hx of colposcopy with cervical biopsy 11/2012    negative     Hypertension 2013     Osteoporosis      Paroxysmal supraventricular tachycardia (H) 9/6/2017     Peritoneal carcinomatosis (H) 8/24/2015     Pneumonia      Uncomplicated asthma 1980         Past Surgical History:    Past Surgical History:   Procedure Laterality Date     CHOLECYSTECTOMY  6/2014     COLONOSCOPY  12/4/2012    Procedure: COLONOSCOPY;  COLONOSCOPY SCREEN;  Surgeon: Mushtaq Lara MD;  Location: [facility] OR     GYN SURGERY       HERNIORRHAPHY HIATAL N/A 11/25/2015    Procedure: HERNIORRHAPHY HIATAL;  Surgeon: Chip Carty MD;  Location: [facility] OR     HYSTERECTOMY TOTAL ABD, ALVIN SALPINGO-OOPHORECTOMY, NODE DISSECTION, TUMOR DEBULKING, COMBINED Bilateral 11/25/2015    Procedure: COMBINED HYSTERECTOMY TOTAL ABDOMINAL, SALPINGO-OOPHORECTOMY, NODE DISSECTION, TUMOR DEBULKING;  Surgeon: Emma Cabrera MD;  Location: [facility] OR         Health Maintenance Due   Topic Date Due     DEXA SCAN SCREENING (SYSTEM ASSIGNED)  08/11/2012     ZOSTER IMMUNIZATION (2 of 3) 06/16/2015     MEDICARE ANNUAL WELLNESS VISIT  04/18/2019     MAMMO Q1 YR  04/26/2019       Current Medications:     Current Outpatient Medications   Medication Sig Dispense Refill     albuterol (PROAIR HFA/PROVENTIL HFA/VENTOLIN HFA) 108 (90 Base) MCG/ACT Inhaler Inhale 2 puffs into the lungs every 6 hours as needed for shortness of breath / dyspnea or wheezing 1 Inhaler 1     atorvastatin (LIPITOR) 20 MG tablet Take 1 tablet (20 mg) by mouth daily 90 tablet 3     BIOTIN PO Take 1 tablet by mouth daily.       budesonide-formoterol (SYMBICORT) 160-4.5 MCG/ACT Inhaler Inhale 2 puffs into the lungs 2 times daily 3 Inhaler 1     Calcium Carbonate (CALCIUM 500 PO) Take 1 tablet by mouth daily.       fluticasone (FLONASE) 50 MCG/ACT spray Spray 2 sprays into both nostrils daily 1 Bottle 1     ibuprofen  (ADVIL,MOTRIN) 600 MG tablet Take 1 tablet (600 mg) by mouth every 6 hours as needed for moderate pain 40 tablet 0     ipratropium - albuterol 0.5 mg/2.5 mg/3 mL (DUONEB) 0.5-2.5 (3) MG/3ML neb solution Take 1 vial (3 mLs) by nebulization every 4 hours as needed for shortness of breath / dyspnea or wheezing 30 vial 5     loratadine (CLARITIN) 10 MG tablet Take 10 mg by mouth daily       losartan (COZAAR) 50 MG tablet Take 1 tablet (50 mg) by mouth daily 90 tablet 3     MAGNESIUM OXIDE PO Take 200 mg by mouth 2 times daily       omeprazole (PRILOSEC) 40 MG capsule Take 1 capsule (40 mg) by mouth daily Take 30-60 minutes before a meal. 90 capsule 1     ondansetron (ZOFRAN ODT) 8 MG ODT tab Take 1 tablet (8 mg) by mouth 3 times daily (before meals) 20 tablet 1     order for DME Equipment being ordered: Nebulizer 1 Device 0     tiotropium (SPIRIVA HANDIHALER) 18 MCG capsule Inhale 1 capsule (18 mcg) into the lungs daily Inhale contents of one capsule. . 90 capsule 2     Zinc Sulfate (ZINC 15 PO) Take by mouth daily       doxycycline (VIBRAMYCIN) 100 MG capsule Take 1 capsule (100 mg) by mouth 2 times daily (Patient not taking: Reported on 4/1/2019) 14 capsule 0     predniSONE (DELTASONE) 20 MG tablet Take 1 tablet (20 mg) by mouth 2 times daily (Patient not taking: Reported on 4/1/2019.) 10 tablet 0     predniSONE (DELTASONE) 20 MG tablet Take 1 tablet (20 mg) by mouth daily (Patient not taking: Reported on 4/1/2019.) 5 tablet 0         Allergies:        Allergies   Allergen Reactions     Levaquin Rash        Social History:     Social History     Tobacco Use     Smoking status: Former Smoker     Packs/day: 1.00     Years: 30.00     Pack years: 30.00     Types: Cigarettes     Start date: 8/11/1965     Last attempt to quit: 10/10/2003     Years since quitting: 15.4     Smokeless tobacco: Never Used   Substance Use Topics     Alcohol use: No       History   Drug Use No         Family History:     The patient's family  "history is notable for:    Family History   Problem Relation Age of Onset     Cancer Brother         testicular     Diabetes Sister      Ovarian Cancer Mother 60     Diabetes Mother              Asthma Father              Diabetes Sister      Depression/Anxiety Daughter      Depression Daughter      Osteoporosis Sister      Other Cancer Brother      Diabetes Brother      Depression Other         Grandson         Physical Exam:     /75 (BP Location: Left arm)   Pulse 72   Temp 97.5  F (36.4  C) (Oral)   Resp 16   Ht 1.676 m (5' 5.98\")   Wt 53.6 kg (118 lb 2 oz)   SpO2 99%   BMI 19.08 kg/m     Body mass index is 19.08 kg/m .    General Appearance: healthy and alert, no distress     HEENT: no thyromegaly, no palpable nodules or masses        Cardiovascular: regular rate and rhythm, no gallops, rubs or murmurs     Respiratory: lungs clear, no rales, rhonchi or wheezes, normal diaphragmatic excursion    Musculoskeletal: extremities non tender and without edema    Skin: no lesions or rashes     Neurological: normal gait, no gross defects     Psychiatric: appropriate mood and affect                               Hematological: normal cervical, supraclavicular and inguinal lymph nodes     Gastrointestinal:       abdomen soft, non-tender, non-distended, no organomegaly or masses    Genitourinary: External genitalia and urethral meatus appears atrophic.  Vagina is smooth without nodularity or masses; stenotic.  Cervix surgically absent.  Bimanual exam reveal no masses, nodularity or fullness.  Recto-vaginal exam confirms these findings.      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 She completed treatment 2016. She is here today for a surveillance visit.    20 minutes were spent with this patient, over 50% of that time was spent in symptom management, treatment planning and in counseling and coordination " of care.      Plan:     1.)        Patient to RTC in 6 months for her next surveillance visit and ; today's is pending. Reviewed signs and symptoms for when she should contact the clinic or seek additional care. Patient to contact the clinic with any questions or concerns in the interim.     2.) Genetic risk factors were assessed and she is negative for mutations in GINGER, BARD1, BRCA1, BRCA2, BRIP1, CDH1, CHEK2, EPCAM, MLH1, MRE11A, MSH2, MSH6, MUTYH, NBN, NF1, PALB2, PMS2, PTEN, RAD50, RAD51C, RAD51D, SMARCA4, STK11, and TP53.        3.) Labs and/or tests ordered include:  .      4.) Health maintenance issues addressed today include annual health maintenance and non-gynecologic issues with PCP.    CHANDLER Jay, WHNP-BC, ANP-BC  Women's Health Nurse Practitioner  Adult Nurse Pracitioner  Division of Gynecologic Oncology          CC  Patient Care Team:  Pillo Koehler MD as PCP - General (Family Practice)  Jessica Lua RN as Continuity Care Coordinator (Gyn-Onc)  Iliana Pierre RN as   Pillo Koehler MD as Assigned PCP      Again, thank you for allowing me to participate in the care of your patient.        Sincerely,        CHANDLER Linares CNP

## 2019-04-05 ENCOUNTER — MYC REFILL (OUTPATIENT)
Dept: FAMILY MEDICINE | Facility: CLINIC | Age: 74
End: 2019-04-05

## 2019-04-05 DIAGNOSIS — J44.9 CHRONIC OBSTRUCTIVE PULMONARY DISEASE, UNSPECIFIED COPD TYPE (H): Primary | ICD-10-CM

## 2019-04-05 DIAGNOSIS — E87.6 HYPOKALEMIA: ICD-10-CM

## 2019-04-05 DIAGNOSIS — I10 ESSENTIAL HYPERTENSION WITH GOAL BLOOD PRESSURE LESS THAN 140/90: ICD-10-CM

## 2019-04-05 DIAGNOSIS — J44.9 COPD (CHRONIC OBSTRUCTIVE PULMONARY DISEASE) (H): ICD-10-CM

## 2019-04-05 DIAGNOSIS — K21.9 GASTROESOPHAGEAL REFLUX DISEASE, ESOPHAGITIS PRESENCE NOT SPECIFIED: ICD-10-CM

## 2019-04-05 DIAGNOSIS — K21.9 GERD (GASTROESOPHAGEAL REFLUX DISEASE): ICD-10-CM

## 2019-04-05 RX ORDER — TIOTROPIUM BROMIDE 18 UG/1
18 CAPSULE ORAL; RESPIRATORY (INHALATION) DAILY
Qty: 90 CAPSULE | Refills: 2 | Status: SHIPPED | OUTPATIENT
Start: 2019-04-05 | End: 2020-07-01

## 2019-04-05 RX ORDER — LOSARTAN POTASSIUM 50 MG/1
50 TABLET ORAL DAILY
Qty: 90 TABLET | Refills: 3 | Status: SHIPPED | OUTPATIENT
Start: 2019-04-05 | End: 2019-08-02 | Stop reason: DRUGHIGH

## 2019-04-05 RX ORDER — OMEPRAZOLE 40 MG/1
40 CAPSULE, DELAYED RELEASE ORAL DAILY
Qty: 90 CAPSULE | Refills: 3 | Status: SHIPPED | OUTPATIENT
Start: 2019-04-05 | End: 2020-04-15

## 2019-04-05 NOTE — TELEPHONE ENCOUNTER
"Requested Prescriptions   Pending Prescriptions Disp Refills     losartan (COZAAR) 50 MG tablet 90 tablet 0     Sig: Take 1 tablet (50 mg) by mouth daily    Angiotensin-II Receptors Passed - 4/5/2019  8:48 AM       Passed - Blood pressure under 140/90 in past 12 months    BP Readings from Last 3 Encounters:   04/01/19 139/75   11/11/18 153/80   09/19/18 122/68                Passed - Recent (12 mo) or future (30 days) visit within the authorizing provider's specialty    Patient had office visit in the last 12 months or has a visit in the next 30 days with authorizing provider or within the authorizing provider's specialty.  See \"Patient Info\" tab in [employer], or \"Choose Columns\" in Meds & Orders section of the refill encounter.             Passed - Medication is active on med list       Passed - Patient is age 18 or older       Passed - No active pregnancy on record       Passed - Normal serum creatinine on file in past 12 months    Recent Labs   Lab Test 09/10/18  1242   CR 1.02            Passed - Normal serum potassium on file in past 12 months    Recent Labs   Lab Test 09/10/18  1242   POTASSIUM 3.8                   Passed - No positive pregnancy test in past 12 months        tiotropium (SPIRIVA HANDIHALER) 18 MCG inhaled capsule 90 capsule 2     Sig: Inhale 1 capsule (18 mcg) into the lungs daily Inhale contents of one capsule. .    Asthma Maintenance Inhalers - Anticholinergics Failed - 4/5/2019  8:48 AM       Failed - Asthma control assessment score within normal limits in last 6 months    Please review ACT score.          Passed - Patient is age 12 years or older       Passed - Medication is active on med list       Passed - Recent (6 mo) or future (30 days) visit within the authorizing provider's specialty    Patient had office visit in the last 6 months or has a visit in the next 30 days with authorizing provider or within the authorizing provider's specialty.  See \"Patient Info\" tab in [employer], or " "\"Choose Columns\" in Meds & Orders section of the refill encounter.            omeprazole (PRILOSEC) 40 MG DR capsule 90 capsule 1     Sig: Take 1 capsule (40 mg) by mouth daily Take 30-60 minutes before a meal.    PPI Protocol Failed - 4/5/2019  8:48 AM       Failed - No diagnosis of osteoporosis on record       Passed - Not on Clopidogrel (unless Pantoprazole ordered)       Passed - Recent (12 mo) or future (30 days) visit within the authorizing provider's specialty    Patient had office visit in the last 12 months or has a visit in the next 30 days with authorizing provider or within the authorizing provider's specialty.  See \"Patient Info\" tab in inbasket, or \"Choose Columns\" in Meds & Orders section of the refill encounter.             Passed - Medication is active on med list       Passed - Patient is age 18 or older       Passed - No active pregnacy on record       Passed - No positive pregnancy test in past 12 months          "

## 2019-06-17 ENCOUNTER — MYC REFILL (OUTPATIENT)
Dept: FAMILY MEDICINE | Facility: CLINIC | Age: 74
End: 2019-06-17

## 2019-06-17 DIAGNOSIS — J42 CHRONIC BRONCHITIS, UNSPECIFIED CHRONIC BRONCHITIS TYPE (H): ICD-10-CM

## 2019-06-18 RX ORDER — IPRATROPIUM BROMIDE AND ALBUTEROL SULFATE 2.5; .5 MG/3ML; MG/3ML
1 SOLUTION RESPIRATORY (INHALATION) EVERY 4 HOURS PRN
Qty: 30 VIAL | Refills: 5 | Status: SHIPPED | OUTPATIENT
Start: 2019-06-18 | End: 2019-11-28

## 2019-06-18 NOTE — TELEPHONE ENCOUNTER
"Requested Prescriptions   Pending Prescriptions Disp Refills     ipratropium - albuterol 0.5 mg/2.5 mg/3 mL (DUONEB) 0.5-2.5 (3) MG/3ML neb solution 30 vial 5     Sig: Take 1 vial (3 mLs) by nebulization every 4 hours as needed for shortness of breath / dyspnea or wheezing       Short-Acting Beta Agonist Inhalers Protocol  Failed - 6/17/2019  8:29 PM        Failed - Asthma control assessment score within normal limits in last 6 months     Please review ACT score.           Failed - Recent (6 mo) or future (30 days) visit within the authorizing provider's specialty     Patient had office visit in the last 6 months or has a visit in the next 30 days with authorizing provider or within the authorizing provider's specialty.  See \"Patient Info\" tab in inbasket, or \"Choose Columns\" in Meds & Orders section of the refill encounter.            Passed - Patient is age 12 or older        Passed - Medication is active on med list        Pending appointment 7/19/19.    Sharita IBANCHIN, RN              "

## 2019-07-29 ASSESSMENT — ENCOUNTER SYMPTOMS
PARESTHESIAS: 0
NERVOUS/ANXIOUS: 0
JOINT SWELLING: 0
BREAST MASS: 0
ARTHRALGIAS: 0
NAUSEA: 0
MYALGIAS: 0
CHILLS: 0
CONSTIPATION: 0
HEARTBURN: 0
HEMATOCHEZIA: 0
FREQUENCY: 0
PALPITATIONS: 0
HEADACHES: 1
SORE THROAT: 0
COUGH: 1
SHORTNESS OF BREATH: 1
WEAKNESS: 0
HEMATURIA: 0
ABDOMINAL PAIN: 0
DYSURIA: 0
DIZZINESS: 0
FEVER: 0
DIARRHEA: 0
EYE PAIN: 0

## 2019-07-29 ASSESSMENT — ACTIVITIES OF DAILY LIVING (ADL): CURRENT_FUNCTION: NO ASSISTANCE NEEDED

## 2019-08-02 ENCOUNTER — OFFICE VISIT (OUTPATIENT)
Dept: FAMILY MEDICINE | Facility: CLINIC | Age: 74
End: 2019-08-02
Payer: COMMERCIAL

## 2019-08-02 VITALS
SYSTOLIC BLOOD PRESSURE: 148 MMHG | OXYGEN SATURATION: 93 % | BODY MASS INDEX: 19.66 KG/M2 | HEART RATE: 67 BPM | HEIGHT: 65 IN | WEIGHT: 118 LBS | TEMPERATURE: 98.2 F | DIASTOLIC BLOOD PRESSURE: 76 MMHG

## 2019-08-02 DIAGNOSIS — Z12.31 SCREENING MAMMOGRAM, ENCOUNTER FOR: ICD-10-CM

## 2019-08-02 DIAGNOSIS — Z00.00 ENCOUNTER FOR MEDICARE ANNUAL WELLNESS EXAM: Primary | ICD-10-CM

## 2019-08-02 DIAGNOSIS — J44.9 CHRONIC OBSTRUCTIVE PULMONARY DISEASE, UNSPECIFIED COPD TYPE (H): ICD-10-CM

## 2019-08-02 DIAGNOSIS — I10 ESSENTIAL HYPERTENSION WITH GOAL BLOOD PRESSURE LESS THAN 140/90: ICD-10-CM

## 2019-08-02 DIAGNOSIS — E78.5 DYSLIPIDEMIA: ICD-10-CM

## 2019-08-02 LAB
ANION GAP SERPL CALCULATED.3IONS-SCNC: 7 MMOL/L (ref 3–14)
BUN SERPL-MCNC: 20 MG/DL (ref 7–30)
CALCIUM SERPL-MCNC: 9.3 MG/DL (ref 8.5–10.1)
CHLORIDE SERPL-SCNC: 105 MMOL/L (ref 94–109)
CHOLEST SERPL-MCNC: 143 MG/DL
CO2 SERPL-SCNC: 27 MMOL/L (ref 20–32)
CREAT SERPL-MCNC: 1.05 MG/DL (ref 0.52–1.04)
GFR SERPL CREATININE-BSD FRML MDRD: 53 ML/MIN/{1.73_M2}
GLUCOSE SERPL-MCNC: 87 MG/DL (ref 70–99)
HDLC SERPL-MCNC: 69 MG/DL
LDLC SERPL CALC-MCNC: 50 MG/DL
NONHDLC SERPL-MCNC: 74 MG/DL
POTASSIUM SERPL-SCNC: 3.7 MMOL/L (ref 3.4–5.3)
SODIUM SERPL-SCNC: 139 MMOL/L (ref 133–144)
TRIGL SERPL-MCNC: 121 MG/DL

## 2019-08-02 PROCEDURE — 80048 BASIC METABOLIC PNL TOTAL CA: CPT | Performed by: FAMILY MEDICINE

## 2019-08-02 PROCEDURE — G0439 PPPS, SUBSEQ VISIT: HCPCS | Performed by: FAMILY MEDICINE

## 2019-08-02 PROCEDURE — 99207 C PAF COMPLETED  NO CHARGE: CPT | Performed by: FAMILY MEDICINE

## 2019-08-02 PROCEDURE — 36415 COLL VENOUS BLD VENIPUNCTURE: CPT | Performed by: FAMILY MEDICINE

## 2019-08-02 PROCEDURE — 80061 LIPID PANEL: CPT | Performed by: FAMILY MEDICINE

## 2019-08-02 RX ORDER — ATORVASTATIN CALCIUM 20 MG/1
20 TABLET, FILM COATED ORAL DAILY
Qty: 90 TABLET | Refills: 3 | Status: SHIPPED | OUTPATIENT
Start: 2019-08-02 | End: 2019-09-12

## 2019-08-02 RX ORDER — BUDESONIDE AND FORMOTEROL FUMARATE DIHYDRATE 160; 4.5 UG/1; UG/1
2 AEROSOL RESPIRATORY (INHALATION) 2 TIMES DAILY
Qty: 3 INHALER | Refills: 1 | Status: SHIPPED | OUTPATIENT
Start: 2019-08-02 | End: 2020-06-19

## 2019-08-02 RX ORDER — LOSARTAN POTASSIUM 100 MG/1
100 TABLET ORAL DAILY
Qty: 90 TABLET | Refills: 3 | Status: SHIPPED | OUTPATIENT
Start: 2019-08-02 | End: 2019-09-12

## 2019-08-02 ASSESSMENT — ENCOUNTER SYMPTOMS
BREAST MASS: 0
JOINT SWELLING: 0
DIZZINESS: 0
COUGH: 1
ABDOMINAL PAIN: 0
CHILLS: 0
HEARTBURN: 0
WEAKNESS: 0
ARTHRALGIAS: 0
SHORTNESS OF BREATH: 1
EYE PAIN: 0
DYSURIA: 0
PARESTHESIAS: 0
FREQUENCY: 0
SORE THROAT: 0
NERVOUS/ANXIOUS: 0
HEMATOCHEZIA: 0
CONSTIPATION: 0
HEMATURIA: 0
PALPITATIONS: 0
DIARRHEA: 0
FEVER: 0
MYALGIAS: 0
NAUSEA: 0
HEADACHES: 1

## 2019-08-02 ASSESSMENT — ANXIETY QUESTIONNAIRES
IF YOU CHECKED OFF ANY PROBLEMS ON THIS QUESTIONNAIRE, HOW DIFFICULT HAVE THESE PROBLEMS MADE IT FOR YOU TO DO YOUR WORK, TAKE CARE OF THINGS AT HOME, OR GET ALONG WITH OTHER PEOPLE: NOT DIFFICULT AT ALL
5. BEING SO RESTLESS THAT IT IS HARD TO SIT STILL: NOT AT ALL
GAD7 TOTAL SCORE: 0
1. FEELING NERVOUS, ANXIOUS, OR ON EDGE: NOT AT ALL
3. WORRYING TOO MUCH ABOUT DIFFERENT THINGS: NOT AT ALL
7. FEELING AFRAID AS IF SOMETHING AWFUL MIGHT HAPPEN: NOT AT ALL
2. NOT BEING ABLE TO STOP OR CONTROL WORRYING: NOT AT ALL
6. BECOMING EASILY ANNOYED OR IRRITABLE: NOT AT ALL

## 2019-08-02 ASSESSMENT — PATIENT HEALTH QUESTIONNAIRE - PHQ9
5. POOR APPETITE OR OVEREATING: NOT AT ALL
SUM OF ALL RESPONSES TO PHQ QUESTIONS 1-9: 2

## 2019-08-02 ASSESSMENT — ACTIVITIES OF DAILY LIVING (ADL): CURRENT_FUNCTION: NO ASSISTANCE NEEDED

## 2019-08-02 ASSESSMENT — MIFFLIN-ST. JEOR: SCORE: 1047.15

## 2019-08-02 NOTE — PATIENT INSTRUCTIONS
1. I recommend including veggies, fresh fruits (3 to 5 servings), nuts (unsalted) in your daily diet. Cut down on carbs like bread, pasta, rice, potatoes. Cut down on red meats like burgers etc. Increase healthy proteins like beans, tofu, tuna, chicken and turkey.    2. Get regular exercise like walking daily (150 minutes per week).      3. Do self breast exams monthly. Report any lumps. Set up the mammogram.    4. Avoid the sun or otherwise use sun screen - please look at the guide I gave you about melanoma.    5. See the dentist and eye doctor regularly.     6. Please call [phone] to register for a class about advanced directive.     7. Increase the Losartan to 100 mg daily.    8. Set up the lung specialist appointment - [place] [phone].    9. See you in 3-4 weeks for blood pressure follow-up.

## 2019-08-02 NOTE — PROGRESS NOTES
"SUBJECTIVE:   Iris Carrion is a 73 year old female who presents for Preventive Visit.    Are you in the first 12 months of your Medicare coverage?  No    Healthy Habits:     In general, how would you rate your overall health?  Fair    Frequency of exercise:  1 day/week    Duration of exercise:  15-30 minutes    Do you usually eat at least 4 servings of fruit and vegetables a day, include whole grains    & fiber and avoid regularly eating high fat or \"junk\" foods?  No    Taking medications regularly:  Yes    Medication side effects:  None    Ability to successfully perform activities of daily living:  No assistance needed    Home Safety:  No safety concerns identified    Hearing Impairment:  No hearing concerns    In the past 6 months, have you been bothered by leaking of urine?  No    In general, how would you rate your overall mental or emotional health?  Good      PHQ-2 Total Score: 0    Additional concerns today:  Yes    Do you feel safe in your environment? Yes    Do you have a Health Care Directive? No: Advance care planning reviewed with patient; information given to patient to review.    Fall risk  Fallen 2 or more times in the past year?: No  Any fall with injury in the past year?: No    Cognitive Screening   1) Repeat 3 items (Leader, Season, Table)    2) Clock draw: NORMAL  3) 3 item recall: Recalls 3 objects  Results: 3 items recalled: COGNITIVE IMPAIRMENT LESS LIKELY    Mini-CogTM Copyright [employer]. Licensed by the author for use in [facility]; reprinted with permission (arias@.[facility]). All rights reserved.      Do you have sleep apnea, excessive snoring or daytime drowsiness?: no    Reviewed and updated as needed this visit by clinical staff         Reviewed and updated as needed this visit by Provider        Social History     Tobacco Use     Smoking status: Former Smoker     Packs/day: 1.00     Years: 30.00     Pack years: 30.00     Types: Cigarettes     Start date: 8/11/1965     Last " attempt to quit: 10/10/2003     Years since quitting: 15.8     Smokeless tobacco: Never Used   Substance Use Topics     Alcohol use: No     If you drink alcohol do you typically have >3 drinks per day or >7 drinks per week? No    Alcohol Use 7/29/2019   Prescreen: >3 drinks/day or >7 drinks/week? Not Applicable   Prescreen: >3 drinks/day or >7 drinks/week? -   No flowsheet data found.    Metastatic endometrial adenocarcinoma - discovered after she presented with abdominal pain since early July 2015.   Evaluation and treatment:    She follows with oncology.     Left great toe nail - is quite thickened at baseline.   Evaluation and treatment:    Removed per podiatry with good results.    Previous Paroxysmal SVT - used to be once per week but now less frequently lasting about 5 minutes. It causes her to be dizzy, sweat and have shortness of breath. No chest pain. Denies weight changes or temperature intolerance.  Evaluation and treatment:   EKG 8/4/18 was normal.   Previous labs were fine including CMP, CBC and TSH   Saw cardiology 9/27/17 - no further evaluation was suggested.   Echo 10/11/17 negative.    HTN with hypokalemia - checks at home sometimes - around 140 systolic. Not controlled in clinic.    Evaluation and treatment:   Clorthalidone and KCL - not taking since not needed. No side effects.   Losartan 50 mg daily - no side effects. I asked her to increase to 100 mg daily.    BP Readings from Last 6 Encounters:   08/02/19 (!) 148/76   04/01/19 139/75   11/11/18 153/80   09/19/18 122/68   09/10/18 142/70   09/05/18 134/76     Last Comprehensive Metabolic Panel:  Sodium   Date Value Ref Range Status   08/02/2019 139 133 - 144 mmol/L Final     Potassium   Date Value Ref Range Status   08/02/2019 3.7 3.4 - 5.3 mmol/L Final     Chloride   Date Value Ref Range Status   08/02/2019 105 94 - 109 mmol/L Final     Carbon Dioxide   Date Value Ref Range Status   08/02/2019 27 20 - 32 mmol/L Final     Anion Gap   Date Value  Ref Range Status   08/02/2019 7 3 - 14 mmol/L Final     Glucose   Date Value Ref Range Status   08/02/2019 87 70 - 99 mg/dL Final     Comment:     Non Fasting     Urea Nitrogen   Date Value Ref Range Status   08/02/2019 20 7 - 30 mg/dL Final     Creatinine   Date Value Ref Range Status   08/02/2019 1.05 (H) 0.52 - 1.04 mg/dL Final     GFR Estimate   Date Value Ref Range Status   08/02/2019 53 (L) >60 mL/min/[1.73_m2] Final     Comment:     Non  GFR Calc  Starting 12/18/2018, serum creatinine based estimated GFR (eGFR) will be   calculated using the Chronic Kidney Disease Epidemiology Collaboration   (CKD-EPI) equation.       Calcium   Date Value Ref Range Status   08/02/2019 9.3 8.5 - 10.1 mg/dL Final     COPD - Has 35 pack history of smoking, quit 2003. Symptoms are intermittent cough, shortness of breath and wheezing. No fevers or chest pain.  Evaluation and treatment:   CT lung 2011 as below.   Spirometry 12/2/13 showed FEV1 42% predicted.    Advair was too expensive.    Symbicort 160/4.5, 2 puffs bid - no side effects.   Spiriva one puff every day - no side effect.   Nebs 3-4 times per day, sometimes at night lately - helps.    She seems to need supplemental O 2 after surgeries. Her last surgery in June 2014 was for cholecystectomy.   She has done pulmonary rehab previously.    I previously filled out disability parking form for her.    Prednisone burst and Doxy for exacerbations.   I am referring her to pulmonology since she feels her symptoms are not controlled.    1) Advanced changes of emphysema.   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 Minimal residual   right pleural fluid or thickening in the posterior aspect of the right mid   hemithorax, both less conspicuous then on 4/13/2011 and likely residual   scarring from the extensive consolidative infiltrate that was present on   1/7/2011.   3) Evidence of previous granulomatous disease.   4) Small esophageal hiatal hernia.   5)  Please see above for additional less significant details.     Yelena Oseguera M.D.  [facility]  CHICO/ben  D:9/13/2011/T:9/13/2011    Dyslipidemia - No history of CAD, CVA, PAD or diabetes.   Evaluation and treatment:    Per ATP4, moderate intensity statin recommended.   Lipitor 20 mg daily - no side effects.   Continue same tx.    The 10-year ASCVD risk score (Torin NASCIMENTO Jr., et al., 2013) is: 16.4%    Values used to calculate the score:      Age: 73 years      Sex: Female      Is Non- : No      Diabetic: No      Tobacco smoker: No      Systolic Blood Pressure: 148 mmHg      Is BP treated: Yes      HDL Cholesterol: 71 mg/dL      Total Cholesterol: 148 mg/dL    Recent Labs   Lab Test 08/02/19  1246 09/10/18  1242  07/18/15  0930 11/03/12  1117   CHOL 143 148   < > 179 229*   HDL 69 71   < > 48* 58   LDL 50 51   < > 93 148*   TRIG 121 132   < > 189* 119   CHOLHDLRATIO  --   --   --  3.7 4.0    < > = values in this interval not displayed.     Lab Results   Component Value Date    ALT 20 09/10/2018     Gall stone pancreatitis - in June 2014 had cholecystectomy and MRCP to remove common bile duct stone. Fine since then.    Previous poor balance and weakness - no longer using walker. Now balance is fairly good. Has gone to PT.    Preventive - declines Shingrix vaccine.     Immunization History   Administered Date(s) Administered     Influenza (High Dose) 3 valent vaccine 11/28/2015, 09/22/2016, 12/04/2017, 09/10/2018     Influenza (IIV3) PF 10/31/2012, 12/02/2013, 11/02/2014     Pneumo Conj 13-V (2010&after) 08/08/2016     Pneumococcal 23 valent 10/18/2009, 07/31/2013     TDAP Vaccine (Adacel) 01/07/2011     Zoster vaccine, live 04/21/2015     STD screen: declined     Mammogram: Negative 4/26/18 - reordered.    PAP - has had hysterectomy    PAP NIL 12/2/2013  PAP NIL 10/31/2012    Colonoscopy: 5/25/18 at [facility] for convenience due to location - repeat in 3 years.    DEXA:  ordered multiple time previously but she told me they canceled on her more than once. Declines at this time.    Hep C screen: Negative 7/18/15    Vit D: takes 5000 units per day    Advanced directive: referred previously and today.    Lung CA: does not qualify.      SH:    Marital status:   Kids: 2  Employment: retired, takes care of grand kids  Exercise: active  Tobacco: per HPI  Etoh: no  Recreational drugs: no  Caffeine: rarely    Current providers sharing in care for this patient include:   Patient Care Team:  Pillo Koehler MD as PCP - General (Family Practice)  Jessica Lua, RN as Continuity Care Coordinator (Gyn-Onc)  Iliana Pierre RN as   Pillo Koehler MD as Assigned PCP    The following health maintenance items are reviewed in Epic and correct as of today:  Health Maintenance   Topic Date Due     DEXA  1945     ZOSTER IMMUNIZATION (2 of 3) 06/16/2015     MEDICARE ANNUAL WELLNESS VISIT  04/18/2019     MAMMO SCREENING  04/26/2019     JUHI ASSESSMENT  05/09/2019     PHQ-9  05/09/2019     INFLUENZA VACCINE (1) 09/01/2019     FALL RISK ASSESSMENT  09/05/2019     LIPID  09/10/2019     DTAP/TDAP/TD IMMUNIZATION (2 - Td) 01/07/2021     ADVANCE CARE PLANNING  05/09/2023     COLONOSCOPY  05/25/2023     SPIROMETRY  Completed     HEPATITIS C SCREENING  Completed     COPD ACTION PLAN  Completed     IPV IMMUNIZATION  Aged Out     MENINGITIS IMMUNIZATION  Aged Out           Review of Systems   Constitutional: Negative for chills and fever.   HENT: Positive for ear pain. Negative for congestion, hearing loss and sore throat.    Eyes: Negative for pain and visual disturbance.   Respiratory: Positive for cough and shortness of breath.    Cardiovascular: Negative for chest pain, palpitations and peripheral edema.   Gastrointestinal: Negative for abdominal pain, constipation, diarrhea, heartburn, hematochezia and nausea.   Breasts:  Negative for breast mass.   Genitourinary: Negative for dysuria,  "frequency, genital sores, hematuria, pelvic pain, urgency and vaginal discharge.   Musculoskeletal: Negative for arthralgias, joint swelling and myalgias.   Skin: Negative for rash.   Neurological: Positive for headaches. Negative for dizziness, weakness and paresthesias.   Psychiatric/Behavioral: Negative for mood changes. The patient is not nervous/anxious.        OBJECTIVE:   BP (!) 148/76   Pulse 67   Temp 98.2  F (36.8  C) (Oral)   Ht 1.645 m (5' 4.75\")   Wt 53.5 kg (118 lb)   SpO2 93%   Breastfeeding? No   BMI 19.79 kg/m   Estimated body mass index is 19.79 kg/m  as calculated from the following:    Height as of this encounter: 1.645 m (5' 4.75\").    Weight as of this encounter: 53.5 kg (118 lb).  Physical Exam  GENERAL APPEARANCE: healthy, alert and no distress  EYES: Eyes grossly normal to inspection, PERRL and conjunctivae and sclerae normal  HENT: ear canals and TM's normal, nose and mouth without ulcers or lesions, oropharynx clear and oral mucous membranes moist  NECK: no adenopathy, no asymmetry, masses, or scars and thyroid normal to palpation  RESP: reduced air movement with mild exp wheezing. O2 sat noted. No tachypnea or other signs of respiratory distress.  CV: regular rate and rhythm, normal S1 S2, no S3 or S4, no murmur, click or rub, no peripheral edema and peripheral pulses strong  ABDOMEN: soft, nontender, no hepatosplenomegaly, no masses and bowel sounds normal  MS: no musculoskeletal defects are noted and gait is age appropriate without ataxia  SKIN: no suspicious lesions or rashes  NEURO: Normal strength and tone, sensory exam grossly normal, mentation intact and speech normal  PSYCH: mentation appears normal and affect normal/bright    ASSESSMENT / PLAN:     End of Life Planning:  Patient currently has an advanced directive: No.  I have verified the patient's ablity to prepare an advanced directive/make health care decisions.  Literature was provided to assist patient in preparing " "an advanced directive.    COUNSELING:  Reviewed preventive health counseling, as reflected in patient instructions       Regular exercise       Healthy diet/nutrition    Estimated body mass index is 19.79 kg/m  as calculated from the following:    Height as of this encounter: 1.645 m (5' 4.75\").    Weight as of this encounter: 53.5 kg (118 lb).         reports that she quit smoking about 15 years ago. Her smoking use included cigarettes. She started smoking about 54 years ago. She has a 30.00 pack-year smoking history. She has never used smokeless tobacco.      Appropriate preventive services were discussed with this patient, including applicable screening as appropriate for cardiovascular disease, diabetes, osteopenia/osteoporosis, and glaucoma.  As appropriate for age/gender, discussed screening for colorectal cancer, prostate cancer, breast cancer, and cervical cancer. Checklist reviewing preventive services available has been given to the patient.    Reviewed patients plan of care and provided an AVS. The Basic Care Plan (routine screening as documented in Health Maintenance) for Iris meets the Care Plan requirement. This Care Plan has been established and reviewed with the Patient.    Assessment and Plan - Decision Making    1. Encounter for Medicare annual wellness exam    Results of today's exam given to patient verbally along with age appropriate preventive measures. Written instructions reviewed and handed to the patient.      2. Dyslipidemia    Per HPI      - Lipid panel reflex to direct LDL Non-fasting  - atorvastatin (LIPITOR) 20 MG tablet; Take 1 tablet (20 mg) by mouth daily  Dispense: 90 tablet; Refill: 3    3. Essential hypertension with goal blood pressure less than 140/90    Per HPI    - Basic metabolic panel  - losartan (COZAAR) 100 MG tablet; Take 1 tablet (100 mg) by mouth daily  Dispense: 90 tablet; Refill: 3    4. Screening mammogram, encounter for    Per [facility]    - MA Screening Digital " Bilateral; Future    5. Chronic obstructive pulmonary disease, unspecified COPD type (H)    Per HPI    - budesonide-formoterol (SYMBICORT) 160-4.5 MCG/ACT Inhaler; Inhale 2 puffs into the lungs 2 times daily  Dispense: 3 Inhaler; Refill: 1  - PULMONARY MEDICINE REFERRAL      Written instructions given as follows:    Patient Instructions   1. I recommend including veggies, fresh fruits (3 to 5 servings), nuts (unsalted) in your daily diet. Cut down on carbs like bread, pasta, rice, potatoes. Cut down on red meats like burgers etc. Increase healthy proteins like beans, tofu, tuna, chicken and turkey.    2. Get regular exercise like walking daily (150 minutes per week).      3. Do self breast exams monthly. Report any lumps. Set up the mammogram.    4. Avoid the sun or otherwise use sun screen - please look at the guide I gave you about melanoma.    5. See the dentist and eye doctor regularly.     6. Please call [phone] to register for a class about advanced directive.     7. Increase the Losartan to 100 mg daily.    8. Set up the lung specialist appointment - [place] [phone].    9. See you in 3-4 weeks for blood pressure follow-up.

## 2019-08-03 ASSESSMENT — ANXIETY QUESTIONNAIRES: GAD7 TOTAL SCORE: 0

## 2019-08-04 NOTE — RESULT ENCOUNTER NOTE
Iraj Simmons,    All your results were fine. See you soon as scheduled.    Regards,    Pillo Koehler M.D.

## 2019-08-15 DIAGNOSIS — J44.9 COPD (CHRONIC OBSTRUCTIVE PULMONARY DISEASE) (H): Primary | ICD-10-CM

## 2019-08-19 NOTE — TELEPHONE ENCOUNTER
RECORDS RECEIVED FROM: internal   DATE RECEIVED: 11.7.19   NOTES STATUS DETAILS   OFFICE NOTE from referring provider Internal 8.2.19 Dr. Koehler  9.10.18   OFFICE NOTE from other specialist Internal 11.11.18 Dr. Cohn  2.27.18   DISCHARGE SUMMARY from hospital N/A    DISCHARGE REPORT from the ER N/A    MEDICATION LIST Internal    IMAGING  (NEED IMAGES AND REPORTS)     CT SCAN N/A    CHEST XRAY (CXR) Internal/ in process 11.11.18  9.10.18  scheduled   TESTS     PULMONARY FUNCTION TESTING (PFT) In process scheduled

## 2019-08-21 ENCOUNTER — MYC MEDICAL ADVICE (OUTPATIENT)
Dept: FAMILY MEDICINE | Facility: CLINIC | Age: 74
End: 2019-08-21

## 2019-08-21 DIAGNOSIS — J44.9 CHRONIC OBSTRUCTIVE PULMONARY DISEASE, UNSPECIFIED COPD TYPE (H): Primary | ICD-10-CM

## 2019-08-23 RX ORDER — PREDNISONE 10 MG/1
TABLET ORAL
Qty: 30 TABLET | Refills: 0 | Status: SHIPPED | OUTPATIENT
Start: 2019-08-23 | End: 2019-09-12

## 2019-09-12 ENCOUNTER — ANCILLARY PROCEDURE (OUTPATIENT)
Dept: MAMMOGRAPHY | Facility: CLINIC | Age: 74
End: 2019-09-12
Attending: FAMILY MEDICINE
Payer: COMMERCIAL

## 2019-09-12 ENCOUNTER — OFFICE VISIT (OUTPATIENT)
Dept: FAMILY MEDICINE | Facility: CLINIC | Age: 74
End: 2019-09-12
Payer: COMMERCIAL

## 2019-09-12 VITALS
HEART RATE: 96 BPM | TEMPERATURE: 98.2 F | SYSTOLIC BLOOD PRESSURE: 132 MMHG | OXYGEN SATURATION: 95 % | WEIGHT: 124 LBS | DIASTOLIC BLOOD PRESSURE: 79 MMHG | BODY MASS INDEX: 20.79 KG/M2

## 2019-09-12 DIAGNOSIS — I10 ESSENTIAL HYPERTENSION WITH GOAL BLOOD PRESSURE LESS THAN 140/90: ICD-10-CM

## 2019-09-12 DIAGNOSIS — Z12.31 SCREENING MAMMOGRAM, ENCOUNTER FOR: ICD-10-CM

## 2019-09-12 DIAGNOSIS — Z23 NEED FOR PROPHYLACTIC VACCINATION AND INOCULATION AGAINST INFLUENZA: ICD-10-CM

## 2019-09-12 DIAGNOSIS — J44.9 CHRONIC OBSTRUCTIVE PULMONARY DISEASE, UNSPECIFIED COPD TYPE (H): Primary | ICD-10-CM

## 2019-09-12 DIAGNOSIS — E78.5 DYSLIPIDEMIA: ICD-10-CM

## 2019-09-12 PROCEDURE — 90662 IIV NO PRSV INCREASED AG IM: CPT | Performed by: FAMILY MEDICINE

## 2019-09-12 PROCEDURE — G0008 ADMIN INFLUENZA VIRUS VAC: HCPCS | Performed by: FAMILY MEDICINE

## 2019-09-12 PROCEDURE — 77067 SCR MAMMO BI INCL CAD: CPT | Mod: TC

## 2019-09-12 PROCEDURE — 99214 OFFICE O/P EST MOD 30 MIN: CPT | Mod: 25 | Performed by: FAMILY MEDICINE

## 2019-09-12 RX ORDER — PREDNISONE 10 MG/1
TABLET ORAL
Qty: 30 TABLET | Refills: 0 | Status: SHIPPED | OUTPATIENT
Start: 2019-09-12 | End: 2019-11-28

## 2019-09-12 RX ORDER — LOSARTAN POTASSIUM 100 MG/1
100 TABLET ORAL DAILY
Qty: 90 TABLET | Refills: 3 | Status: SHIPPED | OUTPATIENT
Start: 2019-09-12 | End: 2020-07-28

## 2019-09-12 RX ORDER — ATORVASTATIN CALCIUM 20 MG/1
20 TABLET, FILM COATED ORAL DAILY
Qty: 90 TABLET | Refills: 3 | Status: SHIPPED | OUTPATIENT
Start: 2019-09-12 | End: 2019-12-12

## 2019-09-12 NOTE — PROGRESS NOTES
HPI:    Iris is a 74 year old female here for follow-up:    Metastatic endometrial adenocarcinoma - discovered after she presented with abdominal pain since early July 2015.   Evaluation and treatment:    She follows with oncology.     Previous Paroxysmal SVT - used to be once per week but now less frequently lasting about 5 minutes. It causes her to be dizzy, sweat and have shortness of breath. No chest pain. Denies weight changes or temperature intolerance.  Evaluation and treatment:   EKG 8/4/18 was normal.   Previous labs were fine including CMP, CBC and TSH   Saw cardiology 9/27/17 - no further evaluation was suggested.   Echo 10/11/17 negative.    HTN with hypokalemia - not checking at home. Controlled in clinic.    Evaluation and treatment:   Clorthalidone and KCL - not taking since not needed.    Losartan 100 mg daily - no side effects. Continue same tx.    BP Readings from Last 6 Encounters:   09/12/19 132/79   08/02/19 (!) 148/76   04/01/19 139/75   11/11/18 153/80   09/19/18 122/68   09/10/18 142/70     Last Comprehensive Metabolic Panel:  Sodium   Date Value Ref Range Status   08/02/2019 139 133 - 144 mmol/L Final     Potassium   Date Value Ref Range Status   08/02/2019 3.7 3.4 - 5.3 mmol/L Final     Chloride   Date Value Ref Range Status   08/02/2019 105 94 - 109 mmol/L Final     Carbon Dioxide   Date Value Ref Range Status   08/02/2019 27 20 - 32 mmol/L Final     Anion Gap   Date Value Ref Range Status   08/02/2019 7 3 - 14 mmol/L Final     Glucose   Date Value Ref Range Status   08/02/2019 87 70 - 99 mg/dL Final     Comment:     Non Fasting     Urea Nitrogen   Date Value Ref Range Status   08/02/2019 20 7 - 30 mg/dL Final     Creatinine   Date Value Ref Range Status   08/02/2019 1.05 (H) 0.52 - 1.04 mg/dL Final     GFR Estimate   Date Value Ref Range Status   08/02/2019 53 (L) >60 mL/min/[1.73_m2] Final     Comment:     Non  GFR Calc  Starting 12/18/2018, serum creatinine based  estimated GFR (eGFR) will be   calculated using the Chronic Kidney Disease Epidemiology Collaboration   (CKD-EPI) equation.       Calcium   Date Value Ref Range Status   08/02/2019 9.3 8.5 - 10.1 mg/dL Final     COPD - Has 35 pack history of smoking, quit 2003. Symptoms are intermittent cough, shortness of breath and wheezing. No fevers or chest pain.  Evaluation and treatment:   CT lung 2011 as below.   Spirometry 12/2/13 showed FEV1 42% predicted.    Advair was too expensive.    Symbicort 160/4.5, 2 puffs bid - no side effects.   Spiriva one puff every day - no side effect.   Nebs 3-4 times per day, sometimes at night lately - helps.    She seems to need supplemental O 2 after surgeries. Her last surgery in June 2014 was for cholecystectomy.   She has done pulmonary rehab previously.    I previously filled out disability parking form for her.    Prednisone burst and Doxy for exacerbations.   I gave her Prednisone rx for future use as needed.   I previously referred her to pulmonology - that's coming up.    1) Advanced changes of emphysema.   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 Minimal residual   right pleural fluid or thickening in the posterior aspect of the right mid   hemithorax, both less conspicuous then on 4/13/2011 and likely residual   scarring from the extensive consolidative infiltrate that was present on   1/7/2011.   3) Evidence of previous granulomatous disease.   4) Small esophageal hiatal hernia.   5) Please see above for additional less significant details.     Yelena Oseguera M.D.  [facility]  CHICO/ben  D:9/13/2011/T:9/13/2011    Dyslipidemia - No history of CAD, CVA, PAD or diabetes.   Evaluation and treatment:    Per ATP4, moderate intensity statin recommended.   Lipitor 20 mg daily - no side effects.   Continue same tx.    The 10-year ASCVD risk score (Torinjose NASCIMENTO Jr., et al., 2013) is: 15%    Values used to calculate the score:      Age: 74 years       Sex: Female      Is Non- : No      Diabetic: No      Tobacco smoker: No      Systolic Blood Pressure: 132 mmHg      Is BP treated: Yes      HDL Cholesterol: 69 mg/dL      Total Cholesterol: 143 mg/dL    Recent Labs   Lab Test 08/02/19  1246 09/10/18  1242  07/18/15  0930 11/03/12  1117   CHOL 143 148   < > 179 229*   HDL 69 71   < > 48* 58   LDL 50 51   < > 93 148*   TRIG 121 132   < > 189* 119   CHOLHDLRATIO  --   --   --  3.7 4.0    < > = values in this interval not displayed.     Lab Results   Component Value Date    ALT 20 09/10/2018     Gall stone pancreatitis - in June 2014 had cholecystectomy and MRCP to remove common bile duct stone. Fine since then.    Previous poor balance and weakness - no longer using walker. Now balance is fairly good. Has gone to PT.    Left great toe nail - is quite thickened at baseline.   Evaluation and treatment:    Removed per podiatry with good results.    Preventive - declines Shingrix vaccine. Flu shot given today.    Immunization History   Administered Date(s) Administered     Influenza (High Dose) 3 valent vaccine 11/28/2015, 09/22/2016, 12/04/2017, 09/10/2018, 09/12/2019     Influenza (IIV3) PF 10/31/2012, 12/02/2013, 11/02/2014     Pneumo Conj 13-V (2010&after) 08/08/2016     Pneumococcal 23 valent 10/18/2009, 07/31/2013     TDAP Vaccine (Adacel) 01/07/2011     Zoster vaccine, live 04/21/2015     STD screen: declined     Mammogram: Negative 4/26/18 - repeat done today - results pending.    PAP - has had hysterectomy    PAP NIL 12/2/2013  PAP NIL 10/31/2012    Colonoscopy: 5/25/18 at [facility] for convenience due to location - repeat in 3 years.    DEXA: ordered multiple time previously but she told me they canceled on her more than once. Declines at this time.    Hep C screen: Negative 7/18/15    Vit D: takes 5000 units per day    Advanced directive: referred previously.    Lung CA: does not qualify.      SH:    Marital status:   Kids:  2  Employment: retired, takes care of grand kids  Exercise: active  Tobacco: per HPI  Etoh: no  Recreational drugs: no  Caffeine: rarely    Exam:    /79   Pulse 96   Temp 98.2  F (36.8  C) (Oral)   Wt 56.2 kg (124 lb)   SpO2 95%   BMI 20.79 kg/m      Gen: Healthy appearing female in no acute distress  Eyes: Conjunctiva and sclera normal. Pupils react normally to light. No nystagmus.  Neck: No enlarged lymph nodes, thyromegally or other masses.  Lungs: reduced air movement with mild exp wheezing. O2 sat noted. No tachypnea or other signs of respiratory distress.  CV: Heart RRR with no murmurs. No JVD, carotid bruits or leg edema.  Psych: Affect is normal. Speech is fluent.       Assessment and Plan - Decision Making    1. Chronic obstructive pulmonary disease, unspecified COPD type (H)    Per HPI    - predniSONE (DELTASONE) 10 MG tablet; Take 4 pills per day for 3 days. Then 3 pills per day for 3 days. Then 2 pills per day for 3 days. Then 1 pill per day for 3 days.  Dispense: 30 tablet; Refill: 0    2. Essential hypertension with goal blood pressure less than 140/90    Per HPI    - losartan (COZAAR) 100 MG tablet; Take 1 tablet (100 mg) by mouth daily  Dispense: 90 tablet; Refill: 3    3. Dyslipidemia    Per HPI    - atorvastatin (LIPITOR) 20 MG tablet; Take 1 tablet (20 mg) by mouth daily  Dispense: 90 tablet; Refill: 3    4. Need for prophylactic vaccination and inoculation against influenza    Per HPI    - FLU VACCINE, INCREASED ANTIGEN, PRESV FREE, AGE 65+ [88630]  - ADMIN INFLUENZA (For MEDICARE Patients ONLY) []      Written instructions given as follows:    Patient Instructions   1. Keep up the good work.    2. See you in 6 months.

## 2019-10-24 ASSESSMENT — ENCOUNTER SYMPTOMS
FEVER: 0
NAUSEA: 1
CONSTIPATION: 0
FATIGUE: 1
BOWEL INCONTINENCE: 0
SHORTNESS OF BREATH: 1
POSTURAL DYSPNEA: 1
SPUTUM PRODUCTION: 0
POLYDIPSIA: 0
WEIGHT LOSS: 0
DYSPNEA ON EXERTION: 1
RECTAL PAIN: 0
ALTERED TEMPERATURE REGULATION: 1
COUGH DISTURBING SLEEP: 0
WEIGHT GAIN: 0
CHILLS: 0
HEMOPTYSIS: 0
VOMITING: 0
NIGHT SWEATS: 0
INCREASED ENERGY: 0
HEARTBURN: 1
WHEEZING: 0
BLOATING: 0
COUGH: 0
SNORES LOUDLY: 0
HALLUCINATIONS: 0
POLYPHAGIA: 0
ABDOMINAL PAIN: 0
DECREASED APPETITE: 1
BLOOD IN STOOL: 0
DIARRHEA: 0
JAUNDICE: 0

## 2019-10-28 ENCOUNTER — ONCOLOGY VISIT (OUTPATIENT)
Dept: ONCOLOGY | Facility: CLINIC | Age: 74
End: 2019-10-28
Attending: NURSE PRACTITIONER
Payer: COMMERCIAL

## 2019-10-28 VITALS
WEIGHT: 124.8 LBS | SYSTOLIC BLOOD PRESSURE: 121 MMHG | BODY MASS INDEX: 20.79 KG/M2 | DIASTOLIC BLOOD PRESSURE: 68 MMHG | RESPIRATION RATE: 18 BRPM | HEART RATE: 73 BPM | OXYGEN SATURATION: 95 % | TEMPERATURE: 98.1 F | HEIGHT: 65 IN

## 2019-10-28 DIAGNOSIS — C78.6 PERITONEAL CARCINOMATOSIS (H): ICD-10-CM

## 2019-10-28 DIAGNOSIS — C56.2 MALIGNANT NEOPLASM OF OVARY, LEFT (H): Primary | ICD-10-CM

## 2019-10-28 DIAGNOSIS — C56.2 MALIGNANT NEOPLASM OF OVARY, LEFT (H): ICD-10-CM

## 2019-10-28 LAB — CANCER AG125 SERPL-ACNC: 11 U/ML (ref 0–30)

## 2019-10-28 PROCEDURE — 99213 OFFICE O/P EST LOW 20 MIN: CPT | Performed by: NURSE PRACTITIONER

## 2019-10-28 PROCEDURE — 36415 COLL VENOUS BLD VENIPUNCTURE: CPT | Performed by: NURSE PRACTITIONER

## 2019-10-28 PROCEDURE — 86304 IMMUNOASSAY TUMOR CA 125: CPT | Performed by: NURSE PRACTITIONER

## 2019-10-28 ASSESSMENT — MIFFLIN-ST. JEOR: SCORE: 1073.22

## 2019-10-28 ASSESSMENT — PAIN SCALES - GENERAL: PAINLEVEL: NO PAIN (0)

## 2019-10-28 NOTE — LETTER
10/28/2019         RE: Iris Carrion  [address] [facility] [address]        Dear Colleague,    Thank you for referring your patient, Iris Carrion, to the [facility]. Please see a copy of my visit note below.                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 She completed treatment 2016. She is here today for a surveillance visit.       Treatment History:  Ms Carrion started having her symptoms of constipation and bloating (May/2015). Her doctor recommended miralax. Symptoms continued to occurred so they followed with a CT scan which showed extensive peritoneal metastatic disease.   8/17/15: CT c/a/p IMPRESSION:   1. Left renal cortical cysts. No enhancing renal mass. No urinary tract calculi or hydronephrosis. Collecting systems, ureters and bladder are unremarkable.  2. Extensive peritoneal metastatic disease with moderate ascites likely related to malignant ascites. Followup as clinically warranted. An obvious etiology for this metastatic disease is not  visualized.  3. No bowel obstruction or diverticulitis. Serosal metastatic disease is present. No obvious colonic mass. Followup colonoscopy as needed for further assessment.  4. Moderate-sized hiatal hernia. No gastric obstruction.      8/22/15:  1950      8/27/15: New patient visit. she continues to have constipation and bloating. She has lost about 5 pounds. She also admits to having lood in urine as well as urinary urgenrgy. She has loose bm bm every other day.       Plan: chemotherapy with Taxol/Carbo. To be considered for these clinical trials: PARP, avatar circulating tumor study, small neogman.       8/31/15: CT guided omental nodule biopsy. Final diagnosis:  Adenocarcinoma, with features consistent with  "endometrioid adenocarcinoma       COMMENT:  Immunohistochemical staining was obtained with appropriate control slides for ER, [place] 8 and WT-1. Malignant cells are positive for all three markers confirming their endometrioid cell differentiation.       Plan: treat like an ovarian cancer but will do dose dense Taxol carbo for 3 cycles then be re-evalated with scan       9/4/15-10/14/15: Cycle #1-3 dose dense Taxol/Carbo.  2008, 470, 30.       11/19/15: CT c/a/p IMPRESSION:   1. Decreased peritoneal carcinomatosis and malignant ascites.  2. Severe emphysema and scattered areas of likely postinfectious scarring. Several of these areas particularly along the right minor fissure appear more nodular malignancy cannot be excluded. Recommend 3-six-month interval followup.  3. Diverticulosis.  4. Osteopenia and numerous compression deformities, none of which appear acute.  5. Stable large hiatal hernia.      11/23/15:  17      11/25/15: Exploratory Laparotomy, Repair Umbilical Hernia, Total Abdominal Hysterectomy, Right Salpingo Oophorectomy, Left Salpingectomy, Appendectomy, Complete Omentectomy, CUSA Tumor Debulking,Cystoscopy, Left PeriAortc Lymph Node Biopsy, Insertion Peritoneal Port, Mobilization of Liver, Ablation of Liver Nodules Open Repair Hiatal Hernia Repair      Surgical pathology report:  FINAL DIAGNOSIS:  A: Umbilical hernia sac, repair:  - Adenocarcinoma  - Fibroadipose tissue, partially surfaced by mesothelium, consistent with hernia sac  B: Hepatic ligament, biopsy:  - One benign lymph node (0/1)  C: Splenic ligament implant, biopsy:  - Adenocarcinoma  D: Inferior vena cava implant, biopsy:  - Adenocarcinoma  E: Omentum, omentectomy:  - Adenocarcinoma  F: \"Liver nodule\", biopsy:  - Fibro-adipose tissue with adenocarcinoma  G: Right pelvic peritoneum, biopsy:  - Adenocarcinoma  H: Cul-de-sac peritoneum, biopsy:  - Adenocarcinoma  I: Uterus, cervix, right ovary and fallopian tube, hysterectomy with " right salpingo-oophorectomy:  - Adenocarcinoma present in the soft tissue at the left adnexal area,cervix and right fallopian tube, most likely endometrioid adenocarcinoma  - Atrophic endometrium  - Benign endometrial polyp  - Myometrium with no significant histologic abnormality  - Atrophic cervix and nabothian cysts  - Tumor present on the external aspect of the cervix  - Right ovary with numerous corpora albicantia and simple epithelial cysts  - Right fallopian tube with a single focus of carcinoma  - See comment  J: Large bowel epiploica, biopsy:  - Adenocarcinoma  K: Appendix, appendectomy:  - Appendix with fibrous replacement  L: Lymph nodes, left periaortic, excision:  - One benign lymph node (0/1)      12/14/15:  48. post op visit. reviewed the results of her surgical pathology copy of report was given to pt. She will start IV/IP taxol/Cddp chemo tomorrow. IV fluids on Thursday and Friday at [facility] and Saturday and Sunday at the [facility]. Day 8 taxol will be on dec 22nd. She will need to have fluids again on 23rd and 24th. She will also need Neupogen.       Plan: 3 cycles IV/IP chemo.       12/15/15: Cycle #1 IV/IP chemo.    1/6/16: Cycle #2 IV/IP chemo.   18. Delayed d/t neutropenia; given 1/13. Neupogen and Neulasta added.    2/1/16: Cycle #3 IV/IP chemo.  23  2/29/16:  41. CT cap  Findings:  Right internal jugular Port-A-Cath with tip at atriocaval junction. The heart is not enlarged. No significant pericardial effusion. The mediastinal great vessels are normal in caliber. No central pulmonary embolus. Thoracic aortic atherosclerotic calcifications. Calcified mediastinal and hilar lymph nodes. No lymphadenopathy in the chest by size criteria. The central tracheobronchial tree is patent. New nodular pleural-based consolidative opacity in the left lower lobe measuring 26 x 29 mm (image 110, series 7). There are a few other new subcentimeter nodules in the left  lower lobe. For example, 7 mm nodule on image 104, series 4. Severe centrilobular emphysematous changes. Likely nodular scarring in the right upper lobe (image 32, series 7) is not significantly changed. Other scattered fibrotic changes, most prominent in the right lower lobe, do not appear significantly changed. Calcified granuloma in the right lower lobe. Postsurgical changes of a hernia repair and hysterectomy/bilateral salpingo-oophorectomy. There is no abnormal soft tissue within the  resection bed to suggest locally recurrent disease. No suspicious liver lesions. Unchanged extrahepatic and central intrahepatic biliary dilatation, likely reservoir effect in the setting of cholecystectomy. Atrophic pancreas. Calcified splenic granulomata. Unchanged  hypoattenuating lesions in the kidneys, the largest of which on the left are compatible with cysts. No abnormally dilated or thickened loops of bowel. No free intraperitoneal air or free fluid. Colonic diverticuli without evidence of active inflammation. Duodenal diverticulum. Intra-abdominal port with tip in the left pelvis. Aortoiliac atheromatous plaque without aneurysmal dilatation. No abnormally enlarged abdominal or pelvic lymph nodes. No definite residual peritoneal/or omental nodules. Marked generalized osteopenia. Redemonstration of multiple compression deformities throughout the thoracolumbar spine, greatest at T8 with greater than 50% loss of vertebral body height, not significantly changed. There is mild increased associated sclerosis. Associated kyphosis of the upper thoracic spine. There are 6 lumbar type vertebral bodies. No new aggressive osseous lesions.  Impression:  1. Continued positive response to therapy in the abdomen/pelvis with no definite residual peritoneal/omental metastasis.  2. New pleural-based consolidative opacity in the left lower lobe. There are a few other new subcentimeter nodules in the left lower lobe. This may be secondary to  "infection/inflammation, but metastases should be considered and short-term interval followup is recommended.      4/6/16 CT/PET IMPRESSION: Patient's history of ovarian carcinoma.  1. Left lower lobe 2 cm irregular pulmonary nodular density is minimally metabolic and argues that it is benign such as rounded atelectasis and less likely rounded pneumonia.  2. Underlying COPD with scattered scars within both lungs but no hypermetabolic structures to indicate malignancy.  3. No evidence of carcinomatosis, peritoneal or abdominal metastasis, or metastatic disease to any of the organs of the abdomen or pelvis.  4. Possible mild esophagitis at the GE junction.      4/11/16:  8. \"Had been unclear as to why  up to 41 from 23-this is concerning in light of recent completion of cddp/taxol, however the  is 8. CT PET is negative for metastatic disease. Recommend q 3 month visits x 2 years, then every 6 mos for 3 additional years.\"      7/25/16:   9.  7/27/16: CT cap Impression:    1. Interval resolution of left lower lobe spiculated pulmonary nodule. This likely represented atelectasis or consolidation.  2. Significant panlobular emphysematous changes of both lungs.  3. Spiculated right upper lobe pulmonary nodule measuring up to 8 mm, unchanged since 11/19/2015, although progressed since 2/4/2011. This may represent scarring. Recommend attention on followup.  4. New right hip fracture, possibly pathologic.  5. New sclerotic lesions of the sternum which are indeterminate. This may represent metastatic disease.      Of note, patient tripped over a cord at home 5/30/16 and fell and had an avulsed fx of her right hip. She underwent PT for this. She subsequently fell backwards 6/13/16 and fractured her left wrist and seeing OT for this.       She was recommended to have a bone scan done. This was scheduled and the patient delayed her scan until 11/2016 which showed uptake compatible with healing " fractures.       10/31/16:  15.  12/5/16:  11.  2/13/17:  12.  5/8/17:  11. Port removed.   8/30/17:  13.  12/4/17:   11.  3/5/18:  12.  9/5/18:  13.  4/1/19:  14.  10/28/19:  pending.           Today she comes to clinic and denies any concerns. She denies any vaginal bleeding, no changes in her bowel or bladder habits, no nausea/emesis, no lower extremity edema, and no difficulties eating or sleeping. She denies any abdominal discomfort/bloating, no fevers or chills, and no chest pain or shortness of breath. She is current with her health screenings and is not sexually active. Her PCP has been managing her COPD until now; she will be seeing pulmonary moving forward.          Review of Systems:    Systemic           no weight changes; no fever; no chills; no night sweats; no appetite changes  Skin           no rashes, or lesions  Eye           no irritation; no changes in vision  Katarzyna-Laryngeal           no dysphagia; no hoarseness   Pulmonary    no cough; no shortness of breath  Cardiovascular    no chest pain; no palpitations  Gastrointestinal    no diarrhea; no constipation; no abdominal pain; no changes in bowel habits; no blood in stool  Genitourinary   no urinary frequency; no urinary urgency; no dysuria; no pain; no abnormal vaginal discharge; no abnormal vaginal bleeding  Breast    no breast discharge; no breast changes; no breast pain  Musculoskeletal    no myalgias; no arthralgias; no back pain  Psychiatric           no depressed mood; no anxiety    Hematologic              no tender lymph nodes; no noticeable swellings or lumps   Endocrine    no hot flashes; no heat/cold intolerance         Neurological   no tremor; no numbness and tingling; no headaches; no difficulty sleeping      Past Medical History:    Past Medical History:   Diagnosis Date     Cervical high risk HPV (human papillomavirus) test positive 10/31/12    type 18     COPD (chronic  obstructive pulmonary disease) (H)      GERD (gastroesophageal reflux disease)      Hx of colposcopy with cervical biopsy 11/2012    negative     Hypertension 2013     Osteoporosis      Paroxysmal supraventricular tachycardia (H) 9/6/2017     Peritoneal carcinomatosis (H) 8/24/2015     Pneumonia      Uncomplicated asthma 1980         Past Surgical History:    Past Surgical History:   Procedure Laterality Date     CHOLECYSTECTOMY  6/2014     COLONOSCOPY  12/4/2012    Procedure: COLONOSCOPY;  COLONOSCOPY SCREEN;  Surgeon: Mushtaq Lara MD;  Location: [facility] OR     GYN SURGERY       HERNIORRHAPHY HIATAL N/A 11/25/2015    Procedure: HERNIORRHAPHY HIATAL;  Surgeon: Chip Carty MD;  Location: [facility] OR     HYSTERECTOMY TOTAL ABD, ALVIN SALPINGO-OOPHORECTOMY, NODE DISSECTION, TUMOR DEBULKING, COMBINED Bilateral 11/25/2015    Procedure: COMBINED HYSTERECTOMY TOTAL ABDOMINAL, SALPINGO-OOPHORECTOMY, NODE DISSECTION, TUMOR DEBULKING;  Surgeon: Emma Cabrera MD;  Location: [facility] OR         Health Maintenance Due   Topic Date Due     DEXA  1945     ZOSTER IMMUNIZATION (2 of 3) 06/16/2015       Current Medications:     Current Outpatient Medications   Medication Sig Dispense Refill     albuterol (PROAIR HFA/PROVENTIL HFA/VENTOLIN HFA) 108 (90 Base) MCG/ACT Inhaler Inhale 2 puffs into the lungs every 6 hours as needed for shortness of breath / dyspnea or wheezing 1 Inhaler 1     atorvastatin (LIPITOR) 20 MG tablet Take 1 tablet (20 mg) by mouth daily 90 tablet 3     BIOTIN PO Take 1 tablet by mouth daily.       budesonide-formoterol (SYMBICORT) 160-4.5 MCG/ACT Inhaler Inhale 2 puffs into the lungs 2 times daily 3 Inhaler 1     Calcium Carbonate (CALCIUM 500 PO) Take 1 tablet by mouth daily.       fluticasone (FLONASE) 50 MCG/ACT spray Spray 2 sprays into both nostrils daily 1 Bottle 1     ibuprofen (ADVIL,MOTRIN) 600 MG tablet Take 1 tablet (600 mg) by mouth every 6 hours as needed for moderate pain 40  tablet 0     ipratropium - albuterol 0.5 mg/2.5 mg/3 mL (DUONEB) 0.5-2.5 (3) MG/3ML neb solution Take 1 vial (3 mLs) by nebulization every 4 hours as needed for shortness of breath / dyspnea or wheezing 30 vial 5     loratadine (CLARITIN) 10 MG tablet Take 10 mg by mouth daily       losartan (COZAAR) 100 MG tablet Take 1 tablet (100 mg) by mouth daily 90 tablet 3     MAGNESIUM OXIDE PO Take 200 mg by mouth 2 times daily       omeprazole (PRILOSEC) 40 MG DR capsule Take 1 capsule (40 mg) by mouth daily Take 30-60 minutes before a meal. 90 capsule 3     ondansetron (ZOFRAN ODT) 8 MG ODT tab Take 1 tablet (8 mg) by mouth 3 times daily (before meals) 20 tablet 1     order for DME Equipment being ordered: Nebulizer 1 Device 0     predniSONE (DELTASONE) 10 MG tablet Take 4 pills per day for 3 days. Then 3 pills per day for 3 days. Then 2 pills per day for 3 days. Then 1 pill per day for 3 days. 30 tablet 0     tiotropium (SPIRIVA HANDIHALER) 18 MCG inhaled capsule Inhale 1 capsule (18 mcg) into the lungs daily Inhale contents of one capsule. . 90 capsule 2     Zinc Sulfate (ZINC 15 PO) Take by mouth daily           Allergies:        Allergies   Allergen Reactions     Levaquin Rash        Social History:     Social History     Tobacco Use     Smoking status: Former Smoker     Packs/day: 1.00     Years: 30.00     Pack years: 30.00     Types: Cigarettes     Start date: 1965     Last attempt to quit: 10/10/2003     Years since quittin.0     Smokeless tobacco: Never Used   Substance Use Topics     Alcohol use: No       History   Drug Use No         Family History:     The patient's family history is notable for:    Family History   Problem Relation Age of Onset     Cancer Brother         testicular     Diabetes Sister      Ovarian Cancer Mother 60     Diabetes Mother              Asthma Father              Diabetes Sister      Depression/Anxiety Daughter      Depression Daughter      Osteoporosis  "Sister      Other Cancer Brother      Diabetes Brother      Depression Other         Grandson         Physical Exam:     /68 (BP Location: Right arm)   Pulse 73   Temp 98.1  F (36.7  C) (Oral)   Resp 18   Ht 1.645 m (5' 4.76\")   Wt 56.6 kg (124 lb 12.8 oz)   SpO2 95%   BMI 20.92 kg/m     Body mass index is 20.92 kg/m .    General Appearance: healthy and alert, no distress     HEENT: no thyromegaly, no palpable nodules or masses        Cardiovascular: regular rate and rhythm, no gallops, rubs or murmurs     Respiratory: lungs clear, no rales, rhonchi or wheezes, normal diaphragmatic excursion    Musculoskeletal: extremities non tender and without edema    Skin: no lesions or rashes     Neurological: normal gait, no gross defects     Psychiatric: appropriate mood and affect                               Hematological: normal cervical, supraclavicular and inguinal lymph nodes     Gastrointestinal:       abdomen soft, non-tender, non-distended, no organomegaly or masses    Genitourinary: External genitalia and urethral meatus appears normal.  Vagina is smooth without nodularity or masses.  Cervix surgically absent.  Bimanual exam reveal no masses, nodularity or fullness.  Recto-vaginal exam confirms these findings.      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 She completed treatment 2/2016. She is here today for a surveillance visit.     15 minutes were spent with this patient, over 50% of that time was spent in symptom management, treatment planning and in counseling and coordination of care.      Plan:     1.)        Patient to RTC in 6 months for her next surveillance visit and ; today's is pending. Reviewed signs and symptoms for when she should contact the clinic or seek additional care. Patient to contact the clinic with any questions or concerns in the interim.     2.) Genetic risk factors were assessed " and she is negative for mutations in GINGER, BARD1, BRCA1, BRCA2, BRIP1, CDH1, CHEK2, EPCAM, MLH1, MRE11A, MSH2, MSH6, MUTYH, NBN, NF1, PALB2, PMS2, PTEN, RAD50, RAD51C, RAD51D, SMARCA4, STK11, and TP53.          3.) Labs and/or tests ordered include:  .      4.) Health maintenance issues addressed today include annual health maintenance and non-gynecologic issues with PCP.    CHANDLER Jay, WHNP-BC, ANP-BC  Women's Health Nurse Practitioner  Adult Nurse Pracitioner  Division of Gynecologic Oncology          CC  Patient Care Team:  Pillo Koehler MD as PCP - General (Family Practice)  Iliana Pierre RN as   Pillo Koehler MD as Assigned PCP  Hayes Garg MD as MD (Internal Medicine)      Again, thank you for allowing me to participate in the care of your patient.        Sincerely,        CHANDLER Linares CNP

## 2019-10-28 NOTE — NURSING NOTE
"Oncology Rooming Note    October 28, 2019 2:00 PM   Iris Carrion is a 74 year old female who presents for:    Chief Complaint   Patient presents with     Oncology Clinic Visit     6 mon f/u     Initial Vitals: /68 (BP Location: Right arm)   Pulse 73   Temp 98.1  F (36.7  C) (Oral)   Resp 18   Ht 1.645 m (5' 4.76\")   Wt 56.6 kg (124 lb 12.8 oz)   SpO2 95%   BMI 20.92 kg/m   Estimated body mass index is 20.92 kg/m  as calculated from the following:    Height as of this encounter: 1.645 m (5' 4.76\").    Weight as of this encounter: 56.6 kg (124 lb 12.8 oz). Body surface area is 1.61 meters squared.  No Pain (0) Comment: Data Unavailable   No LMP recorded. Patient is postmenopausal.  Allergies reviewed: Yes  Medications reviewed: Yes    Medications: Medication refills not needed today.  Pharmacy name entered into [facility]: [facility] [address] [place], [place] - [address]    Jacqueline Daniel LPN              "

## 2019-10-28 NOTE — PROGRESS NOTES
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 She completed treatment 2016. She is here today for a surveillance visit.       Treatment History:  Ms Carrion started having her symptoms of constipation and bloating (May/2015). Her doctor recommended miralax. Symptoms continued to occurred so they followed with a CT scan which showed extensive peritoneal metastatic disease.   8/17/15: CT c/a/p IMPRESSION:   1. Left renal cortical cysts. No enhancing renal mass. No urinary tract calculi or hydronephrosis. Collecting systems, ureters and bladder are unremarkable.  2. Extensive peritoneal metastatic disease with moderate ascites likely related to malignant ascites. Followup as clinically warranted. An obvious etiology for this metastatic disease is not  visualized.  3. No bowel obstruction or diverticulitis. Serosal metastatic disease is present. No obvious colonic mass. Followup colonoscopy as needed for further assessment.  4. Moderate-sized hiatal hernia. No gastric obstruction.      8/22/15:  1950      8/27/15: New patient visit. she continues to have constipation and bloating. She has lost about 5 pounds. She also admits to having lood in urine as well as urinary urgenrgy. She has loose bm bm every other day.       Plan: chemotherapy with Taxol/Carbo. To be considered for these clinical trials: PARP, avatar circulating tumor study, small neogman.       8/31/15: CT guided omental nodule biopsy. Final diagnosis:  Adenocarcinoma, with features consistent with endometrioid adenocarcinoma       COMMENT:  Immunohistochemical staining was obtained with appropriate control slides for ER, Louin 8 and WT-1. Malignant cells are positive for all three markers confirming their endometrioid cell differentiation.  "      Plan: treat like an ovarian cancer but will do dose dense Taxol carbo for 3 cycles then be re-evalated with scan       9/4/15-10/14/15: Cycle #1-3 dose dense Taxol/Carbo.  2008, 470, 30.       11/19/15: CT c/a/p IMPRESSION:   1. Decreased peritoneal carcinomatosis and malignant ascites.  2. Severe emphysema and scattered areas of likely postinfectious scarring. Several of these areas particularly along the right minor fissure appear more nodular malignancy cannot be excluded. Recommend 3-six-month interval followup.  3. Diverticulosis.  4. Osteopenia and numerous compression deformities, none of which appear acute.  5. Stable large hiatal hernia.      11/23/15:  17      11/25/15: Exploratory Laparotomy, Repair Umbilical Hernia, Total Abdominal Hysterectomy, Right Salpingo Oophorectomy, Left Salpingectomy, Appendectomy, Complete Omentectomy, CUSA Tumor Debulking,Cystoscopy, Left PeriAortc Lymph Node Biopsy, Insertion Peritoneal Port, Mobilization of Liver, Ablation of Liver Nodules Open Repair Hiatal Hernia Repair      Surgical pathology report:  FINAL DIAGNOSIS:  A: Umbilical hernia sac, repair:  - Adenocarcinoma  - Fibroadipose tissue, partially surfaced by mesothelium, consistent with hernia sac  B: Hepatic ligament, biopsy:  - One benign lymph node (0/1)  C: Splenic ligament implant, biopsy:  - Adenocarcinoma  D: Inferior vena cava implant, biopsy:  - Adenocarcinoma  E: Omentum, omentectomy:  - Adenocarcinoma  F: \"Liver nodule\", biopsy:  - Fibro-adipose tissue with adenocarcinoma  G: Right pelvic peritoneum, biopsy:  - Adenocarcinoma  H: Cul-de-sac peritoneum, biopsy:  - Adenocarcinoma  I: Uterus, cervix, right ovary and fallopian tube, hysterectomy with right salpingo-oophorectomy:  - Adenocarcinoma present in the soft tissue at the left adnexal area,cervix and right fallopian tube, most likely endometrioid adenocarcinoma  - Atrophic endometrium  - Benign endometrial polyp  - Myometrium with no " significant histologic abnormality  - Atrophic cervix and nabothian cysts  - Tumor present on the external aspect of the cervix  - Right ovary with numerous corpora albicantia and simple epithelial cysts  - Right fallopian tube with a single focus of carcinoma  - See comment  J: Large bowel epiploica, biopsy:  - Adenocarcinoma  K: Appendix, appendectomy:  - Appendix with fibrous replacement  L: Lymph nodes, left periaortic, excision:  - One benign lymph node (0/1)      12/14/15:  48. post op visit. reviewed the results of her surgical pathology copy of report was given to pt. She will start IV/IP taxol/Cddp chemo tomorrow. IV fluids on Thursday and Friday at [place] and Saturday and Sunday at the [facility]. Day 8 taxol will be on dec 22nd. She will need to have fluids again on 23rd and 24th. She will also need Neupogen.       Plan: 3 cycles IV/IP chemo.       12/15/15: Cycle #1 IV/IP chemo.    1/6/16: Cycle #2 IV/IP chemo.   18. Delayed d/t neutropenia; given 1/13. Neupogen and Neulasta added.    2/1/16: Cycle #3 IV/IP chemo.  23  2/29/16:  41. CT cap  Findings:  Right internal jugular Port-A-Cath with tip at atriocaval junction. The heart is not enlarged. No significant pericardial effusion. The mediastinal great vessels are normal in caliber. No central pulmonary embolus. Thoracic aortic atherosclerotic calcifications. Calcified mediastinal and hilar lymph nodes. No lymphadenopathy in the chest by size criteria. The central tracheobronchial tree is patent. New nodular pleural-based consolidative opacity in the left lower lobe measuring 26 x 29 mm (image 110, series 7). There are a few other new subcentimeter nodules in the left lower lobe. For example, 7 mm nodule on image 104, series 4. Severe centrilobular emphysematous changes. Likely nodular scarring in the right upper lobe (image 32, series 7) is not significantly changed. Other scattered fibrotic changes, most  prominent in the right lower lobe, do not appear significantly changed. Calcified granuloma in the right lower lobe. Postsurgical changes of a hernia repair and hysterectomy/bilateral salpingo-oophorectomy. There is no abnormal soft tissue within the  resection bed to suggest locally recurrent disease. No suspicious liver lesions. Unchanged extrahepatic and central intrahepatic biliary dilatation, likely reservoir effect in the setting of cholecystectomy. Atrophic pancreas. Calcified splenic granulomata. Unchanged  hypoattenuating lesions in the kidneys, the largest of which on the left are compatible with cysts. No abnormally dilated or thickened loops of bowel. No free intraperitoneal air or free fluid. Colonic diverticuli without evidence of active inflammation. Duodenal diverticulum. Intra-abdominal port with tip in the left pelvis. Aortoiliac atheromatous plaque without aneurysmal dilatation. No abnormally enlarged abdominal or pelvic lymph nodes. No definite residual peritoneal/or omental nodules. Marked generalized osteopenia. Redemonstration of multiple compression deformities throughout the thoracolumbar spine, greatest at T8 with greater than 50% loss of vertebral body height, not significantly changed. There is mild increased associated sclerosis. Associated kyphosis of the upper thoracic spine. There are 6 lumbar type vertebral bodies. No new aggressive osseous lesions.  Impression:  1. Continued positive response to therapy in the abdomen/pelvis with no definite residual peritoneal/omental metastasis.  2. New pleural-based consolidative opacity in the left lower lobe. There are a few other new subcentimeter nodules in the left lower lobe. This may be secondary to infection/inflammation, but metastases should be considered and short-term interval followup is recommended.      4/6/16 CT/PET IMPRESSION: Patient's history of ovarian carcinoma.  1. Left lower lobe 2 cm irregular pulmonary nodular density is  "minimally metabolic and argues that it is benign such as rounded atelectasis and less likely rounded pneumonia.  2. Underlying COPD with scattered scars within both lungs but no hypermetabolic structures to indicate malignancy.  3. No evidence of carcinomatosis, peritoneal or abdominal metastasis, or metastatic disease to any of the organs of the abdomen or pelvis.  4. Possible mild esophagitis at the GE junction.      4/11/16:  8. \"Had been unclear as to why  up to 41 from 23-this is concerning in light of recent completion of cddp/taxol, however the  is 8. CT PET is negative for metastatic disease. Recommend q 3 month visits x 2 years, then every 6 mos for 3 additional years.\"      7/25/16:   9.  7/27/16: CT cap Impression:    1. Interval resolution of left lower lobe spiculated pulmonary nodule. This likely represented atelectasis or consolidation.  2. Significant panlobular emphysematous changes of both lungs.  3. Spiculated right upper lobe pulmonary nodule measuring up to 8 mm, unchanged since 11/19/2015, although progressed since 2/4/2011. This may represent scarring. Recommend attention on followup.  4. New right hip fracture, possibly pathologic.  5. New sclerotic lesions of the sternum which are indeterminate. This may represent metastatic disease.      Of note, patient tripped over a cord at home 5/30/16 and fell and had an avulsed fx of her right hip. She underwent PT for this. She subsequently fell backwards 6/13/16 and fractured her left wrist and seeing OT for this.       She was recommended to have a bone scan done. This was scheduled and the patient delayed her scan until 11/2016 which showed uptake compatible with healing fractures.       10/31/16:  15.  12/5/16:  11.  2/13/17:  12.  5/8/17:  11. Port removed.   8/30/17:  13.  12/4/17:   11.  3/5/18:  12.  9/5/18:  13.  4/1/19:  14.  10/28/19:  pending. "           Today she comes to clinic and denies any concerns. She denies any vaginal bleeding, no changes in her bowel or bladder habits, no nausea/emesis, no lower extremity edema, and no difficulties eating or sleeping. She denies any abdominal discomfort/bloating, no fevers or chills, and no chest pain or shortness of breath. She is current with her health screenings and is not sexually active. Her PCP has been managing her COPD until now; she will be seeing pulmonary moving forward.          Review of Systems:    Systemic           no weight changes; no fever; no chills; no night sweats; no appetite changes  Skin           no rashes, or lesions  Eye           no irritation; no changes in vision  Katarzyna-Laryngeal           no dysphagia; no hoarseness   Pulmonary    no cough; no shortness of breath  Cardiovascular    no chest pain; no palpitations  Gastrointestinal    no diarrhea; no constipation; no abdominal pain; no changes in bowel habits; no blood in stool  Genitourinary   no urinary frequency; no urinary urgency; no dysuria; no pain; no abnormal vaginal discharge; no abnormal vaginal bleeding  Breast    no breast discharge; no breast changes; no breast pain  Musculoskeletal    no myalgias; no arthralgias; no back pain  Psychiatric           no depressed mood; no anxiety    Hematologic              no tender lymph nodes; no noticeable swellings or lumps   Endocrine    no hot flashes; no heat/cold intolerance         Neurological   no tremor; no numbness and tingling; no headaches; no difficulty sleeping      Past Medical History:    Past Medical History:   Diagnosis Date     Cervical high risk HPV (human papillomavirus) test positive 10/31/12    type 18     COPD (chronic obstructive pulmonary disease) (H)      GERD (gastroesophageal reflux disease)      Hx of colposcopy with cervical biopsy 11/2012    negative     Hypertension 2013     Osteoporosis      Paroxysmal supraventricular tachycardia (H) 9/6/2017      Peritoneal carcinomatosis (H) 8/24/2015     Pneumonia      Uncomplicated asthma 1980         Past Surgical History:    Past Surgical History:   Procedure Laterality Date     CHOLECYSTECTOMY  6/2014     COLONOSCOPY  12/4/2012    Procedure: COLONOSCOPY;  COLONOSCOPY SCREEN;  Surgeon: Mushtaq Lara MD;  Location: [facility] OR     GYN SURGERY       HERNIORRHAPHY HIATAL N/A 11/25/2015    Procedure: HERNIORRHAPHY HIATAL;  Surgeon: Chip Carty MD;  Location: [facility] OR     HYSTERECTOMY TOTAL ABD, ALVIN SALPINGO-OOPHORECTOMY, NODE DISSECTION, TUMOR DEBULKING, COMBINED Bilateral 11/25/2015    Procedure: COMBINED HYSTERECTOMY TOTAL ABDOMINAL, SALPINGO-OOPHORECTOMY, NODE DISSECTION, TUMOR DEBULKING;  Surgeon: Emma Cabrera MD;  Location: [facility] OR         Health Maintenance Due   Topic Date Due     DEXA  1945     ZOSTER IMMUNIZATION (2 of 3) 06/16/2015       Current Medications:     Current Outpatient Medications   Medication Sig Dispense Refill     albuterol (PROAIR HFA/PROVENTIL HFA/VENTOLIN HFA) 108 (90 Base) MCG/ACT Inhaler Inhale 2 puffs into the lungs every 6 hours as needed for shortness of breath / dyspnea or wheezing 1 Inhaler 1     atorvastatin (LIPITOR) 20 MG tablet Take 1 tablet (20 mg) by mouth daily 90 tablet 3     BIOTIN PO Take 1 tablet by mouth daily.       budesonide-formoterol (SYMBICORT) 160-4.5 MCG/ACT Inhaler Inhale 2 puffs into the lungs 2 times daily 3 Inhaler 1     Calcium Carbonate (CALCIUM 500 PO) Take 1 tablet by mouth daily.       fluticasone (FLONASE) 50 MCG/ACT spray Spray 2 sprays into both nostrils daily 1 Bottle 1     ibuprofen (ADVIL,MOTRIN) 600 MG tablet Take 1 tablet (600 mg) by mouth every 6 hours as needed for moderate pain 40 tablet 0     ipratropium - albuterol 0.5 mg/2.5 mg/3 mL (DUONEB) 0.5-2.5 (3) MG/3ML neb solution Take 1 vial (3 mLs) by nebulization every 4 hours as needed for shortness of breath / dyspnea or wheezing 30 vial 5     loratadine (CLARITIN) 10 MG  "tablet Take 10 mg by mouth daily       losartan (COZAAR) 100 MG tablet Take 1 tablet (100 mg) by mouth daily 90 tablet 3     MAGNESIUM OXIDE PO Take 200 mg by mouth 2 times daily       omeprazole (PRILOSEC) 40 MG DR capsule Take 1 capsule (40 mg) by mouth daily Take 30-60 minutes before a meal. 90 capsule 3     ondansetron (ZOFRAN ODT) 8 MG ODT tab Take 1 tablet (8 mg) by mouth 3 times daily (before meals) 20 tablet 1     order for DME Equipment being ordered: Nebulizer 1 Device 0     predniSONE (DELTASONE) 10 MG tablet Take 4 pills per day for 3 days. Then 3 pills per day for 3 days. Then 2 pills per day for 3 days. Then 1 pill per day for 3 days. 30 tablet 0     tiotropium (SPIRIVA HANDIHALER) 18 MCG inhaled capsule Inhale 1 capsule (18 mcg) into the lungs daily Inhale contents of one capsule. . 90 capsule 2     Zinc Sulfate (ZINC 15 PO) Take by mouth daily           Allergies:        Allergies   Allergen Reactions     Levaquin Rash        Social History:     Social History     Tobacco Use     Smoking status: Former Smoker     Packs/day: 1.00     Years: 30.00     Pack years: 30.00     Types: Cigarettes     Start date: 1965     Last attempt to quit: 10/10/2003     Years since quittin.0     Smokeless tobacco: Never Used   Substance Use Topics     Alcohol use: No       History   Drug Use No         Family History:     The patient's family history is notable for:    Family History   Problem Relation Age of Onset     Cancer Brother         testicular     Diabetes Sister      Ovarian Cancer Mother 60     Diabetes Mother              Asthma Father              Diabetes Sister      Depression/Anxiety Daughter      Depression Daughter      Osteoporosis Sister      Other Cancer Brother      Diabetes Brother      Depression Other         Grandson         Physical Exam:     /68 (BP Location: Right arm)   Pulse 73   Temp 98.1  F (36.7  C) (Oral)   Resp 18   Ht 1.645 m (5' 4.76\")   Wt 56.6 " kg (124 lb 12.8 oz)   SpO2 95%   BMI 20.92 kg/m    Body mass index is 20.92 kg/m .    General Appearance: healthy and alert, no distress     HEENT: no thyromegaly, no palpable nodules or masses        Cardiovascular: regular rate and rhythm, no gallops, rubs or murmurs     Respiratory: lungs clear, no rales, rhonchi or wheezes, normal diaphragmatic excursion    Musculoskeletal: extremities non tender and without edema    Skin: no lesions or rashes     Neurological: normal gait, no gross defects     Psychiatric: appropriate mood and affect                               Hematological: normal cervical, supraclavicular and inguinal lymph nodes     Gastrointestinal:       abdomen soft, non-tender, non-distended, no organomegaly or masses    Genitourinary: External genitalia and urethral meatus appears normal.  Vagina is smooth without nodularity or masses.  Cervix surgically absent.  Bimanual exam reveal no masses, nodularity or fullness.  Recto-vaginal exam confirms these findings.      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 She completed treatment 2/2016. She is here today for a surveillance visit.     15 minutes were spent with this patient, over 50% of that time was spent in symptom management, treatment planning and in counseling and coordination of care.      Plan:     1.)        Patient to RTC in 6 months for her next surveillance visit and ; today's is pending. Reviewed signs and symptoms for when she should contact the clinic or seek additional care. Patient to contact the clinic with any questions or concerns in the interim.     2.) Genetic risk factors were assessed and she is negative for mutations in GINGER, BARD1, BRCA1, BRCA2, BRIP1, CDH1, CHEK2, EPCAM, MLH1, MRE11A, MSH2, MSH6, MUTYH, NBN, NF1, PALB2, PMS2, PTEN, RAD50, RAD51C, RAD51D, SMARCA4, STK11, and TP53.         3.) Labs and/or tests ordered include:   .      4.) Health maintenance issues addressed today include annual health maintenance and non-gynecologic issues with PCP.    CHANDLER Jay, WHNP-BC, ANP-BC  Women's Health Nurse Practitioner  Adult Nurse Pracitioner  Division of Gynecologic Oncology          CC  Patient Care Team:  Pillo Koehler MD as PCP - General (Family Practice)  Iliana Pierre RN as   Pillo Koehler MD as Assigned PCP  Hayes Garg MD as MD (Internal Medicine)

## 2019-11-07 ENCOUNTER — PRE VISIT (OUTPATIENT)
Dept: PULMONOLOGY | Facility: CLINIC | Age: 74
End: 2019-11-07

## 2019-11-07 ENCOUNTER — ANCILLARY PROCEDURE (OUTPATIENT)
Dept: GENERAL RADIOLOGY | Facility: CLINIC | Age: 74
End: 2019-11-07
Payer: COMMERCIAL

## 2019-11-07 ENCOUNTER — OFFICE VISIT (OUTPATIENT)
Dept: PULMONOLOGY | Facility: CLINIC | Age: 74
End: 2019-11-07
Attending: FAMILY MEDICINE
Payer: COMMERCIAL

## 2019-11-07 VITALS
DIASTOLIC BLOOD PRESSURE: 69 MMHG | OXYGEN SATURATION: 94 % | RESPIRATION RATE: 18 BRPM | BODY MASS INDEX: 20.66 KG/M2 | WEIGHT: 124 LBS | SYSTOLIC BLOOD PRESSURE: 118 MMHG | HEART RATE: 88 BPM | HEIGHT: 65 IN

## 2019-11-07 DIAGNOSIS — J44.9 STAGE 3 SEVERE COPD BY GOLD CLASSIFICATION (H): Primary | ICD-10-CM

## 2019-11-07 DIAGNOSIS — J44.9 STAGE 3 SEVERE COPD BY GOLD CLASSIFICATION (H): ICD-10-CM

## 2019-11-07 DIAGNOSIS — J43.9 PULMONARY EMPHYSEMA, UNSPECIFIED EMPHYSEMA TYPE (H): ICD-10-CM

## 2019-11-07 DIAGNOSIS — J44.9 COPD (CHRONIC OBSTRUCTIVE PULMONARY DISEASE) (H): ICD-10-CM

## 2019-11-07 DIAGNOSIS — J30.89 OTHER ALLERGIC RHINITIS: ICD-10-CM

## 2019-11-07 LAB
BASOPHILS # BLD AUTO: 0.1 10E9/L (ref 0–0.2)
BASOPHILS NFR BLD AUTO: 1 %
DIFFERENTIAL METHOD BLD: ABNORMAL
DLCOUNC-%PRED-PRE: 52 %
DLCOUNC-PRE: 10.32 ML/MIN/MMHG
DLCOUNC-PRED: 19.82 ML/MIN/MMHG
EOSINOPHIL # BLD AUTO: 0.2 10E9/L (ref 0–0.7)
EOSINOPHIL NFR BLD AUTO: 3.4 %
ERV-%PRED-PRE: 82 %
ERV-PRE: 0.74 L
ERV-PRED: 0.9 L
ERYTHROCYTE [DISTWIDTH] IN BLOOD BY AUTOMATED COUNT: 13.2 % (ref 10–15)
EXPTIME-PRE: 9.04 SEC
FEF2575-%PRED-POST: 16 %
FEF2575-%PRED-PRE: 14 %
FEF2575-POST: 0.3 L/SEC
FEF2575-PRE: 0.26 L/SEC
FEF2575-PRED: 1.83 L/SEC
FEFMAX-%PRED-PRE: 33 %
FEFMAX-PRE: 1.88 L/SEC
FEFMAX-PRED: 5.61 L/SEC
FEV1-%PRED-PRE: 33 %
FEV1-PRE: 0.74 L
FEV1FEV6-PRE: 42 %
FEV1FEV6-PRED: 79 %
FEV1FVC-PRE: 35 %
FEV1FVC-PRED: 78 %
FEV1SVC-PRE: 33 %
FEV1SVC-PRED: 71 %
FIFMAX-PRE: 3.07 L/SEC
FRCPLETH-%PRED-PRE: 214 %
FRCPLETH-PRE: 5.91 L
FRCPLETH-PRED: 2.76 L
FVC-%PRED-PRE: 73 %
FVC-PRE: 2.1 L
FVC-PRED: 2.86 L
HCT VFR BLD AUTO: 44.3 % (ref 35–47)
HGB BLD-MCNC: 14.1 G/DL (ref 11.7–15.7)
IC-%PRED-PRE: 67 %
IC-PRE: 1.49 L
IC-PRED: 2.21 L
IMM GRANULOCYTES # BLD: 0 10E9/L (ref 0–0.4)
IMM GRANULOCYTES NFR BLD: 0.3 %
LYMPHOCYTES # BLD AUTO: 0.5 10E9/L (ref 0.8–5.3)
LYMPHOCYTES NFR BLD AUTO: 7.6 %
MCH RBC QN AUTO: 30.4 PG (ref 26.5–33)
MCHC RBC AUTO-ENTMCNC: 31.8 G/DL (ref 31.5–36.5)
MCV RBC AUTO: 96 FL (ref 78–100)
MONOCYTES # BLD AUTO: 0.5 10E9/L (ref 0–1.3)
MONOCYTES NFR BLD AUTO: 6.9 %
NEUTROPHILS # BLD AUTO: 5.8 10E9/L (ref 1.6–8.3)
NEUTROPHILS NFR BLD AUTO: 80.8 %
NRBC # BLD AUTO: 0 10*3/UL
NRBC BLD AUTO-RTO: 0 /100
PLATELET # BLD AUTO: 221 10E9/L (ref 150–450)
RBC # BLD AUTO: 4.64 10E12/L (ref 3.8–5.2)
RVPLETH-%PRED-PRE: 242 %
RVPLETH-PRE: 5.17 L
RVPLETH-PRED: 2.13 L
TLCPLETH-%PRED-PRE: 146 %
TLCPLETH-PRE: 7.41 L
TLCPLETH-PRED: 5.06 L
VA-%PRED-PRE: 71 %
VA-PRE: 3.44 L
VC-%PRED-PRE: 71 %
VC-PRE: 2.23 L
VC-PRED: 3.11 L
WBC # BLD AUTO: 7.1 10E9/L (ref 4–11)

## 2019-11-07 PROCEDURE — 82785 ASSAY OF IGE: CPT

## 2019-11-07 PROCEDURE — 85025 COMPLETE CBC W/AUTO DIFF WBC: CPT

## 2019-11-07 PROCEDURE — 86003 ALLG SPEC IGE CRUDE XTRC EA: CPT

## 2019-11-07 PROCEDURE — G0463 HOSPITAL OUTPT CLINIC VISIT: HCPCS | Mod: ZF

## 2019-11-07 PROCEDURE — 36415 COLL VENOUS BLD VENIPUNCTURE: CPT

## 2019-11-07 ASSESSMENT — MIFFLIN-ST. JEOR: SCORE: 1069.59

## 2019-11-07 ASSESSMENT — PAIN SCALES - GENERAL: PAINLEVEL: NO PAIN (0)

## 2019-11-07 NOTE — LETTER
11/7/2019       RE: Iris Carrion  [id number] [facility] [address]     Dear Colleague,    Thank you for referring your patient, Iris Carrion, to the [facility] at [facility]. Please see a copy of my visit note below.    [facility]  Pulmonary Medicine  Visit Clinic Note  November 7, 2019         ASSESSMENT & PLAN       Severe COPD: She has severe COPD assessed by her FEV1.  She is hyperinflated and exacerbates frequently over the year.  Currently, she is on triple inhaler therapy and still endorses shortness of breath with exertion which has been present since her abdominal surgery in 2016.    I went over the possible causes for her shortness of breath.  One could be that she has had worsening lung function over time.  We do not have any recent spirometric evidence to support this though.  Another possibility could be that she is not getting adequate drug delivery to her lungs due to her hyperinflation.  I will try switching her Spiriva to Incruse Ellipta, which has a lower internal resistance of the inhaler to activate the drug delivery.  She could be deconditioned as a result of her lung disease, and I advocated for pulmonary rehab for her.  I discussed with this program is and recommended that she participate.      It is quite possible that post abdominal surgery her respiratory mechanics changed to bed and she has been left for shortness of breath since then.  Finally, based on her history it sounds like she may be atopic.  She does get an allergic reaction when she is around her cat and not on an antihistamine.  I will check her IgE level, and [facility] respiratory panel, and her eosinophil level.  She does have a bit of a bronchodilator response on her spirometry today.  If there is any underlying asthma, this could be potentially treated with Xolair or anti-IL-5 therapy.    Her father had COPD.  She has  extensive emphysema in her right lung.  She is not sure whether she has had an alpha-1 antitrypsin test checked.  I will get a buccal swab looking for her alpha-1 phenotypes.    In the future, she still has significant shortness of breath, she may be somebody to consider for endobronchial valve placement for lung volume reduction.  She does have heterogeneity of her emphysema which is much more prominent on the right.  We will have to make sure that her fissures were intact.  This may be something to consider in the future if she does not respond well to inhaler change and pulmonary rehab.    RTC in 6 months.       Grayson Garg MD          Today's visit note:     Chief Complaint: Iris Carrion is a 74 year old year old female who is being seen for Consult (COPD)      HISTORY OF PRESENT ILLNESS:    This is a set knee 4-year-old female with a history of COPD who is presenting to the pulmonary clinic today for initial evaluation.    She also has a history of ovarian cancer, and had a big surgery in 2016 to remove her ovary and omentum.  Prior to that surgery, despite having severe disease she was not significantly symptomatic.  After the surgery, she has been suffering from a lot of shortness of breath with exertion.  She is currently on Symbicort and Spiriva, and uses her nebulizer 3 times a day.  She gets short of breath during activities such as her laundry.  She describes going up and down the stairs 4 times to do her laundry throughout the day there is a couple instances where she has to stop and focus on breathing before she can continue during her work.  She does have a dry cough which is only been present for about a month now, and has not correlated with any worsening shortness of breath.  She gets 2-3 exacerbations a year that require prednisone and antibiotics.    She notes she is allergic to her cat, because if she does not take Zyrtec on a daily basis and she is around her cats her eyes will start  water and become itchy.    She has been on oxygen for different times in the past.  Currently, she is not on oxygen.  She has not been tested overnight in time recently.  She does not sleep well, and wakes up a lot.         Past Medical and Surgical History:     Past Medical History:   Diagnosis Date     Cervical high risk HPV (human papillomavirus) test positive 10/31/12    type 18     COPD (chronic obstructive pulmonary disease) (H)      GERD (gastroesophageal reflux disease)      Hx of colposcopy with cervical biopsy 2012    negative     Hypertension      Osteoporosis      Paroxysmal supraventricular tachycardia (H) 2017     Peritoneal carcinomatosis (H) 2015     Pneumonia      Uncomplicated asthma      Past Surgical History:   Procedure Laterality Date     CHOLECYSTECTOMY  2014     COLONOSCOPY  2012    Procedure: COLONOSCOPY;  COLONOSCOPY SCREEN;  Surgeon: Mushtaq Lara MD;  Location: [facility] OR     GYN SURGERY       HERNIORRHAPHY HIATAL N/A 2015    Procedure: HERNIORRHAPHY HIATAL;  Surgeon: Chip Carty MD;  Location: [facility] OR     HYSTERECTOMY TOTAL ABD, ALVIN SALPINGO-OOPHORECTOMY, NODE DISSECTION, TUMOR DEBULKING, COMBINED Bilateral 2015    Procedure: COMBINED HYSTERECTOMY TOTAL ABDOMINAL, SALPINGO-OOPHORECTOMY, NODE DISSECTION, TUMOR DEBULKING;  Surgeon: Emma Cabrera MD;  Location: [facility] OR           Family History:     Family History   Problem Relation Age of Onset     Cancer Brother         testicular     Diabetes Sister      Ovarian Cancer Mother 60     Diabetes Mother              Asthma Father              Diabetes Sister      Depression/Anxiety Daughter      Depression Daughter      Osteoporosis Sister      Other Cancer Brother      Diabetes Brother      Depression Other         Grandson              Social History:     Social History     Socioeconomic History     Marital status:      Spouse name: Not on file     Number of  children: Not on file     Years of education: Not on file     Highest education level: Not on file   Occupational History     Not on file   Social Needs     Financial resource strain: Not on file     Food insecurity:     Worry: Not on file     Inability: Not on file     Transportation needs:     Medical: Not on file     Non-medical: Not on file   Tobacco Use     Smoking status: Former Smoker     Packs/day: 1.00     Years: 30.00     Pack years: 30.00     Types: Cigarettes     Start date: 1965     Last attempt to quit: 10/10/2003     Years since quittin.0     Smokeless tobacco: Never Used   Substance and Sexual Activity     Alcohol use: No     Drug use: No     Sexual activity: Never   Lifestyle     Physical activity:     Days per week: Not on file     Minutes per session: Not on file     Stress: Not on file   Relationships     Social connections:     Talks on phone: Not on file     Gets together: Not on file     Attends Church service: Not on file     Active member of club or organization: Not on file     Attends meetings of clubs or organizations: Not on file     Relationship status: Not on file     Intimate partner violence:     Fear of current or ex partner: Not on file     Emotionally abused: Not on file     Physically abused: Not on file     Forced sexual activity: Not on file   Other Topics Concern     Parent/sibling w/ CABG, MI or angioplasty before 65F 55M? 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   Living in [place].          Medications:     Current Outpatient Medications   Medication     albuterol (PROAIR HFA/PROVENTIL HFA/VENTOLIN HFA) 108 (90 Base) MCG/ACT Inhaler     atorvastatin (LIPITOR) 20 MG tablet     BIOTIN PO     budesonide-formoterol (SYMBICORT) 160-4.5 MCG/ACT Inhaler     Calcium Carbonate (CALCIUM 500 PO)     fluticasone (FLONASE) 50 MCG/ACT spray     ibuprofen (ADVIL,MOTRIN) 600 MG tablet     ipratropium - albuterol 0.5 mg/2.5  "mg/3 mL (DUONEB) 0.5-2.5 (3) MG/3ML neb solution     loratadine (CLARITIN) 10 MG tablet     losartan (COZAAR) 100 MG tablet     MAGNESIUM OXIDE PO     omeprazole (PRILOSEC) 40 MG DR capsule     ondansetron (ZOFRAN ODT) 8 MG ODT tab     order for DME     predniSONE (DELTASONE) 10 MG tablet     tiotropium (SPIRIVA HANDIHALER) 18 MCG inhaled capsule     Zinc Sulfate (ZINC 15 PO)     No current facility-administered medications for this visit.             Review of Systems:       Answers for HPI/ROS submitted by the patient on 10/24/2019   General Symptoms: Yes  Skin Symptoms: No  HENT Symptoms: No  EYE SYMPTOMS: No  HEART SYMPTOMS: No  LUNG SYMPTOMS: Yes  INTESTINAL SYMPTOMS: Yes  URINARY SYMPTOMS: No  GYNECOLOGIC SYMPTOMS: No  BREAST SYMPTOMS: No  SKELETAL SYMPTOMS: No  BLOOD SYMPTOMS: No  NERVOUS SYSTEM SYMPTOMS: No  MENTAL HEALTH SYMPTOMS: No  Fever: No  Loss of appetite: Yes  Weight loss: No  Weight gain: No  Fatigue: Yes  Night sweats: No  Chills: No  Increased stress: No  Excessive hunger: No  Excessive thirst: No  Feeling hot or cold when others believe the temperature is normal: Yes  Loss of height: No  Post-operative complications: No  Surgical site pain: No  Hallucinations: No  Change in or Loss of Energy: No  Hyperactivity: No  Confusion: No  Cough: No  Sputum or phlegm: No  Coughing up blood: No  Difficulty breating or shortness of breath: Yes  Snoring: No  Wheezing: No  Difficulty breathing on exertion: Yes  Nighttime Cough: No  Difficulty breathing when lying flat: Yes  Heart burn or indigestion: Yes  Nausea: Yes  Vomiting: No  Abdominal pain: No  Bloating: No  Constipation: No  Diarrhea: No  Blood in stool: No  Black stools: No  Rectal or Anal pain: No  Fecal incontinence: No  Yellowing of skin or eyes: No  Vomit with blood: No  Change in stools: No        PHYSICAL EXAM:  /69 (BP Location: Right arm, Patient Position: Chair, Cuff Size: Adult Regular)   Pulse 88   Resp 18   Ht 1.645 m (5' 4.76\")  "  Wt 56.2 kg (124 lb)   SpO2 94%   BMI 20.79 kg/m        General: pleasant, NAD  Eyes: Anicteric  Ears: Hearing grossly normal  Mouth: Oral mucosa is moist, without any lesions. No oropharyngeal exudate.  Neck: supple, no thyromegaly  Lymphatics: No cervical or supraclavicular nodes  Respiratory: Good air movement. No crackles. No rhonchi. No wheezes  Cardiac: RRR, normal S1, S2. No murmurs.  Musculoskeletal: Extremities normal. No clubbing. No cyanosis. No edema.  Skin: No rash on limited exam  Neuro: Normal mentation. Normal speech.  Psych:Normal affect           Data:   All laboratory and imaging data reviewed.      PFT:       PFT Interpretation:  Severe COPD. There is a significant bronchodilator response. Santana Trapping and hyperinflation.  Impaired diffusion capacity.   Valid Maneuver    CXR: Pending radiology review.  On my review of the images, there is hyperinflation. More emphysema on the right. Stable nodular changes in the RLL.     PET CT 2016:      IMPRESSION: Patient's history of ovarian carcinoma.  1. Left lower lobe 2 cm irregular pulmonary nodular density is  minimally metabolic and argues that it is benign such as rounded  atelectasis and less likely rounded pneumonia.  2. Underlying COPD with scattered scars within both lungs but no  hypermetabolic structures to indicate malignancy.  3. No evidence of carcinomatosis, peritoneal or abdominal metastasis,  or metastatic disease to any of the organs of the abdomen or pelvis.  4. Possible mild esophagitis at the GE junction.     Recent Results (from the past 168 hour(s))   General PFT Lab (Please always keep checked)    Collection Time: 11/07/19 11:04 AM   Result Value Ref Range    FVC-Pred 2.86 L    FVC-Pre 2.10 L    FVC-%Pred-Pre 73 %    FEV1-Pre 0.74 L    FEV1-%Pred-Pre 33 %    FEV1FVC-Pred 78 %    FEV1FVC-Pre 35 %    FEFMax-Pred 5.61 L/sec    FEFMax-Pre 1.88 L/sec    FEFMax-%Pred-Pre 33 %    FEF2575-Pred 1.83 L/sec    FEF2575-Pre 0.26 L/sec     OHQ3169-%Pred-Pre 14 %    FEF2575-Post 0.30 L/sec    WYH6842-%Pred-Post 16 %    ExpTime-Pre 9.04 sec    FIFMax-Pre 3.07 L/sec    VC-Pred 3.11 L    VC-Pre 2.23 L    VC-%Pred-Pre 71 %    IC-Pred 2.21 L    IC-Pre 1.49 L    IC-%Pred-Pre 67 %    ERV-Pred 0.90 L    ERV-Pre 0.74 L    ERV-%Pred-Pre 82 %    FEV1FEV6-Pred 79 %    FEV1FEV6-Pre 42 %    FRCPleth-Pred 2.76 L    FRCPleth-Pre 5.91 L    FRCPleth-%Pred-Pre 214 %    RVPleth-Pred 2.13 L    RVPleth-Pre 5.17 L    RVPleth-%Pred-Pre 242 %    TLCPleth-Pred 5.06 L    TLCPleth-Pre 7.41 L    TLCPleth-%Pred-Pre 146 %    DLCOunc-Pred 19.82 ml/min/mmHg    DLCOunc-Pre 10.32 ml/min/mmHg    DLCOunc-%Pred-Pre 52 %    VA-Pre 3.44 L    VA-%Pred-Pre 71 %    FEV1SVC-Pred 71 %    FEV1SVC-Pre 33 %         Again, thank you for allowing me to participate in the care of your patient.      Sincerely,    Hayes Garg MD

## 2019-11-07 NOTE — PROGRESS NOTES
[facility]  Pulmonary Medicine  Visit Clinic Note  November 7, 2019         ASSESSMENT & PLAN       Severe COPD: She has severe COPD assessed by her FEV1.  She is hyperinflated and exacerbates frequently over the year.  Currently, she is on triple inhaler therapy and still endorses shortness of breath with exertion which has been present since her abdominal surgery in 2016.    I went over the possible causes for her shortness of breath.  One could be that she has had worsening lung function over time.  We do not have any recent spirometric evidence to support this though.  Another possibility could be that she is not getting adequate drug delivery to her lungs due to her hyperinflation.  I will try switching her Spiriva to Incruse Ellipta, which has a lower internal resistance of the inhaler to activate the drug delivery.  She could be deconditioned as a result of her lung disease, and I advocated for pulmonary rehab for her.  I discussed with this program is and recommended that she participate.      It is quite possible that post abdominal surgery her respiratory mechanics changed to bed and she has been left for shortness of breath since then.  Finally, based on her history it sounds like she may be atopic.  She does get an allergic reaction when she is around her cat and not on an antihistamine.  I will check her IgE level, and [facility] respiratory panel, and her eosinophil level.  She does have a bit of a bronchodilator response on her spirometry today.  If there is any underlying asthma, this could be potentially treated with Xolair or anti-IL-5 therapy.    Her father had COPD.  She has extensive emphysema in her right lung.  She is not sure whether she has had an alpha-1 antitrypsin test checked.  I will get a buccal swab looking for her alpha-1 phenotypes.    In the future, she still has significant shortness of breath, she may be somebody to consider for endobronchial valve placement for  lung volume reduction.  She does have heterogeneity of her emphysema which is much more prominent on the right.  We will have to make sure that her fissures were intact.  This may be something to consider in the future if she does not respond well to inhaler change and pulmonary rehab.    RTC in 6 months.       Grayson Garg MD          Today's visit note:     Chief Complaint: Iris Carrion is a 74 year old year old female who is being seen for Consult (COPD)      HISTORY OF PRESENT ILLNESS:    This is a set knee 4-year-old female with a history of COPD who is presenting to the pulmonary clinic today for initial evaluation.    She also has a history of ovarian cancer, and had a big surgery in 2016 to remove her ovary and omentum.  Prior to that surgery, despite having severe disease she was not significantly symptomatic.  After the surgery, she has been suffering from a lot of shortness of breath with exertion.  She is currently on Symbicort and Spiriva, and uses her nebulizer 3 times a day.  She gets short of breath during activities such as her laundry.  She describes going up and down the stairs 4 times to do her laundry throughout the day there is a couple instances where she has to stop and focus on breathing before she can continue during her work.  She does have a dry cough which is only been present for about a month now, and has not correlated with any worsening shortness of breath.  She gets 2-3 exacerbations a year that require prednisone and antibiotics.    She notes she is allergic to her cat, because if she does not take Zyrtec on a daily basis and she is around her cats her eyes will start water and become itchy.    She has been on oxygen for different times in the past.  Currently, she is not on oxygen.  She has not been tested overnight in time recently.  She does not sleep well, and wakes up a lot.         Past Medical and Surgical History:     Past Medical History:   Diagnosis Date     Cervical  high risk HPV (human papillomavirus) test positive 10/31/12    type 18     COPD (chronic obstructive pulmonary disease) (H)      GERD (gastroesophageal reflux disease)      Hx of colposcopy with cervical biopsy 2012    negative     Hypertension      Osteoporosis      Paroxysmal supraventricular tachycardia (H) 2017     Peritoneal carcinomatosis (H) 2015     Pneumonia      Uncomplicated asthma 1980     Past Surgical History:   Procedure Laterality Date     CHOLECYSTECTOMY  2014     COLONOSCOPY  2012    Procedure: COLONOSCOPY;  COLONOSCOPY SCREEN;  Surgeon: Mushtaq Lara MD;  Location: [facility] OR     GYN SURGERY       HERNIORRHAPHY HIATAL N/A 2015    Procedure: HERNIORRHAPHY HIATAL;  Surgeon: Chip Carty MD;  Location: [facility] OR     HYSTERECTOMY TOTAL ABD, ALVIN SALPINGO-OOPHORECTOMY, NODE DISSECTION, TUMOR DEBULKING, COMBINED Bilateral 2015    Procedure: COMBINED HYSTERECTOMY TOTAL ABDOMINAL, SALPINGO-OOPHORECTOMY, NODE DISSECTION, TUMOR DEBULKING;  Surgeon: Emma Cabrera MD;  Location: [facility] OR           Family History:     Family History   Problem Relation Age of Onset     Cancer Brother         testicular     Diabetes Sister      Ovarian Cancer Mother 60     Diabetes Mother              Asthma Father              Diabetes Sister      Depression/Anxiety Daughter      Depression Daughter      Osteoporosis Sister      Other Cancer Brother      Diabetes Brother      Depression Other         Grandson              Social History:     Social History     Socioeconomic History     Marital status:      Spouse name: Not on file     Number of children: Not on file     Years of education: Not on file     Highest education level: Not on file   Occupational History     Not on file   Social Needs     Financial resource strain: Not on file     Food insecurity:     Worry: Not on file     Inability: Not on file     Transportation needs:     Medical: Not on  file     Non-medical: Not on file   Tobacco Use     Smoking status: Former Smoker     Packs/day: 1.00     Years: 30.00     Pack years: 30.00     Types: Cigarettes     Start date: 1965     Last attempt to quit: 10/10/2003     Years since quittin.0     Smokeless tobacco: Never Used   Substance and Sexual Activity     Alcohol use: No     Drug use: No     Sexual activity: Never   Lifestyle     Physical activity:     Days per week: Not on file     Minutes per session: Not on file     Stress: Not on file   Relationships     Social connections:     Talks on phone: Not on file     Gets together: Not on file     Attends Sikhism service: Not on file     Active member of club or organization: Not on file     Attends meetings of clubs or organizations: Not on file     Relationship status: Not on file     Intimate partner violence:     Fear of current or ex partner: Not on file     Emotionally abused: Not on file     Physically abused: Not on file     Forced sexual activity: Not on file   Other Topics Concern     Parent/sibling w/ CABG, MI or angioplasty before 65F 55M? 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   Living in [place].          Medications:     Current Outpatient Medications   Medication     albuterol (PROAIR HFA/PROVENTIL HFA/VENTOLIN HFA) 108 (90 Base) MCG/ACT Inhaler     atorvastatin (LIPITOR) 20 MG tablet     BIOTIN PO     budesonide-formoterol (SYMBICORT) 160-4.5 MCG/ACT Inhaler     Calcium Carbonate (CALCIUM 500 PO)     fluticasone (FLONASE) 50 MCG/ACT spray     ibuprofen (ADVIL,MOTRIN) 600 MG tablet     ipratropium - albuterol 0.5 mg/2.5 mg/3 mL (DUONEB) 0.5-2.5 (3) MG/3ML neb solution     loratadine (CLARITIN) 10 MG tablet     losartan (COZAAR) 100 MG tablet     MAGNESIUM OXIDE PO     omeprazole (PRILOSEC) 40 MG DR capsule     ondansetron (ZOFRAN ODT) 8 MG ODT tab     order for DME     predniSONE (DELTASONE) 10 MG tablet     tiotropium  "(SPIRIVA HANDIHALER) 18 MCG inhaled capsule     Zinc Sulfate (ZINC 15 PO)     No current facility-administered medications for this visit.             Review of Systems:       Answers for HPI/ROS submitted by the patient on 10/24/2019   General Symptoms: Yes  Skin Symptoms: No  HENT Symptoms: No  EYE SYMPTOMS: No  HEART SYMPTOMS: No  LUNG SYMPTOMS: Yes  INTESTINAL SYMPTOMS: Yes  URINARY SYMPTOMS: No  GYNECOLOGIC SYMPTOMS: No  BREAST SYMPTOMS: No  SKELETAL SYMPTOMS: No  BLOOD SYMPTOMS: No  NERVOUS SYSTEM SYMPTOMS: No  MENTAL HEALTH SYMPTOMS: No  Fever: No  Loss of appetite: Yes  Weight loss: No  Weight gain: No  Fatigue: Yes  Night sweats: No  Chills: No  Increased stress: No  Excessive hunger: No  Excessive thirst: No  Feeling hot or cold when others believe the temperature is normal: Yes  Loss of height: No  Post-operative complications: No  Surgical site pain: No  Hallucinations: No  Change in or Loss of Energy: No  Hyperactivity: No  Confusion: No  Cough: No  Sputum or phlegm: No  Coughing up blood: No  Difficulty breating or shortness of breath: Yes  Snoring: No  Wheezing: No  Difficulty breathing on exertion: Yes  Nighttime Cough: No  Difficulty breathing when lying flat: Yes  Heart burn or indigestion: Yes  Nausea: Yes  Vomiting: No  Abdominal pain: No  Bloating: No  Constipation: No  Diarrhea: No  Blood in stool: No  Black stools: No  Rectal or Anal pain: No  Fecal incontinence: No  Yellowing of skin or eyes: No  Vomit with blood: No  Change in stools: No        PHYSICAL EXAM:  /69 (BP Location: Right arm, Patient Position: Chair, Cuff Size: Adult Regular)   Pulse 88   Resp 18   Ht 1.645 m (5' 4.76\")   Wt 56.2 kg (124 lb)   SpO2 94%   BMI 20.79 kg/m       General: pleasant, NAD  Eyes: Anicteric  Ears: Hearing grossly normal  Mouth: Oral mucosa is moist, without any lesions. No oropharyngeal exudate.  Neck: supple, no thyromegaly  Lymphatics: No cervical or supraclavicular nodes  Respiratory: Good " air movement. No crackles. No rhonchi. No wheezes  Cardiac: RRR, normal S1, S2. No murmurs.  Musculoskeletal: Extremities normal. No clubbing. No cyanosis. No edema.  Skin: No rash on limited exam  Neuro: Normal mentation. Normal speech.  Psych:Normal affect           Data:   All laboratory and imaging data reviewed.      PFT:       PFT Interpretation:  Severe COPD. There is a significant bronchodilator response. Santana Trapping and hyperinflation.  Impaired diffusion capacity.   Valid Maneuver    CXR: Pending radiology review.  On my review of the images, there is hyperinflation. More emphysema on the right. Stable nodular changes in the RLL.     PET CT 2016:      IMPRESSION: Patient's history of ovarian carcinoma.  1. Left lower lobe 2 cm irregular pulmonary nodular density is  minimally metabolic and argues that it is benign such as rounded  atelectasis and less likely rounded pneumonia.  2. Underlying COPD with scattered scars within both lungs but no  hypermetabolic structures to indicate malignancy.  3. No evidence of carcinomatosis, peritoneal or abdominal metastasis,  or metastatic disease to any of the organs of the abdomen or pelvis.  4. Possible mild esophagitis at the GE junction.     Recent Results (from the past 168 hour(s))   General PFT Lab (Please always keep checked)    Collection Time: 11/07/19 11:04 AM   Result Value Ref Range    FVC-Pred 2.86 L    FVC-Pre 2.10 L    FVC-%Pred-Pre 73 %    FEV1-Pre 0.74 L    FEV1-%Pred-Pre 33 %    FEV1FVC-Pred 78 %    FEV1FVC-Pre 35 %    FEFMax-Pred 5.61 L/sec    FEFMax-Pre 1.88 L/sec    FEFMax-%Pred-Pre 33 %    FEF2575-Pred 1.83 L/sec    FEF2575-Pre 0.26 L/sec    LKI1618-%Pred-Pre 14 %    FEF2575-Post 0.30 L/sec    QOZ6013-%Pred-Post 16 %    ExpTime-Pre 9.04 sec    FIFMax-Pre 3.07 L/sec    VC-Pred 3.11 L    VC-Pre 2.23 L    VC-%Pred-Pre 71 %    IC-Pred 2.21 L    IC-Pre 1.49 L    IC-%Pred-Pre 67 %    ERV-Pred 0.90 L    ERV-Pre 0.74 L    ERV-%Pred-Pre 82 %     FEV1FEV6-Pred 79 %    FEV1FEV6-Pre 42 %    FRCPleth-Pred 2.76 L    FRCPleth-Pre 5.91 L    FRCPleth-%Pred-Pre 214 %    RVPleth-Pred 2.13 L    RVPleth-Pre 5.17 L    RVPleth-%Pred-Pre 242 %    TLCPleth-Pred 5.06 L    TLCPleth-Pre 7.41 L    TLCPleth-%Pred-Pre 146 %    DLCOunc-Pred 19.82 ml/min/mmHg    DLCOunc-Pre 10.32 ml/min/mmHg    DLCOunc-%Pred-Pre 52 %    VA-Pre 3.44 L    VA-%Pred-Pre 71 %    FEV1SVC-Pred 71 %    FEV1SVC-Pre 33 %

## 2019-11-07 NOTE — NURSING NOTE
Chief Complaint   Patient presents with     Consult     COPD     Medications reviewed and updated.  Vitals taken  Lilian Mchugh CMA

## 2019-11-11 LAB

## 2019-11-28 ENCOUNTER — MYC REFILL (OUTPATIENT)
Dept: FAMILY MEDICINE | Facility: CLINIC | Age: 74
End: 2019-11-28

## 2019-11-28 DIAGNOSIS — J44.9 CHRONIC OBSTRUCTIVE PULMONARY DISEASE, UNSPECIFIED COPD TYPE (H): ICD-10-CM

## 2019-11-28 DIAGNOSIS — J42 CHRONIC BRONCHITIS, UNSPECIFIED CHRONIC BRONCHITIS TYPE (H): ICD-10-CM

## 2019-11-29 RX ORDER — PREDNISONE 10 MG/1
TABLET ORAL
Qty: 30 TABLET | Refills: 0 | Status: SHIPPED | OUTPATIENT
Start: 2019-11-29 | End: 2019-12-30

## 2019-11-29 RX ORDER — IPRATROPIUM BROMIDE AND ALBUTEROL SULFATE 2.5; .5 MG/3ML; MG/3ML
1 SOLUTION RESPIRATORY (INHALATION) EVERY 4 HOURS PRN
Qty: 30 VIAL | Refills: 5 | Status: SHIPPED | OUTPATIENT
Start: 2019-11-29 | End: 2020-03-17

## 2019-11-29 NOTE — TELEPHONE ENCOUNTER
Routing refill request to provider for review/approval because:  Drug not on the [facility] refill protocol   Inhaler not for asthma.  My Perry BSN, RN

## 2019-12-12 ENCOUNTER — MYC REFILL (OUTPATIENT)
Dept: FAMILY MEDICINE | Facility: CLINIC | Age: 74
End: 2019-12-12

## 2019-12-12 DIAGNOSIS — E78.5 DYSLIPIDEMIA: ICD-10-CM

## 2019-12-12 RX ORDER — ATORVASTATIN CALCIUM 20 MG/1
20 TABLET, FILM COATED ORAL DAILY
Qty: 90 TABLET | Refills: 3 | Status: SHIPPED | OUTPATIENT
Start: 2019-12-12 | End: 2020-12-02

## 2019-12-27 ENCOUNTER — MYC MEDICAL ADVICE (OUTPATIENT)
Dept: FAMILY MEDICINE | Facility: CLINIC | Age: 74
End: 2019-12-27

## 2019-12-27 DIAGNOSIS — J44.9 CHRONIC OBSTRUCTIVE PULMONARY DISEASE, UNSPECIFIED COPD TYPE (H): ICD-10-CM

## 2019-12-30 RX ORDER — PREDNISONE 10 MG/1
TABLET ORAL
Qty: 30 TABLET | Refills: 1 | Status: SHIPPED | OUTPATIENT
Start: 2019-12-30 | End: 2020-03-03

## 2019-12-30 NOTE — TELEPHONE ENCOUNTER
To provider to advise. Patient asking for prescription for prednisone      Emma BIANCHIN, RN, CPN

## 2020-01-06 ENCOUNTER — TRANSFERRED RECORDS (OUTPATIENT)
Dept: HEALTH INFORMATION MANAGEMENT | Facility: CLINIC | Age: 75
End: 2020-01-06

## 2020-01-14 ENCOUNTER — TRANSFERRED RECORDS (OUTPATIENT)
Dept: HEALTH INFORMATION MANAGEMENT | Facility: CLINIC | Age: 75
End: 2020-01-14

## 2020-02-02 ENCOUNTER — TRANSFERRED RECORDS (OUTPATIENT)
Dept: HEALTH INFORMATION MANAGEMENT | Facility: CLINIC | Age: 75
End: 2020-02-02

## 2020-02-06 ENCOUNTER — TRANSFERRED RECORDS (OUTPATIENT)
Dept: HEALTH INFORMATION MANAGEMENT | Facility: CLINIC | Age: 75
End: 2020-02-06

## 2020-02-20 ENCOUNTER — TRANSFERRED RECORDS (OUTPATIENT)
Dept: HEALTH INFORMATION MANAGEMENT | Facility: CLINIC | Age: 75
End: 2020-02-20

## 2020-03-03 ENCOUNTER — MYC REFILL (OUTPATIENT)
Dept: FAMILY MEDICINE | Facility: CLINIC | Age: 75
End: 2020-03-03

## 2020-03-03 DIAGNOSIS — J44.9 CHRONIC OBSTRUCTIVE PULMONARY DISEASE, UNSPECIFIED COPD TYPE (H): ICD-10-CM

## 2020-03-03 RX ORDER — PREDNISONE 10 MG/1
TABLET ORAL
Qty: 30 TABLET | Refills: 1 | Status: SHIPPED | OUTPATIENT
Start: 2020-03-03 | End: 2020-05-28

## 2020-03-03 NOTE — TELEPHONE ENCOUNTER
Routing refill request to provider for review/approval because:  Drug not on the [facility] refill protocol - last filled 12/30/19    Anel Talbot, ARIASN, RN

## 2020-03-16 ENCOUNTER — MYC REFILL (OUTPATIENT)
Dept: FAMILY MEDICINE | Facility: CLINIC | Age: 75
End: 2020-03-16

## 2020-03-16 DIAGNOSIS — J42 CHRONIC BRONCHITIS, UNSPECIFIED CHRONIC BRONCHITIS TYPE (H): ICD-10-CM

## 2020-03-16 RX ORDER — IPRATROPIUM BROMIDE AND ALBUTEROL SULFATE 2.5; .5 MG/3ML; MG/3ML
1 SOLUTION RESPIRATORY (INHALATION) EVERY 4 HOURS PRN
Qty: 30 VIAL | Refills: 5 | Status: CANCELLED | OUTPATIENT
Start: 2020-03-16

## 2020-03-17 RX ORDER — IPRATROPIUM BROMIDE AND ALBUTEROL SULFATE 2.5; .5 MG/3ML; MG/3ML
SOLUTION RESPIRATORY (INHALATION)
Qty: 90 ML | Refills: 0 | Status: SHIPPED | OUTPATIENT
Start: 2020-03-17 | End: 2020-04-08

## 2020-04-07 ENCOUNTER — MYC MEDICAL ADVICE (OUTPATIENT)
Dept: FAMILY MEDICINE | Facility: CLINIC | Age: 75
End: 2020-04-07

## 2020-04-08 ENCOUNTER — MYC MEDICAL ADVICE (OUTPATIENT)
Dept: FAMILY MEDICINE | Facility: CLINIC | Age: 75
End: 2020-04-08

## 2020-04-08 DIAGNOSIS — J42 CHRONIC BRONCHITIS, UNSPECIFIED CHRONIC BRONCHITIS TYPE (H): ICD-10-CM

## 2020-04-08 RX ORDER — IPRATROPIUM BROMIDE AND ALBUTEROL SULFATE 2.5; .5 MG/3ML; MG/3ML
SOLUTION RESPIRATORY (INHALATION)
Qty: 90 ML | Refills: 0 | Status: SHIPPED | OUTPATIENT
Start: 2020-04-08 | End: 2020-05-08

## 2020-04-08 NOTE — TELEPHONE ENCOUNTER
"Requested Prescriptions   Pending Prescriptions Disp Refills     ipratropium - albuterol 0.5 mg/2.5 mg/3 mL (DUONEB) 0.5-2.5 (3) MG/3ML neb solution 90 mL 0     Sig: USE 1 AMPULE IN NEBULIZER EVERY 4 HOURS AS NEEDED FOR SHORTNESS OF BREATH FOR WHEEZING OR  DYSPNEA       Short-Acting Beta Agonist Inhalers Protocol  Failed - 4/8/2020  2:47 PM        Failed - Asthma control assessment score within normal limits in last 6 months     Please review ACT score.           Failed - Recent (6 mo) or future (30 days) visit within the authorizing provider's specialty     Patient had office visit in the last 6 months or has a visit in the next 30 days with authorizing provider or within the authorizing provider's specialty.  See \"Patient Info\" tab in inbasket, or \"Choose Columns\" in Meds & Orders section of the refill encounter.            Passed - Patient is age 12 or older        Passed - Medication is active on med list       Asthma Nebs Protocol Failed - 4/8/2020  2:47 PM        Failed - Asthma control assessment score within normal limits in last 6 months     Please review ACT score.           Failed - Recent (6 mo) or future (30 days) visit within the authorizing provider's specialty     Patient had office visit in the last 6 months or has a visit in the next 30 days with authorizing provider or within the authorizing provider's specialty.  See \"Patient Info\" tab in inbasket, or \"Choose Columns\" in Meds & Orders section of the refill encounter.            Passed - Patient is age 4 years or older        Passed - Medication is active on med list             "

## 2020-04-15 ENCOUNTER — VIRTUAL VISIT (OUTPATIENT)
Dept: FAMILY MEDICINE | Facility: CLINIC | Age: 75
End: 2020-04-15
Payer: COMMERCIAL

## 2020-04-15 DIAGNOSIS — K21.9 GASTROESOPHAGEAL REFLUX DISEASE, ESOPHAGITIS PRESENCE NOT SPECIFIED: ICD-10-CM

## 2020-04-15 PROCEDURE — 99213 OFFICE O/P EST LOW 20 MIN: CPT | Mod: TEL | Performed by: FAMILY MEDICINE

## 2020-04-15 RX ORDER — OMEPRAZOLE 40 MG/1
40 CAPSULE, DELAYED RELEASE ORAL DAILY
Qty: 90 CAPSULE | Refills: 3 | Status: SHIPPED | OUTPATIENT
Start: 2020-04-15 | End: 2021-04-07

## 2020-04-15 NOTE — PROGRESS NOTES
"Iris Carrion is a 74 year old female who is being evaluated via a billable telephone visit.      The patient has been notified of following:     \"This telephone visit will be conducted via a call between you and your physician/provider. We have found that certain health care needs can be provided without the need for a physical exam.  This service lets us provide the care you need with a short phone conversation.  If a prescription is necessary we can send it directly to your pharmacy.  If lab work is needed we can place an order for that and you can then stop by our lab to have the test done at a later time.    Telephone visits are billed at different rates depending on your insurance coverage. During this emergency period, for some insurers they may be billed the same as an in-person visit.  Please reach out to your insurance provider with any questions.    If during the course of the call the physician/provider feels a telephone visit is not appropriate, you will not be charged for this service.\"    Patient has given verbal consent for Telephone visit?  Yes    How would you like to obtain your AVS? MyChart    Subjective     GERD - chronic symptoms of burning type of pain upper abdomen and sometimes up to the chest. Spicy foods make it worse.  Evaluation and treatment:    Omeprazole 40 mg daily - no side effects - symptoms come back if not taking or taking 20 mg.   Continue same tx.      The following reviewed but not addressed today:       Metastatic endometrial adenocarcinoma - discovered after she presented with abdominal pain since early July 2015.   Evaluation and treatment:    She follows with oncology.     Previous Paroxysmal SVT - used to be once per week but now less frequently lasting about 5 minutes. It causes her to be dizzy, sweat and have shortness of breath. No chest pain. Denies weight changes or temperature intolerance.  Evaluation and treatment:   EKG 8/4/18 was normal.   Previous labs were " fine including CMP, CBC and TSH   Saw cardiology 9/27/17 - no further evaluation was suggested.   Echo 10/11/17 negative.    HTN with hypokalemia - not checking at home. Controlled in clinic.    Evaluation and treatment:   Clorthalidone and KCL - not taking since not needed.    Losartan 100 mg daily - no side effects. Continue same tx.    BP Readings from Last 6 Encounters:   11/07/19 118/69   10/28/19 121/68   09/12/19 132/79   08/02/19 (!) 148/76   04/01/19 139/75   11/11/18 153/80     Last Comprehensive Metabolic Panel:  Sodium   Date Value Ref Range Status   08/02/2019 139 133 - 144 mmol/L Final     Potassium   Date Value Ref Range Status   08/02/2019 3.7 3.4 - 5.3 mmol/L Final     Chloride   Date Value Ref Range Status   08/02/2019 105 94 - 109 mmol/L Final     Carbon Dioxide   Date Value Ref Range Status   08/02/2019 27 20 - 32 mmol/L Final     Anion Gap   Date Value Ref Range Status   08/02/2019 7 3 - 14 mmol/L Final     Glucose   Date Value Ref Range Status   08/02/2019 87 70 - 99 mg/dL Final     Comment:     Non Fasting     Urea Nitrogen   Date Value Ref Range Status   08/02/2019 20 7 - 30 mg/dL Final     Creatinine   Date Value Ref Range Status   08/02/2019 1.05 (H) 0.52 - 1.04 mg/dL Final     GFR Estimate   Date Value Ref Range Status   08/02/2019 53 (L) >60 mL/min/[1.73_m2] Final     Comment:     Non  GFR Calc  Starting 12/18/2018, serum creatinine based estimated GFR (eGFR) will be   calculated using the Chronic Kidney Disease Epidemiology Collaboration   (CKD-EPI) equation.       Calcium   Date Value Ref Range Status   08/02/2019 9.3 8.5 - 10.1 mg/dL Final     COPD - Has 35 pack history of smoking, quit 2003. Symptoms are intermittent cough, shortness of breath and wheezing. No fevers or chest pain.  Evaluation and treatment:   CT lung 2011 as below.   Spirometry 12/2/13 showed FEV1 42% predicted.    Advair was too expensive.    Symbicort 160/4.5, 2 puffs bid - no side  effects.   Spiriva one puff every day - no side effect.   Nebs 3-4 times per day, sometimes at night lately - helps.    She seems to need supplemental O 2 after surgeries. Her last surgery in June 2014 was for cholecystectomy.   She has done pulmonary rehab previously.    I previously filled out disability parking form for her.    Prednisone burst and Doxy for exacerbations.   I gave her Prednisone rx for future use as needed.   I previously referred her to pulmonology - that's coming up.    1) Advanced changes of emphysema.   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 Minimal residual   right pleural fluid or thickening in the posterior aspect of the right mid   hemithorax, both less conspicuous then on 4/13/2011 and likely residual   scarring from the extensive consolidative infiltrate that was present on   1/7/2011.   3) Evidence of previous granulomatous disease.   4) Small esophageal hiatal hernia.   5) Please see above for additional less significant details.     Yelena Oseguera M.D.  [facility]  CHICO/ben  D:9/13/2011/T:9/13/2011    Dyslipidemia - No history of CAD, CVA, PAD or diabetes.   Evaluation and treatment:    Per ATP4, moderate intensity statin recommended.   Lipitor 20 mg daily - no side effects.   Continue same tx.    The 10-year ASCVD risk score (Torin PILY Jr., et al., 2013) is: 12.1%    Values used to calculate the score:      Age: 74 years      Sex: Female      Is Non- : No      Diabetic: No      Tobacco smoker: No      Systolic Blood Pressure: 118 mmHg      Is BP treated: Yes      HDL Cholesterol: 69 mg/dL      Total Cholesterol: 143 mg/dL    Recent Labs   Lab Test 08/02/19  1246 09/10/18  1242  07/18/15  0930 11/03/12  1117   CHOL 143 148   < > 179 229*   HDL 69 71   < > 48* 58   LDL 50 51   < > 93 148*   TRIG 121 132   < > 189* 119   CHOLHDLRATIO  --   --   --  3.7 4.0    < > = values in this interval not displayed.     Lab Results    Component Value Date    ALT 20 09/10/2018     Gall stone pancreatitis - in June 2014 had cholecystectomy and MRCP to remove common bile duct stone. Fine since then.    Previous poor balance and weakness - no longer using walker. Now balance is fairly good. Has gone to PT.    Left great toe nail - is quite thickened at baseline.   Evaluation and treatment:    Removed per podiatry with good results.    Preventive - declines Shingrix vaccine. Flu shot given today.    Immunization History   Administered Date(s) Administered     Influenza (High Dose) 3 valent vaccine 11/28/2015, 09/22/2016, 12/04/2017, 09/10/2018, 09/12/2019     Influenza (IIV3) PF 10/31/2012, 12/02/2013, 11/02/2014     Pneumo Conj 13-V (2010&after) 08/08/2016     Pneumococcal 23 valent 10/18/2009, 07/31/2013     TDAP Vaccine (Adacel) 01/07/2011     Zoster vaccine, live 04/21/2015     STD screen: declined     Mammogram: Negative 4/26/18 - repeat done today - results pending.    PAP - has had hysterectomy    PAP NIL 12/2/2013  PAP NIL 10/31/2012    Colonoscopy: 5/25/18 at [facility] for convenience due to location - repeat in 3 years.    DEXA: ordered multiple time previously but she told me they canceled on her more than once. Declines at this time.    Hep C screen: Negative 7/18/15    Vit D: takes 5000 units per day    Advanced directive: referred previously.    Lung CA: does not qualify.      SH:    Marital status:   Kids: 2  Employment: retired, takes care of grand kids  Exercise: active  Tobacco: per HPI  Etoh: no  Recreational drugs: no  Caffeine: rarely    Exam:    There were no vitals taken for this visit.    Gen: sounded healthy on the phone.    Assessment and Plan - Decision Making    1. Gastroesophageal reflux disease, esophagitis presence not specified    Per HPI    - omeprazole (PRILOSEC) 40 MG DR capsule; Take 1 capsule (40 mg) by mouth daily Take 30-60 minutes before a meal.  Dispense: 90 capsule; Refill: 3      Written instructions  given as follows:    Patient Instructions   See you in 3 months for a physical with fasting labs.    Total time on the phone and reviewing records: 5 min

## 2020-05-07 DIAGNOSIS — J44.9 COPD (CHRONIC OBSTRUCTIVE PULMONARY DISEASE) (H): ICD-10-CM

## 2020-05-07 DIAGNOSIS — J42 CHRONIC BRONCHITIS, UNSPECIFIED CHRONIC BRONCHITIS TYPE (H): ICD-10-CM

## 2020-05-07 NOTE — TELEPHONE ENCOUNTER
Routing refill request to provider for review/approval because:  No ACT so unable to refill.  Pt is on it for chronic bronchitis.  To provider to advise.  My BIANCHIN, RN

## 2020-05-08 RX ORDER — IPRATROPIUM BROMIDE AND ALBUTEROL SULFATE 2.5; .5 MG/3ML; MG/3ML
SOLUTION RESPIRATORY (INHALATION)
Refills: 0 | COMMUNITY
Start: 2020-05-08 | End: 2020-05-08

## 2020-05-08 RX ORDER — IPRATROPIUM BROMIDE AND ALBUTEROL SULFATE 2.5; .5 MG/3ML; MG/3ML
1 SOLUTION RESPIRATORY (INHALATION) EVERY 4 HOURS PRN
Qty: 30 VIAL | Refills: 5 | Status: SHIPPED | OUTPATIENT
Start: 2020-05-08 | End: 2020-09-08

## 2020-05-28 ENCOUNTER — MYC REFILL (OUTPATIENT)
Dept: FAMILY MEDICINE | Facility: CLINIC | Age: 75
End: 2020-05-28

## 2020-05-28 DIAGNOSIS — J44.9 CHRONIC OBSTRUCTIVE PULMONARY DISEASE, UNSPECIFIED COPD TYPE (H): ICD-10-CM

## 2020-05-29 RX ORDER — PREDNISONE 10 MG/1
TABLET ORAL
Qty: 30 TABLET | Refills: 1 | Status: SHIPPED | OUTPATIENT
Start: 2020-05-29 | End: 2020-12-02

## 2020-06-18 DIAGNOSIS — J44.9 CHRONIC OBSTRUCTIVE PULMONARY DISEASE, UNSPECIFIED COPD TYPE (H): ICD-10-CM

## 2020-06-18 RX ORDER — BUDESONIDE AND FORMOTEROL FUMARATE DIHYDRATE 160; 4.5 UG/1; UG/1
AEROSOL RESPIRATORY (INHALATION)
Qty: 33 G | Refills: 0 | OUTPATIENT
Start: 2020-06-18

## 2020-06-18 NOTE — TELEPHONE ENCOUNTER
"Requested Prescriptions   Pending Prescriptions Disp Refills     budesonide-formoterol (SYMBICORT) 160-4.5 MCG/ACT Inhaler [Pharmacy Med Name: Budesonide-Formoterol Fumarate 160-4.5 MCG/ACT Inhalation Aerosol] 33 g 0     Sig: Inhale 2 puffs by mouth twice daily       Inhaled Steroids Protocol Failed - 6/18/2020 11:24 AM        Failed - Asthma control assessment score within normal limits in last 6 months     Please review ACT score.           Passed - Patient is age 12 or older        Passed - Medication is active on med list        Passed - Recent (6 mo) or future (30 days) visit within the authorizing provider's specialty     Patient had office visit in the last 6 months or has a visit in the next 30 days with authorizing provider or within the authorizing provider's specialty.  See \"Patient Info\" tab in inbasket, or \"Choose Columns\" in Meds & Orders section of the refill encounter.           Long-Acting Beta Agonist Inhalers Protocol  Failed - 6/18/2020 11:24 AM        Failed - Asthma control assessment score within normal limits in last 6 months     Please review ACT score.           Failed - Order for Serevent, Striverdi, or Foradil and pt has steroid inhaler        Passed - Patient is age 12 or older        Passed - Medication is active on med list        Passed - Recent (6 mo) or future (30 days) visit within the authorizing provider's specialty     Patient had office visit in the last 6 months or has a visit in the next 30 days with authorizing provider or within the authorizing provider's specialty.  See \"Patient Info\" tab in inbasket, or \"Choose Columns\" in Meds & Orders section of the refill encounter.                 "

## 2020-06-19 RX ORDER — BUDESONIDE AND FORMOTEROL FUMARATE DIHYDRATE 160; 4.5 UG/1; UG/1
2 AEROSOL RESPIRATORY (INHALATION) 2 TIMES DAILY
Qty: 3 INHALER | Refills: 0 | Status: SHIPPED | OUTPATIENT
Start: 2020-06-19 | End: 2020-10-29

## 2020-06-19 NOTE — TELEPHONE ENCOUNTER
Isabel refill given per protocol so pt does not run out of medication prior to 6/30/20 appointment.  Anel Talbot, ARIASN, RN

## 2020-06-19 NOTE — TELEPHONE ENCOUNTER
Medication is-budesonide-formoterol (SYMBICORT) 160-4.5 MCG/ACT Inhaler-For COPD    Patient is scheduled on 6/30/20, with Dr Koehler. RN,can you please send refill to the pharmacy. Thank you.Faiza Reyes MA/TC

## 2020-06-24 DIAGNOSIS — C56.2 MALIGNANT NEOPLASM OF OVARY, LEFT (H): ICD-10-CM

## 2020-06-24 DIAGNOSIS — C78.6 PERITONEAL CARCINOMATOSIS (H): ICD-10-CM

## 2020-06-24 LAB — CANCER AG125 SERPL-ACNC: 14 U/ML (ref 0–30)

## 2020-06-24 PROCEDURE — 36415 COLL VENOUS BLD VENIPUNCTURE: CPT | Performed by: NURSE PRACTITIONER

## 2020-06-24 PROCEDURE — 86304 IMMUNOASSAY TUMOR CA 125: CPT | Performed by: NURSE PRACTITIONER

## 2020-06-28 ENCOUNTER — MYC MEDICAL ADVICE (OUTPATIENT)
Dept: FAMILY MEDICINE | Facility: CLINIC | Age: 75
End: 2020-06-28

## 2020-06-29 NOTE — TELEPHONE ENCOUNTER
To provider to advise. Patient has phone visit with you tomorrow. Do you want to order stool cultures?    Emma BIANCHIN, RN, CPN

## 2020-06-30 ENCOUNTER — VIRTUAL VISIT (OUTPATIENT)
Dept: FAMILY MEDICINE | Facility: CLINIC | Age: 75
End: 2020-06-30
Payer: COMMERCIAL

## 2020-06-30 DIAGNOSIS — J42 CHRONIC BRONCHITIS, UNSPECIFIED CHRONIC BRONCHITIS TYPE (H): Primary | ICD-10-CM

## 2020-06-30 PROCEDURE — 99213 OFFICE O/P EST LOW 20 MIN: CPT | Mod: 95 | Performed by: FAMILY MEDICINE

## 2020-06-30 NOTE — PROGRESS NOTES
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 She completed treatment 2016. She is here today for a surveillance visit. In light of the recent COVID19 pandemic, and in accordance with our group and national guidelines this patients care has been reviewed and it is felt that presenting for her visit would be more likely to increase rather than decrease her relative risks.  Therefore this visit was conducted by video        Treatment History:  Ms Carrion started having her symptoms of constipation and bloating (May/2015). Her doctor recommended miralax. Symptoms continued to occurred so they followed with a CT scan which showed extensive peritoneal metastatic disease.   8/17/15: CT c/a/p IMPRESSION:   1. Left renal cortical cysts. No enhancing renal mass. No urinary tract calculi or hydronephrosis. Collecting systems, ureters and bladder are unremarkable.  2. Extensive peritoneal metastatic disease with moderate ascites likely related to malignant ascites. Followup as clinically warranted. An obvious etiology for this metastatic disease is not  visualized.  3. No bowel obstruction or diverticulitis. Serosal metastatic disease is present. No obvious colonic mass. Followup colonoscopy as needed for further assessment.  4. Moderate-sized hiatal hernia. No gastric obstruction.      8/22/15:  1950      8/27/15: New patient visit. she continues to have constipation and bloating. She has lost about 5 pounds. She also admits to having lood in urine as well as urinary urgenrgy. She has loose bm bm every other day.       Plan: chemotherapy with Taxol/Carbo. To be considered for these clinical trials: PARP, avatar circulating tumor study, small neogman.       8/31/15: CT guided omental nodule biopsy. Final  "diagnosis:  Adenocarcinoma, with features consistent with endometrioid adenocarcinoma       COMMENT:  Immunohistochemical staining was obtained with appropriate control slides for ER, [place] 8 and WT-1. Malignant cells are positive for all three markers confirming their endometrioid cell differentiation.       Plan: treat like an ovarian cancer but will do dose dense Taxol carbo for 3 cycles then be re-evalated with scan       9/4/15-10/14/15: Cycle #1-3 dose dense Taxol/Carbo.  2008, 470, 30.       11/19/15: CT c/a/p IMPRESSION:   1. Decreased peritoneal carcinomatosis and malignant ascites.  2. Severe emphysema and scattered areas of likely postinfectious scarring. Several of these areas particularly along the right minor fissure appear more nodular malignancy cannot be excluded. Recommend 3-six-month interval followup.  3. Diverticulosis.  4. Osteopenia and numerous compression deformities, none of which appear acute.  5. Stable large hiatal hernia.      11/23/15:  17      11/25/15: Exploratory Laparotomy, Repair Umbilical Hernia, Total Abdominal Hysterectomy, Right Salpingo Oophorectomy, Left Salpingectomy, Appendectomy, Complete Omentectomy, CUSA Tumor Debulking,Cystoscopy, Left PeriAortc Lymph Node Biopsy, Insertion Peritoneal Port, Mobilization of Liver, Ablation of Liver Nodules Open Repair Hiatal Hernia Repair      Surgical pathology report:  FINAL DIAGNOSIS:  A: Umbilical hernia sac, repair:  - Adenocarcinoma  - Fibroadipose tissue, partially surfaced by mesothelium, consistent with hernia sac  B: Hepatic ligament, biopsy:  - One benign lymph node (0/1)  C: Splenic ligament implant, biopsy:  - Adenocarcinoma  D: Inferior vena cava implant, biopsy:  - Adenocarcinoma  E: Omentum, omentectomy:  - Adenocarcinoma  F: \"Liver nodule\", biopsy:  - Fibro-adipose tissue with adenocarcinoma  G: Right pelvic peritoneum, biopsy:  - Adenocarcinoma  H: Cul-de-sac peritoneum, biopsy:  - Adenocarcinoma  I: Uterus, " cervix, right ovary and fallopian tube, hysterectomy with right salpingo-oophorectomy:  - Adenocarcinoma present in the soft tissue at the left adnexal area,cervix and right fallopian tube, most likely endometrioid adenocarcinoma  - Atrophic endometrium  - Benign endometrial polyp  - Myometrium with no significant histologic abnormality  - Atrophic cervix and nabothian cysts  - Tumor present on the external aspect of the cervix  - Right ovary with numerous corpora albicantia and simple epithelial cysts  - Right fallopian tube with a single focus of carcinoma  - See comment  J: Large bowel epiploica, biopsy:  - Adenocarcinoma  K: Appendix, appendectomy:  - Appendix with fibrous replacement  L: Lymph nodes, left periaortic, excision:  - One benign lymph node (0/1)      12/14/15:  46. post op visit. reviewed the results of her surgical pathology copy of report was given to pt. She will start IV/IP taxol/Cddp chemo tomorrow. IV fluids on Thursday and Friday at [place] and Saturday and Sunday at the [facility]. Day 8 taxol will be on dec 22nd. She will need to have fluids again on 23rd and 24th. She will also need Neupogen.       Plan: 3 cycles IV/IP chemo.       12/15/15: Cycle #1 IV/IP chemo.    1/6/16: Cycle #2 IV/IP chemo.   18. Delayed d/t neutropenia; given 1/13. Neupogen and Neulasta added.    2/1/16: Cycle #3 IV/IP chemo.  23  2/29/16:  41. CT cap  Findings:  Right internal jugular Port-A-Cath with tip at atriocaval junction. The heart is not enlarged. No significant pericardial effusion. The mediastinal great vessels are normal in caliber. No central pulmonary embolus. Thoracic aortic atherosclerotic calcifications. Calcified mediastinal and hilar lymph nodes. No lymphadenopathy in the chest by size criteria. The central tracheobronchial tree is patent. New nodular pleural-based consolidative opacity in the left lower lobe measuring 26 x 29 mm (image 110, series 7). There  are a few other new subcentimeter nodules in the left lower lobe. For example, 7 mm nodule on image 104, series 4. Severe centrilobular emphysematous changes. Likely nodular scarring in the right upper lobe (image 32, series 7) is not significantly changed. Other scattered fibrotic changes, most prominent in the right lower lobe, do not appear significantly changed. Calcified granuloma in the right lower lobe. Postsurgical changes of a hernia repair and hysterectomy/bilateral salpingo-oophorectomy. There is no abnormal soft tissue within the  resection bed to suggest locally recurrent disease. No suspicious liver lesions. Unchanged extrahepatic and central intrahepatic biliary dilatation, likely reservoir effect in the setting of cholecystectomy. Atrophic pancreas. Calcified splenic granulomata. Unchanged  hypoattenuating lesions in the kidneys, the largest of which on the left are compatible with cysts. No abnormally dilated or thickened loops of bowel. No free intraperitoneal air or free fluid. Colonic diverticuli without evidence of active inflammation. Duodenal diverticulum. Intra-abdominal port with tip in the left pelvis. Aortoiliac atheromatous plaque without aneurysmal dilatation. No abnormally enlarged abdominal or pelvic lymph nodes. No definite residual peritoneal/or omental nodules. Marked generalized osteopenia. Redemonstration of multiple compression deformities throughout the thoracolumbar spine, greatest at T8 with greater than 50% loss of vertebral body height, not significantly changed. There is mild increased associated sclerosis. Associated kyphosis of the upper thoracic spine. There are 6 lumbar type vertebral bodies. No new aggressive osseous lesions.  Impression:  1. Continued positive response to therapy in the abdomen/pelvis with no definite residual peritoneal/omental metastasis.  2. New pleural-based consolidative opacity in the left lower lobe. There are a few other new subcentimeter  "nodules in the left lower lobe. This may be secondary to infection/inflammation, but metastases should be considered and short-term interval followup is recommended.      4/6/16 CT/PET IMPRESSION: Patient's history of ovarian carcinoma.  1. Left lower lobe 2 cm irregular pulmonary nodular density is minimally metabolic and argues that it is benign such as rounded atelectasis and less likely rounded pneumonia.  2. Underlying COPD with scattered scars within both lungs but no hypermetabolic structures to indicate malignancy.  3. No evidence of carcinomatosis, peritoneal or abdominal metastasis, or metastatic disease to any of the organs of the abdomen or pelvis.  4. Possible mild esophagitis at the GE junction.      4/11/16:  8. \"Had been unclear as to why  up to 41 from 23-this is concerning in light of recent completion of cddp/taxol, however the  is 8. CT PET is negative for metastatic disease. Recommend q 3 month visits x 2 years, then every 6 mos for 3 additional years.\"      7/25/16:   9.  7/27/16: CT cap Impression:    1. Interval resolution of left lower lobe spiculated pulmonary nodule. This likely represented atelectasis or consolidation.  2. Significant panlobular emphysematous changes of both lungs.  3. Spiculated right upper lobe pulmonary nodule measuring up to 8 mm, unchanged since 11/19/2015, although progressed since 2/4/2011. This may represent scarring. Recommend attention on followup.  4. New right hip fracture, possibly pathologic.  5. New sclerotic lesions of the sternum which are indeterminate. This may represent metastatic disease.      Of note, patient tripped over a cord at home 5/30/16 and fell and had an avulsed fx of her right hip. She underwent PT for this. She subsequently fell backwards 6/13/16 and fractured her left wrist and seeing OT for this.       She was recommended to have a bone scan done. This was scheduled and the patient delayed her scan until 11/2016 " which showed uptake compatible with healing fractures.       10/31/16:  15.  12/5/16:  11.  2/13/17:  12.  5/8/17:  11. Port removed.   8/30/17:  13.  12/4/17:   11.  3/5/18:  12.  9/5/18:  13.  4/1/19:  14.  10/28/19:  11.   6/24/2020:  14.      Today she reports she is doing well. She denies any vaginal bleeding, no changes in her bowel or bladder habits, no nausea/emesis, no lower extremity edema, and no difficulties eating or sleeping. She denies any abdominal discomfort/bloating, no fevers or chills, and no chest pain or shortness of breath. She continues to see pulmonary and has an appt next week; she has not needed to increase her use of inhalers. She denies any vaginal dryness or discomfort. Her annual and mammogram are due in Sept.  She has been going outside, mostly in the evenings, and has not noticed any impact on her breathing with the humidity.            Review of Systems:    Systemic           no weight changes; no fever; no chills; no night sweats; no appetite changes  Skin           no rashes, or lesions  Eye           no irritation; no changes in vision  Katarzyna-Laryngeal           no dysphagia; no hoarseness   Pulmonary    no cough; no shortness of breath  Cardiovascular    no chest pain; no palpitations  Gastrointestinal    no diarrhea; no constipation; no abdominal pain; no changes in bowel habits; no blood in stool  Genitourinary   no urinary frequency; no urinary urgency; no dysuria; no pain; no abnormal vaginal discharge; no abnormal vaginal bleeding  Breast    no breast discharge; no breast changes; no breast pain  Musculoskeletal    no myalgias; no arthralgias; no back pain  Psychiatric           no depressed mood; no anxiety    Hematologic              no tender lymph nodes; no noticeable swellings or lumps   Endocrine    no hot flashes; no heat/cold intolerance         Neurological   no tremor; no numbness and tingling; no  headaches; no difficulty sleeping      Past Medical History:    Past Medical History:   Diagnosis Date     Cervical high risk HPV (human papillomavirus) test positive 10/31/12    type 18     COPD (chronic obstructive pulmonary disease) (H)      GERD (gastroesophageal reflux disease)      Hx of colposcopy with cervical biopsy 11/2012    negative     Hypertension 2013     Osteoporosis      Paroxysmal supraventricular tachycardia (H) 9/6/2017     Peritoneal carcinomatosis (H) 8/24/2015     Pneumonia      Uncomplicated asthma 1980         Past Surgical History:    Past Surgical History:   Procedure Laterality Date     CHOLECYSTECTOMY  6/2014     COLONOSCOPY  12/4/2012    Procedure: COLONOSCOPY;  COLONOSCOPY SCREEN;  Surgeon: Mushtaq Lara MD;  Location: [facility] OR     GYN SURGERY       HERNIORRHAPHY HIATAL N/A 11/25/2015    Procedure: HERNIORRHAPHY HIATAL;  Surgeon: Chip Carty MD;  Location: [facility] OR     HYSTERECTOMY TOTAL ABD, ALVIN SALPINGO-OOPHORECTOMY, NODE DISSECTION, TUMOR DEBULKING, COMBINED Bilateral 11/25/2015    Procedure: COMBINED HYSTERECTOMY TOTAL ABDOMINAL, SALPINGO-OOPHORECTOMY, NODE DISSECTION, TUMOR DEBULKING;  Surgeon: Emma Cabrera MD;  Location: [facility] OR         Health Maintenance Due   Topic Date Due     DEXA  1945     ZOSTER IMMUNIZATION (2 of 3) 06/16/2015       Current Medications:     Current Outpatient Medications   Medication Sig Dispense Refill     albuterol (PROAIR HFA/PROVENTIL HFA/VENTOLIN HFA) 108 (90 Base) MCG/ACT Inhaler Inhale 2 puffs into the lungs every 6 hours as needed for shortness of breath / dyspnea or wheezing 1 Inhaler 1     atorvastatin (LIPITOR) 20 MG tablet Take 1 tablet (20 mg) by mouth daily 90 tablet 3     BIOTIN PO Take 1 tablet by mouth daily.       budesonide-formoterol (SYMBICORT) 160-4.5 MCG/ACT Inhaler Inhale 2 puffs into the lungs 2 times daily 3 Inhaler 0     Calcium Carbonate (CALCIUM 500 PO) Take 1 tablet by mouth daily.        fluticasone (FLONASE) 50 MCG/ACT spray Spray 2 sprays into both nostrils daily 1 Bottle 1     ibuprofen (ADVIL,MOTRIN) 600 MG tablet Take 1 tablet (600 mg) by mouth every 6 hours as needed for moderate pain 40 tablet 0     ipratropium - albuterol 0.5 mg/2.5 mg/3 mL (DUONEB) 0.5-2.5 (3) MG/3ML neb solution Take 1 vial (3 mLs) by nebulization every 4 hours as needed for shortness of breath / dyspnea or wheezing 30 vial 5     loratadine (CLARITIN) 10 MG tablet Take 10 mg by mouth daily       losartan (COZAAR) 100 MG tablet Take 1 tablet (100 mg) by mouth daily 90 tablet 3     MAGNESIUM OXIDE PO Take 200 mg by mouth 2 times daily       omeprazole (PRILOSEC) 40 MG DR capsule Take 1 capsule (40 mg) by mouth daily Take 30-60 minutes before a meal. 90 capsule 3     order for DME Equipment being ordered: Nebulizer 1 Device 0     predniSONE (DELTASONE) 10 MG tablet Take 4 pills per day for 3 days. Then 3 pills per day for 3 days. Then 2 pills per day for 3 days. Then 1 pill per day for 3 days. 30 tablet 1     Pyridoxine HCl (VITAMIN B-6 PO) Take 100 mg by mouth daily       umeclidinium (INCRUSE ELLIPTA) 62.5 MCG/INH inhaler Inhale 1 puff into the lungs daily 1 Inhaler 11     Zinc Sulfate (ZINC 15 PO) Take by mouth daily           Allergies:        Allergies   Allergen Reactions     Levaquin Rash        Social History:     Social History     Tobacco Use     Smoking status: Former Smoker     Packs/day: 1.00     Years: 30.00     Pack years: 30.00     Types: Cigarettes     Start date: 1965     Last attempt to quit: 10/10/2003     Years since quittin.7     Smokeless tobacco: Never Used   Substance Use Topics     Alcohol use: No       History   Drug Use No         Family History:     The patient's family history is notable for:    Family History   Problem Relation Age of Onset     Cancer Brother         testicular     Diabetes Sister      Ovarian Cancer Mother 60     Diabetes Mother              Asthma Father               Diabetes Sister      Depression/Anxiety Daughter      Depression Daughter      Osteoporosis Sister      Other Cancer Brother      Diabetes Brother      Depression Other         Grandson         Physical Exam:     There were no vitals taken for this visit.  There is no height or weight on file to calculate BMI.    General Appearance: healthy and alert, no distress    Eyes:  Eyes grossly normal to inspection.  No discharge or erythema, or obvious scleral/conjunctival abnormalities.    Respiratory: No audible wheeze, cough, or visible cyanosis.  No visible retractions or increased work of breathing.     Musculoskeletal: extremities non tender and without edema    Skin: no lesions or rashes on visible skin    Neurological: normal gait, no gross defects     Psychiatric: appropriate mood and affect. Mentation appears normal, affect normal/bright, judgement and insight intact, normal speech and appearance well-groomed                            The rest of a comprehensive physical examination is deferred due to PHE (public health emergency) video visit restrictions.      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 She completed treatment 2016. She is here today for a surveillance visit. In light of the recent COVID19 pandemic, and in accordance with our group and national guidelines this patients care has been reviewed and it is felt that presenting for her visit would be more likely to increase rather than decrease her relative risks.  Therefore this visit was conducted by video    10 minutes were spent with this patient by video. Over 50% of that time was spent in symptom management, treatment planning and in counseling and coordination of care. See rooming note for consent.     Video start:100  Video end: 110    Plan:     1.)        Patient to RTC in 6 months for her next surveillance visit and . Reviewed signs  and symptoms for when she should contact the clinic or seek additional care. Patient to contact the clinic with any questions or concerns in the interim.     2.) Genetic risk factors were assessed andshe is negative for mutations in GINGER, BARD1, BRCA1, BRCA2, BRIP1, CDH1, CHEK2, EPCAM, MLH1, MRE11A, MSH2, MSH6, MUTYH, NBN, NF1, PALB2, PMS2, PTEN, RAD50, RAD51C, RAD51D, SMARCA4, STK11, and TP53.        3.) Labs and/or tests ordered include:  .     4.) Health maintenance issues addressed today include annual health maintenance and non-gynecologic issues with PCP.    CHANDLER Jay, WHNP-BC, ANP-BC  Women's Health Nurse Practitioner  Adult Nurse Pracitioner  Division of Gynecologic Oncology          CC  Patient Care Team:  Pillo Koehler MD as PCP - General (Family Practice)  Iliana Pierre RN as   Pillo Koehler MD as Assigned PCP  Hayes Garg MD as MD (Internal Medicine)

## 2020-06-30 NOTE — PROGRESS NOTES
"Iris Carrion is a 74 year old female who is being evaluated via a billable telephone visit.      The patient has been notified of following:     \"This telephone visit will be conducted via a call between you and your physician/provider. We have found that certain health care needs can be provided without the need for a physical exam.  This service lets us provide the care you need with a short phone conversation.  If a prescription is necessary we can send it directly to your pharmacy.  If lab work is needed we can place an order for that and you can then stop by our lab to have the test done at a later time.    Telephone visits are billed at different rates depending on your insurance coverage. During this emergency period, for some insurers they may be billed the same as an in-person visit.  Please reach out to your insurance provider with any questions.    If during the course of the call the physician/provider feels a telephone visit is not appropriate, you will not be charged for this service.\"    Patient has given verbal consent for Telephone visit?  Yes    What phone number would you like to be contacted at? [phone]    How would you like to obtain your AVS? Manish Appiah     Iris Carrion is a 74 year old female who presents via phone visit today for the following health issues:      Please note: only the issues listed at the bottom under Assessment and Plan are addressed today. The rest of the medical problems are listed for the sake of completeness.      GERD - chronic symptoms of burning type of pain upper abdomen and sometimes up to the chest. Spicy foods make it worse.  Evaluation and treatment:    Omeprazole 40 mg daily - no side effects - symptoms come back if not taking or taking 20 mg.   Continue same tx.     Metastatic endometrial adenocarcinoma - discovered after she presented with abdominal pain since early July 2015.   Evaluation and treatment:    She follows with oncology. "     Previous Paroxysmal SVT - used to be once per week but now less frequently lasting about 5 minutes. It causes her to be dizzy, sweat and have shortness of breath. No chest pain. Denies weight changes or temperature intolerance.  Evaluation and treatment:   EKG 8/4/18 was normal.   Previous labs were fine including CMP, CBC and TSH   Saw cardiology 9/27/17 - no further evaluation was suggested.   Echo 10/11/17 negative.    HTN with hypokalemia - not checking at home. Controlled in clinic.    Evaluation and treatment:   Clorthalidone and KCL - not taking since not needed.    Losartan 100 mg daily - no side effects. Continue same tx.    BP Readings from Last 6 Encounters:   11/07/19 118/69   10/28/19 121/68   09/12/19 132/79   08/02/19 (!) 148/76   04/01/19 139/75   11/11/18 153/80     Last Comprehensive Metabolic Panel:  Sodium   Date Value Ref Range Status   08/02/2019 139 133 - 144 mmol/L Final     Potassium   Date Value Ref Range Status   08/02/2019 3.7 3.4 - 5.3 mmol/L Final     Chloride   Date Value Ref Range Status   08/02/2019 105 94 - 109 mmol/L Final     Carbon Dioxide   Date Value Ref Range Status   08/02/2019 27 20 - 32 mmol/L Final     Anion Gap   Date Value Ref Range Status   08/02/2019 7 3 - 14 mmol/L Final     Glucose   Date Value Ref Range Status   08/02/2019 87 70 - 99 mg/dL Final     Comment:     Non Fasting     Urea Nitrogen   Date Value Ref Range Status   08/02/2019 20 7 - 30 mg/dL Final     Creatinine   Date Value Ref Range Status   08/02/2019 1.05 (H) 0.52 - 1.04 mg/dL Final     GFR Estimate   Date Value Ref Range Status   08/02/2019 53 (L) >60 mL/min/[1.73_m2] Final     Comment:     Non  GFR Calc  Starting 12/18/2018, serum creatinine based estimated GFR (eGFR) will be   calculated using the Chronic Kidney Disease Epidemiology Collaboration   (CKD-EPI) equation.       Calcium   Date Value Ref Range Status   08/02/2019 9.3 8.5 - 10.1 mg/dL Final     COPD - Has 35 pack history  of smoking, quit 2003. Symptoms are intermittent cough, shortness of breath and wheezing. No fevers or chest pain.  Evaluation and treatment:   CT lung 2011 as below.   Spirometry 12/2/13 showed FEV1 42% predicted.    Advair was too expensive.    Symbicort 160/4.5, 2 puffs bid - no side effects.   Spiriva one puff every day - no side effect.   Nebs 3-4 times per day, sometimes at night lately - helps.    She seems to need supplemental O 2 after surgeries. Her last surgery in June 2014 was for cholecystectomy.   She has done pulmonary rehab previously.    I previously filled out disability parking form for her.    Prednisone burst and Doxy for exacerbations.   I gave her Prednisone rx for future use as needed.   I previously referred her to pulmonology - that's coming up.    1) Advanced changes of emphysema.   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 Minimal residual   right pleural fluid or thickening in the posterior aspect of the right mid   hemithorax, both less conspicuous then on 4/13/2011 and likely residual   scarring from the extensive consolidative infiltrate that was present on   1/7/2011.   3) Evidence of previous granulomatous disease.   4) Small esophageal hiatal hernia.   5) Please see above for additional less significant details.     Yelena Oseguera M.D.  [facility]  CHICO/ben  D:9/13/2011/T:9/13/2011    Dyslipidemia - No history of CAD, CVA, PAD or diabetes.   Evaluation and treatment:    Per ATP4, moderate intensity statin recommended.   Lipitor 20 mg daily - no side effects.   Continue same tx.    The 10-year ASCVD risk score (Hammondjose NASCIMENTO Jr., et al., 2013) is: 12.1%    Values used to calculate the score:      Age: 74 years      Sex: Female      Is Non- : No      Diabetic: No      Tobacco smoker: No      Systolic Blood Pressure: 118 mmHg      Is BP treated: Yes      HDL Cholesterol: 69 mg/dL      Total Cholesterol: 143 mg/dL    Recent Labs    Lab Test 08/02/19  1246 09/10/18  1242  07/18/15  0930 11/03/12  1117   CHOL 143 148   < > 179 229*   HDL 69 71   < > 48* 58   LDL 50 51   < > 93 148*   TRIG 121 132   < > 189* 119   CHOLHDLRATIO  --   --   --  3.7 4.0    < > = values in this interval not displayed.     Lab Results   Component Value Date    ALT 20 09/10/2018     Gall stone pancreatitis - in June 2014 had cholecystectomy and MRCP to remove common bile duct stone. Fine since then.    Previous poor balance and weakness - no longer using walker. Now balance is fairly good. Has gone to PT.    Left great toe nail - is quite thickened at baseline.   Evaluation and treatment:    Removed per podiatry with good results.    Preventive - declines Shingrix vaccine.     Immunization History   Administered Date(s) Administered     Influenza (High Dose) 3 valent vaccine 11/28/2015, 09/22/2016, 12/04/2017, 09/10/2018, 09/12/2019     Influenza (IIV3) PF 10/31/2012, 12/02/2013, 11/02/2014     Pneumo Conj 13-V (2010&after) 08/08/2016     Pneumococcal 23 valent 10/18/2009, 07/31/2013     TDAP Vaccine (Adacel) 01/07/2011     Zoster vaccine, live 04/21/2015     STD screen: declined     Mammogram: Negative 4/26/18 - repeat done today - results pending.    PAP - has had hysterectomy    PAP NIL 12/2/2013  PAP NIL 10/31/2012    Colonoscopy: 5/25/18 at [facility] for convenience due to location - repeat in 3 years.    DEXA: ordered multiple time previously but she told me they canceled on her more than once. Declines at this time.    Hep C screen: Negative 7/18/15    Vit D: takes 5000 units per day    Advanced directive: referred previously.    Lung CA: does not qualify.      SH:    Marital status:   Kids: 2  Employment: retired, takes care of grand kids  Exercise: active  Tobacco: per HPI  Etoh: no  Recreational drugs: no  Caffeine: rarely    Exam:    There were no vitals taken for this visit.    Gen: sounded healthy on the phone.    Assessment and Plan - Decision  Making    1. Chronic bronchitis, unspecified chronic bronchitis type (H)    Per HPI        Written instructions given as follows:    Patient Instructions   See you in 3 months for a physical with fasting labs.    Total time on the phone and reviewing records: 6 min

## 2020-07-01 ENCOUNTER — VIRTUAL VISIT (OUTPATIENT)
Dept: ONCOLOGY | Facility: CLINIC | Age: 75
End: 2020-07-01
Attending: NURSE PRACTITIONER
Payer: COMMERCIAL

## 2020-07-01 DIAGNOSIS — C78.6 PERITONEAL CARCINOMATOSIS (H): ICD-10-CM

## 2020-07-01 DIAGNOSIS — C56.2 MALIGNANT NEOPLASM OF OVARY, LEFT (H): Primary | ICD-10-CM

## 2020-07-01 PROCEDURE — 99212 OFFICE O/P EST SF 10 MIN: CPT | Mod: 95 | Performed by: NURSE PRACTITIONER

## 2020-07-01 NOTE — LETTER
2020         RE: Iris Carrion  [address]  [place] [address]        Dear Colleague,    Thank you for referring your patient, Iris Carrion, to the [facility]. Please see a copy of my visit note below.                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 She completed treatment 2016. She is here today for a surveillance visit. In light of the recent COVID19 pandemic, and in accordance with our group and national guidelines this patients care has been reviewed and it is felt that presenting for her visit would be more likely to increase rather than decrease her relative risks.  Therefore this visit was conducted by video        Treatment History:  Ms Carrion started having her symptoms of constipation and bloating (May/2015). Her doctor recommended miralax. Symptoms continued to occurred so they followed with a CT scan which showed extensive peritoneal metastatic disease.   8/17/15: CT c/a/p IMPRESSION:   1. Left renal cortical cysts. No enhancing renal mass. No urinary tract calculi or hydronephrosis. Collecting systems, ureters and bladder are unremarkable.  2. Extensive peritoneal metastatic disease with moderate ascites likely related to malignant ascites. Followup as clinically warranted. An obvious etiology for this metastatic disease is not  visualized.  3. No bowel obstruction or diverticulitis. Serosal metastatic disease is present. No obvious colonic mass. Followup colonoscopy as needed for further assessment.  4. Moderate-sized hiatal hernia. No gastric obstruction.      8/22/15:  1950      8/27/15: New patient visit. she continues to have constipation and bloating. She has lost about 5 pounds. She also admits to having lood in urine as well as urinary  urgenrgy. She has loose bm bm every other day.       Plan: chemotherapy with Taxol/Carbo. To be considered for these clinical trials: PARP, avatar circulating tumor study, small neogman.       8/31/15: CT guided omental nodule biopsy. Final diagnosis:  Adenocarcinoma, with features consistent with endometrioid adenocarcinoma       COMMENT:  Immunohistochemical staining was obtained with appropriate control slides for ER, Hartly 8 and WT-1. Malignant cells are positive for all three markers confirming their endometrioid cell differentiation.       Plan: treat like an ovarian cancer but will do dose dense Taxol carbo for 3 cycles then be re-evalated with scan       9/4/15-10/14/15: Cycle #1-3 dose dense Taxol/Carbo.  2008, 470, 30.       11/19/15: CT c/a/p IMPRESSION:   1. Decreased peritoneal carcinomatosis and malignant ascites.  2. Severe emphysema and scattered areas of likely postinfectious scarring. Several of these areas particularly along the right minor fissure appear more nodular malignancy cannot be excluded. Recommend 3-six-month interval followup.  3. Diverticulosis.  4. Osteopenia and numerous compression deformities, none of which appear acute.  5. Stable large hiatal hernia.      11/23/15:  17      11/25/15: Exploratory Laparotomy, Repair Umbilical Hernia, Total Abdominal Hysterectomy, Right Salpingo Oophorectomy, Left Salpingectomy, Appendectomy, Complete Omentectomy, CUSA Tumor Debulking,Cystoscopy, Left PeriAortc Lymph Node Biopsy, Insertion Peritoneal Port, Mobilization of Liver, Ablation of Liver Nodules Open Repair Hiatal Hernia Repair      Surgical pathology report:  FINAL DIAGNOSIS:  A: Umbilical hernia sac, repair:  - Adenocarcinoma  - Fibroadipose tissue, partially surfaced by mesothelium, consistent with hernia sac  B: Hepatic ligament, biopsy:  - One benign lymph node (0/1)  C: Splenic ligament implant, biopsy:  - Adenocarcinoma  D: Inferior vena cava implant, biopsy:  -  "Adenocarcinoma  E: Omentum, omentectomy:  - Adenocarcinoma  F: \"Liver nodule\", biopsy:  - Fibro-adipose tissue with adenocarcinoma  G: Right pelvic peritoneum, biopsy:  - Adenocarcinoma  H: Cul-de-sac peritoneum, biopsy:  - Adenocarcinoma  I: Uterus, cervix, right ovary and fallopian tube, hysterectomy with right salpingo-oophorectomy:  - Adenocarcinoma present in the soft tissue at the left adnexal area,cervix and right fallopian tube, most likely endometrioid adenocarcinoma  - Atrophic endometrium  - Benign endometrial polyp  - Myometrium with no significant histologic abnormality  - Atrophic cervix and nabothian cysts  - Tumor present on the external aspect of the cervix  - Right ovary with numerous corpora albicantia and simple epithelial cysts  - Right fallopian tube with a single focus of carcinoma  - See comment  J: Large bowel epiploica, biopsy:  - Adenocarcinoma  K: Appendix, appendectomy:  - Appendix with fibrous replacement  L: Lymph nodes, left periaortic, excision:  - One benign lymph node (0/1)      12/14/15:  46. post op visit. reviewed the results of her surgical pathology copy of report was given to pt. She will start IV/IP taxol/Cddp chemo tomorrow. IV fluids on Thursday and Friday at [place] and Saturday and Sunday at the [facility]. Day 8 taxol will be on dec 22nd. She will need to have fluids again on 23rd and 24th. She will also need Neupogen.       Plan: 3 cycles IV/IP chemo.       12/15/15: Cycle #1 IV/IP chemo.    1/6/16: Cycle #2 IV/IP chemo.   18. Delayed d/t neutropenia; given 1/13. Neupogen and Neulasta added.    2/1/16: Cycle #3 IV/IP chemo.  23  2/29/16:  41. CT cap  Findings:  Right internal jugular Port-A-Cath with tip at atriocaval junction. The heart is not enlarged. No significant pericardial effusion. The mediastinal great vessels are normal in caliber. No central pulmonary embolus. Thoracic aortic atherosclerotic calcifications. Calcified " mediastinal and hilar lymph nodes. No lymphadenopathy in the chest by size criteria. The central tracheobronchial tree is patent. New nodular pleural-based consolidative opacity in the left lower lobe measuring 26 x 29 mm (image 110, series 7). There are a few other new subcentimeter nodules in the left lower lobe. For example, 7 mm nodule on image 104, series 4. Severe centrilobular emphysematous changes. Likely nodular scarring in the right upper lobe (image 32, series 7) is not significantly changed. Other scattered fibrotic changes, most prominent in the right lower lobe, do not appear significantly changed. Calcified granuloma in the right lower lobe. Postsurgical changes of a hernia repair and hysterectomy/bilateral salpingo-oophorectomy. There is no abnormal soft tissue within the  resection bed to suggest locally recurrent disease. No suspicious liver lesions. Unchanged extrahepatic and central intrahepatic biliary dilatation, likely reservoir effect in the setting of cholecystectomy. Atrophic pancreas. Calcified splenic granulomata. Unchanged  hypoattenuating lesions in the kidneys, the largest of which on the left are compatible with cysts. No abnormally dilated or thickened loops of bowel. No free intraperitoneal air or free fluid. Colonic diverticuli without evidence of active inflammation. Duodenal diverticulum. Intra-abdominal port with tip in the left pelvis. Aortoiliac atheromatous plaque without aneurysmal dilatation. No abnormally enlarged abdominal or pelvic lymph nodes. No definite residual peritoneal/or omental nodules. Marked generalized osteopenia. Redemonstration of multiple compression deformities throughout the thoracolumbar spine, greatest at T8 with greater than 50% loss of vertebral body height, not significantly changed. There is mild increased associated sclerosis. Associated kyphosis of the upper thoracic spine. There are 6 lumbar type vertebral bodies. No new aggressive osseous  "lesions.  Impression:  1. Continued positive response to therapy in the abdomen/pelvis with no definite residual peritoneal/omental metastasis.  2. New pleural-based consolidative opacity in the left lower lobe. There are a few other new subcentimeter nodules in the left lower lobe. This may be secondary to infection/inflammation, but metastases should be considered and short-term interval followup is recommended.      4/6/16 CT/PET IMPRESSION: Patient's history of ovarian carcinoma.  1. Left lower lobe 2 cm irregular pulmonary nodular density is minimally metabolic and argues that it is benign such as rounded atelectasis and less likely rounded pneumonia.  2. Underlying COPD with scattered scars within both lungs but no hypermetabolic structures to indicate malignancy.  3. No evidence of carcinomatosis, peritoneal or abdominal metastasis, or metastatic disease to any of the organs of the abdomen or pelvis.  4. Possible mild esophagitis at the GE junction.      4/11/16:  8. \"Had been unclear as to why  up to 41 from 23-this is concerning in light of recent completion of cddp/taxol, however the  is 8. CT PET is negative for metastatic disease. Recommend q 3 month visits x 2 years, then every 6 mos for 3 additional years.\"      7/25/16:   9.  7/27/16: CT cap Impression:    1. Interval resolution of left lower lobe spiculated pulmonary nodule. This likely represented atelectasis or consolidation.  2. Significant panlobular emphysematous changes of both lungs.  3. Spiculated right upper lobe pulmonary nodule measuring up to 8 mm, unchanged since 11/19/2015, although progressed since 2/4/2011. This may represent scarring. Recommend attention on followup.  4. New right hip fracture, possibly pathologic.  5. New sclerotic lesions of the sternum which are indeterminate. This may represent metastatic disease.      Of note, patient tripped over a cord at home 5/30/16 and fell and had an avulsed fx of " her right hip. She underwent PT for this. She subsequently fell backwards 6/13/16 and fractured her left wrist and seeing OT for this.       She was recommended to have a bone scan done. This was scheduled and the patient delayed her scan until 11/2016 which showed uptake compatible with healing fractures.       10/31/16:  15.  12/5/16:  11.  2/13/17:  12.  5/8/17:  11. Port removed.   8/30/17:  13.  12/4/17:   11.  3/5/18:  12.  9/5/18:  13.  4/1/19:  14.  10/28/19:  11.   6/24/2020:  14.      Today she reports she is doing well. She denies any vaginal bleeding, no changes in her bowel or bladder habits, no nausea/emesis, no lower extremity edema, and no difficulties eating or sleeping. She denies any abdominal discomfort/bloating, no fevers or chills, and no chest pain or shortness of breath. She continues to see pulmonary and has an appt next week; she has not needed to increase her use of inhalers. She denies any vaginal dryness or discomfort. Her annual and mammogram are due in Sept.  She has been going outside, mostly in the evenings, and has not noticed any impact on her breathing with the humidity.            Review of Systems:    Systemic           no weight changes; no fever; no chills; no night sweats; no appetite changes  Skin           no rashes, or lesions  Eye           no irritation; no changes in vision  Katarzyna-Laryngeal           no dysphagia; no hoarseness   Pulmonary    no cough; no shortness of breath  Cardiovascular    no chest pain; no palpitations  Gastrointestinal    no diarrhea; no constipation; no abdominal pain; no changes in bowel habits; no blood in stool  Genitourinary   no urinary frequency; no urinary urgency; no dysuria; no pain; no abnormal vaginal discharge; no abnormal vaginal bleeding  Breast    no breast discharge; no breast changes; no breast pain  Musculoskeletal    no myalgias; no arthralgias; no back  pain  Psychiatric           no depressed mood; no anxiety    Hematologic              no tender lymph nodes; no noticeable swellings or lumps   Endocrine    no hot flashes; no heat/cold intolerance         Neurological   no tremor; no numbness and tingling; no headaches; no difficulty sleeping      Past Medical History:    Past Medical History:   Diagnosis Date     Cervical high risk HPV (human papillomavirus) test positive 10/31/12    type 18     COPD (chronic obstructive pulmonary disease) (H)      GERD (gastroesophageal reflux disease)      Hx of colposcopy with cervical biopsy 11/2012    negative     Hypertension 2013     Osteoporosis      Paroxysmal supraventricular tachycardia (H) 9/6/2017     Peritoneal carcinomatosis (H) 8/24/2015     Pneumonia      Uncomplicated asthma 1980         Past Surgical History:    Past Surgical History:   Procedure Laterality Date     CHOLECYSTECTOMY  6/2014     COLONOSCOPY  12/4/2012    Procedure: COLONOSCOPY;  COLONOSCOPY SCREEN;  Surgeon: Mushtaq Lara MD;  Location: [facility] OR     GYN SURGERY       HERNIORRHAPHY HIATAL N/A 11/25/2015    Procedure: HERNIORRHAPHY HIATAL;  Surgeon: PodChip Marte MD;  Location: [facility] OR     HYSTERECTOMY TOTAL ABD, ALVIN SALPINGO-OOPHORECTOMY, NODE DISSECTION, TUMOR DEBULKING, COMBINED Bilateral 11/25/2015    Procedure: COMBINED HYSTERECTOMY TOTAL ABDOMINAL, SALPINGO-OOPHORECTOMY, NODE DISSECTION, TUMOR DEBULKING;  Surgeon: Emma Cabrera MD;  Location: [facility] OR         Health Maintenance Due   Topic Date Due     DEXA  1945     ZOSTER IMMUNIZATION (2 of 3) 06/16/2015       Current Medications:     Current Outpatient Medications   Medication Sig Dispense Refill     albuterol (PROAIR HFA/PROVENTIL HFA/VENTOLIN HFA) 108 (90 Base) MCG/ACT Inhaler Inhale 2 puffs into the lungs every 6 hours as needed for shortness of breath / dyspnea or wheezing 1 Inhaler 1     atorvastatin (LIPITOR) 20 MG tablet Take 1 tablet (20 mg) by mouth daily  90 tablet 3     BIOTIN PO Take 1 tablet by mouth daily.       budesonide-formoterol (SYMBICORT) 160-4.5 MCG/ACT Inhaler Inhale 2 puffs into the lungs 2 times daily 3 Inhaler 0     Calcium Carbonate (CALCIUM 500 PO) Take 1 tablet by mouth daily.       fluticasone (FLONASE) 50 MCG/ACT spray Spray 2 sprays into both nostrils daily 1 Bottle 1     ibuprofen (ADVIL,MOTRIN) 600 MG tablet Take 1 tablet (600 mg) by mouth every 6 hours as needed for moderate pain 40 tablet 0     ipratropium - albuterol 0.5 mg/2.5 mg/3 mL (DUONEB) 0.5-2.5 (3) MG/3ML neb solution Take 1 vial (3 mLs) by nebulization every 4 hours as needed for shortness of breath / dyspnea or wheezing 30 vial 5     loratadine (CLARITIN) 10 MG tablet Take 10 mg by mouth daily       losartan (COZAAR) 100 MG tablet Take 1 tablet (100 mg) by mouth daily 90 tablet 3     MAGNESIUM OXIDE PO Take 200 mg by mouth 2 times daily       omeprazole (PRILOSEC) 40 MG DR capsule Take 1 capsule (40 mg) by mouth daily Take 30-60 minutes before a meal. 90 capsule 3     order for DME Equipment being ordered: Nebulizer 1 Device 0     predniSONE (DELTASONE) 10 MG tablet Take 4 pills per day for 3 days. Then 3 pills per day for 3 days. Then 2 pills per day for 3 days. Then 1 pill per day for 3 days. 30 tablet 1     Pyridoxine HCl (VITAMIN B-6 PO) Take 100 mg by mouth daily       umeclidinium (INCRUSE ELLIPTA) 62.5 MCG/INH inhaler Inhale 1 puff into the lungs daily 1 Inhaler 11     Zinc Sulfate (ZINC 15 PO) Take by mouth daily           Allergies:        Allergies   Allergen Reactions     Levaquin Rash        Social History:     Social History     Tobacco Use     Smoking status: Former Smoker     Packs/day: 1.00     Years: 30.00     Pack years: 30.00     Types: Cigarettes     Start date: 1965     Last attempt to quit: 10/10/2003     Years since quittin.7     Smokeless tobacco: Never Used   Substance Use Topics     Alcohol use: No       History   Drug Use No         Family  History:     The patient's family history is notable for:    Family History   Problem Relation Age of Onset     Cancer Brother         testicular     Diabetes Sister      Ovarian Cancer Mother 60     Diabetes Mother              Asthma Father              Diabetes Sister      Depression/Anxiety Daughter      Depression Daughter      Osteoporosis Sister      Other Cancer Brother      Diabetes Brother      Depression Other         Grandson         Physical Exam:     There were no vitals taken for this visit.  There is no height or weight on file to calculate BMI.    General Appearance: healthy and alert, no distress    Eyes:  Eyes grossly normal to inspection.  No discharge or erythema, or obvious scleral/conjunctival abnormalities.    Respiratory: No audible wheeze, cough, or visible cyanosis.  No visible retractions or increased work of breathing.     Musculoskeletal: extremities non tender and without edema    Skin: no lesions or rashes on visible skin    Neurological: normal gait, no gross defects     Psychiatric: appropriate mood and affect. Mentation appears normal, affect normal/bright, judgement and insight intact, normal speech and appearance well-groomed                            The rest of a comprehensive physical examination is deferred due to PHE (public health emergency) video visit restrictions.      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 She completed treatment 2016. She is here today for a surveillance visit. In light of the recent COVID19 pandemic, and in accordance with our group and national guidelines this patients care has been reviewed and it is felt that presenting for her visit would be more likely to increase rather than decrease her relative risks.  Therefore this visit was conducted by video    10 minutes were spent with this patient by video. Over 50% of that time was spent in  symptom management, treatment planning and in counseling and coordination of care. See rooming note for consent.     Video start:100  Video end: 110    Plan:     1.)        Patient to RTC in 6 months for her next surveillance visit and . Reviewed signs and symptoms for when she should contact the clinic or seek additional care. Patient to contact the clinic with any questions or concerns in the interim.     2.) Genetic risk factors were assessed andshe is negative for mutations in GINGER, BARD1, BRCA1, BRCA2, BRIP1, CDH1, CHEK2, EPCAM, MLH1, MRE11A, MSH2, MSH6, MUTYH, NBN, NF1, PALB2, PMS2, PTEN, RAD50, RAD51C, RAD51D, SMARCA4, STK11, and TP53.        3.) Labs and/or tests ordered include:  .     4.) Health maintenance issues addressed today include annual health maintenance and non-gynecologic issues with PCP.    CHANDLER Jay, WHNP-BC, ANP-BC  Women's Health Nurse Practitioner  Adult Nurse Pracitioner  Division of Gynecologic Oncology          CC  Patient Care Team:  Pillo Koehler MD as PCP - General (Family Practice)  Iliana Pierre RN as   Pillo Koehler MD as Assigned PCP  Hayes Garg MD as MD (Internal Medicine)    Again, thank you for allowing me to participate in the care of your patient.        Sincerely,        CHANDLER Linares CNP

## 2020-07-01 NOTE — NURSING NOTE
"Iris Carrion is a 74 year old female who is being evaluated via a billable video visit.      The patient has been notified of following:     \"This video visit will be conducted via a call between you and your physician/provider. We have found that certain health care needs can be provided without the need for an in-person physical exam.  This service lets us provide the care you need with a video conversation.  If a prescription is necessary we can send it directly to your pharmacy.  If lab work is needed we can place an order for that and you can then stop by our lab to have the test done at a later time.    Video visits are billed at different rates depending on your insurance coverage.  Please reach out to your insurance provider with any questions.    If during the course of the call the physician/provider feels a video visit is not appropriate, you will not be charged for this service.\"    Patient has given verbal consent for Video visit? Yes  How would you like to obtain your AVS? Manish  Patient would like the video invitation sent by: CTI Towersjayce  Will anyone else be joining your video visit? No        Video-Visit Details    Type of service:  Video Visit    Originating Location (pt. Location): Home    Distant Location (provider location):  [facility]     Platform used for Video Visit: AmWell    Checklist Positives   Headaches - pt states that she has them all of the time -   takes ASA   Shortness of breath - Chronic   Diarrhea - she had a salad that was recalled - getting better   Difficulty sleeping - patient states that she does not sleep   much.  If she does, its not for very long.    No Concerns    Jossie Celaya CMA        "

## 2020-07-09 ENCOUNTER — VIRTUAL VISIT (OUTPATIENT)
Dept: PULMONOLOGY | Facility: CLINIC | Age: 75
End: 2020-07-09
Payer: COMMERCIAL

## 2020-07-09 ENCOUNTER — MYC MEDICAL ADVICE (OUTPATIENT)
Dept: PULMONOLOGY | Facility: CLINIC | Age: 75
End: 2020-07-09

## 2020-07-09 ENCOUNTER — TELEPHONE (OUTPATIENT)
Dept: PULMONOLOGY | Facility: CLINIC | Age: 75
End: 2020-07-09

## 2020-07-09 DIAGNOSIS — J44.9 STAGE 3 SEVERE COPD BY GOLD CLASSIFICATION (H): Primary | ICD-10-CM

## 2020-07-09 RX ORDER — ARFORMOTEROL TARTRATE 15 UG/2ML
15 SOLUTION RESPIRATORY (INHALATION) 2 TIMES DAILY
Qty: 120 ML | Refills: 11 | Status: SHIPPED | OUTPATIENT
Start: 2020-07-09 | End: 2020-07-09

## 2020-07-09 RX ORDER — FORMOTEROL FUMARATE DIHYDRATE 20 UG/2ML
20 SOLUTION RESPIRATORY (INHALATION) EVERY 12 HOURS
Qty: 120 ML | Refills: 11 | Status: SHIPPED | OUTPATIENT
Start: 2020-07-09 | End: 2021-10-07

## 2020-07-09 RX ORDER — BUDESONIDE 0.5 MG/2ML
0.5 INHALANT ORAL 2 TIMES DAILY
Qty: 120 ML | Refills: 11 | Status: SHIPPED | OUTPATIENT
Start: 2020-07-09 | End: 2022-08-09

## 2020-07-09 NOTE — TELEPHONE ENCOUNTER
Prior Authorization Retail Medication Request    Medication/Dose: arformoterol (BROVANA) 15 MCG/2ML NEBU neb solution  ICD code (if different than what is on RX):    Previously Tried and Failed:    Rationale:      Insurance Name:    Insurance ID:        Pharmacy Information (if different than what is on RX)  Name:    Phone:

## 2020-07-09 NOTE — LETTER
7/9/2020         RE: Iris Carrion  [address]  [place] [address]        Dear Colleague,    Thank you for referring your patient, Iris Carrion, to the [facility]. Please see a copy of my visit note below.    Iris Carrion is a 74 year old female who is being evaluated via a billable telephone visit.          Chief complaint: Shortness of breath  History of present illness: This is a 74-year-old female with a history of severe COPD who is presenting as a telephone visit follow-up for her COPD.  This visit was conducted over the phone due to the coronavirus pandemic.    It has been 6 months since her last visit.  She has daily shortness of breath, and has difficulty climbing up a flight of stairs.  She did complete pulmonary rehab back in March, and found it extremely helpful.  She had plans to continue the exercises at a gym, however when COVID-19 happen all the gym is close down and she has not been able to do that.  She requires prednisone nearly every other month to keep her symptoms better.  She takes Symbicort 2 puffs twice a day as well as Incruse Ellipta daily.  She requires 3 nebulized albuterol courses during the day to keep her shortness of breath under control.  She finds that the albuterol inhaler is not helpful at all.    She is having hard time wearing a mask when she is out in public due to the shortness of breath that it causes.    I reviewed her previous chest CT scan images from 2016 and her pulmonary function testing.    Assessment and plan:  Severe COPD   emphysema  Shortness of breath    She has significant pulmonary disease.  Unfortunately her dyspnea is not under good control.  Strangely, she does not get any improvement from an albuterol inhaler.  She does think that Symbicort and Incruse Ellipta do help.  However I wonder if a trial of fully nebulized steroid and long-acting beta agonist may be helpful for her.  She has severe  disease, and likely inspiratory muscle weakness which may make some of these inhalers ineffective.  She finds the nebulized albuterol extremely helpful.    I will prescribe nebulized Pulmicort 0.5 mg twice a day as well as a aformoterol.  These medications will take the place of Symbicort.  We could also prescribe longhala if she finds that these nebulized medications are better for her.    We did talk about mask use and I did encourage her to continue wearing a mask if in enclosed spaces with other people.      Phone call duration: 14 minutes    Hayes Garg MD

## 2020-07-09 NOTE — TELEPHONE ENCOUNTER
Central Prior Authorization Team   [phone]    PA Initiation    Medication: arformoterol (BROVANA) 15 MCG/2ML NEBU neb solution  Insurance Company: [employer] - Phone [phone] Fax [phone]  Pharmacy Filling the Rx: [facility] [address] [facility] [place] [place] [facility] [address]  Filling Pharmacy Phone: [phone]  Filling Pharmacy Fax: [phone]  Start Date: 7/9/2020

## 2020-07-09 NOTE — PROGRESS NOTES
"Iris Carrion is a 74 year old female who is being evaluated via a billable telephone visit.      The patient has been notified of following:     \"This telephone visit will be conducted via a call between you and your physician/provider. We have found that certain health care needs can be provided without the need for a physical exam.  This service lets us provide the care you need with a short phone conversation.  If a prescription is necessary we can send it directly to your pharmacy.  If lab work is needed we can place an order for that and you can then stop by our lab to have the test done at a later time.    Telephone visits are billed at different rates depending on your insurance coverage. During this emergency period, for some insurers they may be billed the same as an in-person visit.  Please reach out to your insurance provider with any questions.    If during the course of the call the physician/provider feels a telephone visit is not appropriate, you will not be charged for this service.\"    Patient has given verbal consent for Telephone visit?  Yes    What phone number would you like to be contacted at? [phone]    How would you like to obtain your AVS? Manish    Chief complaint: Shortness of breath  History of present illness: This is a 74-year-old female with a history of severe COPD who is presenting as a telephone visit follow-up for her COPD.  This visit was conducted over the phone due to the coronavirus pandemic.    It has been 6 months since her last visit.  She has daily shortness of breath, and has difficulty climbing up a flight of stairs.  She did complete pulmonary rehab back in March, and found it extremely helpful.  She had plans to continue the exercises at a gym, however when COVID-19 happen all the gym is close down and she has not been able to do that.  She requires prednisone nearly every other month to keep her symptoms better.  She takes Symbicort 2 puffs twice a day as well " as Incruse Ellipta daily.  She requires 3 nebulized albuterol courses during the day to keep her shortness of breath under control.  She finds that the albuterol inhaler is not helpful at all.    She is having hard time wearing a mask when she is out in public due to the shortness of breath that it causes.    I reviewed her previous chest CT scan images from 2016 and her pulmonary function testing.    Assessment and plan:  Severe COPD   emphysema  Shortness of breath    She has significant pulmonary disease.  Unfortunately her dyspnea is not under good control.  Strangely, she does not get any improvement from an albuterol inhaler.  She does think that Symbicort and Incruse Ellipta do help.  However I wonder if a trial of fully nebulized steroid and long-acting beta agonist may be helpful for her.  She has severe disease, and likely inspiratory muscle weakness which may make some of these inhalers ineffective.  She finds the nebulized albuterol extremely helpful.    I will prescribe nebulized Pulmicort 0.5 mg twice a day as well as a aformoterol.  These medications will take the place of Symbicort.  We could also prescribe longhala if she finds that these nebulized medications are better for her.    We did talk about mask use and I did encourage her to continue wearing a mask if in enclosed spaces with other people.      Phone call duration: 14 minutes    Hayes Garg MD

## 2020-07-14 NOTE — TELEPHONE ENCOUNTER
PRIOR AUTHORIZATION DENIED    Medication: arformoterol (BROVANA) 15 MCG/2ML NEBU neb solution-PA DENIED     Denial Date: 7/9/2020    Denial Rational:         Appeal Information:           [id number] Detailed

## 2020-07-14 NOTE — TELEPHONE ENCOUNTER
Called insurance and insurance stated that PA has been Denied. Request Denial letter be sent via fax for documentation . Awaiting on Denial Letter be sent via fax.

## 2020-07-15 ENCOUNTER — TELEPHONE (OUTPATIENT)
Dept: PULMONOLOGY | Facility: CLINIC | Age: 75
End: 2020-07-15

## 2020-07-15 NOTE — TELEPHONE ENCOUNTER
Prior Authorization Retail Medication Request    Medication/Dose: formoterol (PERFOROMIST) 20 MCG/2ML neb solution   ICD code (if different than what is on RX):    Previously Tried and Failed:    Rationale:      Insurance Name:    Insurance ID:        Pharmacy Information (if different than what is on RX)  Name:    Phone:

## 2020-07-15 NOTE — TELEPHONE ENCOUNTER
Prior Authorization Not Needed per Insurance    Medication: formoterol (PERFOROMIST) 20 MCG/2ML neb solution - PA Not Needed  Insurance Company: [employer] - Phone [phone] Fax [phone]  Expected CoPay:      Pharmacy Filling the Rx: [facility] [id number] [id number] [address]  Pharmacy Notified: Yes  Patient Notified: No    Left message to call back with rejection they are getting as the insurance states that medication is covered. Otherwise to process and let patient when ready to .

## 2020-07-27 DIAGNOSIS — I10 ESSENTIAL HYPERTENSION WITH GOAL BLOOD PRESSURE LESS THAN 140/90: ICD-10-CM

## 2020-07-28 RX ORDER — LOSARTAN POTASSIUM 100 MG/1
TABLET ORAL
Qty: 90 TABLET | Refills: 0 | Status: SHIPPED | OUTPATIENT
Start: 2020-07-28 | End: 2020-10-31

## 2020-07-28 NOTE — TELEPHONE ENCOUNTER
Routing refill request to provider for review/approval because:  Labs not current:  Creatinine    Sharita Pfeiffer BSN, RN

## 2020-09-03 DIAGNOSIS — J42 CHRONIC BRONCHITIS, UNSPECIFIED CHRONIC BRONCHITIS TYPE (H): ICD-10-CM

## 2020-09-03 NOTE — TELEPHONE ENCOUNTER
Routing refill request to provider for review/approval because:  There is not a current, normal ACT on file in the last 6 months.  RN unable to refill medication.    Sharita Pfeiffer BSN, RN

## 2020-09-08 RX ORDER — IPRATROPIUM BROMIDE AND ALBUTEROL SULFATE 2.5; .5 MG/3ML; MG/3ML
SOLUTION RESPIRATORY (INHALATION)
Qty: 90 ML | Refills: 3 | Status: SHIPPED | OUTPATIENT
Start: 2020-09-08 | End: 2020-12-22

## 2020-10-28 DIAGNOSIS — J44.9 STAGE 3 SEVERE COPD BY GOLD CLASSIFICATION (H): ICD-10-CM

## 2020-10-28 DIAGNOSIS — I10 ESSENTIAL HYPERTENSION WITH GOAL BLOOD PRESSURE LESS THAN 140/90: ICD-10-CM

## 2020-10-28 NOTE — TELEPHONE ENCOUNTER
Routing refill request to provider for review/approval because:  Labs not current:  Cr, potassium  My Perry BSN, RN

## 2020-10-29 ENCOUNTER — MYC REFILL (OUTPATIENT)
Dept: FAMILY MEDICINE | Facility: CLINIC | Age: 75
End: 2020-10-29

## 2020-10-29 DIAGNOSIS — J44.9 CHRONIC OBSTRUCTIVE PULMONARY DISEASE, UNSPECIFIED COPD TYPE (H): ICD-10-CM

## 2020-10-29 DIAGNOSIS — I10 ESSENTIAL HYPERTENSION WITH GOAL BLOOD PRESSURE LESS THAN 140/90: ICD-10-CM

## 2020-10-29 DIAGNOSIS — J42 CHRONIC BRONCHITIS, UNSPECIFIED CHRONIC BRONCHITIS TYPE (H): ICD-10-CM

## 2020-10-29 RX ORDER — IPRATROPIUM BROMIDE AND ALBUTEROL SULFATE 2.5; .5 MG/3ML; MG/3ML
SOLUTION RESPIRATORY (INHALATION)
Qty: 90 ML | Refills: 3 | Status: CANCELLED | OUTPATIENT
Start: 2020-10-29

## 2020-10-29 NOTE — TELEPHONE ENCOUNTER
"Requested Prescriptions   Pending Prescriptions Disp Refills    budesonide-formoterol (SYMBICORT) 160-4.5 MCG/ACT Inhaler 3 Inhaler 0     Sig: Inhale 2 puffs into the lungs 2 times daily       Inhaled Steroids Protocol Failed - 10/29/2020  1:24 PM        Failed - Asthma control assessment score within normal limits in last 6 months     Please review ACT score.           Passed - Patient is age 12 or older        Passed - Medication is active on med list        Passed - Recent (6 mo) or future (30 days) visit within the authorizing provider's specialty     Patient had office visit in the last 6 months or has a visit in the next 30 days with authorizing provider or within the authorizing provider's specialty.  See \"Patient Info\" tab in inbasket, or \"Choose Columns\" in Meds & Orders section of the refill encounter.           Long-Acting Beta Agonist Inhalers Protocol  Failed - 10/29/2020  1:24 PM        Failed - Asthma control assessment score within normal limits in last 6 months     Please review ACT score.           Passed - Patient is age 12 or older        Passed - Medication is active on med list        Passed - Recent (6 mo) or future (30 days) visit within the authorizing provider's specialty     Patient had office visit in the last 6 months or has a visit in the next 30 days with authorizing provider or within the authorizing provider's specialty.  See \"Patient Info\" tab in inbasket, or \"Choose Columns\" in Meds & Orders section of the refill encounter.              losartan (COZAAR) 100 MG tablet 90 tablet 0     Sig: Take 1 tablet (100 mg) by mouth daily       Angiotensin-II Receptors Failed - 10/29/2020  1:24 PM        Failed - Normal serum creatinine on file in past 12 months     Recent Labs   Lab Test 08/02/19  1246   CR 1.05*       Ok to refill medication if creatinine is low          Failed - Normal serum potassium on file in past 12 months     Recent Labs   Lab Test 08/02/19  1246   POTASSIUM 3.7    " "                Passed - Last blood pressure under 140/90 in past 12 months     BP Readings from Last 3 Encounters:   11/07/19 118/69   10/28/19 121/68   09/12/19 132/79                 Passed - Recent (12 mo) or future (30 days) visit within the authorizing provider's specialty     Patient has had an office visit with the authorizing provider or a provider within the authorizing providers department within the previous 12 mos or has a future within next 30 days. See \"Patient Info\" tab in inbasket, or \"Choose Columns\" in Meds & Orders section of the refill encounter.              Passed - Medication is active on med list        Passed - Patient is age 18 or older        Passed - No active pregnancy on record        Passed - No positive pregnancy test in past 12 months             "

## 2020-10-31 RX ORDER — LOSARTAN POTASSIUM 100 MG/1
TABLET ORAL
Qty: 90 TABLET | Refills: 0 | Status: SHIPPED | OUTPATIENT
Start: 2020-10-31 | End: 2022-01-24

## 2020-10-31 RX ORDER — BUDESONIDE AND FORMOTEROL FUMARATE DIHYDRATE 160; 4.5 UG/1; UG/1
2 AEROSOL RESPIRATORY (INHALATION) 2 TIMES DAILY
Qty: 3 INHALER | Refills: 3 | Status: SHIPPED | OUTPATIENT
Start: 2020-10-31 | End: 2022-04-07

## 2020-10-31 RX ORDER — LOSARTAN POTASSIUM 100 MG/1
100 TABLET ORAL DAILY
Qty: 90 TABLET | Refills: 0 | Status: SHIPPED | OUTPATIENT
Start: 2020-10-31 | End: 2021-04-27

## 2020-11-21 ENCOUNTER — E-VISIT (OUTPATIENT)
Dept: URGENT CARE | Facility: URGENT CARE | Age: 75
End: 2020-11-21
Payer: COMMERCIAL

## 2020-11-21 DIAGNOSIS — Z20.822 SUSPECTED COVID-19 VIRUS INFECTION: ICD-10-CM

## 2020-11-21 DIAGNOSIS — J01.00 ACUTE MAXILLARY SINUSITIS, RECURRENCE NOT SPECIFIED: Primary | ICD-10-CM

## 2020-11-21 PROCEDURE — 99421 OL DIG E/M SVC 5-10 MIN: CPT | Performed by: PHYSICIAN ASSISTANT

## 2020-11-21 RX ORDER — AMOXICILLIN 875 MG
875 TABLET ORAL 2 TIMES DAILY
Qty: 20 TABLET | Refills: 0 | Status: SHIPPED | OUTPATIENT
Start: 2020-11-21 | End: 2020-12-01

## 2020-11-21 NOTE — PATIENT INSTRUCTIONS
Dear Iris Carrion,    Your symptoms show that you may have coronavirus (COVID-19). This illness can cause fever, cough and trouble breathing. Many people get a mild case and get better on their own. Some people can get very sick.    Will I be tested for COVID-19?  We would like to test you for Covid-19 virus. I have placed orders for this test.   To schedule: go to your [employer] home page and scroll down to the section that says  You have an appointment that needs to be scheduled  and click the large green button that says  Schedule Now  and follow the steps to find the next available openings.    If you are unable to complete these [employer] scheduling steps, please call [phone] to schedule your testing.     When it's time for your COVID test:  Stay at least 6 feet away from others. (If someone will drive you to your test, stay in the backseat, as far away from the  as you can.)  Cover your mouth and nose with a mask, tissue or washcloth.  Go straight to the testing site. Don't make any stops on the way there or back.    Starting now:     Do not go to work. Follow your usual processes for taking time away from work.  o If you receive a negative COVID-19 test result and were NOT exposed to someone with a known positive COVID-19 test, you can return to work once you're free of fever for 24 hours without fever-reducing medication and your symptoms are improving or resolved.  o If you receive a positive COVID-19 test result, return to work should be at least 10 days from symptom onset (20 days if people have immune compromise) and people should be fever-free for 24 hours without medications, and the respiratory symptoms should be improved significantly before returning to work or school  o If you were exposed to someone who has tested positive for COVID-19, you can return to work 14 days after your last contact with the positive individual, provided you do not have symptoms at all during that  "time.    During this time, don't leave the house except for testing or medical care.  o Stay in your own room, even for meals. Use your own bathroom if you can.  o Stay away from others in your home. No hugging, kissing or shaking hands. No visitors.  o Don't go to work, school or anywhere else.    Clean \"high touch\" surfaces often (doorknobs, counters, handles, etc.). Use a household cleaning spray or wipes. You'll find a full list of  on the EPA website: www.epa.gov/pesticide-registration/list-n-disinfectants-use-against-sars-cov-2.    Cover your mouth and nose with a mask, tissue or washcloth to avoid spreading germs.    Wash your hands and face often. Use soap and water.    People in these groups are at risk for severe illness due to COVID-19:  o People 65 years and older  o People who live in a nursing home or long-term care facility  o People with chronic disease (lung, heart, cancer, diabetes, kidney, liver, immunologic)  o People who have a weakened immune system, including those who:  - Are in cancer treatment  - Take medicine that weakens the immune system, such as corticosteroids  - Had a bone marrow or organ transplant  - Have an immune deficiency  - Have poorly controlled HIV or AIDS  - Are obese (body mass index of 40 or higher)  - Smoke regularly      Caregivers should wear gloves while washing dishes, handling laundry and cleaning bedrooms and bathrooms.    Use caution when washing and drying laundry: Don't shake dirty laundry, and use the warmest water setting that you can.    For more tips, go to www.cdc.gov/coronavirus/2019-ncov/downloads/10Things.pdf.    Sign up for [employer]. We know it's scary to hear that you might have COVID-19. We want to track your symptoms to make sure you're okay over the next 2 weeks. Please look for an email from [employer]--this is a free, online program that we'll use to keep in touch. To sign up, follow the link in the email you will receive. Learn more " at [email or web address]    How can I take care of myself?    Get lots of rest. Drink extra fluids (unless a doctor has told you not to)    Take Tylenol (acetaminophen) for fever or pain. If you have liver or kidney problems, ask your family doctor if it's okay to take Tylenol.  Adults can take either:    650 mg (two 325 mg pills) every 4 to 6 hours, or     1,000 mg (two 500 mg pills) every 8 hours as needed.    Note: Don't take more than 3,000 mg in one day. Acetaminophen is found in many medicines (both prescribed and over-the-counter medicines). Read all labels to be sure you don't take too much.  For children, check the Tylenol bottle for the right dose. The dose is based on the child's age or weight.    If you have other health problems (like cancer, heart failure, an organ transplant or severe kidney disease): Call your specialty clinic if you don't feel better in the next 2 days.    Know when to call 911. Emergency warning signs include:  Trouble breathing or shortness of breath  Pain or pressure in the chest that doesn't go away  Feeling confused like you haven't felt before, or not being able to wake up  Bluish-colored lips or face    Where can I get more information?    [facility] - About COVID-19: [email or web address]  CDC - What to Do If You're Sick: www.cdc.gov/coronavirus/2019-ncov/about/steps-when-sick.html

## 2020-11-21 NOTE — TELEPHONE ENCOUNTER
Provider E-Visit time total (minutes): 8      I have sent you a prescription of amoxicillin to your pharmacy to help with this infection while we schedule you for covid testing.   Thank you

## 2020-12-02 ENCOUNTER — MYC REFILL (OUTPATIENT)
Dept: FAMILY MEDICINE | Facility: CLINIC | Age: 75
End: 2020-12-02

## 2020-12-02 DIAGNOSIS — E78.5 DYSLIPIDEMIA: ICD-10-CM

## 2020-12-02 DIAGNOSIS — J44.9 CHRONIC OBSTRUCTIVE PULMONARY DISEASE, UNSPECIFIED COPD TYPE (H): ICD-10-CM

## 2020-12-02 NOTE — TELEPHONE ENCOUNTER
Routing refill request to provider for review/approval because:  Drug not on the [facility] refill protocol   Labs not current:  Lipids  My BIANCHIN, RN

## 2020-12-03 ENCOUNTER — MYC REFILL (OUTPATIENT)
Dept: FAMILY MEDICINE | Facility: CLINIC | Age: 75
End: 2020-12-03

## 2020-12-03 DIAGNOSIS — J44.9 CHRONIC OBSTRUCTIVE PULMONARY DISEASE, UNSPECIFIED COPD TYPE (H): ICD-10-CM

## 2020-12-03 DIAGNOSIS — J44.9 STAGE 3 SEVERE COPD BY GOLD CLASSIFICATION (H): ICD-10-CM

## 2020-12-03 RX ORDER — PREDNISONE 10 MG/1
TABLET ORAL
Qty: 30 TABLET | Refills: 1 | Status: CANCELLED | OUTPATIENT
Start: 2020-12-03

## 2020-12-03 RX ORDER — ATORVASTATIN CALCIUM 20 MG/1
20 TABLET, FILM COATED ORAL DAILY
Qty: 90 TABLET | Refills: 3 | Status: SHIPPED | OUTPATIENT
Start: 2020-12-03 | End: 2021-12-02

## 2020-12-03 RX ORDER — PREDNISONE 10 MG/1
TABLET ORAL
Qty: 30 TABLET | Refills: 1 | Status: SHIPPED | OUTPATIENT
Start: 2020-12-03 | End: 2021-02-23

## 2020-12-03 NOTE — TELEPHONE ENCOUNTER
Routing refill request to provider for review/approval because:  Drug not on the [facility] refill protocol     Sharita BIANCHIN, RN

## 2020-12-12 ENCOUNTER — HEALTH MAINTENANCE LETTER (OUTPATIENT)
Age: 75
End: 2020-12-12

## 2020-12-18 DIAGNOSIS — J44.9 CHRONIC OBSTRUCTIVE PULMONARY DISEASE, UNSPECIFIED COPD TYPE (H): Primary | ICD-10-CM

## 2020-12-18 DIAGNOSIS — J42 CHRONIC BRONCHITIS, UNSPECIFIED CHRONIC BRONCHITIS TYPE (H): ICD-10-CM

## 2020-12-21 DIAGNOSIS — C56.2 MALIGNANT NEOPLASM OF OVARY, LEFT (H): Primary | ICD-10-CM

## 2020-12-21 NOTE — TELEPHONE ENCOUNTER
duoneb refill request  Last filled: 9/8/20 with 3 refills per Dr. Koehler for chronic bronchitis.  Virtual visit Dr. Koehler: 6/30/20    Routing to provider:  No ACT on record.  RN unable to fill med.

## 2020-12-22 RX ORDER — IPRATROPIUM BROMIDE AND ALBUTEROL SULFATE 2.5; .5 MG/3ML; MG/3ML
SOLUTION RESPIRATORY (INHALATION)
Qty: 90 ML | Refills: 3 | Status: SHIPPED | OUTPATIENT
Start: 2020-12-22 | End: 2021-04-07

## 2021-01-11 ENCOUNTER — VIRTUAL VISIT (OUTPATIENT)
Dept: PULMONOLOGY | Facility: CLINIC | Age: 76
End: 2021-01-11
Payer: COMMERCIAL

## 2021-01-11 DIAGNOSIS — J44.9 STAGE 3 SEVERE COPD BY GOLD CLASSIFICATION (H): ICD-10-CM

## 2021-01-11 PROCEDURE — 99214 OFFICE O/P EST MOD 30 MIN: CPT | Mod: 95

## 2021-01-11 NOTE — PROGRESS NOTES
Iris is a 75 year old who is being evaluated via a billable telephone visit.      What phone number would you like to be contacted at? [phone]  How would you like to obtain your AVS? Manish    Phone call duration: 8 minutes    [facility]  Pulmonary Medicine  Visit Clinic Note  January 11, 2021         ASSESSMENT & PLAN       Severe COPD: Currently symptoms are well controlled on triple inhaler therapy with a nebulize albuterol twice a day.  She is performing all of her activities of daily living, and not having any significant limitations.  She has received her influenza vaccine.  I would not change in her medications at the moment.  I will follow-up with her in 1 year.      Grayson Garg MD          Today's visit note:     Chief Complaint: Iris Carrion is a 75 year old year old female who is being seen for RECHECK (6 month follow up )      HISTORY OF PRESENT ILLNESS:    This is a 75-year-old female with a history of severe COPD who is presenting as a telephone visit follow-up for her lung disease.    The last time I saw her was in July 2020.  At that time, she was having a lot of difficulty with shortness of breath.  Her dyspnea did not seem to improve at all with an albuterol inhaler.  She was requiring prednisone nearly every month to get her symptoms manageable.  Actually tried prescribing Pulmicort and Perforomist nebulizers to see if that would help her out much.  Her insurance company did not cover Pulmicort.  She did receive Perforomist and took that for about a month, but she did not notice a substantial improvement so she stopped.  Fortunately with time, her symptoms have improved.  Now she feels great.  She is taking Symbicort, Incruse, and generally not being twice a day.  Her last dose of prednisone was in July.  She has had 1 upper respiratory tract infection that was easily managed without any extra antibiotics or steroids.  She does not think she needs any change in her  medications at the moment.  Her weight is stable.           Past Medical and Surgical History:     Past Medical History:   Diagnosis Date     Cervical high risk HPV (human papillomavirus) test positive 10/31/12    type 18     COPD (chronic obstructive pulmonary disease) (H)      GERD (gastroesophageal reflux disease)      Hx of colposcopy with cervical biopsy 2012    negative     Hypertension      Osteoporosis      Paroxysmal supraventricular tachycardia (H) 2017     Peritoneal carcinomatosis (H) 2015     Pneumonia      Uncomplicated asthma      Past Surgical History:   Procedure Laterality Date     CHOLECYSTECTOMY  2014     COLONOSCOPY  2012    Procedure: COLONOSCOPY;  COLONOSCOPY SCREEN;  Surgeon: Mushtaq Lara MD;  Location: [facility] OR     GYN SURGERY       HERNIORRHAPHY HIATAL N/A 2015    Procedure: HERNIORRHAPHY HIATAL;  Surgeon: Chip Carty MD;  Location: [facility] OR     HYSTERECTOMY TOTAL ABD, ALVIN SALPINGO-OOPHORECTOMY, NODE DISSECTION, TUMOR DEBULKING, COMBINED Bilateral 2015    Procedure: COMBINED HYSTERECTOMY TOTAL ABDOMINAL, SALPINGO-OOPHORECTOMY, NODE DISSECTION, TUMOR DEBULKING;  Surgeon: Emma Cabrera MD;  Location: [facility] OR           Family History:     Family History   Problem Relation Age of Onset     Cancer Brother         testicular     Diabetes Sister      Ovarian Cancer Mother 60     Diabetes Mother              Asthma Father              Diabetes Sister      Depression/Anxiety Daughter      Depression Daughter      Osteoporosis Sister      Other Cancer Brother      Diabetes Brother      Depression Other         Grandson              Social History:     Social History     Socioeconomic History     Marital status:      Spouse name: Not on file     Number of children: Not on file     Years of education: Not on file     Highest education level: Not on file   Occupational History     Not on file   Social Needs      Financial resource strain: Not on file     Food insecurity     Worry: Not on file     Inability: Not on file     Transportation needs     Medical: Not on file     Non-medical: Not on file   Tobacco Use     Smoking status: Former Smoker     Packs/day: 1.00     Years: 30.00     Pack years: 30.00     Types: Cigarettes     Start date: 1965     Quit date: 10/10/2003     Years since quittin.2     Smokeless tobacco: Never Used   Substance and Sexual Activity     Alcohol use: No     Drug use: No     Sexual activity: Never   Lifestyle     Physical activity     Days per week: Not on file     Minutes per session: Not on file     Stress: Not on file   Relationships     Social connections     Talks on phone: Not on file     Gets together: Not on file     Attends Zoroastrian service: Not on file     Active member of club or organization: Not on file     Attends meetings of clubs or organizations: Not on file     Relationship status: Not on file     Intimate partner violence     Fear of current or ex partner: Not on file     Emotionally abused: Not on file     Physically abused: Not on file     Forced sexual activity: Not on file   Other Topics Concern     Parent/sibling w/ CABG, MI or angioplasty before 65F 55M? No   Social History Narrative     Not on file            Medications:     Current Outpatient Medications   Medication     albuterol (PROAIR HFA/PROVENTIL HFA/VENTOLIN HFA) 108 (90 Base) MCG/ACT Inhaler     atorvastatin (LIPITOR) 20 MG tablet     BIOTIN PO     Calcium Carbonate (CALCIUM 500 PO)     fluticasone (FLONASE) 50 MCG/ACT spray     formoterol (PERFOROMIST) 20 MCG/2ML neb solution     ibuprofen (ADVIL,MOTRIN) 600 MG tablet     ipratropium - albuterol 0.5 mg/2.5 mg/3 mL (DUONEB) 0.5-2.5 (3) MG/3ML neb solution     loratadine (CLARITIN) 10 MG tablet     losartan (COZAAR) 100 MG tablet     losartan (COZAAR) 100 MG tablet     MAGNESIUM OXIDE PO     omeprazole (PRILOSEC) 40 MG DR capsule     order for DME      predniSONE (DELTASONE) 10 MG tablet     umeclidinium (INCRUSE ELLIPTA) 62.5 MCG/INH inhaler     vitamin B complex with vitamin C (VITAMIN  B COMPLEX) tablet     Zinc Sulfate (ZINC 15 PO)     budesonide (PULMICORT) 0.5 MG/2ML neb solution     budesonide-formoterol (SYMBICORT) 160-4.5 MCG/ACT Inhaler     Pyridoxine HCl (VITAMIN B-6 PO)     No current facility-administered medications for this visit.             Review of Systems:       A complete review of systems was otherwise negative except as noted in the HPI.      PHYSICAL EXAM:  There were no vitals taken for this visit.     Exam not performed as this was a telephone visit         Data:   All laboratory and imaging data reviewed.      PFT:   Pulmonary function testing from November 7, 2019 reviewed.  FEV1/FVC ratio 0.34 after bronchodilator.  Her postbronchodilator FVC is 2.46 which is 86% predicted, and the postbronchodilator FEV1 is 0.83 which is 37% predicted.  Residual volume is 242% predicted and the total lung capacity is 146% predicted.  Her diffusion capacity is 52% predicted.      PFT Interpretation:  Severe airflow obstruction.  Gas trapping and hyperinflation.  Reduction in her diffusion capacity.  Valid Maneuver

## 2021-01-11 NOTE — LETTER
1/11/2021         RE: Iris Carrion  [address]  [place] [address]        Dear Colleague,    Thank you for referring your patient, Iris Carrion, to the [facility] FOR LUNG SCIENCE AND [facility] [place]. Please see a copy of my visit note below.    Iris is a 75 year old who is being evaluated via a billable telephone visit.      What phone number would you like to be contacted at? [phone]  How would you like to obtain your AVS? MyChart    Phone call duration: 8 minutes    [facility]  Pulmonary Medicine  Visit Clinic Note  January 11, 2021         ASSESSMENT & PLAN       Severe COPD: Currently symptoms are well controlled on triple inhaler therapy with a nebulize albuterol twice a day.  She is performing all of her activities of daily living, and not having any significant limitations.  She has received her influenza vaccine.  I would not change in her medications at the moment.  I will follow-up with her in 1 year.      Grayson Garg MD          Today's visit note:     Chief Complaint: Iris Carrion is a 75 year old year old female who is being seen for RECHECK (6 month follow up )      HISTORY OF PRESENT ILLNESS:    This is a 75-year-old female with a history of severe COPD who is presenting as a telephone visit follow-up for her lung disease.    The last time I saw her was in July 2020.  At that time, she was having a lot of difficulty with shortness of breath.  Her dyspnea did not seem to improve at all with an albuterol inhaler.  She was requiring prednisone nearly every month to get her symptoms manageable.  Actually tried prescribing Pulmicort and Perforomist nebulizers to see if that would help her out much.  Her insurance company did not cover Pulmicort.  She did receive Perforomist and took that for about a month, but she did not notice a substantial improvement so she stopped.  Fortunately with time, her symptoms have improved.  Now  she feels great.  She is taking Symbicort, Incruse, and generally not being twice a day.  Her last dose of prednisone was in July.  She has had 1 upper respiratory tract infection that was easily managed without any extra antibiotics or steroids.  She does not think she needs any change in her medications at the moment.  Her weight is stable.           Past Medical and Surgical History:     Past Medical History:   Diagnosis Date     Cervical high risk HPV (human papillomavirus) test positive 10/31/12    type 18     COPD (chronic obstructive pulmonary disease) (H)      GERD (gastroesophageal reflux disease)      Hx of colposcopy with cervical biopsy 2012    negative     Hypertension      Osteoporosis      Paroxysmal supraventricular tachycardia (H) 2017     Peritoneal carcinomatosis (H) 2015     Pneumonia      Uncomplicated asthma      Past Surgical History:   Procedure Laterality Date     CHOLECYSTECTOMY  2014     COLONOSCOPY  2012    Procedure: COLONOSCOPY;  COLONOSCOPY SCREEN;  Surgeon: Mushtaq Lara MD;  Location:  OR     GYN SURGERY       HERNIORRHAPHY HIATAL N/A 2015    Procedure: HERNIORRHAPHY HIATAL;  Surgeon: Chip Carty MD;  Location: [facility] OR     HYSTERECTOMY TOTAL ABD, ALVIN SALPINGO-OOPHORECTOMY, NODE DISSECTION, TUMOR DEBULKING, COMBINED Bilateral 2015    Procedure: COMBINED HYSTERECTOMY TOTAL ABDOMINAL, SALPINGO-OOPHORECTOMY, NODE DISSECTION, TUMOR DEBULKING;  Surgeon: Emma Cabrera MD;  Location: [facility] OR           Family History:     Family History   Problem Relation Age of Onset     Cancer Brother         testicular     Diabetes Sister      Ovarian Cancer Mother 60     Diabetes Mother              Asthma Father              Diabetes Sister      Depression/Anxiety Daughter      Depression Daughter      Osteoporosis Sister      Other Cancer Brother      Diabetes Brother      Depression Other         Grandson               Social History:     Social History     Socioeconomic History     Marital status:      Spouse name: Not on file     Number of children: Not on file     Years of education: Not on file     Highest education level: Not on file   Occupational History     Not on file   Social Needs     Financial resource strain: Not on file     Food insecurity     Worry: Not on file     Inability: Not on file     Transportation needs     Medical: Not on file     Non-medical: Not on file   Tobacco Use     Smoking status: Former Smoker     Packs/day: 1.00     Years: 30.00     Pack years: 30.00     Types: Cigarettes     Start date: 1965     Quit date: 10/10/2003     Years since quittin.2     Smokeless tobacco: Never Used   Substance and Sexual Activity     Alcohol use: No     Drug use: No     Sexual activity: Never   Lifestyle     Physical activity     Days per week: Not on file     Minutes per session: Not on file     Stress: Not on file   Relationships     Social connections     Talks on phone: Not on file     Gets together: Not on file     Attends Baptism service: Not on file     Active member of club or organization: Not on file     Attends meetings of clubs or organizations: Not on file     Relationship status: Not on file     Intimate partner violence     Fear of current or ex partner: Not on file     Emotionally abused: Not on file     Physically abused: Not on file     Forced sexual activity: Not on file   Other Topics Concern     Parent/sibling w/ CABG, MI or angioplasty before 65F 55M? No   Social History Narrative     Not on file            Medications:     Current Outpatient Medications   Medication     albuterol (PROAIR HFA/PROVENTIL HFA/VENTOLIN HFA) 108 (90 Base) MCG/ACT Inhaler     atorvastatin (LIPITOR) 20 MG tablet     BIOTIN PO     Calcium Carbonate (CALCIUM 500 PO)     fluticasone (FLONASE) 50 MCG/ACT spray     formoterol (PERFOROMIST) 20 MCG/2ML neb solution     ibuprofen (ADVIL,MOTRIN) 600 MG tablet      ipratropium - albuterol 0.5 mg/2.5 mg/3 mL (DUONEB) 0.5-2.5 (3) MG/3ML neb solution     loratadine (CLARITIN) 10 MG tablet     losartan (COZAAR) 100 MG tablet     losartan (COZAAR) 100 MG tablet     MAGNESIUM OXIDE PO     omeprazole (PRILOSEC) 40 MG DR capsule     order for DME     predniSONE (DELTASONE) 10 MG tablet     umeclidinium (INCRUSE ELLIPTA) 62.5 MCG/INH inhaler     vitamin B complex with vitamin C (VITAMIN  B COMPLEX) tablet     Zinc Sulfate (ZINC 15 PO)     budesonide (PULMICORT) 0.5 MG/2ML neb solution     budesonide-formoterol (SYMBICORT) 160-4.5 MCG/ACT Inhaler     Pyridoxine HCl (VITAMIN B-6 PO)     No current facility-administered medications for this visit.           Review of Systems:     A complete review of systems was otherwise negative except as noted in the HPI.    PHYSICAL EXAM:  There were no vitals taken for this visit.     Exam not performed as this was a telephone visit         Data:   All laboratory and imaging data reviewed.      PFT:   Pulmonary function testing from November 7, 2019 reviewed.  FEV1/FVC ratio 0.34 after bronchodilator.  Her postbronchodilator FVC is 2.46 which is 86% predicted, and the postbronchodilator FEV1 is 0.83 which is 37% predicted.  Residual volume is 242% predicted and the total lung capacity is 146% predicted.  Her diffusion capacity is 52% predicted.      PFT Interpretation:  Severe airflow obstruction.  Gas trapping and hyperinflation.  Reduction in her diffusion capacity.  Valid Maneuver  Sincerely,    Hayes Garg MD

## 2021-01-18 ENCOUNTER — TELEPHONE (OUTPATIENT)
Dept: ONCOLOGY | Facility: CLINIC | Age: 76
End: 2021-01-18

## 2021-01-18 NOTE — TELEPHONE ENCOUNTER
This patient canceled her lab and appointment with My Hernandez today 01/18/2021. I called and spoke with this patient and she will call back to reschedule once she arranges for transportation.

## 2021-01-21 ENCOUNTER — TELEPHONE (OUTPATIENT)
Dept: ONCOLOGY | Facility: CLINIC | Age: 76
End: 2021-01-21

## 2021-01-21 NOTE — TELEPHONE ENCOUNTER
Writer attempted to reach the patient and schedule a lab/appointment F2F with My Hernandez.    Writer lydia.    Maggie [facility]   [phone]

## 2021-02-08 ENCOUNTER — TELEPHONE (OUTPATIENT)
Dept: ONCOLOGY | Facility: CLINIC | Age: 76
End: 2021-02-08

## 2021-02-23 DIAGNOSIS — J44.9 CHRONIC OBSTRUCTIVE PULMONARY DISEASE, UNSPECIFIED COPD TYPE (H): ICD-10-CM

## 2021-02-23 RX ORDER — PREDNISONE 10 MG/1
TABLET ORAL
Qty: 30 TABLET | Refills: 3 | Status: SHIPPED | OUTPATIENT
Start: 2021-02-23 | End: 2021-09-20

## 2021-04-07 DIAGNOSIS — J44.9 CHRONIC OBSTRUCTIVE PULMONARY DISEASE, UNSPECIFIED COPD TYPE (H): ICD-10-CM

## 2021-04-07 DIAGNOSIS — K21.9 GASTROESOPHAGEAL REFLUX DISEASE: ICD-10-CM

## 2021-04-07 DIAGNOSIS — K21.9 GASTROESOPHAGEAL REFLUX DISEASE, UNSPECIFIED WHETHER ESOPHAGITIS PRESENT: Primary | ICD-10-CM

## 2021-04-07 RX ORDER — OMEPRAZOLE 40 MG/1
CAPSULE, DELAYED RELEASE ORAL
Qty: 90 CAPSULE | Refills: 3 | Status: SHIPPED | OUTPATIENT
Start: 2021-04-07 | End: 2022-03-25

## 2021-04-07 RX ORDER — IPRATROPIUM BROMIDE AND ALBUTEROL SULFATE 2.5; .5 MG/3ML; MG/3ML
SOLUTION RESPIRATORY (INHALATION)
Qty: 90 ML | Refills: 11 | Status: SHIPPED | OUTPATIENT
Start: 2021-04-07 | End: 2022-02-04

## 2021-04-07 NOTE — TELEPHONE ENCOUNTER
"Requested Prescriptions   Pending Prescriptions Disp Refills    omeprazole (PRILOSEC) 40 MG DR capsule [Pharmacy Med Name: Omeprazole 40 MG Oral Capsule Delayed Release] 90 capsule 0     Sig: TAKE 1 CAPSULE BY MOUTH ONCE DAILY 30-60  MINUTES  BEFORE  A  MEAL       PPI Protocol Failed - 4/7/2021  9:51 AM        Failed - No diagnosis of osteoporosis on record        Passed - Not on Clopidogrel (unless Pantoprazole ordered)        Passed - Recent (12 mo) or future (30 days) visit within the authorizing provider's specialty     Patient has had an office visit with the authorizing provider or a provider within the authorizing providers department within the previous 12 mos or has a future within next 30 days. See \"Patient Info\" tab in inbasket, or \"Choose Columns\" in Meds & Orders section of the refill encounter.              Passed - Medication is active on med list        Passed - Patient is age 18 or older        Passed - No active pregnacy on record        Passed - No positive pregnancy test in past 12 months          ipratropium - albuterol 0.5 mg/2.5 mg/3 mL (DUONEB) 0.5-2.5 (3) MG/3ML neb solution [Pharmacy Med Name: Ipratropium-Albuterol 0.5-2.5 (3) MG/3ML Inhalation Solution] 90 mL 0     Sig: USE 1 VIAL IN NEBULIZER EVERY 4 HOURS AS NEEDED FOR SHORTNESS OF BREATH /  DYSPNEA  OR  WHEEZING       Short-Acting Beta Agonist Inhalers Protocol  Failed - 4/7/2021  9:51 AM        Failed - Asthma control assessment score within normal limits in last 6 months     Please review ACT score.           Failed - Recent (6 mo) or future (30 days) visit within the authorizing provider's specialty     Patient had office visit in the last 6 months or has a visit in the next 30 days with authorizing provider or within the authorizing provider's specialty.  See \"Patient Info\" tab in inbasket, or \"Choose Columns\" in Meds & Orders section of the refill encounter.            Passed - Patient is age 12 or older        Passed - Medication " "is active on med list       Asthma Nebs Protocol Failed - 4/7/2021  9:51 AM        Failed - Asthma control assessment score within normal limits in last 6 months     Please review ACT score.           Failed - Recent (6 mo) or future (30 days) visit within the authorizing provider's specialty     Patient had office visit in the last 6 months or has a visit in the next 30 days with authorizing provider or within the authorizing provider's specialty.  See \"Patient Info\" tab in inbasket, or \"Choose Columns\" in Meds & Orders section of the refill encounter.            Passed - Patient is age 4 years or older        Passed - Medication is active on med list             "

## 2021-04-26 DIAGNOSIS — I10 ESSENTIAL HYPERTENSION WITH GOAL BLOOD PRESSURE LESS THAN 140/90: ICD-10-CM

## 2021-04-27 RX ORDER — LOSARTAN POTASSIUM 100 MG/1
TABLET ORAL
Qty: 90 TABLET | Refills: 0 | Status: SHIPPED | OUTPATIENT
Start: 2021-04-27 | End: 2021-07-28

## 2021-04-27 NOTE — TELEPHONE ENCOUNTER
BP not taken in over 1 year. Overdue for labs.  RN unable to refill medication.     Routing to provider to advise.  Sharita Pfeiffer BSN, RN

## 2021-07-25 DIAGNOSIS — I10 ESSENTIAL HYPERTENSION WITH GOAL BLOOD PRESSURE LESS THAN 140/90: ICD-10-CM

## 2021-07-27 NOTE — TELEPHONE ENCOUNTER
"Requested Prescriptions   Pending Prescriptions Disp Refills     losartan (COZAAR) 100 MG tablet [Pharmacy Med Name: Losartan Potassium 100 MG Oral Tablet] 90 tablet 0     Sig: Take 1 tablet by mouth once daily       Angiotensin-II Receptors Failed - 7/25/2021 11:18 AM        Failed - Last blood pressure under 140/90 in past 12 months     BP Readings from Last 3 Encounters:   11/07/19 118/69   10/28/19 121/68   09/12/19 132/79                 Failed - Recent (12 mo) or future (30 days) visit within the authorizing provider's specialty     Patient has had an office visit with the authorizing provider or a provider within the authorizing providers department within the previous 12 mos or has a future within next 30 days. See \"Patient Info\" tab in inbasket, or \"Choose Columns\" in Meds & Orders section of the refill encounter.              Failed - Normal serum creatinine on file in past 12 months     Recent Labs   Lab Test 08/02/19  1246   CR 1.05*       Ok to refill medication if creatinine is low          Failed - Normal serum potassium on file in past 12 months     Recent Labs   Lab Test 08/02/19  1246   POTASSIUM 3.7                    Passed - Medication is active on med list        Passed - Patient is age 18 or older        Passed - No active pregnancy on record        Passed - No positive pregnancy test in past 12 months           Sharita BIANCHIN, RN    "

## 2021-07-28 RX ORDER — LOSARTAN POTASSIUM 100 MG/1
TABLET ORAL
Qty: 90 TABLET | Refills: 3 | Status: SHIPPED | OUTPATIENT
Start: 2021-07-28 | End: 2021-11-05

## 2021-07-28 NOTE — TELEPHONE ENCOUNTER
Patient sent [employer] message to follow up on refill request below.   Patient has only 1 tablet left.  Requesting refill be addressed ASAP.   See protocol below.   Tiffanie Barry RN

## 2021-09-19 DIAGNOSIS — J44.9 CHRONIC OBSTRUCTIVE PULMONARY DISEASE, UNSPECIFIED COPD TYPE (H): ICD-10-CM

## 2021-09-20 RX ORDER — PREDNISONE 10 MG/1
TABLET ORAL
Qty: 30 TABLET | Refills: 0 | Status: SHIPPED | OUTPATIENT
Start: 2021-09-20 | End: 2021-11-05

## 2021-09-26 ENCOUNTER — HEALTH MAINTENANCE LETTER (OUTPATIENT)
Age: 76
End: 2021-09-26

## 2021-10-07 ENCOUNTER — OFFICE VISIT (OUTPATIENT)
Dept: FAMILY MEDICINE | Facility: CLINIC | Age: 76
End: 2021-10-07
Payer: COMMERCIAL

## 2021-10-07 VITALS
OXYGEN SATURATION: 94 % | DIASTOLIC BLOOD PRESSURE: 73 MMHG | TEMPERATURE: 98.3 F | HEIGHT: 65 IN | HEART RATE: 86 BPM | WEIGHT: 129 LBS | BODY MASS INDEX: 21.49 KG/M2 | RESPIRATION RATE: 16 BRPM | SYSTOLIC BLOOD PRESSURE: 138 MMHG

## 2021-10-07 DIAGNOSIS — Z23 NEED FOR VACCINATION: ICD-10-CM

## 2021-10-07 DIAGNOSIS — C56.2 MALIGNANT NEOPLASM OF OVARY, LEFT (H): ICD-10-CM

## 2021-10-07 DIAGNOSIS — R42 VERTIGO: Primary | ICD-10-CM

## 2021-10-07 DIAGNOSIS — Z23 NEED FOR PROPHYLACTIC VACCINATION AND INOCULATION AGAINST INFLUENZA: ICD-10-CM

## 2021-10-07 DIAGNOSIS — L40.9 PSORIASIS: ICD-10-CM

## 2021-10-07 LAB
ALBUMIN SERPL-MCNC: 3.8 G/DL (ref 3.4–5)
ALP SERPL-CCNC: 96 U/L (ref 40–150)
ALT SERPL W P-5'-P-CCNC: 30 U/L (ref 0–50)
ANION GAP SERPL CALCULATED.3IONS-SCNC: 5 MMOL/L (ref 3–14)
AST SERPL W P-5'-P-CCNC: 22 U/L (ref 0–45)
BASOPHILS # BLD AUTO: 0.1 10E3/UL (ref 0–0.2)
BASOPHILS NFR BLD AUTO: 1 %
BILIRUB SERPL-MCNC: 0.5 MG/DL (ref 0.2–1.3)
BUN SERPL-MCNC: 25 MG/DL (ref 7–30)
CALCIUM SERPL-MCNC: 8.9 MG/DL (ref 8.5–10.1)
CANCER AG125 SERPL-ACNC: 16 U/ML (ref 0–30)
CHLORIDE BLD-SCNC: 107 MMOL/L (ref 94–109)
CO2 SERPL-SCNC: 28 MMOL/L (ref 20–32)
CREAT SERPL-MCNC: 1.11 MG/DL (ref 0.52–1.04)
EOSINOPHIL # BLD AUTO: 0.2 10E3/UL (ref 0–0.7)
EOSINOPHIL NFR BLD AUTO: 3 %
ERYTHROCYTE [DISTWIDTH] IN BLOOD BY AUTOMATED COUNT: 12.8 % (ref 10–15)
GFR SERPL CREATININE-BSD FRML MDRD: 49 ML/MIN/1.73M2
GLUCOSE BLD-MCNC: 104 MG/DL (ref 70–99)
HCT VFR BLD AUTO: 42.8 % (ref 35–47)
HGB BLD-MCNC: 13.6 G/DL (ref 11.7–15.7)
LYMPHOCYTES # BLD AUTO: 0.7 10E3/UL (ref 0.8–5.3)
LYMPHOCYTES NFR BLD AUTO: 9 %
MCH RBC QN AUTO: 30 PG (ref 26.5–33)
MCHC RBC AUTO-ENTMCNC: 31.8 G/DL (ref 31.5–36.5)
MCV RBC AUTO: 94 FL (ref 78–100)
MONOCYTES # BLD AUTO: 0.5 10E3/UL (ref 0–1.3)
MONOCYTES NFR BLD AUTO: 7 %
NEUTROPHILS # BLD AUTO: 6.3 10E3/UL (ref 1.6–8.3)
NEUTROPHILS NFR BLD AUTO: 81 %
PLATELET # BLD AUTO: 235 10E3/UL (ref 150–450)
POTASSIUM BLD-SCNC: 3.6 MMOL/L (ref 3.4–5.3)
PROT SERPL-MCNC: 6.9 G/DL (ref 6.8–8.8)
RBC # BLD AUTO: 4.54 10E6/UL (ref 3.8–5.2)
SODIUM SERPL-SCNC: 140 MMOL/L (ref 133–144)
WBC # BLD AUTO: 7.7 10E3/UL (ref 4–11)

## 2021-10-07 PROCEDURE — 99213 OFFICE O/P EST LOW 20 MIN: CPT | Mod: 25 | Performed by: PHYSICIAN ASSISTANT

## 2021-10-07 PROCEDURE — 80053 COMPREHEN METABOLIC PANEL: CPT | Performed by: PHYSICIAN ASSISTANT

## 2021-10-07 PROCEDURE — 36415 COLL VENOUS BLD VENIPUNCTURE: CPT | Performed by: PHYSICIAN ASSISTANT

## 2021-10-07 PROCEDURE — 90471 IMMUNIZATION ADMIN: CPT | Performed by: PHYSICIAN ASSISTANT

## 2021-10-07 PROCEDURE — 90662 IIV NO PRSV INCREASED AG IM: CPT | Performed by: PHYSICIAN ASSISTANT

## 2021-10-07 PROCEDURE — 90714 TD VACC NO PRESV 7 YRS+ IM: CPT | Performed by: PHYSICIAN ASSISTANT

## 2021-10-07 PROCEDURE — 85025 COMPLETE CBC W/AUTO DIFF WBC: CPT | Performed by: PHYSICIAN ASSISTANT

## 2021-10-07 PROCEDURE — G0008 ADMIN INFLUENZA VIRUS VAC: HCPCS | Mod: 59 | Performed by: PHYSICIAN ASSISTANT

## 2021-10-07 PROCEDURE — 86304 IMMUNOASSAY TUMOR CA 125: CPT | Performed by: PHYSICIAN ASSISTANT

## 2021-10-07 RX ORDER — TRIAMCINOLONE ACETONIDE 5 MG/G
CREAM TOPICAL 2 TIMES DAILY
Qty: 60 G | Refills: 1 | Status: SHIPPED | OUTPATIENT
Start: 2021-10-07

## 2021-10-07 ASSESSMENT — MIFFLIN-ST. JEOR: SCORE: 1082.05

## 2021-10-07 ASSESSMENT — PAIN SCALES - GENERAL: PAINLEVEL: MILD PAIN (3)

## 2021-10-07 NOTE — NURSING NOTE
Prior to immunization administration, verified patients identity using patient s name and date of birth. Please see Immunization Activity for additional information.     Screening Questionnaire for Adult Immunization    Are you sick today?   No   Do you have allergies to medications, food, a vaccine component or latex?   No   Have you ever had a serious reaction after receiving a vaccination?   No   Do you have a long-term health problem with heart, lung, kidney, or metabolic disease (e.g., diabetes), asthma, a blood disorder, no spleen, complement component deficiency, a cochlear implant, or a spinal fluid leak?  Are you on long-term aspirin therapy?   COPD   Do you have cancer, leukemia, HIV/AIDS, or any other immune system problem?   No   Do you have a parent, brother, or sister with an immune system problem?   No   In the past 3 months, have you taken medications that affect  your immune system, such as prednisone, other steroids, or anticancer drugs; drugs for the treatment of rheumatoid arthritis, Crohn s disease, or psoriasis; or have you had radiation treatments?   No   Have you had a seizure, or a brain or other nervous system problem?   No   During the past year, have you received a transfusion of blood or blood    products, or been given immune (gamma) globulin or antiviral drug?   No   For women: Are you pregnant or is there a chance you could become       pregnant during the next month?   No   Have you received any vaccinations in the past 4 weeks?   No     Immunization questionnaire was positive for at least one answer.  Notified ADO.        Per orders of ADO, injection of td given by Liberty Lyons CMA. Patient instructed to remain in clinic for 15 minutes afterwards, and to report any adverse reaction to me immediately.       Screening performed by Liberty Lyons CMA on 10/7/2021 at 2:08 PM.

## 2021-10-07 NOTE — PROGRESS NOTES
Assessment & Plan       ICD-10-CM    1. Vertigo  R42 CBC with platelets and differential     Comprehensive metabolic panel (BMP + Alb, Alk Phos, ALT, AST, Total. Bili, TP)     Comprehensive metabolic panel (BMP + Alb, Alk Phos, ALT, AST, Total. Bili, TP)     CBC with platelets and differential   2. Psoriasis  L40.9 triamcinolone (ARISTOCORT HP) 0.5 % external cream   3. Need for prophylactic vaccination and inoculation against influenza  Z23 INFLUENZA, QUAD, HIGH DOSE, PF, 65YR + (FLUZONE HD)     TD PRSERV FREE >=7 YRS ADS IM [[id number]]   4. Need for vaccination  Z23    5. Malignant neoplasm of ovary, left (H)  C56.2     1.  Talk to patient about her concerns at this point her symptoms are slowly improving.  She requested lab work to be done labs are pending.  We talked about warning signs.  We talked about slow head movements and position changes.  We talked about good hydration and nutrition.  Warning signs were discussed if they should occur she should follow-up otherwise on as-needed basis.  2.  Would do a trial of triamcinolone cream twice a day as needed.  Warning signs side effects were discussed.  If her symptoms persist then she will follow-up with dermatology.    Return in about 1 week (around 10/14/2021) for recheck, As Needed.    KEVIN Martell [facility]   Iris is a 76 year old who presents for the following health issues     HPI     Concern -   Onset: 2 weeks   Description: dizzy, loss of appetite, not sleeping- room spinning for couple seconds multiple times a day with head movements. Over all better but still happening. Similar symptoms about 3 yrs ago and was diagnosis with ear infection.   Intensity: moderate  Progression of Symptoms: better  Accompanying Signs & Symptoms:   Previous history of similar problem:   Precipitating factors:        Worsened by: last week was with any movement   Alleviating factors:        Improved by: resting  "  Therapies tried and outcome:   No fevers or recent illness.  History of COPD and seasonal allergies.     Psoriasis on back, using otc products without much relief   Itches. Stated couple years ago.     New patient to me:     Review of Systems   Constitutional, HEENT, cardiovascular, pulmonary, gi and gu systems are negative, except as otherwise noted.      Objective    /73   Pulse 86   Temp 98.3  F (36.8  C) (Tympanic)   Resp 16   Ht 1.645 m (5' 4.75\")   Wt 58.5 kg (129 lb)   SpO2 94%   BMI 21.63 kg/m    Body mass index is 21.63 kg/m .  Physical Exam   GENERAL: healthy, alert and no distress  EYES: Eyes grossly normal to inspection, PERRL and conjunctivae and sclerae normal  HENT: ear canals and TM's normal, nose and mouth without ulcers or lesions  NECK: no adenopathy, no asymmetry, masses, or scars and thyroid normal to palpation  RESP: lungs clear to auscultation - no rales, rhonchi or wheezes  CV: regular rate and rhythm, normal S1 S2, no S3 or S4, no murmur, click or rub, no peripheral edema and peripheral pulses strong  MS: no gross musculoskeletal defects noted, no edema  SKIN: no suspicious lesions or rashes and lichenification / thicken patch- back of upper neck  NEURO: Normal strength and tone, mentation intact and speech normal  PSYCH: mentation appears normal, affect normal/bright    Labs pending           "

## 2021-10-08 ENCOUNTER — TELEPHONE (OUTPATIENT)
Dept: ONCOLOGY | Facility: CLINIC | Age: 76
End: 2021-10-08

## 2021-10-08 NOTE — RESULT ENCOUNTER NOTE
Ms. Sheyla,    All of your labs were normal/near normal for you.    Please contact the clinic if you have additional questions.  Thank you.    Sincerely,    Beto Evans PA-C

## 2021-10-18 ENCOUNTER — TELEPHONE (OUTPATIENT)
Dept: ONCOLOGY | Facility: CLINIC | Age: 76
End: 2021-10-18

## 2021-10-18 NOTE — TELEPHONE ENCOUNTER
----- Message from CHANDLER Marquez CNP sent at 10/8/2021  8:04 AM CDT -----  Hi,    Can someone please call this patient to scheduled an in person visit?  It looks like we haven't seen her in person since 2019.    Thanks,My

## 2021-10-26 ENCOUNTER — TELEPHONE (OUTPATIENT)
Dept: PULMONOLOGY | Facility: CLINIC | Age: 76
End: 2021-10-26

## 2021-10-26 DIAGNOSIS — J44.9 STAGE 3 SEVERE COPD BY GOLD CLASSIFICATION (H): ICD-10-CM

## 2021-10-26 NOTE — TELEPHONE ENCOUNTER
Left voicemail for patient to contact me (direct number provided) or the call center (number provided) to discuss scheduling an appointment as records indicate they are due for a follow up visit with Dr. Garg for some time in Jan 2022. As they do have a [employer] account, informed them that I would send them a message detailing the same information.     Pt returned my call right away and appt was scheduled for Jan.

## 2021-11-05 ENCOUNTER — ANCILLARY PROCEDURE (OUTPATIENT)
Dept: GENERAL RADIOLOGY | Facility: CLINIC | Age: 76
End: 2021-11-05
Attending: PHYSICIAN ASSISTANT
Payer: COMMERCIAL

## 2021-11-05 ENCOUNTER — OFFICE VISIT (OUTPATIENT)
Dept: URGENT CARE | Facility: URGENT CARE | Age: 76
End: 2021-11-05
Payer: COMMERCIAL

## 2021-11-05 VITALS
BODY MASS INDEX: 21.4 KG/M2 | TEMPERATURE: 98.7 F | OXYGEN SATURATION: 91 % | DIASTOLIC BLOOD PRESSURE: 102 MMHG | HEART RATE: 96 BPM | WEIGHT: 127.6 LBS | SYSTOLIC BLOOD PRESSURE: 173 MMHG

## 2021-11-05 DIAGNOSIS — N18.31 STAGE 3A CHRONIC KIDNEY DISEASE (H): ICD-10-CM

## 2021-11-05 DIAGNOSIS — C78.6 PERITONEAL CARCINOMATOSIS (H): ICD-10-CM

## 2021-11-05 DIAGNOSIS — S22.000A COMPRESSION FRACTURE OF BODY OF THORACIC VERTEBRA (H): ICD-10-CM

## 2021-11-05 DIAGNOSIS — M54.6 ACUTE MIDLINE THORACIC BACK PAIN: ICD-10-CM

## 2021-11-05 DIAGNOSIS — M54.6 ACUTE MIDLINE THORACIC BACK PAIN: Primary | ICD-10-CM

## 2021-11-05 PROBLEM — N18.30 CHRONIC KIDNEY DISEASE, STAGE 3 (H): Status: ACTIVE | Noted: 2021-11-05

## 2021-11-05 PROCEDURE — 72070 X-RAY EXAM THORAC SPINE 2VWS: CPT | Performed by: RADIOLOGY

## 2021-11-05 PROCEDURE — 99214 OFFICE O/P EST MOD 30 MIN: CPT | Performed by: PHYSICIAN ASSISTANT

## 2021-11-05 RX ORDER — TIZANIDINE 2 MG/1
2 TABLET ORAL 3 TIMES DAILY
Qty: 25 TABLET | Refills: 0 | Status: SHIPPED | OUTPATIENT
Start: 2021-11-05 | End: 2022-05-05

## 2021-11-05 ASSESSMENT — ENCOUNTER SYMPTOMS: BACK PAIN: 1

## 2021-11-05 NOTE — PROGRESS NOTES
SUBJECTIVE:   Iris Carrion is a 76 year old female presenting with a chief complaint of   Chief Complaint   Patient presents with     Back Pain     Mid back, for 1 week, no known injury. No kidney pain. Hx of off and on back pain.        She is an established patient of [facility].  Patient presents with complaints of mid back pain x 7 days.  States spasm, no fevers, no dysuria and no trauma.  Patient states gets back spasms from time to time and is treated with muscle relaxers.  She called PCP for Rx and was told to come in.      Treatment:  salonpas and ibuprofen         Review of Systems   Musculoskeletal: Positive for back pain.   All other systems reviewed and are negative.      Past Medical History:   Diagnosis Date     Cervical high risk HPV (human papillomavirus) test positive 10/31/12    type 18     COPD (chronic obstructive pulmonary disease) (H)      GERD (gastroesophageal reflux disease)      Hx of colposcopy with cervical biopsy 2012    negative     Hypertension      Osteoporosis      Paroxysmal supraventricular tachycardia (H) 2017     Peritoneal carcinomatosis (H) 2015     Pneumonia      Uncomplicated asthma 1980     Family History   Problem Relation Age of Onset     Cancer Brother         testicular     Diabetes Sister      Ovarian Cancer Mother 60     Diabetes Mother              Asthma Father              Diabetes Sister      Depression/Anxiety Daughter      Depression Daughter      Osteoporosis Sister      Other Cancer Brother      Diabetes Brother      Depression Other         Grandson     Current Outpatient Medications   Medication Sig Dispense Refill     atorvastatin (LIPITOR) 20 MG tablet Take 1 tablet (20 mg) by mouth daily 90 tablet 3     budesonide (PULMICORT) 0.5 MG/2ML neb solution Take 2 mLs (0.5 mg) by nebulization 2 times daily 120 mL 11     budesonide-formoterol (SYMBICORT) 160-4.5 MCG/ACT Inhaler Inhale 2 puffs into the lungs 2 times daily 3 Inhaler  3     Calcium Carbonate (CALCIUM 500 PO) Take 1 tablet by mouth daily.       ibuprofen (ADVIL,MOTRIN) 600 MG tablet Take 1 tablet (600 mg) by mouth every 6 hours as needed for moderate pain 40 tablet 0     loratadine (CLARITIN) 10 MG tablet Take 10 mg by mouth daily       losartan (COZAAR) 100 MG tablet Take 1 tablet by mouth once daily 90 tablet 0     MAGNESIUM OXIDE PO Take 200 mg by mouth 2 times daily       omeprazole (PRILOSEC) 40 MG DR capsule TAKE 1 CAPSULE BY MOUTH ONCE DAILY 30-60  MINUTES  BEFORE  A  MEAL 90 capsule 3     order for DME Equipment being ordered: Nebulizer 1 Device 0     tiZANidine (ZANAFLEX) 2 MG tablet Take 1 tablet (2 mg) by mouth 3 times daily 25 tablet 0     triamcinolone (ARISTOCORT HP) 0.5 % external cream Apply topically 2 times daily 60 g 1     umeclidinium (INCRUSE ELLIPTA) 62.5 MCG/INH inhaler Inhale 1 puff into the lungs daily 1 each 5     vitamin B complex with vitamin C (VITAMIN  B COMPLEX) tablet Take 1 tablet by mouth daily       Zinc Sulfate (ZINC 15 PO) Take by mouth daily       albuterol (PROAIR HFA/PROVENTIL HFA/VENTOLIN HFA) 108 (90 Base) MCG/ACT Inhaler Inhale 2 puffs into the lungs every 6 hours as needed for shortness of breath / dyspnea or wheezing (Patient not taking: Reported on 2021) 1 Inhaler 1     fluticasone (FLONASE) 50 MCG/ACT spray Spray 2 sprays into both nostrils daily (Patient not taking: Reported on 2021) 1 Bottle 1     ipratropium - albuterol 0.5 mg/2.5 mg/3 mL (DUONEB) 0.5-2.5 (3) MG/3ML neb solution USE 1 VIAL IN NEBULIZER EVERY 4 HOURS AS NEEDED FOR SHORTNESS OF BREATH /  DYSPNEA  OR  WHEEZING (Patient not taking: Reported on 2021) 90 mL 11     Social History     Tobacco Use     Smoking status: Former Smoker     Packs/day: 1.00     Years: 30.00     Pack years: 30.00     Types: Cigarettes     Start date: 1965     Quit date: 10/10/2003     Years since quittin.0     Smokeless tobacco: Never Used   Substance Use Topics     Alcohol  use: No       OBJECTIVE  BP (!) 173/102   Pulse 96   Temp 98.7  F (37.1  C) (Tympanic)   Wt 57.9 kg (127 lb 9.6 oz)   SpO2 91%   BMI 21.40 kg/m      Physical Exam  Vitals and nursing note reviewed.   Constitutional:       Appearance: Normal appearance. She is normal weight.   Eyes:      Extraocular Movements: Extraocular movements intact.      Conjunctiva/sclera: Conjunctivae normal.   Cardiovascular:      Rate and Rhythm: Normal rate.   Musculoskeletal:      Comments: Skin over the back is normal.  No rashes..  Tenderness to palpation of bilateral lower thoracic spine.  Kyphotic.  No bony spine tenderness.   Neurological:      Mental Status: She is alert.         Labs:  No results found for this or any previous visit (from the past 24 hour(s)).    X-Ray was done, my findings are: Old compression fracture    ASSESSMENT:      ICD-10-CM    1. Acute midline thoracic back pain  M54.6 XR Thoracic Spine 2 Views     tiZANidine (ZANAFLEX) 2 MG tablet   2. Peritoneal carcinomatosis (H)  C78.6    3. Stage 3a chronic kidney disease (H)  N18.31         Medical Decision Making:    Differential Diagnosis:  Back pain, compression fracture.      Serious Comorbid Conditions:  Adult:  reviewed    PLAN:    Rx for zanaflex.  Discussed the xray and compression fractures.  Patient declined ortho referral. Briefly discussed vertebroplasty.  Discussed SE of medication.  Discussed reasons to seek immediate medical attention.        Followup:    If not improving or if condition worsens, follow up with your Primary Care Provider, If not improving or if conditions worsens over the next 12-24 hours, go to the Emergency Department    There are no Patient Instructions on file for this visit.

## 2021-11-09 DIAGNOSIS — J44.9 CHRONIC OBSTRUCTIVE PULMONARY DISEASE, UNSPECIFIED COPD TYPE (H): ICD-10-CM

## 2021-11-10 RX ORDER — PREDNISONE 10 MG/1
TABLET ORAL
Qty: 30 TABLET | Refills: 0 | Status: SHIPPED | OUTPATIENT
Start: 2021-11-10 | End: 2021-12-02

## 2021-12-02 DIAGNOSIS — E78.5 DYSLIPIDEMIA: ICD-10-CM

## 2021-12-02 DIAGNOSIS — J44.9 CHRONIC OBSTRUCTIVE PULMONARY DISEASE, UNSPECIFIED COPD TYPE (H): ICD-10-CM

## 2021-12-02 RX ORDER — ATORVASTATIN CALCIUM 20 MG/1
TABLET, FILM COATED ORAL
Qty: 90 TABLET | Refills: 3 | Status: SHIPPED | OUTPATIENT
Start: 2021-12-02 | End: 2022-12-06

## 2021-12-02 RX ORDER — PREDNISONE 10 MG/1
TABLET ORAL
Qty: 30 TABLET | Refills: 4 | Status: SHIPPED | OUTPATIENT
Start: 2021-12-02 | End: 2022-04-18

## 2021-12-02 NOTE — TELEPHONE ENCOUNTER
Routing refill request to provider for review/approval because:  Labs not current:  Lipids  Drug not on the [facility] refill protocol  prednisone    Sharita BIANCHIN, RN

## 2022-01-16 ENCOUNTER — HEALTH MAINTENANCE LETTER (OUTPATIENT)
Age: 77
End: 2022-01-16

## 2022-01-24 ENCOUNTER — MYC REFILL (OUTPATIENT)
Dept: FAMILY MEDICINE | Facility: CLINIC | Age: 77
End: 2022-01-24
Payer: COMMERCIAL

## 2022-01-24 DIAGNOSIS — I10 ESSENTIAL HYPERTENSION WITH GOAL BLOOD PRESSURE LESS THAN 140/90: ICD-10-CM

## 2022-01-24 ASSESSMENT — ENCOUNTER SYMPTOMS
TROUBLE SWALLOWING: 0
STIFFNESS: 0
SINUS PAIN: 0
BACK PAIN: 1
HOARSE VOICE: 0
JOINT SWELLING: 0
MYALGIAS: 0
MUSCLE CRAMPS: 0
SORE THROAT: 0
NECK PAIN: 1
ARTHRALGIAS: 0
SINUS CONGESTION: 1
MUSCLE WEAKNESS: 0
TASTE DISTURBANCE: 0
SMELL DISTURBANCE: 0
NECK MASS: 0

## 2022-01-24 NOTE — TELEPHONE ENCOUNTER
"Requested Prescriptions   Pending Prescriptions Disp Refills    losartan (COZAAR) 100 MG tablet 90 tablet 0     Sig: Take 1 tablet (100 mg) by mouth daily        Angiotensin-II Receptors Failed - 1/24/2022  1:39 AM        Failed - Last blood pressure under 140/90 in past 12 months       BP Readings from Last 3 Encounters:   11/05/21 (!) 173/102   10/07/21 138/73   11/07/19 118/69                 Failed - Normal serum creatinine on file in past 12 months     Recent Labs   Lab Test 10/07/21  1434   CR 1.11*       Ok to refill medication if creatinine is low          Passed - Recent (12 mo) or future (30 days) visit within the authorizing provider's specialty     Patient has had an office visit with the authorizing provider or a provider within the authorizing providers department within the previous 12 mos or has a future within next 30 days. See \"Patient Info\" tab in inbasket, or \"Choose Columns\" in Meds & Orders section of the refill encounter.              Passed - Medication is active on med list        Passed - Patient is age 18 or older        Passed - No active pregnancy on record        Passed - Normal serum potassium on file in past 12 months       Recent Labs   Lab Test 10/07/21  1434   POTASSIUM 3.6                    Passed - No positive pregnancy test in past 12 months              "

## 2022-01-25 RX ORDER — LOSARTAN POTASSIUM 100 MG/1
100 TABLET ORAL DAILY
Qty: 90 TABLET | Refills: 3 | Status: SHIPPED | OUTPATIENT
Start: 2022-01-25 | End: 2022-12-06

## 2022-02-03 DIAGNOSIS — J44.9 CHRONIC OBSTRUCTIVE PULMONARY DISEASE, UNSPECIFIED COPD TYPE (H): ICD-10-CM

## 2022-02-04 RX ORDER — IPRATROPIUM BROMIDE AND ALBUTEROL SULFATE 2.5; .5 MG/3ML; MG/3ML
SOLUTION RESPIRATORY (INHALATION)
Qty: 90 ML | Refills: 11 | Status: SHIPPED | OUTPATIENT
Start: 2022-02-04 | End: 2022-08-26

## 2022-02-10 ENCOUNTER — ANCILLARY PROCEDURE (OUTPATIENT)
Dept: MAMMOGRAPHY | Facility: CLINIC | Age: 77
End: 2022-02-10
Attending: FAMILY MEDICINE
Payer: COMMERCIAL

## 2022-02-10 DIAGNOSIS — Z12.31 SCREENING MAMMOGRAM, ENCOUNTER FOR: ICD-10-CM

## 2022-02-10 PROCEDURE — 77063 BREAST TOMOSYNTHESIS BI: CPT | Mod: TC | Performed by: RADIOLOGY

## 2022-02-10 PROCEDURE — 77067 SCR MAMMO BI INCL CAD: CPT | Mod: TC | Performed by: RADIOLOGY

## 2022-02-17 PROBLEM — Z71.89 ADVANCED DIRECTIVES, COUNSELING/DISCUSSION: Status: ACTIVE | Noted: 2018-05-09

## 2022-03-24 DIAGNOSIS — K21.9 GASTROESOPHAGEAL REFLUX DISEASE, UNSPECIFIED WHETHER ESOPHAGITIS PRESENT: ICD-10-CM

## 2022-03-25 RX ORDER — OMEPRAZOLE 40 MG/1
CAPSULE, DELAYED RELEASE ORAL
Qty: 90 CAPSULE | Refills: 0 | Status: SHIPPED | OUTPATIENT
Start: 2022-03-25 | End: 2022-06-28

## 2022-03-25 NOTE — TELEPHONE ENCOUNTER
"Routing refill request to provider for review/approval because:  Requested Prescriptions   Pending Prescriptions Disp Refills    omeprazole (PRILOSEC) 40 MG DR capsule [Pharmacy Med Name: Omeprazole 40 MG Oral Capsule Delayed Release] 90 capsule 0     Sig: TAKE 1 CAPSULE BY MOUTH ONCE DAILY 30-60  MINUTES  BEFORE  A  MEAL        PPI Protocol Failed - 3/24/2022  5:46 PM        Failed - No diagnosis of osteoporosis on record        Passed - Not on Clopidogrel (unless Pantoprazole ordered)        Passed - Recent (12 mo) or future (30 days) visit within the authorizing provider's specialty     Patient has had an office visit with the authorizing provider or a provider within the authorizing providers department within the previous 12 mos or has a future within next 30 days. See \"Patient Info\" tab in inbasket, or \"Choose Columns\" in Meds & Orders section of the refill encounter.              Passed - Medication is active on med list        Passed - Patient is age 18 or older        Passed - No active pregnacy on record        Passed - No positive pregnancy test in past 12 months                Emma MURRAY, RN        "

## 2022-04-03 DIAGNOSIS — J44.9 CHRONIC OBSTRUCTIVE PULMONARY DISEASE, UNSPECIFIED COPD TYPE (H): ICD-10-CM

## 2022-04-04 RX ORDER — BUDESONIDE AND FORMOTEROL FUMARATE DIHYDRATE 160; 4.5 UG/1; UG/1
AEROSOL RESPIRATORY (INHALATION)
Qty: 33 G | Refills: 0 | OUTPATIENT
Start: 2022-04-04

## 2022-04-04 NOTE — TELEPHONE ENCOUNTER
"Requested Prescriptions   Pending Prescriptions Disp Refills    SYMBICORT 160-4.5 MCG/ACT Inhaler [Pharmacy Med Name: Symbicort 160-4.5 MCG/ACT Inhalation Aerosol] 33 g 0     Sig: INHALE 2 PUFFS INTO THE LUNGS TWICE DAILY        Inhaled Steroids Protocol Failed - 4/3/2022 11:41 AM        Failed - Asthma control assessment score within normal limits in last 6 months     Please review ACT score.           Passed - Patient is age 12 or older        Passed - Medication is active on med list        Passed - Recent (6 mo) or future (30 days) visit within the authorizing provider's specialty     Patient had office visit in the last 6 months or has a visit in the next 30 days with authorizing provider or within the authorizing provider's specialty.  See \"Patient Info\" tab in inbasket, or \"Choose Columns\" in Meds & Orders section of the refill encounter.           Long-Acting Beta Agonist Inhalers Protocol  Failed - 4/3/2022 11:41 AM        Failed - Asthma control assessment score within normal limits in last 6 months     Please review ACT score.           Passed - Patient is age 12 or older        Passed - Medication is active on med list        Passed - Recent (6 mo) or future (30 days) visit within the authorizing provider's specialty     Patient had office visit in the last 6 months or has a visit in the next 30 days with authorizing provider or within the authorizing provider's specialty.  See \"Patient Info\" tab in inbasket, or \"Choose Columns\" in Meds & Orders section of the refill encounter.                  "

## 2022-04-05 NOTE — TELEPHONE ENCOUNTER
Dr. Garg in Pulmonology is managing COPD and prescribing medication. Dr. Koehler is out of office.   Pharmacy notified to contact Pulmonology

## 2022-04-07 DIAGNOSIS — J44.9 CHRONIC OBSTRUCTIVE PULMONARY DISEASE, UNSPECIFIED COPD TYPE (H): ICD-10-CM

## 2022-04-07 RX ORDER — BUDESONIDE AND FORMOTEROL FUMARATE DIHYDRATE 160; 4.5 UG/1; UG/1
2 AEROSOL RESPIRATORY (INHALATION) 2 TIMES DAILY
Qty: 33 G | Refills: 0 | Status: CANCELLED | OUTPATIENT
Start: 2022-04-07

## 2022-04-13 ASSESSMENT — ENCOUNTER SYMPTOMS
JAUNDICE: 0
ABDOMINAL PAIN: 0
SNORES LOUDLY: 1
SHORTNESS OF BREATH: 1
COUGH: 0
DYSPNEA ON EXERTION: 0
BLOOD IN STOOL: 0
HEARTBURN: 1
HEMOPTYSIS: 0
POSTURAL DYSPNEA: 1
WHEEZING: 0
BLOATING: 0
RECTAL PAIN: 0
CONSTIPATION: 1
VOMITING: 0
COUGH DISTURBING SLEEP: 0
NAUSEA: 1
BOWEL INCONTINENCE: 0
DIARRHEA: 1
SPUTUM PRODUCTION: 0

## 2022-04-18 ENCOUNTER — OFFICE VISIT (OUTPATIENT)
Dept: PULMONOLOGY | Facility: CLINIC | Age: 77
End: 2022-04-18
Payer: COMMERCIAL

## 2022-04-18 VITALS
RESPIRATION RATE: 17 BRPM | SYSTOLIC BLOOD PRESSURE: 156 MMHG | BODY MASS INDEX: 19.99 KG/M2 | HEART RATE: 82 BPM | DIASTOLIC BLOOD PRESSURE: 79 MMHG | WEIGHT: 120 LBS | HEIGHT: 65 IN | OXYGEN SATURATION: 93 %

## 2022-04-18 DIAGNOSIS — J44.9 STAGE 3 SEVERE COPD BY GOLD CLASSIFICATION (H): Primary | ICD-10-CM

## 2022-04-18 DIAGNOSIS — J43.2 CENTRILOBULAR EMPHYSEMA (H): ICD-10-CM

## 2022-04-18 PROCEDURE — G0463 HOSPITAL OUTPT CLINIC VISIT: HCPCS

## 2022-04-18 PROCEDURE — 99214 OFFICE O/P EST MOD 30 MIN: CPT | Mod: GC

## 2022-04-18 RX ORDER — BUDESONIDE AND FORMOTEROL FUMARATE DIHYDRATE 160; 4.5 UG/1; UG/1
2 AEROSOL RESPIRATORY (INHALATION) 2 TIMES DAILY
Qty: 10.2 G | Refills: 11 | Status: SHIPPED | OUTPATIENT
Start: 2022-04-18 | End: 2023-06-26

## 2022-04-18 ASSESSMENT — PAIN SCALES - GENERAL: PAINLEVEL: NO PAIN (0)

## 2022-04-18 NOTE — NURSING NOTE
Chief Complaint   Patient presents with     RECHECK     Return 1 year follow up     Medications reviewed and vital signs taken.   Rayna Mason CMA

## 2022-04-18 NOTE — LETTER
4/18/2022     RE: Iris Carrion  [address]  [place] [address]    Dear Colleague,    Thank you for referring your patient, Iris Carrion, to the [facility] [place]. Please see a copy of my visit note below.    [facility]  Pulmonary Medicine  Visit Clinic Note  April 18, 2022         ASSESSMENT & PLAN     Severe COPD: Currently symptoms are incompletely controlled. We will obtain a CT scan of her lungs if able today, or next available.  We advise a cardiovascular workup to evaluate if any cardiac process is ongoing to attribute her symptomatology. We have placed an order for a portable nebulizer. She states that the pulmicort that she has used int eh past has provided benefit for her shortness of breath. While she has not used it in the recent past we have advised that she use it. We have provided a renewal for Symbicort. Per the patient Symbicort works well. Today her exam is benign with clear auscultation. She will RTC for follow up in 3 months. We will consider a PFT when she is feeling better as well as an overnight oximetry test to assess for marked desaturation. We have discussed the plan with the patient and she verbalized agreement and understanding of the plan. Daughter in room for entirety of the visit.     Of note, her reported once per week emesis is concerning for introduction of an infectious organism into the lungs. A chest CT should provide some insight into this process. We note her prior ovarian cancer diagnosis and are concerned that this symptom may have additional underlying etiology that may be related, including a lung origin.     Vish Cerda MD on 4/18/2022 at 9:58 AM  Resident, Occupational Medicine  (This patient was discussed with Dr. Garg)    Physician Attestation   I, Hayes Garg MD, saw this patient and agree with the findings and plan of care as documented in the note.      Items  personally reviewed/procedural attestation: vitals, labs, imaging and agree with the interpretation documented in the note and spirometry report and agree with the interpretation documented in the note.    Severe COPD  Emphysema  Recurrent COPD Exacerbations    Unclear why she is having decompensations.  Lungs clear today on auscultation.  Has a history of cancer so will get a chest CT scan.  Refill symbicort (instead of advair) given the fact she likes this medication more.  I will get an overnight oximetry study.  She also requests a portable nebulizer.  I gave her a prescription for one. She may need an ischemic evaluation, especially if her lung CT is stable.     Hayes Garg MD         Today's visit note:     Chief Complaint: Iris Carrion is a 75 year old year old female who is being seen for RECHECK (Return 1 year follow up )      HISTORY OF PRESENT ILLNESS:    This is a 75-year-old female with a history of severe COPD who is presenting for follow-up for her lung disease.    The last time she was seen by pulmonology was on 1/11/2021 for a virtual visit. She was diagnosed with severe COPD by Gold criteria. Her symptoms were well controlled on triple inhaler therapy with a nebulize albuterol twice a day.  She was performing all of her activities of daily living, and not having any significant limitations.  She has received her influenza vaccine. We did not change in her medications at that visit. She was scheduled for a follow up visit in 1 year.     In the past, she was having a lot of difficulty with shortness of breath.  Her dyspnea did not seem to improve at all with an albuterol inhaler.  She was requiring prednisone nearly every month to get her symptoms manageable.  Actually tried prescribing Pulmicort and Perforomist nebulizers to see if that would help her out much.  Her insurance company did not cover Pulmicort.  She did receive Perforomist and took that for about a month, but she did not  notice a substantial improvement so she stopped.  Fortunately with time, her symptoms have improved.  Now she feels great.  She is taking Symbicort, Incruse, and generally not being twice a day.  Her last dose of prednisone was in July of 2020.      Since her virtual visit on 1/11/2021, she has been noted a regression in her symptomatology. She has been taking prednisone tapers to control the symptoms and is has worked well. The shortness of breath has recurred despite the inhaled medications. She reports increasing shortness of breath when she gets up from a sitting position or task. She has been prescribed low dose Advair and she questions why this is the case when Symbicort has worked well in the past.  has not started the Advair and has been using the Symbicort still. When she goes outside she has trouble breathing per the patient. She states that she has been throwing up once per week in the interim. Start date not specified. There is no indicated temporal association between the prednisone and emesis and her shortness of breath. She reports no fevers, no head colds, no swelling of the legs, no headaches or any other changes in the interim. She does report that she has had heart ultrasounds and workups performed in the past with a cardiologist. She has been followed by her primary care provider who is currently out of town until July. She would liek to know if she can have a prescription for a portable nebulizer. Nebulized albuterol and steroid has worked well in the past to control her shortness of breath when she used it. She presents today for a 1 year follow up visit with her daughter present in the room.            Past Medical and Surgical History:     Past Medical History:   Diagnosis Date     Cervical high risk HPV (human papillomavirus) test positive 10/31/12    type 18     COPD (chronic obstructive pulmonary disease) (H)      GERD (gastroesophageal reflux disease)      Hx of colposcopy with cervical  biopsy 2012    negative     Hypertension      Osteoporosis      Paroxysmal supraventricular tachycardia (H) 2017     Peritoneal carcinomatosis (H) 2015     Pneumonia      Uncomplicated asthma 1980     Past Surgical History:   Procedure Laterality Date     CHOLECYSTECTOMY  2014     COLONOSCOPY  2012    Procedure: COLONOSCOPY;  COLONOSCOPY SCREEN;  Surgeon: Mushtaq Lara MD;  Location: [facility] OR     GYN SURGERY       HERNIORRHAPHY HIATAL N/A 2015    Procedure: HERNIORRHAPHY HIATAL;  Surgeon: Chip Carty MD;  Location: [facility] OR     HYSTERECTOMY TOTAL ABD, ALVIN SALPINGO-OOPHORECTOMY, NODE DISSECTION, TUMOR DEBULKING, COMBINED Bilateral 2015    Procedure: COMBINED HYSTERECTOMY TOTAL ABDOMINAL, SALPINGO-OOPHORECTOMY, NODE DISSECTION, TUMOR DEBULKING;  Surgeon: Emma Cabrera MD;  Location: [facility] OR           Family History:     Family History   Problem Relation Age of Onset     Cancer Brother         testicular     Diabetes Sister      Ovarian Cancer Mother 60     Diabetes Mother              Asthma Father              Diabetes Sister      Depression/Anxiety Daughter      Depression Daughter      Osteoporosis Sister      Other Cancer Brother      Diabetes Brother      Depression Other         Grandson              Social History:     Social History     Socioeconomic History     Marital status:      Spouse name: Not on file     Number of children: Not on file     Years of education: Not on file     Highest education level: Not on file   Occupational History     Not on file   Tobacco Use     Smoking status: Former Smoker     Packs/day: 1.00     Years: 30.00     Pack years: 30.00     Types: Cigarettes     Start date: 1965     Quit date: 10/10/2003     Years since quittin.5     Smokeless tobacco: Never Used   Vaping Use     Vaping Use: Never used   Substance and Sexual Activity     Alcohol use: No     Drug use: No     Sexual activity: Not  Currently   Other Topics Concern     Parent/sibling w/ CABG, MI or angioplasty before 65F 55M? No   Social History Narrative     Not on file     Social Determinants of Health     Financial Resource Strain: Not on file   Food Insecurity: Not on file   Transportation Needs: Not on file   Physical Activity: Not on file   Stress: Not on file   Social Connections: Not on file   Intimate Partner Violence: Not on file   Housing Stability: Not on file            Medications:     Current Outpatient Medications   Medication     albuterol (PROAIR HFA/PROVENTIL HFA/VENTOLIN HFA) 108 (90 Base) MCG/ACT Inhaler     atorvastatin (LIPITOR) 20 MG tablet     budesonide (PULMICORT) 0.5 MG/2ML neb solution     budesonide-formoterol (SYMBICORT) 160-4.5 MCG/ACT Inhaler     Calcium Carbonate (CALCIUM 500 PO)     ibuprofen (ADVIL,MOTRIN) 600 MG tablet     ipratropium - albuterol 0.5 mg/2.5 mg/3 mL (DUONEB) 0.5-2.5 (3) MG/3ML neb solution     loratadine (CLARITIN) 10 MG tablet     losartan (COZAAR) 100 MG tablet     MAGNESIUM OXIDE PO     omeprazole (PRILOSEC) 40 MG DR capsule     order for DME     tiZANidine (ZANAFLEX) 2 MG tablet     triamcinolone (ARISTOCORT HP) 0.5 % external cream     umeclidinium (INCRUSE ELLIPTA) 62.5 MCG/INH inhaler     vitamin B complex with vitamin C (STRESS TAB) tablet     Zinc Sulfate (ZINC 15 PO)     fluticasone (FLONASE) 50 MCG/ACT spray     No current facility-administered medications for this visit.            Review of Systems:       Answers for HPI/ROS submitted by the patient on 4/13/2022  General Symptoms: No  Skin Symptoms: No  EYE SYMPTOMS: No  HEART SYMPTOMS: No  LUNG SYMPTOMS: Yes  INTESTINAL SYMPTOMS: Yes  URINARY SYMPTOMS: No  GYNECOLOGIC SYMPTOMS: No  BREAST SYMPTOMS: No  BLOOD SYMPTOMS: No  NERVOUS SYSTEM SYMPTOMS: No  MENTAL HEALTH SYMPTOMS: No  Cough: No  Sputum or phlegm: No  Coughing up blood: No  Difficulty breating or shortness of breath: Yes  Snoring: Yes  Wheezing: No  Difficulty breathing  "on exertion: No  Nighttime Cough: No  Difficulty breathing when lying flat: Yes  Heart burn or indigestion: Yes  Nausea: Yes  Vomiting: No  Abdominal pain: No  Bloating: No  Constipation: Yes  Diarrhea: Yes  Blood in stool: No  Black stools: No  Rectal or Anal pain: No  Fecal incontinence: No  Yellowing of skin or eyes: No  Vomit with blood: No  Change in stools: No          PHYSICAL EXAM:  BP (!) 156/79   Pulse 82   Resp 17   Ht 1.645 m (5' 4.75\")   Wt 54.4 kg (120 lb)   SpO2 93%   BMI 20.12 kg/m       General: pleasant, NAD  Eyes: Anicteric  Ears: Hearing grossly normal  Mouth: Oral mucosa is moist, without any lesions. No oropharyngeal exudate.  Neck: supple, no thyromegaly  Lymphatics: No cervical or supraclavicular nodes  Respiratory: Good air movement. No crackles. No rhonchi. No wheezes  Cardiac: RRR, normal S1, S2. No murmurs.  Musculoskeletal: Extremities normal. No clubbing. No cyanosis. No edema.  Skin: No rash on limited exam  Neuro: Normal mentation. Normal speech.  Psych:Normal affect         Data:   All laboratory and imaging data reviewed.      Pulmonary Rehab 2/2/2020:      PFT:   Pulmonary function testing from November 7, 2019 reviewed.  FEV1/FVC ratio 0.34 after bronchodilator.  Her postbronchodilator FVC is 2.46 which is 86% predicted, and the postbronchodilator FEV1 is 0.83 which is 37% predicted.  Residual volume is 242% predicted and the total lung capacity is 146% predicted.  Her diffusion capacity is 52% predicted.      PFT Interpretation:  Severe airflow obstruction.  Gas trapping and hyperinflation.  Reduction in her diffusion capacity.  Valid Maneuver                                            DME (Durable Medical Equipment) Orders and Documentation  Orders Placed This Encounter   Procedures     Nebulizer and Supplies Order      The patient was assessed and it was determined the patient is in need of the following listed DME Supplies/Equipment. Please complete supporting " documentation below to demonstrate medical necessity.      Nebulizer Documentation  The patient was seen 04/18/2022. After assessment, the patient will need to be treated with ongoing nebulizer for treatment/management of COPD.    Again, thank you for allowing me to participate in the care of your patient.      Sincerely,    Hayes Garg MD

## 2022-04-18 NOTE — PROGRESS NOTES
DME (Durable Medical Equipment) Orders and Documentation  Orders Placed This Encounter   Procedures     Nebulizer and Supplies Order      The patient was assessed and it was determined the patient is in need of the following listed DME Supplies/Equipment. Please complete supporting documentation below to demonstrate medical necessity.      Nebulizer Documentation  The patient was seen 04/18/2022. After assessment, the patient will need to be treated with ongoing nebulizer for treatment/management of COPD.

## 2022-04-18 NOTE — PROGRESS NOTES
[facility]  Pulmonary Medicine  Visit Clinic Note  April 18, 2022         ASSESSMENT & PLAN     Severe COPD: Currently symptoms are incompletely controlled. We will obtain a CT scan of her lungs if able today, or next available.  We advise a cardiovascular workup to evaluate if any cardiac process is ongoing to attribute her symptomatology. We have placed an order for a portable nebulizer. She states that the pulmicort that she has used int eh past has provided benefit for her shortness of breath. While she has not used it in the recent past we have advised that she use it. We have provided a renewal for Symbicort. Per the patient Symbicort works well. Today her exam is benign with clear auscultation. She will RTC for follow up in 3 months. We will consider a PFT when she is feeling better as well as an overnight oximetry test to assess for marked desaturation. We have discussed the plan with the patient and she verbalized agreement and understanding of the plan. Daughter in room for entirety of the visit.     Of note, her reported once per week emesis is concerning for introduction of an infectious organism into the lungs. A chest CT should provide some insight into this process. We note her prior ovarian cancer diagnosis and are concerned that this symptom may have additional underlying etiology that may be related, including a lung origin.     Vish Cerda MD on 4/18/2022 at 9:58 AM  Resident, Occupational Medicine  (This patient was discussed with Dr. Garg)    Physician Attestation   I, Hayes Garg MD, saw this patient and agree with the findings and plan of care as documented in the note.      Items personally reviewed/procedural attestation: vitals, labs, imaging and agree with the interpretation documented in the note and spirometry report and agree with the interpretation documented in the note.    Severe COPD  Emphysema  Recurrent COPD Exacerbations    Unclear why she is  having decompensations.  Lungs clear today on auscultation.  Has a history of cancer so will get a chest CT scan.  Refill symbicort (instead of advair) given the fact she likes this medication more.  I will get an overnight oximetry study.  She also requests a portable nebulizer.  I gave her a prescription for one. She may need an ischemic evaluation, especially if her lung CT is stable.     Hayes Garg MD         Today's visit note:     Chief Complaint: Iris Carrion is a 75 year old year old female who is being seen for RECHECK (Return 1 year follow up )      HISTORY OF PRESENT ILLNESS:    This is a 75-year-old female with a history of severe COPD who is presenting for follow-up for her lung disease.    The last time she was seen by pulmonology was on 1/11/2021 for a virtual visit. She was diagnosed with severe COPD by Gold criteria. Her symptoms were well controlled on triple inhaler therapy with a nebulize albuterol twice a day.  She was performing all of her activities of daily living, and not having any significant limitations.  She has received her influenza vaccine. We did not change in her medications at that visit. She was scheduled for a follow up visit in 1 year.     In the past, she was having a lot of difficulty with shortness of breath.  Her dyspnea did not seem to improve at all with an albuterol inhaler.  She was requiring prednisone nearly every month to get her symptoms manageable.  Actually tried prescribing Pulmicort and Perforomist nebulizers to see if that would help her out much.  Her insurance company did not cover Pulmicort.  She did receive Perforomist and took that for about a month, but she did not notice a substantial improvement so she stopped.  Fortunately with time, her symptoms have improved.  Now she feels great.  She is taking Symbicort, Incruse, and generally not being twice a day.  Her last dose of prednisone was in July of 2020.      Since her virtual visit on  1/11/2021, she has been noted a regression in her symptomatology. She has been taking prednisone tapers to control the symptoms and is has worked well. The shortness of breath has recurred despite the inhaled medications. She reports increasing shortness of breath when she gets up from a sitting position or task. She has been prescribed low dose Advair and she questions why this is the case when Symbicort has worked well in the past. Se has not started the Advair and has been using the Symbicort still. When she goes outside she has trouble breathing per the patient. She states that she has been throwing up once per week in the interim. Start date not specified. There is no indicated temporal association between the prednisone and emesis and her shortness of breath. She reports no fevers, no head colds, no swelling of the legs, no headaches or any other changes in the interim. She does report that she has had heart ultrasounds and workups performed in the past with a cardiologist. She has been followed by her primary care provider who is currently out of town until July. She would liek to know if she can have a prescription for a portable nebulizer. Nebulized albuterol and steroid has worked well in the past to control her shortness of breath when she used it. She presents today for a 1 year follow up visit with her daughter present in the room.            Past Medical and Surgical History:     Past Medical History:   Diagnosis Date     Cervical high risk HPV (human papillomavirus) test positive 10/31/12    type 18     COPD (chronic obstructive pulmonary disease) (H)      GERD (gastroesophageal reflux disease)      Hx of colposcopy with cervical biopsy 11/2012    negative     Hypertension 2013     Osteoporosis      Paroxysmal supraventricular tachycardia (H) 9/6/2017     Peritoneal carcinomatosis (H) 8/24/2015     Pneumonia      Uncomplicated asthma 1980     Past Surgical History:   Procedure Laterality Date      CHOLECYSTECTOMY  2014     COLONOSCOPY  2012    Procedure: COLONOSCOPY;  COLONOSCOPY SCREEN;  Surgeon: Mushtaq Lara MD;  Location: [facility] OR     GYN SURGERY       HERNIORRHAPHY HIATAL N/A 2015    Procedure: HERNIORRHAPHY HIATAL;  Surgeon: Chip Carty MD;  Location: [facility] OR     HYSTERECTOMY TOTAL ABD, ALVIN SALPINGO-OOPHORECTOMY, NODE DISSECTION, TUMOR DEBULKING, COMBINED Bilateral 2015    Procedure: COMBINED HYSTERECTOMY TOTAL ABDOMINAL, SALPINGO-OOPHORECTOMY, NODE DISSECTION, TUMOR DEBULKING;  Surgeon: Emma Cabrera MD;  Location: [facility] OR           Family History:     Family History   Problem Relation Age of Onset     Cancer Brother         testicular     Diabetes Sister      Ovarian Cancer Mother 60     Diabetes Mother              Asthma Father              Diabetes Sister      Depression/Anxiety Daughter      Depression Daughter      Osteoporosis Sister      Other Cancer Brother      Diabetes Brother      Depression Other         Grandson              Social History:     Social History     Socioeconomic History     Marital status:      Spouse name: Not on file     Number of children: Not on file     Years of education: Not on file     Highest education level: Not on file   Occupational History     Not on file   Tobacco Use     Smoking status: Former Smoker     Packs/day: 1.00     Years: 30.00     Pack years: 30.00     Types: Cigarettes     Start date: 1965     Quit date: 10/10/2003     Years since quittin.5     Smokeless tobacco: Never Used   Vaping Use     Vaping Use: Never used   Substance and Sexual Activity     Alcohol use: No     Drug use: No     Sexual activity: Not Currently   Other Topics Concern     Parent/sibling w/ CABG, MI or angioplasty before 65F 55M? No   Social History Narrative     Not on file     Social Determinants of Health     Financial Resource Strain: Not on file   Food Insecurity: Not on file   Transportation Needs:  Not on file   Physical Activity: Not on file   Stress: Not on file   Social Connections: Not on file   Intimate Partner Violence: Not on file   Housing Stability: Not on file            Medications:     Current Outpatient Medications   Medication     albuterol (PROAIR HFA/PROVENTIL HFA/VENTOLIN HFA) 108 (90 Base) MCG/ACT Inhaler     atorvastatin (LIPITOR) 20 MG tablet     budesonide (PULMICORT) 0.5 MG/2ML neb solution     budesonide-formoterol (SYMBICORT) 160-4.5 MCG/ACT Inhaler     Calcium Carbonate (CALCIUM 500 PO)     ibuprofen (ADVIL,MOTRIN) 600 MG tablet     ipratropium - albuterol 0.5 mg/2.5 mg/3 mL (DUONEB) 0.5-2.5 (3) MG/3ML neb solution     loratadine (CLARITIN) 10 MG tablet     losartan (COZAAR) 100 MG tablet     MAGNESIUM OXIDE PO     omeprazole (PRILOSEC) 40 MG DR capsule     order for DME     tiZANidine (ZANAFLEX) 2 MG tablet     triamcinolone (ARISTOCORT HP) 0.5 % external cream     umeclidinium (INCRUSE ELLIPTA) 62.5 MCG/INH inhaler     vitamin B complex with vitamin C (STRESS TAB) tablet     Zinc Sulfate (ZINC 15 PO)     fluticasone (FLONASE) 50 MCG/ACT spray     No current facility-administered medications for this visit.            Review of Systems:       Answers for HPI/ROS submitted by the patient on 4/13/2022  General Symptoms: No  Skin Symptoms: No  EYE SYMPTOMS: No  HEART SYMPTOMS: No  LUNG SYMPTOMS: Yes  INTESTINAL SYMPTOMS: Yes  URINARY SYMPTOMS: No  GYNECOLOGIC SYMPTOMS: No  BREAST SYMPTOMS: No  BLOOD SYMPTOMS: No  NERVOUS SYSTEM SYMPTOMS: No  MENTAL HEALTH SYMPTOMS: No  Cough: No  Sputum or phlegm: No  Coughing up blood: No  Difficulty breating or shortness of breath: Yes  Snoring: Yes  Wheezing: No  Difficulty breathing on exertion: No  Nighttime Cough: No  Difficulty breathing when lying flat: Yes  Heart burn or indigestion: Yes  Nausea: Yes  Vomiting: No  Abdominal pain: No  Bloating: No  Constipation: Yes  Diarrhea: Yes  Blood in stool: No  Black stools: No  Rectal or Anal pain:  "No  Fecal incontinence: No  Yellowing of skin or eyes: No  Vomit with blood: No  Change in stools: No          PHYSICAL EXAM:  BP (!) 156/79   Pulse 82   Resp 17   Ht 1.645 m (5' 4.75\")   Wt 54.4 kg (120 lb)   SpO2 93%   BMI 20.12 kg/m       General: pleasant, NAD  Eyes: Anicteric  Ears: Hearing grossly normal  Mouth: Oral mucosa is moist, without any lesions. No oropharyngeal exudate.  Neck: supple, no thyromegaly  Lymphatics: No cervical or supraclavicular nodes  Respiratory: Good air movement. No crackles. No rhonchi. No wheezes  Cardiac: RRR, normal S1, S2. No murmurs.  Musculoskeletal: Extremities normal. No clubbing. No cyanosis. No edema.  Skin: No rash on limited exam  Neuro: Normal mentation. Normal speech.  Psych:Normal affect         Data:   All laboratory and imaging data reviewed.      Pulmonary Rehab 2/2/2020:      PFT:   Pulmonary function testing from November 7, 2019 reviewed.  FEV1/FVC ratio 0.34 after bronchodilator.  Her postbronchodilator FVC is 2.46 which is 86% predicted, and the postbronchodilator FEV1 is 0.83 which is 37% predicted.  Residual volume is 242% predicted and the total lung capacity is 146% predicted.  Her diffusion capacity is 52% predicted.      PFT Interpretation:  Severe airflow obstruction.  Gas trapping and hyperinflation.  Reduction in her diffusion capacity.  Valid Maneuver                                        "

## 2022-04-22 ENCOUNTER — TELEPHONE (OUTPATIENT)
Dept: PULMONOLOGY | Facility: CLINIC | Age: 77
End: 2022-04-22
Payer: COMMERCIAL

## 2022-04-22 DIAGNOSIS — I25.9 ISCHEMIC HEART DISEASE: ICD-10-CM

## 2022-04-22 DIAGNOSIS — R06.02 SHORTNESS OF BREATH: Primary | ICD-10-CM

## 2022-04-22 DIAGNOSIS — J44.9 STAGE 3 SEVERE COPD BY GOLD CLASSIFICATION (H): ICD-10-CM

## 2022-04-22 NOTE — TELEPHONE ENCOUNTER
"Per provider:    Can you please place a referral to Cardiology Clinic for \"shortness of breath\" \"Ischemic Heart Disease\".     Let the patient know that her primary care physician agrees with this      RN placed order and notified patient.  "

## 2022-04-26 ENCOUNTER — ANCILLARY PROCEDURE (OUTPATIENT)
Dept: CT IMAGING | Facility: CLINIC | Age: 77
End: 2022-04-26
Payer: COMMERCIAL

## 2022-04-26 DIAGNOSIS — J44.9 STAGE 3 SEVERE COPD BY GOLD CLASSIFICATION (H): ICD-10-CM

## 2022-04-26 PROCEDURE — 71250 CT THORAX DX C-: CPT | Mod: TC | Performed by: RADIOLOGY

## 2022-04-26 NOTE — PATIENT INSTRUCTIONS
1. Take Doxycycline 100 mg twice per day for 7 days. Avoid the sun.    2. Take Prednisone as prescribed. This is a steroid anti-inflammatory medication. When used long term it can have many health consequences. But short term use is safe - common side effects are insomnia and anxiety. It should be taken 1st part of the day.    3. Stop by the  and make an appointment with our foot specialist - otherwise call [phone].    4. I will contact you about your test results - if all is well, see you in 6 months for follow-up.    5. I will be gone between Sept 14 and Oct 15, in case you try to get a hold of me. My partners will be answering my messages at that time. You can schedule an appointment with one of them if needed.     90

## 2022-04-27 ENCOUNTER — E-VISIT (OUTPATIENT)
Dept: FAMILY MEDICINE | Facility: CLINIC | Age: 77
End: 2022-04-27
Payer: COMMERCIAL

## 2022-04-27 DIAGNOSIS — R52 PAIN: Primary | ICD-10-CM

## 2022-04-27 PROCEDURE — 99207 PR NON-BILLABLE SERV PER CHARTING: CPT | Performed by: PHYSICIAN ASSISTANT

## 2022-05-01 ENCOUNTER — OFFICE VISIT (OUTPATIENT)
Dept: URGENT CARE | Facility: URGENT CARE | Age: 77
End: 2022-05-01
Payer: COMMERCIAL

## 2022-05-01 VITALS
RESPIRATION RATE: 18 BRPM | TEMPERATURE: 97.1 F | SYSTOLIC BLOOD PRESSURE: 158 MMHG | HEART RATE: 104 BPM | OXYGEN SATURATION: 95 % | DIASTOLIC BLOOD PRESSURE: 73 MMHG

## 2022-05-01 DIAGNOSIS — M54.6 ACUTE RIGHT-SIDED THORACIC BACK PAIN: Primary | ICD-10-CM

## 2022-05-01 PROCEDURE — 99213 OFFICE O/P EST LOW 20 MIN: CPT | Performed by: NURSE PRACTITIONER

## 2022-05-01 RX ORDER — CYCLOBENZAPRINE HCL 5 MG
5 TABLET ORAL 3 TIMES DAILY PRN
Qty: 20 TABLET | Refills: 0 | Status: SHIPPED | OUTPATIENT
Start: 2022-05-01

## 2022-05-01 NOTE — PROGRESS NOTES
SUBJECTIVE  HPI: Iris Carrion is a 76 year old female who presents for evaluation of back pain  Symptoms began 10 day(s) ago, have been onset acute and are worse.  Pain is located in the low back region more on right side, with radiation to front. At worst a 6 on a scale of 1-10.    Recent injury:none recalled by the patient  Personal hx of back pain is recurrent self limited episodes of low back pain in the past.  Denies chest pain sob.   ASSOCIATED sx include: none.  Red flag symptoms: negative.    Past Medical History:   Diagnosis Date     Cervical high risk HPV (human papillomavirus) test positive 10/31/12    type 18     COPD (chronic obstructive pulmonary disease) (H)      GERD (gastroesophageal reflux disease)      Hx of colposcopy with cervical biopsy 11/2012    negative     Hypertension 2013     Osteoporosis      Paroxysmal supraventricular tachycardia (H) 9/6/2017     Peritoneal carcinomatosis (H) 8/24/2015     Pneumonia      Uncomplicated asthma 1980     Current Outpatient Medications   Medication Sig Dispense Refill     albuterol (PROAIR HFA/PROVENTIL HFA/VENTOLIN HFA) 108 (90 Base) MCG/ACT Inhaler Inhale 2 puffs into the lungs every 6 hours as needed for shortness of breath / dyspnea or wheezing 1 Inhaler 1     atorvastatin (LIPITOR) 20 MG tablet Take 1 tablet by mouth once daily 90 tablet 3     budesonide (PULMICORT) 0.5 MG/2ML neb solution Take 2 mLs (0.5 mg) by nebulization 2 times daily 120 mL 11     budesonide-formoterol (SYMBICORT) 160-4.5 MCG/ACT Inhaler Inhale 2 puffs into the lungs 2 times daily 10.2 g 11     Calcium Carbonate (CALCIUM 500 PO) Take 1 tablet by mouth daily.       fluticasone (FLONASE) 50 MCG/ACT spray Spray 2 sprays into both nostrils daily (Patient not taking: No sig reported) 1 Bottle 1     ibuprofen (ADVIL,MOTRIN) 600 MG tablet Take 1 tablet (600 mg) by mouth every 6 hours as needed for moderate pain 40 tablet 0     ipratropium - albuterol 0.5 mg/2.5 mg/3 mL (DUONEB)  0.5-2.5 (3) MG/3ML neb solution USE 1 VIAL IN NEBULIZER EVERY 4 HOURS AS NEEDED FOR SHORTNESS OF BREATH /  DYSPNEA  OR  WHEEZING 90 mL 11     loratadine (CLARITIN) 10 MG tablet Take 10 mg by mouth daily       losartan (COZAAR) 100 MG tablet Take 1 tablet (100 mg) by mouth daily 90 tablet 3     MAGNESIUM OXIDE PO Take 200 mg by mouth 2 times daily       omeprazole (PRILOSEC) 40 MG DR capsule TAKE 1 CAPSULE BY MOUTH ONCE DAILY 30-60  MINUTES  BEFORE  A  MEAL 90 capsule 0     order for DME Equipment being ordered: Nebulizer 1 Device 0     tiZANidine (ZANAFLEX) 2 MG tablet Take 1 tablet (2 mg) by mouth 3 times daily 25 tablet 0     triamcinolone (ARISTOCORT HP) 0.5 % external cream Apply topically 2 times daily 60 g 1     umeclidinium (INCRUSE ELLIPTA) 62.5 MCG/INH inhaler Inhale 1 puff into the lungs daily 1 each 5     vitamin B complex with vitamin C (STRESS TAB) tablet Take 1 tablet by mouth daily       Zinc Sulfate (ZINC 15 PO) Take by mouth daily       Social History     Tobacco Use     Smoking status: Former Smoker     Packs/day: 1.00     Years: 30.00     Pack years: 30.00     Types: Cigarettes     Start date: 1965     Quit date: 10/10/2003     Years since quittin.5     Smokeless tobacco: Never Used   Substance Use Topics     Alcohol use: No       ROS:  Review of systems negative except as stated above.    OBJECTIVE:  BP (!) 158/73   Pulse 104   Temp 97.1  F (36.2  C) (Tympanic)   Resp 18   SpO2 95%   Back examination: Back symmetric, no curvature. ROM normal. No CVA tenderness. Tender upper right side to palpation, radiates into front.   STRAIGHT leg raise test: negative  GENERAL APPEARANCE: alert and no distress  RESP: lungs clear to auscultation - no rales, rhonchi or wheezes  CV: regular rates and rhythm, normal S1 S2, no murmur noted  ABDOMEN:  soft, nontender, no HSM or masses and bowel sounds normal  NEURO: Normal strength and tone with no weakness or sensory deficit noted, reflexes normal  "  SKIN: no suspicious lesions or rashes    ASSESSMENT/IMPRESSION:  (M54.6) Acute right-sided thoracic back pain  (primary encounter diagnosis)    Plan:   Appears to be muscular no chest pain cold symptoms or fever  Feels \"spasms\"  cyclobenzaprine (FLEXERIL) 5 MG tablet as needed  Continue stretching and strengthening exercises.   Continue prn heat or ice application.  Emergent symptoms to ER  Not improving see pcp    Iris to follow up with Primary Care provider regarding elevated blood pressure.      Livia Cohn APRN CNP      "

## 2022-05-05 ENCOUNTER — OFFICE VISIT (OUTPATIENT)
Dept: FAMILY MEDICINE | Facility: CLINIC | Age: 77
End: 2022-05-05
Payer: COMMERCIAL

## 2022-05-05 VITALS
DIASTOLIC BLOOD PRESSURE: 65 MMHG | RESPIRATION RATE: 24 BRPM | SYSTOLIC BLOOD PRESSURE: 123 MMHG | WEIGHT: 129.6 LBS | TEMPERATURE: 98.1 F | HEART RATE: 114 BPM | BODY MASS INDEX: 21.73 KG/M2 | OXYGEN SATURATION: 95 %

## 2022-05-05 DIAGNOSIS — M48.50XA PATHOLOGICAL COMPRESSION FRACTURE OF VERTEBRA, INITIAL ENCOUNTER (H): ICD-10-CM

## 2022-05-05 DIAGNOSIS — N18.31 STAGE 3A CHRONIC KIDNEY DISEASE (H): ICD-10-CM

## 2022-05-05 DIAGNOSIS — J44.9 CHRONIC OBSTRUCTIVE PULMONARY DISEASE, UNSPECIFIED COPD TYPE (H): ICD-10-CM

## 2022-05-05 DIAGNOSIS — M54.6 RIGHT-SIDED THORACIC BACK PAIN, UNSPECIFIED CHRONICITY: Primary | ICD-10-CM

## 2022-05-05 DIAGNOSIS — M80.00XA AGE-RELATED OSTEOPOROSIS WITH CURRENT PATHOLOGICAL FRACTURE, INITIAL ENCOUNTER: ICD-10-CM

## 2022-05-05 LAB
ALBUMIN UR-MCNC: ABNORMAL MG/DL
APPEARANCE UR: CLEAR
BACTERIA #/AREA URNS HPF: ABNORMAL /HPF
BILIRUB UR QL STRIP: NEGATIVE
COLOR UR AUTO: YELLOW
GLUCOSE UR STRIP-MCNC: NEGATIVE MG/DL
HGB UR QL STRIP: ABNORMAL
KETONES UR STRIP-MCNC: NEGATIVE MG/DL
LEUKOCYTE ESTERASE UR QL STRIP: NEGATIVE
MUCOUS THREADS #/AREA URNS LPF: PRESENT /LPF
NITRATE UR QL: NEGATIVE
PH UR STRIP: 6 [PH] (ref 5–7)
RBC #/AREA URNS AUTO: ABNORMAL /HPF
SP GR UR STRIP: 1.02 (ref 1–1.03)
SQUAMOUS #/AREA URNS AUTO: ABNORMAL /LPF
UROBILINOGEN UR STRIP-ACNC: 2 E.U./DL
WBC #/AREA URNS AUTO: ABNORMAL /HPF

## 2022-05-05 PROCEDURE — 81001 URINALYSIS AUTO W/SCOPE: CPT | Performed by: FAMILY MEDICINE

## 2022-05-05 PROCEDURE — 82043 UR ALBUMIN QUANTITATIVE: CPT | Performed by: FAMILY MEDICINE

## 2022-05-05 PROCEDURE — 99214 OFFICE O/P EST MOD 30 MIN: CPT | Performed by: FAMILY MEDICINE

## 2022-05-05 RX ORDER — CALCITONIN SALMON 200 [IU]/.09ML
1 SPRAY, METERED NASAL DAILY
Qty: 3.7 ML | Refills: 3 | Status: SHIPPED | OUTPATIENT
Start: 2022-05-05 | End: 2023-05-04

## 2022-05-05 RX ORDER — HYDROCODONE BITARTRATE AND ACETAMINOPHEN 5; 325 MG/1; MG/1
1 TABLET ORAL EVERY 6 HOURS PRN
Qty: 18 TABLET | Refills: 0 | Status: SHIPPED | OUTPATIENT
Start: 2022-05-05 | End: 2022-05-08

## 2022-05-05 RX ORDER — PREDNISONE 10 MG/1
TABLET ORAL
Qty: 30 TABLET | Refills: 4 | OUTPATIENT
Start: 2022-05-05

## 2022-05-05 ASSESSMENT — ENCOUNTER SYMPTOMS
DIZZINESS: 0
CHEST TIGHTNESS: 1
FEVER: 0
FATIGUE: 0
BACK PAIN: 1
ACTIVITY CHANGE: 1
PARESTHESIAS: 0
DYSURIA: 0
SLEEP DISTURBANCE: 1
WEAKNESS: 0
LIGHT-HEADEDNESS: 0
NUMBNESS: 0
DIFFICULTY URINATING: 0
NECK STIFFNESS: 0
HEADACHES: 0
NECK PAIN: 0
PALPITATIONS: 0

## 2022-05-05 ASSESSMENT — PAIN SCALES - GENERAL: PAINLEVEL: SEVERE PAIN (6)

## 2022-05-05 NOTE — PROGRESS NOTES
Assessment & Plan     (M54.6) Right-sided thoracic back pain, unspecified chronicity  (primary encounter diagnosis)  (M80.00XA) Age-related osteoporosis with current pathological fracture, initial encounter  (M48.50XA) Pathological compression fracture of vertebra, initial encounter (H)  Comment: pain most likely related to worsening compression of throacic spine, will start calcitonin and extra strength tylenol for now, has follow-up with PCP scheduled in July. Norco prescribed for severe pain only. PT referral made to help maximize mobility. Refer to Endocrinology for further management of osteoporosis. No obvious bone lesions present on XR but will follow final radiology report. CXR does not suggest PNA as source of pain with inspiration.   Plan: XR Chest 2 Views, XR Thoracic Spine 2 Views, UA        Macro with Reflex to Micro and Culture - lab         collect, Urine Microscopic, Adult Endocrinology  Referral,         HYDROcodone-acetaminophen (NORCO) 5-325 MG         tablet, calcitonin, salmon, (MIACALCIN) 200         UNIT/ACT nasal spray, Physical Therapy Referral          Follow-up instructions given if pain causes weakness of extremities or severe pain is unrelieved with measures described above    (N18.31) Stage 3a chronic kidney disease (H)  Comment: due for albumin random. UA collected to rule out potential infectious process.   Plan: Albumin Random Urine Quantitative with Creat         Ratio, UA Macro with Reflex to Micro and         Culture - lab collect, Urine Microscopic        Results will be communicated via [employer]. Follow- up with PCP as scheduled in July.                Patient Instructions   Extra strength Tylenol 1000mg two or three times a day- max dose 4000mg every 24 hours    Calcitonin spray one spray in one nostril alternating which nostril every day    Norco as needed for severe pain only. Watch for constipation (drink plenty of water) and be careful when moving around to  prevent falls.     Lidocaine patches (Salon Pas) or Voltaren gel twice a day to areas of pain    Alternate heat/cold for 10 minutes at a time    Schedule with Physical Therapy and Endocrinology             Return in about 2 months (around 7/5/2022) for as scheduled with primary.  Keyonna Murillo RN  DNP-FNP Student, [facility]  *patient seen by NP student with supervision from MD Sari Salazar MD  [facility]   Iris is a 76 year old who presents for the following health issues     Known thoracic compression fractures on spine XR 11/5/21 when initially presenting at  for thoracic back pain. Seen again at  four days ago for pain exacerbation & spasms; Flexeril (prescribed at  5/1) is not incredibly helpful for the pain, wondering if there is something else we can try to provide some relief. She reports that naproxen has historically been most helpful for her. Has not seen PT, ortho, or chiropractor related to this issue. No s/s of radiculopathy, no incontinence or dysuria, no extremity weakness. Pain does radiate to R midaxillary line which makes it difficult to take a deep breath, denies cough or worsening shortness of breath.     History of Present Illness       Back Pain:  She presents for follow up of back pain. Patient's back pain is a recurring problem.  Location of back pain:  Right middle of back  Description of back pain: sharp and shooting  Back pain spreads: nowhere    Since patient first noticed back pain, pain is: always present, but gets better and worse  Does back pain interfere with her job:  Not applicable      Reason for visit:  Back and side pain on right side  Symptom onset:  1-2 weeks ago  Symptoms include:  Pain in back and right side making it hard to breathe  Symptom intensity:  Moderate  Symptom progression:  Staying the same  Had these symptoms before:  Yes  Has tried/received treatment for these symptoms:  Yes  Previous treatment  was successful:  Yes  Prior treatment description:  Prescribed Flexeril  What makes it worse:  Walking  What makes it better:  Laying down    She eats 0-1 servings of fruits and vegetables daily.She consumes 1 sweetened beverage(s) daily.She exercises with enough effort to increase her heart rate 9 or less minutes per day.  She exercises with enough effort to increase her heart rate 3 or less days per week.   She is taking medications regularly.             Review of Systems   Constitutional: Positive for activity change. Negative for fatigue and fever.   Respiratory: Positive for chest tightness. Choking: during back spasm.    Cardiovascular: Negative for chest pain, palpitations and peripheral edema.   Genitourinary: Negative for difficulty urinating, dyspareunia, dysuria and pelvic pain.   Musculoskeletal: Positive for back pain. Negative for neck pain and neck stiffness.   Skin: Negative.    Neurological: Negative for dizziness, syncope, weakness, light-headedness, numbness, headaches and paresthesias.   Psychiatric/Behavioral: Positive for sleep disturbance.     PAST MEDICAL HISTORY:   Past Medical History:   Diagnosis Date     Cervical high risk HPV (human papillomavirus) test positive 10/31/12    type 18     COPD (chronic obstructive pulmonary disease) (H)      GERD (gastroesophageal reflux disease)      Hx of colposcopy with cervical biopsy 11/2012    negative     Hypertension 2013     Osteoporosis      Paroxysmal supraventricular tachycardia (H) 9/6/2017     Peritoneal carcinomatosis (H) 8/24/2015     Pneumonia      Uncomplicated asthma 1980       PAST SURGICAL HISTORY:   Past Surgical History:   Procedure Laterality Date     CHOLECYSTECTOMY  6/2014     COLONOSCOPY  12/4/2012    Procedure: COLONOSCOPY;  COLONOSCOPY SCREEN;  Surgeon: Mushtaq Lara MD;  Location: [facility] OR     GYN SURGERY       HERNIORRHAPHY HIATAL N/A 11/25/2015    Procedure: HERNIORRHAPHY HIATAL;  Surgeon: Chip Carty MD;   Location: [facility] OR     HYSTERECTOMY TOTAL ABD, ALVIN SALPINGO-OOPHORECTOMY, NODE DISSECTION, TUMOR DEBULKING, COMBINED Bilateral 2015    Procedure: COMBINED HYSTERECTOMY TOTAL ABDOMINAL, SALPINGO-OOPHORECTOMY, NODE DISSECTION, TUMOR DEBULKING;  Surgeon: Emma Cabrera MD;  Location: [facility] OR       FAMILY HISTORY:   Family History   Problem Relation Age of Onset     Cancer Brother         testicular     Diabetes Sister      Ovarian Cancer Mother 60     Diabetes Mother              Asthma Father              Diabetes Sister      Depression/Anxiety Daughter      Depression Daughter      Osteoporosis Sister      Other Cancer Brother      Diabetes Brother      Depression Other         Grandson       SOCIAL HISTORY:   Social History     Tobacco Use     Smoking status: Former Smoker     Packs/day: 1.00     Years: 30.00     Pack years: 30.00     Types: Cigarettes     Start date: 1965     Quit date: 10/10/2003     Years since quittin.5     Smokeless tobacco: Never Used   Substance Use Topics     Alcohol use: No            Objective    /65   Pulse 114   Temp 98.1  F (36.7  C) (Oral)   Resp 24   Wt 58.8 kg (129 lb 9.6 oz)   SpO2 95%   BMI 21.73 kg/m    Body mass index is 21.73 kg/m .  Physical Exam   GENERAL: healthy, alert and no distress  RESP: decreased breath sounds throughout & fine crackles auscultated in RLL & LLL  CV: regular rate and rhythm, normal S1 S2, no S3 or S4, no murmur, click or rub, no peripheral edema and peripheral pulses strong  ABDOMEN: soft, nontender, no hepatosplenomegaly, no masses and bowel sounds normal  MS: no gross musculoskeletal defects noted, no edema  Comprehensive back pain exam:  No tenderness, Range of motion not limited by pain, Lower extremity strength functional and equal on both sides, Lower extremity reflexes within normal limits bilaterally and Lower extremity sensation normal and equal on both sides    CXR - Reviewed and interpreted by me  awaiting formal interpretation from Radiologist at this time  Xray - Reviewed and interpreted by me.  Wedge fracture appears to be unchanged from previous XR in '21, compression may have progressed when evaluating T7. personally reviewed during visit, await radiology review

## 2022-05-05 NOTE — NURSING NOTE
"Chief Complaint   Patient presents with     RECHECK       Initial /65   Pulse 114   Temp 98.1  F (36.7  C) (Oral)   Resp 24   Wt 58.8 kg (129 lb 9.6 oz)   SpO2 95%   BMI 21.73 kg/m   Estimated body mass index is 21.73 kg/m  as calculated from the following:    Height as of 4/18/22: 1.645 m (5' 4.75\").    Weight as of this encounter: 58.8 kg (129 lb 9.6 oz).  Medication Reconciliation: complete  Roshan Mckinnon CMA  "

## 2022-05-05 NOTE — PATIENT INSTRUCTIONS
Extra strength Tylenol 1000mg two or three times a day- max dose 4000mg every 24 hours    Calcitonin spray one spray in one nostril alternating which nostril every day    Norco as needed for severe pain only. Watch for constipation (drink plenty of water) and be careful when moving around to prevent falls.     Lidocaine patches (Salon Pas) or Voltaren gel twice a day to areas of pain    Alternate heat/cold for 10 minutes at a time    Schedule with Physical Therapy and Endocrinology

## 2022-05-06 LAB
CREAT UR-MCNC: 177 MG/DL
MICROALBUMIN UR-MCNC: 106 MG/L
MICROALBUMIN/CREAT UR: 59.89 MG/G CR (ref 0–25)

## 2022-05-14 ENCOUNTER — MYC MEDICAL ADVICE (OUTPATIENT)
Dept: PULMONOLOGY | Facility: CLINIC | Age: 77
End: 2022-05-14
Payer: COMMERCIAL

## 2022-05-14 DIAGNOSIS — J42 CHRONIC BRONCHITIS, UNSPECIFIED CHRONIC BRONCHITIS TYPE (H): ICD-10-CM

## 2022-05-16 ENCOUNTER — TELEPHONE (OUTPATIENT)
Dept: PULMONOLOGY | Facility: CLINIC | Age: 77
End: 2022-05-16
Payer: COMMERCIAL

## 2022-05-16 RX ORDER — ALBUTEROL SULFATE 90 UG/1
2 AEROSOL, METERED RESPIRATORY (INHALATION) EVERY 6 HOURS PRN
Qty: 8.5 G | Refills: 3 | Status: SHIPPED | OUTPATIENT
Start: 2022-05-16 | End: 2024-05-06

## 2022-05-16 NOTE — TELEPHONE ENCOUNTER
Spoke with [facility] earlier today as we have been waiting on the PAOLO results for patient with deadline in 2 days. They state that the equipment was sent to [place] and has been sitting there for patient . This was relayed to JANE Dickerson, who reached out to the patient and she states she was told that she would get a call from [place] to , but never received a call. Spoke with  home medical who states they had sent message to [place] showroom and they stated they attempted to call pt once already without answer and would try again today. Informed that if pt is planning on picking up today and dropping it off tomorrow for ASAP download (prior to 5/18), pt will need to drop it off at [place] site ([address] This info was relayed to JANE Dickerson, who will speak with patient.

## 2022-05-16 NOTE — TELEPHONE ENCOUNTER
Followed up on portable nebulizer - pt mistakenly sent non-portable.  [facility] with reach out to patient as machine now past 10 day return policy.

## 2022-06-16 ENCOUNTER — TELEPHONE (OUTPATIENT)
Dept: PULMONOLOGY | Facility: CLINIC | Age: 77
End: 2022-06-16
Payer: COMMERCIAL

## 2022-06-16 NOTE — TELEPHONE ENCOUNTER
Contacted Iris to follow up on symptoms.  She started prednisone a couple of days ago and is now feeling a little better.  She will finish the course on 6/18.  She will call if symptoms not resolved.

## 2022-06-27 DIAGNOSIS — K21.9 GASTROESOPHAGEAL REFLUX DISEASE, UNSPECIFIED WHETHER ESOPHAGITIS PRESENT: ICD-10-CM

## 2022-06-28 RX ORDER — OMEPRAZOLE 40 MG/1
CAPSULE, DELAYED RELEASE ORAL
Qty: 90 CAPSULE | Refills: 0 | Status: SHIPPED | OUTPATIENT
Start: 2022-06-28 | End: 2022-10-10

## 2022-07-06 ENCOUNTER — TELEPHONE (OUTPATIENT)
Dept: CARDIOLOGY | Facility: CLINIC | Age: 77
End: 2022-07-06

## 2022-07-06 DIAGNOSIS — R06.02 SHORTNESS OF BREATH: Primary | ICD-10-CM

## 2022-07-06 DIAGNOSIS — I47.10 PAROXYSMAL SUPRAVENTRICULAR TACHYCARDIA (H): ICD-10-CM

## 2022-07-06 DIAGNOSIS — J44.9 STAGE 3 SEVERE COPD BY GOLD CLASSIFICATION (H): ICD-10-CM

## 2022-07-06 NOTE — TELEPHONE ENCOUNTER
Pt is schd to see Dr Mcgregor  On 7/29.   chart reviewed. Unable to access [facility] or [facility] without authorization     Last echo noted was 2017 when pt was seen by Dr Navin Rogers for palpitations. At that time, no hx of CAD/ICM. Normal echo.     Will contact pt.   Need to obtain authorization to view outside records and/or verify if recent echo (last 6 months)    If pt has not had echo, will schedule echocardiogram prior to seeing Dr Mcgregor on 7/29

## 2022-07-06 NOTE — TELEPHONE ENCOUNTER
Spoke with patient, she has not had a recent Echocardiogram. She gave a verbal consent for records in care everywhere. Scheduled an Echo 7/27.     CHRISTY Alfredo

## 2022-07-11 ENCOUNTER — MYC MEDICAL ADVICE (OUTPATIENT)
Dept: FAMILY MEDICINE | Facility: CLINIC | Age: 77
End: 2022-07-11

## 2022-07-11 DIAGNOSIS — J44.9 CHRONIC OBSTRUCTIVE PULMONARY DISEASE, UNSPECIFIED COPD TYPE (H): ICD-10-CM

## 2022-07-11 RX ORDER — PREDNISONE 10 MG/1
TABLET ORAL
Qty: 30 TABLET | Refills: 4 | Status: SHIPPED | OUTPATIENT
Start: 2022-07-11 | End: 2022-08-09

## 2022-07-25 ENCOUNTER — ANCILLARY PROCEDURE (OUTPATIENT)
Dept: CARDIOLOGY | Facility: CLINIC | Age: 77
End: 2022-07-25
Attending: INTERNAL MEDICINE
Payer: COMMERCIAL

## 2022-07-25 DIAGNOSIS — I47.10 PAROXYSMAL SUPRAVENTRICULAR TACHYCARDIA (H): ICD-10-CM

## 2022-07-25 DIAGNOSIS — R06.02 SHORTNESS OF BREATH: ICD-10-CM

## 2022-07-25 DIAGNOSIS — J44.9 STAGE 3 SEVERE COPD BY GOLD CLASSIFICATION (H): ICD-10-CM

## 2022-07-25 LAB — LVEF ECHO: NORMAL

## 2022-07-25 PROCEDURE — 93306 TTE W/DOPPLER COMPLETE: CPT | Performed by: INTERNAL MEDICINE

## 2022-07-28 NOTE — PROGRESS NOTES
July 29, 2022     I had the pleasure of seeing Iris Carrion  in the [facility] for further evaluation and management of her shortness of breath. Unfortunately we did not have much access to her prior records, she was last seen in the  system in 2017 by Dr. Rogers for palpitations. She has been following in multiple hospital systems in the past.  Patient is a 77 y/o lady with HTN, severe COPD with recurrent exacerbations, and metastatic endometrial carcinoma who presents today as wer patient evaluation.  She was referred for further evaluation given her shortness of breath.  She does not have any known cardiac history other than detailed above including a few episodes of palpitation that have actually improved significantly over the past couple of months to years per patient.  She does have it very infrequently last for a few seconds and resolved on its own.  She never fainted from this.  She denies any chest pains no lower extremity edema or other concerns.  Her shortness of breath is constant and has been getting a little bit worse over time.  She does have some elevated blood pressure and notes her blood pressure usually runs with the highest values in the 150s to 160s systolic today has been higher than usual for her.  Denies any other complaints notes no other issues.  She does not have any known structural heart disease, again she does have known longstanding hypertension no history of diabetes but does have a 30+ pack year smoking history.    PAST MEDICAL HISTORY:  Past Medical History:   Diagnosis Date     Cervical high risk HPV (human papillomavirus) test positive 10/31/12    type 18     COPD (chronic obstructive pulmonary disease) (H)      GERD (gastroesophageal reflux disease)      Hx of colposcopy with cervical biopsy 11/2012    negative     Hypertension 2013     Osteoporosis      Paroxysmal supraventricular tachycardia (H) 9/6/2017     Peritoneal carcinomatosis (H) 8/24/2015      Pneumonia      Uncomplicated asthma      FAMILY HISTORY:  Family History   Problem Relation Age of Onset     Cancer Brother         testicular     Diabetes Sister      Ovarian Cancer Mother 60     Diabetes Mother              Asthma Father              Diabetes Sister      Depression/Anxiety Daughter      Depression Daughter      Osteoporosis Sister      Other Cancer Brother      Diabetes Brother      Depression Other         Grandson     SOCIAL HISTORY:  Social History     Socioeconomic History     Marital status:    Tobacco Use     Smoking status: Former Smoker     Packs/day: 1.00     Years: 30.00     Pack years: 30.00     Types: Cigarettes     Start date: 1965     Quit date: 10/10/2003     Years since quittin.8     Smokeless tobacco: Never Used   Vaping Use     Vaping Use: Never used   Substance and Sexual Activity     Alcohol use: No     Drug use: No     Sexual activity: Not Currently   Other Topics Concern     Parent/sibling w/ CABG, MI or angioplasty before 65F 55M? No     CURRENT MEDICATIONS:  Current Outpatient Medications   Medication     albuterol (PROAIR HFA/PROVENTIL HFA/VENTOLIN HFA) 108 (90 Base) MCG/ACT inhaler     atorvastatin (LIPITOR) 20 MG tablet     budesonide (PULMICORT) 0.5 MG/2ML neb solution     budesonide-formoterol (SYMBICORT) 160-4.5 MCG/ACT Inhaler     calcitonin, salmon, (MIACALCIN) 200 UNIT/ACT nasal spray     Calcium Carbonate (CALCIUM 500 PO)     cyclobenzaprine (FLEXERIL) 5 MG tablet     fluticasone (FLONASE) 50 MCG/ACT spray     ibuprofen (ADVIL,MOTRIN) 600 MG tablet     ipratropium - albuterol 0.5 mg/2.5 mg/3 mL (DUONEB) 0.5-2.5 (3) MG/3ML neb solution     loratadine (CLARITIN) 10 MG tablet     losartan (COZAAR) 100 MG tablet     MAGNESIUM OXIDE PO     omeprazole (PRILOSEC) 40 MG DR capsule     order for DME     predniSONE (DELTASONE) 10 MG tablet     triamcinolone (ARISTOCORT HP) 0.5 % external cream     umeclidinium (INCRUSE ELLIPTA) 62.5  MCG/INH inhaler     vitamin B complex with vitamin C (STRESS TAB) tablet     Zinc Sulfate (ZINC 15 PO)     No current facility-administered medications for this visit.     ROS:   Constitutional: No fever, chills, or sweats. Weight is 0 lbs 0 oz  ENT: No visual disturbance, ear ache, epistaxis, sore throat.   Allergies/Immunologic: Negative.   Respiratory: No cough, hemoptysis.   Cardiovascular: As per HPI.   GI: No nausea, vomiting, hematemesis, melena, or hematochezia.   : No urinary frequency, dysuria, or hematuria.   Integrument: Negative.   Psychiatric: No evidence of major depression  Neuro: No new neurological complaints at this time. Non focal  Endocrinology: Negative.   Musculoskeletal: As per HPI.      EXAM:  BP (!) 175/71 (BP Location: Right arm, Patient Position: Chair, Cuff Size: Adult Regular)   Pulse 90   Wt 58.3 kg (128 lb 9.6 oz)   SpO2 94%   BMI 21.57 kg/m    General: appears comfortable, alert and oriented  Head: normocephalic, atraumatic  Eyes: anicteric sclera, EOMI , PERRL  Neck: no adenopathy  Orophyarynx: moist mucosa, no lesions noted  Heart: regular, S1/S2, no murmurs, rubs or gallop. Estimated JVP at 5 cmH2O  Lungs: CTAB, No wheezing.   Abdomen: soft, non-tender, bowel sounds present, no hepatosplenomegaly  Extremities: No LE edema today  Skin: no open lesions noted  Neuro: grossly non-focal  Psych: no evidence of depression noted     Labs:  Lab Results   Component Value Date    WBC 7.7 10/07/2021    HGB 13.6 10/07/2021    HCT 42.8 10/07/2021     10/07/2021     10/07/2021    POTASSIUM 3.6 10/07/2021    CHLORIDE 107 10/07/2021    CO2 28 10/07/2021    BUN 25 10/07/2021    CR 1.11 (H) 10/07/2021     (H) 10/07/2021    AST 22 10/07/2021    ALT 30 10/07/2021    ALKPHOS 96 10/07/2021    BILITOTAL 0.5 10/07/2021    INR 1.1 08/31/2015     TTE 7/25/22:  Poor acoustic windows.  Global and regional left ventricular function is hyperkinetic with an EF of 65-70%.  Right  ventricular function, chamber size, wall motion, and thickness are normal.  Right ventricular systolic pressure is 34mmHg above the right atrial pressure.  IVC diameter <2.1 cm collapsing >50% with sniff suggests a normal RA pressure of 3 mmHg. No significant changes noted.     ASSESSMENT AND PLAN:  In summary, patient is a 76 year old lady with above past medical history which is only significant for palpitations from the cardiac aspect, very severe COPD seen by pulmonary team in the setting of 30+ pack year smoking history.  She was referred for further evaluation for worsening shortness of breath and potential cardiac contribution of her shortness of breath.  We do have limited information at this time including very limited lab work which is from several months to almost a year ago.  This was was not concerning and creatinine was reasonable.  We also do have an echocardiogram which was done recently which showed normal cardiac function in fact ejection fraction was about 70% and normal RA pressure and normal IVC.  On clinical exam she does not appear volume overloaded.  I do think her cardiac contribution is minimal although unable to completely exclude it.  She certainly does have some HFpEF which is related to her longstanding hypertension as well as her age which is not unexpected.  However I do think this is a minimal contributor.  I do think it is reasonable to continue to hold her blood pressure little bit better and we will start amlodipine 5 mg once a day.  All side effects discussed we will prescribe it to her.  She is going to continue to monitor her blood pressure at home.  Again I do think this will provide her minimal symptomatic relief from blood pressure control.  I do agree with ongoing management of her COPD.  For at this point no further interventions from cardiac standpoint is needed.  If she does have more symptoms including palpitations or dizziness lightheadedness chest pain she will get  back to us immediately.  Went to stop amlodipine discussed.  We will see her back as needed.     I appreciate the opportunity to participate in the care of Iriscr Carrion . Please do not hesitate to contact me with any further questions.    Sincerely,   Tanmay Mcgregor MD     [facility] Division of Cardiology

## 2022-07-29 ENCOUNTER — OFFICE VISIT (OUTPATIENT)
Dept: CARDIOLOGY | Facility: CLINIC | Age: 77
End: 2022-07-29
Payer: COMMERCIAL

## 2022-07-29 VITALS
OXYGEN SATURATION: 94 % | BODY MASS INDEX: 21.57 KG/M2 | DIASTOLIC BLOOD PRESSURE: 71 MMHG | SYSTOLIC BLOOD PRESSURE: 175 MMHG | HEART RATE: 90 BPM | WEIGHT: 128.6 LBS

## 2022-07-29 DIAGNOSIS — J41.0 SIMPLE CHRONIC BRONCHITIS (H): ICD-10-CM

## 2022-07-29 DIAGNOSIS — R06.02 SHORTNESS OF BREATH: Primary | ICD-10-CM

## 2022-07-29 DIAGNOSIS — I10 BENIGN ESSENTIAL HYPERTENSION: ICD-10-CM

## 2022-07-29 DIAGNOSIS — N18.30 STAGE 3 CHRONIC KIDNEY DISEASE, UNSPECIFIED WHETHER STAGE 3A OR 3B CKD (H): ICD-10-CM

## 2022-07-29 DIAGNOSIS — I50.30 NYHA CLASS 3 HEART FAILURE WITH PRESERVED EJECTION FRACTION (H): ICD-10-CM

## 2022-07-29 DIAGNOSIS — E78.2 MIXED HYPERLIPIDEMIA: ICD-10-CM

## 2022-07-29 DIAGNOSIS — I42.8 NONISCHEMIC CARDIOMYOPATHY (H): ICD-10-CM

## 2022-07-29 PROCEDURE — 99204 OFFICE O/P NEW MOD 45 MIN: CPT | Performed by: INTERNAL MEDICINE

## 2022-07-29 RX ORDER — AMLODIPINE BESYLATE 5 MG/1
5 TABLET ORAL DAILY
Qty: 90 TABLET | Refills: 1 | Status: SHIPPED | OUTPATIENT
Start: 2022-07-29 | End: 2023-02-01

## 2022-07-29 ASSESSMENT — PAIN SCALES - GENERAL: PAINLEVEL: NO PAIN (0)

## 2022-07-29 NOTE — PATIENT INSTRUCTIONS
Thank you for coming to the [facility] @ [facility]; please note the following instructions:    1. New Medication; amlodipine 5 mg once daily.     2. Follow up with your primary care physician.     3. Follow up with general cardiology as needed.             If you have any questions regarding your visit please contact your care team:     Cardiology  Telephone Number   Ml FULTON, RN  Emma RAMOS,RN  Dunia LLAMAS, CHRISTY NOBLES, MA  Clark MALIK, YOVANAN   [phone]   (select option 1)    *After hours: [phone]     For scheduling appts:     [phone] or    [phone] (select option 1)    *After hours: [phone]     For the Device Clinic (Pacemakers and ICD's)  RN's :  Melissa Williamson   During business hours: [phone]    *After business hours:  [phone] (select option 4)      Normal test result notifications will be released via [employer] or mailed within 7 business days.  All other test results, will be communicated via telephone once reviewed by your cardiologist.    If you need a medication refill please contact your pharmacy.  Please allow 3 business days for your refill to be completed.    As always, thank you for trusting us with your health care needs!

## 2022-07-29 NOTE — NURSING NOTE
Chief Complaint   Patient presents with     New Patient       Vitals were taken and medications reconciled.    Brijesh Hartman, EMT  9:40 AM

## 2022-07-29 NOTE — LETTER
7/29/2022      RE: Iris Carrion  [address] [place] [address]       Dear Colleague,    Thank you for the opportunity to participate in the care of your patient, Iris Carrion, at the [facility] at [facility]. Please see a copy of my visit note below.    July 29, 2022     I had the pleasure of seeing Iris Carrion  in the [facility] for further evaluation and management of her shortness of breath. Unfortunately we did not have much access to her prior records, she was last seen in the  system in 2017 by Dr. Rogers for palpitations. She has been following in multiple hospital systems in the past.  Patient is a 75 y/o lady with HTN, severe COPD with recurrent exacerbations, and metastatic endometrial carcinoma who presents today as wer patient evaluation.  She was referred for further evaluation given her shortness of breath.  She does not have any known cardiac history other than detailed above including a few episodes of palpitation that have actually improved significantly over the past couple of months to years per patient.  She does have it very infrequently last for a few seconds and resolved on its own.  She never fainted from this.  She denies any chest pains no lower extremity edema or other concerns.  Her shortness of breath is constant and has been getting a little bit worse over time.  She does have some elevated blood pressure and notes her blood pressure usually runs with the highest values in the 150s to 160s systolic today has been higher than usual for her.  Denies any other complaints notes no other issues.  She does not have any known structural heart disease, again she does have known longstanding hypertension no history of diabetes but does have a 30+ pack year smoking history.    PAST MEDICAL HISTORY:  Past Medical History:   Diagnosis Date     Cervical high risk HPV (human  papillomavirus) test positive 10/31/12    type 18     COPD (chronic obstructive pulmonary disease) (H)      GERD (gastroesophageal reflux disease)      Hx of colposcopy with cervical biopsy 2012    negative     Hypertension      Osteoporosis      Paroxysmal supraventricular tachycardia (H) 2017     Peritoneal carcinomatosis (H) 2015     Pneumonia      Uncomplicated asthma 1980     FAMILY HISTORY:  Family History   Problem Relation Age of Onset     Cancer Brother         testicular     Diabetes Sister      Ovarian Cancer Mother 60     Diabetes Mother              Asthma Father              Diabetes Sister      Depression/Anxiety Daughter      Depression Daughter      Osteoporosis Sister      Other Cancer Brother      Diabetes Brother      Depression Other         Grandson     SOCIAL HISTORY:  Social History     Socioeconomic History     Marital status:    Tobacco Use     Smoking status: Former Smoker     Packs/day: 1.00     Years: 30.00     Pack years: 30.00     Types: Cigarettes     Start date: 1965     Quit date: 10/10/2003     Years since quittin.8     Smokeless tobacco: Never Used   Vaping Use     Vaping Use: Never used   Substance and Sexual Activity     Alcohol use: No     Drug use: No     Sexual activity: Not Currently   Other Topics Concern     Parent/sibling w/ CABG, MI or angioplasty before 65F 55M? No     CURRENT MEDICATIONS:  Current Outpatient Medications   Medication     albuterol (PROAIR HFA/PROVENTIL HFA/VENTOLIN HFA) 108 (90 Base) MCG/ACT inhaler     atorvastatin (LIPITOR) 20 MG tablet     budesonide (PULMICORT) 0.5 MG/2ML neb solution     budesonide-formoterol (SYMBICORT) 160-4.5 MCG/ACT Inhaler     calcitonin, salmon, (MIACALCIN) 200 UNIT/ACT nasal spray     Calcium Carbonate (CALCIUM 500 PO)     cyclobenzaprine (FLEXERIL) 5 MG tablet     fluticasone (FLONASE) 50 MCG/ACT spray     ibuprofen (ADVIL,MOTRIN) 600 MG tablet     ipratropium - albuterol  0.5 mg/2.5 mg/3 mL (DUONEB) 0.5-2.5 (3) MG/3ML neb solution     loratadine (CLARITIN) 10 MG tablet     losartan (COZAAR) 100 MG tablet     MAGNESIUM OXIDE PO     omeprazole (PRILOSEC) 40 MG DR capsule     order for DME     predniSONE (DELTASONE) 10 MG tablet     triamcinolone (ARISTOCORT HP) 0.5 % external cream     umeclidinium (INCRUSE ELLIPTA) 62.5 MCG/INH inhaler     vitamin B complex with vitamin C (STRESS TAB) tablet     Zinc Sulfate (ZINC 15 PO)     No current facility-administered medications for this visit.     ROS:   Constitutional: No fever, chills, or sweats. Weight is 0 lbs 0 oz  ENT: No visual disturbance, ear ache, epistaxis, sore throat.   Allergies/Immunologic: Negative.   Respiratory: No cough, hemoptysis.   Cardiovascular: As per HPI.   GI: No nausea, vomiting, hematemesis, melena, or hematochezia.   : No urinary frequency, dysuria, or hematuria.   Integrument: Negative.   Psychiatric: No evidence of major depression  Neuro: No new neurological complaints at this time. Non focal  Endocrinology: Negative.   Musculoskeletal: As per HPI.      EXAM:  BP (!) 175/71 (BP Location: Right arm, Patient Position: Chair, Cuff Size: Adult Regular)   Pulse 90   Wt 58.3 kg (128 lb 9.6 oz)   SpO2 94%   BMI 21.57 kg/m    General: appears comfortable, alert and oriented  Head: normocephalic, atraumatic  Eyes: anicteric sclera, EOMI , PERRL  Neck: no adenopathy  Orophyarynx: moist mucosa, no lesions noted  Heart: regular, S1/S2, no murmurs, rubs or gallop. Estimated JVP at 5 cmH2O  Lungs: CTAB, No wheezing.   Abdomen: soft, non-tender, bowel sounds present, no hepatosplenomegaly  Extremities: No LE edema today  Skin: no open lesions noted  Neuro: grossly non-focal  Psych: no evidence of depression noted     Labs:  Lab Results   Component Value Date    WBC 7.7 10/07/2021    HGB 13.6 10/07/2021    HCT 42.8 10/07/2021     10/07/2021     10/07/2021    POTASSIUM 3.6 10/07/2021    CHLORIDE 107  10/07/2021    CO2 28 10/07/2021    BUN 25 10/07/2021    CR 1.11 (H) 10/07/2021     (H) 10/07/2021    AST 22 10/07/2021    ALT 30 10/07/2021    ALKPHOS 96 10/07/2021    BILITOTAL 0.5 10/07/2021    INR 1.1 08/31/2015     TTE 7/25/22:  Poor acoustic windows.  Global and regional left ventricular function is hyperkinetic with an EF of 65-70%.  Right ventricular function, chamber size, wall motion, and thickness are normal.  Right ventricular systolic pressure is 34mmHg above the right atrial pressure.  IVC diameter <2.1 cm collapsing >50% with sniff suggests a normal RA pressure of 3 mmHg. No significant changes noted.     ASSESSMENT AND PLAN:  In summary, patient is a 76 year old lady with above past medical history which is only significant for palpitations from the cardiac aspect, very severe COPD seen by pulmonary team in the setting of 30+ pack year smoking history.  She was referred for further evaluation for worsening shortness of breath and potential cardiac contribution of her shortness of breath.  We do have limited information at this time including very limited lab work which is from several months to almost a year ago.  This was was not concerning and creatinine was reasonable.  We also do have an echocardiogram which was done recently which showed normal cardiac function in fact ejection fraction was about 70% and normal RA pressure and normal IVC.  On clinical exam she does not appear volume overloaded.  I do think her cardiac contribution is minimal although unable to completely exclude it.  She certainly does have some HFpEF which is related to her longstanding hypertension as well as her age which is not unexpected.  However I do think this is a minimal contributor.  I do think it is reasonable to continue to hold her blood pressure little bit better and we will start amlodipine 5 mg once a day.  All side effects discussed we will prescribe it to her.  She is going to continue to monitor her  blood pressure at home.  Again I do think this will provide her minimal symptomatic relief from blood pressure control.  I do agree with ongoing management of her COPD.  For at this point no further interventions from cardiac standpoint is needed.  If she does have more symptoms including palpitations or dizziness lightheadedness chest pain she will get back to us immediately.  Went to stop amlodipine discussed.  We will see her back as needed.     I appreciate the opportunity to participate in the care of Iris Carrion . Please do not hesitate to contact me with any further questions.    Sincerely,   Tanmay Mcgregor MD     [facility] Division of Cardiology

## 2022-08-01 ASSESSMENT — ENCOUNTER SYMPTOMS
DISTURBANCES IN COORDINATION: 0
SNORES LOUDLY: 0
PANIC: 0
DYSPNEA ON EXERTION: 1
MYALGIAS: 0
SEIZURES: 0
DIZZINESS: 0
NERVOUS/ANXIOUS: 0
SPUTUM PRODUCTION: 0
MUSCLE CRAMPS: 0
TREMORS: 0
SHORTNESS OF BREATH: 1
MEMORY LOSS: 0
NUMBNESS: 0
MUSCLE WEAKNESS: 0
COUGH DISTURBING SLEEP: 0
PARALYSIS: 0
STIFFNESS: 0
SPEECH CHANGE: 0
WEAKNESS: 0
DEPRESSION: 0
BACK PAIN: 1
POSTURAL DYSPNEA: 1
LOSS OF CONSCIOUSNESS: 0
HEMOPTYSIS: 0
TINGLING: 0
WHEEZING: 0
DECREASED CONCENTRATION: 0
ARTHRALGIAS: 0
COUGH: 0
HEADACHES: 1
NECK PAIN: 0
INSOMNIA: 1
JOINT SWELLING: 0

## 2022-08-08 ENCOUNTER — OFFICE VISIT (OUTPATIENT)
Dept: PULMONOLOGY | Facility: CLINIC | Age: 77
End: 2022-08-08
Payer: COMMERCIAL

## 2022-08-08 ENCOUNTER — TELEPHONE (OUTPATIENT)
Dept: PULMONOLOGY | Facility: CLINIC | Age: 77
End: 2022-08-08

## 2022-08-08 VITALS — HEART RATE: 85 BPM | DIASTOLIC BLOOD PRESSURE: 69 MMHG | OXYGEN SATURATION: 94 % | SYSTOLIC BLOOD PRESSURE: 137 MMHG

## 2022-08-08 DIAGNOSIS — J44.9 STAGE 3 SEVERE COPD BY GOLD CLASSIFICATION (H): Primary | ICD-10-CM

## 2022-08-08 DIAGNOSIS — J43.2 CENTRILOBULAR EMPHYSEMA (H): ICD-10-CM

## 2022-08-08 PROCEDURE — G0463 HOSPITAL OUTPT CLINIC VISIT: HCPCS

## 2022-08-08 PROCEDURE — 99213 OFFICE O/P EST LOW 20 MIN: CPT

## 2022-08-08 ASSESSMENT — PAIN SCALES - GENERAL: PAINLEVEL: NO PAIN (0)

## 2022-08-08 NOTE — TELEPHONE ENCOUNTER
Spoke with Elvis from  [employer] regarding PAOLO order. Able to visualize order in the chart and he will reach out to the oxygen team to reach out to the patient to arrange.

## 2022-08-08 NOTE — NURSING NOTE
Chief Complaint   Patient presents with     Follow Up     Reschedule from 8/1       Vitals were taken and medications were reconciled.     Leslie Vázquez RMA  1:01 PM

## 2022-08-08 NOTE — LETTER
8/8/2022         RE: Iris Carrion  [address]  [place] [address]        Dear Colleague,    Thank you for referring your patient, Iris Carrion, to the [facility] FOR LUNG SCIENCE AND [facility] [place]. Please see a copy of my visit note below.    [facility]  Pulmonary Medicine  Visit Clinic Note  August 8, 2022         ASSESSMENT & PLAN     Severe COPD  Emphysema    Dyspnea is a major limiting symptom for her right now.  She was recently treated with prednisone, which did not help out with her shortness of breath.  I had her see her cardiologist to discuss if there is any ischemic heart disease that could be causing her shortness of breath.  It was determined that she had mild heart failure with preserved ejection fraction, and her blood pressure was managed at that point.  No further evaluation was necessary.    She is currently on maximum inhaler therapy.  We tried nebulized medications, but insurance would not pay for this.  I think her focus needs to be in pulmonary rehab.  I discussed this with her.  She previously participated in pulmonary rehab in 2020, and she is open to doing this again.  I sent a referral.  I also discussed the idea of endobronchial valve therapy for her.  She does have diffuse emphysema.  She would like to pursue testing to see if she would be a potential candidate for this.  To start off, and need to repeat her pulmonary function testing and arterial blood gas.  Her echocardiogram does not show any signs of right heart failure.  If her pulmonary function testing is still in range, we can get a chest CT scan to assess heterogeneity of emphysema and fissure integrity.  I will get in touch with her after I get the results of the pulmonary function testing.  She also needs an overnight oximetry study on room air to see if she needs oxygen at night.    I spent 22 minutes on the date of the encounter reviewing the medical  record, performing history and physical exam, and discussing endobronchial valve therapy and pulmonary rehab.       Today's visit note:     Chief Complaint: Iris Carrion is a 76 year old year old female who is being seen for COPD    HISTORY OF PRESENT ILLNESS:    This is a 76-year-old female with a history of severe COPD who is presenting for follow-up for her lung disease.    Overall, she says that she is not doing that well at all at home.  She gets short of breath with minimal amounts of exertion.  Walking from her home to her son's car to get here was very difficult for her.  As a result, she does not do anything to physical during the day.  She rarely leaves her house.  She does not have a significant cough, but dyspnea is debilitating.  He tried prescribing nebulized Pulmicort and Brovana earlier for her, but insurance would not cover these.  She is currently taking Symbicort and Incruse Ellipta.  She has a nebulizer and albuterol inhaler that she uses as needed.  About 1 month ago, she was treated with prednisone by her primary care physician, but this did not make a big difference in her symptoms.           Past Medical and Surgical History:     Past Medical History:   Diagnosis Date     Cervical high risk HPV (human papillomavirus) test positive 10/31/12    type 18     COPD (chronic obstructive pulmonary disease) (H)      GERD (gastroesophageal reflux disease)      Hx of colposcopy with cervical biopsy 11/2012    negative     Hypertension 2013     Osteoporosis      Paroxysmal supraventricular tachycardia (H) 9/6/2017     Peritoneal carcinomatosis (H) 8/24/2015     Pneumonia      Uncomplicated asthma 1980     Past Surgical History:   Procedure Laterality Date     CHOLECYSTECTOMY  6/2014     COLONOSCOPY  12/4/2012    Procedure: COLONOSCOPY;  COLONOSCOPY SCREEN;  Surgeon: Mushtaq Lara MD;  Location: [facility] OR     GYN SURGERY       HERNIORRHAPHY HIATAL N/A 11/25/2015    Procedure: HERNIORRHAPHY HIATAL;   Surgeon: Chip Carty MD;  Location: [facility] OR     HYSTERECTOMY TOTAL ABD, ALVIN SALPINGO-OOPHORECTOMY, NODE DISSECTION, TUMOR DEBULKING, COMBINED Bilateral 2015    Procedure: COMBINED HYSTERECTOMY TOTAL ABDOMINAL, SALPINGO-OOPHORECTOMY, NODE DISSECTION, TUMOR DEBULKING;  Surgeon: Emma Cabrera MD;  Location: [facility] OR           Family History:     Family History   Problem Relation Age of Onset     Cancer Brother         testicular     Diabetes Sister      Ovarian Cancer Mother 60     Diabetes Mother              Asthma Father              Diabetes Sister      Depression/Anxiety Daughter      Depression Daughter      Osteoporosis Sister      Other Cancer Brother      Diabetes Brother      Depression Other         Grandson              Social History:     Social History     Socioeconomic History     Marital status:      Spouse name: Not on file     Number of children: Not on file     Years of education: Not on file     Highest education level: Not on file   Occupational History     Not on file   Tobacco Use     Smoking status: Former Smoker     Packs/day: 1.00     Years: 30.00     Pack years: 30.00     Types: Cigarettes     Start date: 1965     Quit date: 10/10/2003     Years since quittin.8     Smokeless tobacco: Never Used   Vaping Use     Vaping Use: Never used   Substance and Sexual Activity     Alcohol use: No     Drug use: No     Sexual activity: Not Currently   Other Topics Concern     Parent/sibling w/ CABG, MI or angioplasty before 65F 55M? No   Social History Narrative     Not on file     Social Determinants of Health     Financial Resource Strain: Not on file   Food Insecurity: Not on file   Transportation Needs: Not on file   Physical Activity: Not on file   Stress: Not on file   Social Connections: Not on file   Intimate Partner Violence: Not on file   Housing Stability: Not on file            Medications:     Current Outpatient Medications   Medication      albuterol (PROAIR HFA/PROVENTIL HFA/VENTOLIN HFA) 108 (90 Base) MCG/ACT inhaler     amLODIPine (NORVASC) 5 MG tablet     atorvastatin (LIPITOR) 20 MG tablet     budesonide (PULMICORT) 0.5 MG/2ML neb solution     budesonide-formoterol (SYMBICORT) 160-4.5 MCG/ACT Inhaler     calcitonin, salmon, (MIACALCIN) 200 UNIT/ACT nasal spray     Calcium Carbonate (CALCIUM 500 PO)     cyclobenzaprine (FLEXERIL) 5 MG tablet     fluticasone (FLONASE) 50 MCG/ACT spray     ibuprofen (ADVIL,MOTRIN) 600 MG tablet     ipratropium - albuterol 0.5 mg/2.5 mg/3 mL (DUONEB) 0.5-2.5 (3) MG/3ML neb solution     loratadine (CLARITIN) 10 MG tablet     losartan (COZAAR) 100 MG tablet     MAGNESIUM OXIDE PO     omeprazole (PRILOSEC) 40 MG DR capsule     order for DME     predniSONE (DELTASONE) 10 MG tablet     triamcinolone (ARISTOCORT HP) 0.5 % external cream     umeclidinium (INCRUSE ELLIPTA) 62.5 MCG/INH inhaler     vitamin B complex with vitamin C (STRESS TAB) tablet     Zinc Sulfate (ZINC 15 PO)     No current facility-administered medications for this visit.            Review of Systems:       Answers for HPI/ROS submitted by the patient on 8/1/2022  General Symptoms: No  Skin Symptoms: No  HENT Symptoms: No  EYE SYMPTOMS: No  HEART SYMPTOMS: No  LUNG SYMPTOMS: Yes  INTESTINAL SYMPTOMS: No  URINARY SYMPTOMS: No  GYNECOLOGIC SYMPTOMS: No  BREAST SYMPTOMS: No  SKELETAL SYMPTOMS: Yes  BLOOD SYMPTOMS: No  NERVOUS SYSTEM SYMPTOMS: Yes  MENTAL HEALTH SYMPTOMS: Yes  Cough: No  Sputum or phlegm: No  Coughing up blood: No  Difficulty breating or shortness of breath: Yes  Snoring: No  Wheezing: No  Difficulty breathing on exertion: Yes  Nighttime Cough: No  Difficulty breathing when lying flat: Yes  Back pain: Yes  Muscle aches: No  Neck pain: No  Swollen joints: No  Joint pain: No  Bone pain: No  Muscle cramps: No  Muscle weakness: No  Joint stiffness: No  Bone fracture: No  Trouble with coordination: No  Dizziness or trouble with balance:  No  Fainting or black-out spells: No  Memory loss: No  Headache: Yes  Seizures: No  Speech problems: No  Tingling: No  Tremor: No  Weakness: No  Difficulty walking: No  Paralysis: No  Numbness: No  Nervous or Anxious: No  Depression: No  Trouble sleeping: Yes  Trouble thinking or concentrating: No  Mood changes: No  Panic attacks: No              PHYSICAL EXAM:  /69   Pulse 85   SpO2 94%      General:  NAD  Eyes: Anicteric  Ears: Hearing grossly normal  Mouth: Oral mucosa is moist, without any lesions. No oropharyngeal exudate.  Respiratory: Decreased breath sounds, no wheezing  Cardiac: RRR, normal S1, S2. No murmurs.  Musculoskeletal: Extremities normal. No clubbing. No cyanosis. No edema.  Skin: No rash on limited exam  Neuro: Normal mentation. Normal speech.  Psych:Normal affect         Data:   All laboratory and imaging data reviewed.      Pulmonary Rehab 2/2/2020:      PFT:   Pulmonary function testing from November 7, 2019 reviewed.  FEV1/FVC ratio 0.34 after bronchodilator.  Her postbronchodilator FVC is 2.46 which is 86% predicted, and the postbronchodilator FEV1 is 0.83 which is 37% predicted.  Residual volume is 242% predicted and the total lung capacity is 146% predicted.  Her diffusion capacity is 52% predicted.      PFT Interpretation:  Severe airflow obstruction.  Gas trapping and hyperinflation.  Reduction in her diffusion capacity.  Valid Maneuver    Chest CT scan: I have reviewed the chest CT scan imaging from April 26, 2022 and agree with radiologist interpretation below:  FINDINGS:   LUNGS AND PLEURA: Advanced centrilobular emphysema with bullous  changes in the right upper lobe. Posterior right upper lobe linear  area of scarring. Posterior medial right upper lobe 8 mm nodular  opacity (series 4, image 60) and right middle lobe 5 mm nodule (series  4, image 190). These are not discretely visualized on the CT on  2/4/2011 due to consolidative opacities on that CT. Few  calcified  granulomas. No infiltrate or pleural effusion.     MEDIASTINUM/AXILLAE: No lymphadenopathy. Few calcified mediastinal and  hilar lymph nodes, the sequela of prior granulomatous disease. No  thoracic aortic aneurysm. Moderate coronary artery calcifications.  Trace pericardial fluid. Postoperative changes at the gastroesophageal  junction.     UPPER ABDOMEN: Cholecystectomy. Punctate calcified hepatic and splenic  granulomas. Left renal cyst, partly visualized.     MUSCULOSKELETAL: Age indeterminate severe wedge compression deformity  of T8. No suspicious lesions in the bones.                                                                      IMPRESSION:   1.  Advanced centrilobular emphysema and bullous emphysematous changes  in the right upper lobe.  2.  Few small pulmonary nodules, the largest being an 8 mm nodular  opacity in the right upper lobe. CT follow-up guidelines.  3.  Age-indeterminate but likely chronic compression fracture of T8.  Correlate for tenderness.  4.  Partly visualized left renal cyst. A renal ultrasound can be  considered for complete visualization.               Again, thank you for allowing me to participate in the care of your patient.        Sincerely,        Hayes Garg MD

## 2022-08-08 NOTE — TELEPHONE ENCOUNTER
Spoke with pt as Dr. Garg placed pulm rehab order, which pt has requested to be completed at [facility], but needs a more recent PFT result (last in 2019). This was ordered today, in addition to ABG, but patient had it scheduled for Sept. Spoke with pt regarding moving this up so we could have results and get started on pulm rehab sooner, but she states due to personal conflicts, the soonest she is available is in Sept. Discussed we cannot fax order until completed and she voiced understanding. Will fax order when completed.

## 2022-08-08 NOTE — PROGRESS NOTES
[facility]  Pulmonary Medicine  Visit Clinic Note  August 8, 2022         ASSESSMENT & PLAN     Severe COPD  Emphysema    Dyspnea is a major limiting symptom for her right now.  She was recently treated with prednisone, which did not help out with her shortness of breath.  I had her see her cardiologist to discuss if there is any ischemic heart disease that could be causing her shortness of breath.  It was determined that she had mild heart failure with preserved ejection fraction, and her blood pressure was managed at that point.  No further evaluation was necessary.    She is currently on maximum inhaler therapy.  We tried nebulized medications, but insurance would not pay for this.  I think her focus needs to be in pulmonary rehab.  I discussed this with her.  She previously participated in pulmonary rehab in 2020, and she is open to doing this again.  I sent a referral.  I also discussed the idea of endobronchial valve therapy for her.  She does have diffuse emphysema.  She would like to pursue testing to see if she would be a potential candidate for this.  To start off, and need to repeat her pulmonary function testing and arterial blood gas.  Her echocardiogram does not show any signs of right heart failure.  If her pulmonary function testing is still in range, we can get a chest CT scan to assess heterogeneity of emphysema and fissure integrity.  I will get in touch with her after I get the results of the pulmonary function testing.  She also needs an overnight oximetry study on room air to see if she needs oxygen at night.    I spent 22 minutes on the date of the encounter reviewing the medical record, performing history and physical exam, and discussing endobronchial valve therapy and pulmonary rehab.    Addendum: Overnight oximetry on room air reviewed.  < 88% for about 1 hour.  I will prescribe 2 L oxygen to be used at night.   I certify that this patient, Iris Carrion has been  under my care (or a nurse practitioner or physican's assistant working with me). This is the face-to-face encounter for oxygen medical necessity.      Iris Carrion is now in a chronic stable state and continues to require supplemental oxygen. Patient has continued oxygen desaturation due to COPD J44.9.    Alternative treatment(s) tried or considered and deemed clinically infective for treatment of Chronic Respiratory Failure with Hypoxia J96.11 include nebulizers and inhalers.  If portability is ordered, is the patient mobile within the home? yes    **Patients who qualify for home O2 coverage under the CMS guidelines require ABG tests or O2 sat readings obtained closest to, but no earlier than 2 days prior to the discharge, as evidence of the need for home oxygen therapy. Testing must be performed while patient is in the chronic stable state. See notes for O2 sats.**         Today's visit note:     Chief Complaint: Iris Carrion is a 76 year old year old female who is being seen for COPD    HISTORY OF PRESENT ILLNESS:    This is a 76-year-old female with a history of severe COPD who is presenting for follow-up for her lung disease.    Overall, she says that she is not doing that well at all at home.  She gets short of breath with minimal amounts of exertion.  Walking from her home to her son's car to get here was very difficult for her.  As a result, she does not do anything to physical during the day.  She rarely leaves her house.  She does not have a significant cough, but dyspnea is debilitating.  He tried prescribing nebulized Pulmicort and Brovana earlier for her, but insurance would not cover these.  She is currently taking Symbicort and Incruse Ellipta.  She has a nebulizer and albuterol inhaler that she uses as needed.  About 1 month ago, she was treated with prednisone by her primary care physician, but this did not make a big difference in her symptoms.           Past Medical and Surgical History:      Past Medical History:   Diagnosis Date     Cervical high risk HPV (human papillomavirus) test positive 10/31/12    type 18     COPD (chronic obstructive pulmonary disease) (H)      GERD (gastroesophageal reflux disease)      Hx of colposcopy with cervical biopsy 2012    negative     Hypertension      Osteoporosis      Paroxysmal supraventricular tachycardia (H) 2017     Peritoneal carcinomatosis (H) 2015     Pneumonia      Uncomplicated asthma 1980     Past Surgical History:   Procedure Laterality Date     CHOLECYSTECTOMY  2014     COLONOSCOPY  2012    Procedure: COLONOSCOPY;  COLONOSCOPY SCREEN;  Surgeon: Mushtaq Lara MD;  Location: [facility] OR     GYN SURGERY       HERNIORRHAPHY HIATAL N/A 2015    Procedure: HERNIORRHAPHY HIATAL;  Surgeon: Chip Carty MD;  Location: [facility] OR     HYSTERECTOMY TOTAL ABD, ALVIN SALPINGO-OOPHORECTOMY, NODE DISSECTION, TUMOR DEBULKING, COMBINED Bilateral 2015    Procedure: COMBINED HYSTERECTOMY TOTAL ABDOMINAL, SALPINGO-OOPHORECTOMY, NODE DISSECTION, TUMOR DEBULKING;  Surgeon: Emma Cabrera MD;  Location: [facility] OR           Family History:     Family History   Problem Relation Age of Onset     Cancer Brother         testicular     Diabetes Sister      Ovarian Cancer Mother 60     Diabetes Mother              Asthma Father              Diabetes Sister      Depression/Anxiety Daughter      Depression Daughter      Osteoporosis Sister      Other Cancer Brother      Diabetes Brother      Depression Other         Grandson              Social History:     Social History     Socioeconomic History     Marital status:      Spouse name: Not on file     Number of children: Not on file     Years of education: Not on file     Highest education level: Not on file   Occupational History     Not on file   Tobacco Use     Smoking status: Former Smoker     Packs/day: 1.00     Years: 30.00     Pack years: 30.00     Types:  Cigarettes     Start date: 1965     Quit date: 10/10/2003     Years since quittin.8     Smokeless tobacco: Never Used   Vaping Use     Vaping Use: Never used   Substance and Sexual Activity     Alcohol use: No     Drug use: No     Sexual activity: Not Currently   Other Topics Concern     Parent/sibling w/ CABG, MI or angioplasty before 65F 55M? No   Social History Narrative     Not on file     Social Determinants of Health     Financial Resource Strain: Not on file   Food Insecurity: Not on file   Transportation Needs: Not on file   Physical Activity: Not on file   Stress: Not on file   Social Connections: Not on file   Intimate Partner Violence: Not on file   Housing Stability: Not on file            Medications:     Current Outpatient Medications   Medication     albuterol (PROAIR HFA/PROVENTIL HFA/VENTOLIN HFA) 108 (90 Base) MCG/ACT inhaler     amLODIPine (NORVASC) 5 MG tablet     atorvastatin (LIPITOR) 20 MG tablet     budesonide-formoterol (SYMBICORT) 160-4.5 MCG/ACT Inhaler     calcitonin, salmon, (MIACALCIN) 200 UNIT/ACT nasal spray     Calcium Carbonate (CALCIUM 500 PO)     cyclobenzaprine (FLEXERIL) 5 MG tablet     fluticasone (FLONASE) 50 MCG/ACT spray     ibuprofen (ADVIL,MOTRIN) 600 MG tablet     ipratropium - albuterol 0.5 mg/2.5 mg/3 mL (DUONEB) 0.5-2.5 (3) MG/3ML neb solution     loratadine (CLARITIN) 10 MG tablet     losartan (COZAAR) 100 MG tablet     MAGNESIUM OXIDE PO     omeprazole (PRILOSEC) 40 MG DR capsule     order for DME     triamcinolone (ARISTOCORT HP) 0.5 % external cream     umeclidinium (INCRUSE ELLIPTA) 62.5 MCG/INH inhaler     vitamin B complex with vitamin C (STRESS TAB) tablet     Zinc Sulfate (ZINC 15 PO)     No current facility-administered medications for this visit.            Review of Systems:       Answers for HPI/ROS submitted by the patient on 2022  General Symptoms: No  Skin Symptoms: No  HENT Symptoms: No  EYE SYMPTOMS: No  HEART SYMPTOMS: No  LUNG  SYMPTOMS: Yes  INTESTINAL SYMPTOMS: No  URINARY SYMPTOMS: No  GYNECOLOGIC SYMPTOMS: No  BREAST SYMPTOMS: No  SKELETAL SYMPTOMS: Yes  BLOOD SYMPTOMS: No  NERVOUS SYSTEM SYMPTOMS: Yes  MENTAL HEALTH SYMPTOMS: Yes  Cough: No  Sputum or phlegm: No  Coughing up blood: No  Difficulty breating or shortness of breath: Yes  Snoring: No  Wheezing: No  Difficulty breathing on exertion: Yes  Nighttime Cough: No  Difficulty breathing when lying flat: Yes  Back pain: Yes  Muscle aches: No  Neck pain: No  Swollen joints: No  Joint pain: No  Bone pain: No  Muscle cramps: No  Muscle weakness: No  Joint stiffness: No  Bone fracture: No  Trouble with coordination: No  Dizziness or trouble with balance: No  Fainting or black-out spells: No  Memory loss: No  Headache: Yes  Seizures: No  Speech problems: No  Tingling: No  Tremor: No  Weakness: No  Difficulty walking: No  Paralysis: No  Numbness: No  Nervous or Anxious: No  Depression: No  Trouble sleeping: Yes  Trouble thinking or concentrating: No  Mood changes: No  Panic attacks: No              PHYSICAL EXAM:  /69   Pulse 85   SpO2 94%      General:  NAD  Eyes: Anicteric  Ears: Hearing grossly normal  Mouth: Oral mucosa is moist, without any lesions. No oropharyngeal exudate.  Respiratory: Decreased breath sounds, no wheezing  Cardiac: RRR, normal S1, S2. No murmurs.  Musculoskeletal: Extremities normal. No clubbing. No cyanosis. No edema.  Skin: No rash on limited exam  Neuro: Normal mentation. Normal speech.  Psych:Normal affect         Data:   All laboratory and imaging data reviewed.      Pulmonary Rehab 2/2/2020:      PFT:   Pulmonary function testing from November 7, 2019 reviewed.  FEV1/FVC ratio 0.34 after bronchodilator.  Her postbronchodilator FVC is 2.46 which is 86% predicted, and the postbronchodilator FEV1 is 0.83 which is 37% predicted.  Residual volume is 242% predicted and the total lung capacity is 146% predicted.  Her diffusion capacity is 52% predicted.       PFT Interpretation:  Severe airflow obstruction.  Gas trapping and hyperinflation.  Reduction in her diffusion capacity.  Valid Maneuver    Chest CT scan: I have reviewed the chest CT scan imaging from April 26, 2022 and agree with radiologist interpretation below:  FINDINGS:   LUNGS AND PLEURA: Advanced centrilobular emphysema with bullous  changes in the right upper lobe. Posterior right upper lobe linear  area of scarring. Posterior medial right upper lobe 8 mm nodular  opacity (series 4, image 60) and right middle lobe 5 mm nodule (series  4, image 190). These are not discretely visualized on the CT on  2/4/2011 due to consolidative opacities on that CT. Few calcified  granulomas. No infiltrate or pleural effusion.     MEDIASTINUM/AXILLAE: No lymphadenopathy. Few calcified mediastinal and  hilar lymph nodes, the sequela of prior granulomatous disease. No  thoracic aortic aneurysm. Moderate coronary artery calcifications.  Trace pericardial fluid. Postoperative changes at the gastroesophageal  junction.     UPPER ABDOMEN: Cholecystectomy. Punctate calcified hepatic and splenic  granulomas. Left renal cyst, partly visualized.     MUSCULOSKELETAL: Age indeterminate severe wedge compression deformity  of T8. No suspicious lesions in the bones.                                                                      IMPRESSION:   1.  Advanced centrilobular emphysema and bullous emphysematous changes  in the right upper lobe.  2.  Few small pulmonary nodules, the largest being an 8 mm nodular  opacity in the right upper lobe. CT follow-up guidelines.  3.  Age-indeterminate but likely chronic compression fracture of T8.  Correlate for tenderness.  4.  Partly visualized left renal cyst. A renal ultrasound can be  considered for complete visualization.                                        DME (Durable Medical Equipment) Orders and Documentation  Orders Placed This Encounter   Procedures     Oxygen Order      The  patient was assessed and it was determined the patient is in need of the following listed DME Supplies/Equipment. Please complete supporting documentation below to demonstrate medical necessity.

## 2022-08-09 PROBLEM — J43.2 CENTRILOBULAR EMPHYSEMA (H): Status: ACTIVE | Noted: 2022-08-09

## 2022-08-18 ENCOUNTER — TRANSFERRED RECORDS (OUTPATIENT)
Dept: PULMONOLOGY | Facility: CLINIC | Age: 77
End: 2022-08-18

## 2022-08-23 ENCOUNTER — DOCUMENTATION ONLY (OUTPATIENT)
Dept: OTHER | Facility: CLINIC | Age: 77
End: 2022-08-23

## 2022-08-23 NOTE — PROGRESS NOTES
PAOLO RX SIGNED BY: DR. PHIPPS  DATE:08/08/2022  STUDY PERFORMED ON (PAP/O2/RA): RA  DOWNLOAD METHOD (BREATHE/NONIN): BREATHE  PROVIDER FAX: [phone]      AS: 8/9/2022 ADDED TO WAIT LIST    8/10/22 LALO MENDES - CALLED PT DISCUSS PAOLO AND PICKUP LOCATION. PT STATED SHE WOULD LIKE TO PICKUP FROM [place] LOCATION. INFORMED PT I WOULD GET DEVICE SENT TO [place] AND THE STAFF THERE WOULD CONTACT HER ONCE AVAILABLE FOR PICKUP. SHE STATED UNDERSTANDING.  -MADE [facility]HE ACCOUNT, UPLOADED RX.  -EMAILED BREANNE TO PREPARE PAOLO AND PUT IN BIN FOR [place] SHOWROOM. EMAILED EFREN TO CALL PT ONCE RCD FOR PICKUP.  8/11 AV- PAOLO PREPARED AND IN BIN  8/16/2022 MIESHA ENRIQUEZ CALLED TO FOLLOW UP IN REGARDS TO THE STATUS OF PAOLO, REACHED OUT TO EFREN REGARDING STATUS, SHE IS GOING TO CALL PT TO  PAOLO.  8/16 KG- CALLED PATIENT TO PU PAOLO  8/17 EFREN GODOY- PT PICKED UP PAOLO  8/23 AV- RCVD RETURNED PAOLO, DOWNLOAD COMPLETED THROUGH BREATHE

## 2022-08-26 ENCOUNTER — TELEPHONE (OUTPATIENT)
Dept: PULMONOLOGY | Facility: CLINIC | Age: 77
End: 2022-08-26

## 2022-08-26 DIAGNOSIS — J44.9 CHRONIC OBSTRUCTIVE PULMONARY DISEASE, UNSPECIFIED COPD TYPE (H): ICD-10-CM

## 2022-08-26 RX ORDER — IPRATROPIUM BROMIDE AND ALBUTEROL SULFATE 2.5; .5 MG/3ML; MG/3ML
SOLUTION RESPIRATORY (INHALATION)
Qty: 90 ML | Refills: 11 | Status: SHIPPED | OUTPATIENT
Start: 2022-08-26 | End: 2024-04-29

## 2022-08-26 NOTE — TELEPHONE ENCOUNTER
"Routing refill request to provider for review/approval because:  Labs not current:  No ACT on file  Patient needs to be seen because:  Due for follow up    Med pended with reminder    Requested Prescriptions   Pending Prescriptions Disp Refills     ipratropium - albuterol 0.5 mg/2.5 mg/3 mL (DUONEB) 0.5-2.5 (3) MG/3ML neb solution [Pharmacy Med Name: Ipratropium-Albuterol 0.5-2.5 (3) MG/3ML Inhalation Solution] 90 mL 0     Sig: USE 1 AMPULE IN NEBULIZER EVERY 4 HOURS AS NEEDED FOR SHORTNESS OF BREATH /DYSPNEA, OR WHEEZING       Short-Acting Beta Agonist Inhalers Protocol  Failed - 8/26/2022 10:56 AM        Failed - Asthma control assessment score within normal limits in last 6 months     Please review ACT score.           Passed - Patient is age 12 or older        Passed - Medication is active on med list        Passed - Recent (6 mo) or future (30 days) visit within the authorizing provider's specialty     Patient had office visit in the last 6 months or has a visit in the next 30 days with authorizing provider or within the authorizing provider's specialty.  See \"Patient Info\" tab in inbasket, or \"Choose Columns\" in Meds & Orders section of the refill encounter.           Asthma Nebs Protocol Failed - 8/26/2022 10:56 AM        Failed - Asthma control assessment score within normal limits in last 6 months     Please review ACT score.           Passed - Patient is age 4 years or older        Passed - Medication is active on med list        Passed - Recent (6 mo) or future (30 days) visit within the authorizing provider's specialty     Patient had office visit in the last 6 months or has a visit in the next 30 days with authorizing provider or within the authorizing provider's specialty.  See \"Patient Info\" tab in inbasket, or \"Choose Columns\" in Meds & Orders section of the refill encounter.                       "

## 2022-09-07 DIAGNOSIS — J44.9 STAGE 3 SEVERE COPD BY GOLD CLASSIFICATION (H): Primary | ICD-10-CM

## 2022-09-07 DIAGNOSIS — J44.9 STAGE 3 SEVERE COPD BY GOLD CLASSIFICATION (H): ICD-10-CM

## 2022-09-07 LAB
BASE EXCESS BLDA CALC-SCNC: 0.5 MMOL/L (ref -9.6–2)
COHGB MFR BLD: 1.3 % (ref 0–2)
DLCOCOR-%PRED-PRE: 42 %
DLCOCOR-PRE: 8.27 ML/MIN/MMHG
DLCOUNC-%PRED-PRE: 41 %
DLCOUNC-PRE: 8.17 ML/MIN/MMHG
DLCOUNC-PRED: 19.61 ML/MIN/MMHG
ERV-%PRED-PRE: 73 %
ERV-PRE: 0.64 L
ERV-PRED: 0.87 L
EXPTIME-PRE: 8.92 SEC
FEF2575-%PRED-PRE: 12 %
FEF2575-PRE: 0.21 L/SEC
FEF2575-PRED: 1.73 L/SEC
FEFMAX-%PRED-PRE: 28 %
FEFMAX-PRE: 1.55 L/SEC
FEFMAX-PRED: 5.36 L/SEC
FEV1-%PRED-PRE: 27 %
FEV1-PRE: 0.58 L
FEV1FEV6-PRE: 39 %
FEV1FEV6-PRED: 78 %
FEV1FVC-PRE: 33 %
FEV1FVC-PRED: 77 %
FEV1SVC-PRE: 31 %
FEV1SVC-PRED: 69 %
FIFMAX-PRE: 3.29 L/SEC
FIO2-PRE: 21 %
FRCPLETH-%PRED-PRE: 197 %
FRCPLETH-PRE: 5.44 L
FRCPLETH-PRED: 2.76 L
FVC-%PRED-PRE: 63 %
FVC-PRE: 1.76 L
FVC-PRED: 2.76 L
HCO3 BLDA-SCNC: 25 MMOL/L (ref 21–28)
HGB BLD-MCNC: 13.4 G/DL (ref 11.7–15.7)
IC-%PRED-PRE: 55 %
IC-PRE: 1.21 L
IC-PRED: 2.18 L
METHGB MFR BLD: 0 % (ref 0–3)
O2/TOTAL GAS SETTING VFR VENT: 21 %
OXYHGB MFR BLDA: 91 % (ref 92–100)
PCO2 BLDA: 40 MM HG (ref 35–45)
PH BLDA: 7.41 [PH] (ref 7.35–7.45)
PO2 BLDA: 68 MM HG (ref 80–105)
RVPLETH-%PRED-PRE: 220 %
RVPLETH-PRE: 4.8 L
RVPLETH-PRED: 2.18 L
TLCPLETH-%PRED-PRE: 131 %
TLCPLETH-PRE: 6.65 L
TLCPLETH-PRED: 5.06 L
VA-%PRED-PRE: 61 %
VA-PRE: 2.93 L
VC-%PRED-PRE: 60 %
VC-PRE: 1.85 L
VC-PRED: 3.05 L

## 2022-09-07 PROCEDURE — 94729 DIFFUSING CAPACITY: CPT | Performed by: INTERNAL MEDICINE

## 2022-09-07 PROCEDURE — 85018 HEMOGLOBIN: CPT | Performed by: PATHOLOGY

## 2022-09-07 PROCEDURE — 82375 ASSAY CARBOXYHB QUANT: CPT | Performed by: PATHOLOGY

## 2022-09-07 PROCEDURE — 82803 BLOOD GASES ANY COMBINATION: CPT | Performed by: PATHOLOGY

## 2022-09-07 PROCEDURE — 83050 HGB METHEMOGLOBIN QUAN: CPT | Performed by: PATHOLOGY

## 2022-09-07 PROCEDURE — 94726 PLETHYSMOGRAPHY LUNG VOLUMES: CPT | Performed by: INTERNAL MEDICINE

## 2022-09-07 PROCEDURE — 36600 WITHDRAWAL OF ARTERIAL BLOOD: CPT | Performed by: INTERNAL MEDICINE

## 2022-09-07 PROCEDURE — 94375 RESPIRATORY FLOW VOLUME LOOP: CPT | Performed by: INTERNAL MEDICINE

## 2022-09-08 ENCOUNTER — TELEPHONE (OUTPATIENT)
Dept: PULMONOLOGY | Facility: CLINIC | Age: 77
End: 2022-09-08

## 2022-09-08 NOTE — TELEPHONE ENCOUNTER
Patient completed PFT and ABG yesterday, which was needed to fax orders for Pulm Rehab at [facility].     Fax cover sheet, demographic sheet, most recent medication list, most recent CXR, pulm rehab referral, most recent office notes,most recent PFT, most recent ABG and lung power referral form faxed to [facility] at [facility] in [place] [phone] with request for patient to be contacted.

## 2022-09-20 DIAGNOSIS — J44.9 STAGE 3 SEVERE COPD BY GOLD CLASSIFICATION (H): Primary | ICD-10-CM

## 2022-09-21 ENCOUNTER — TELEPHONE (OUTPATIENT)
Dept: PULMONOLOGY | Facility: CLINIC | Age: 77
End: 2022-09-21

## 2022-09-21 NOTE — TELEPHONE ENCOUNTER
LVM regarding scheduling CT ordered by Dr. Garg. Left direct call back phone number and imaging scheduling phone number to schedule.

## 2022-09-27 ENCOUNTER — E-VISIT (OUTPATIENT)
Dept: URGENT CARE | Facility: URGENT CARE | Age: 77
End: 2022-09-27
Payer: COMMERCIAL

## 2022-09-27 DIAGNOSIS — R05.9 COUGH: Primary | ICD-10-CM

## 2022-09-27 NOTE — PATIENT INSTRUCTIONS
Dear Iris Carrion,    We are sorry you are not feeling well. Based on the responses you provided, it is recommended that you be seen in-person in urgent care so we can better evaluate your symptoms. Please click here to find the nearest urgent care location to you.   You will not be charged for this Visit. Thank you for trusting us with your care.    CHANDLER Oliva CNP

## 2022-09-28 ENCOUNTER — MYC MEDICAL ADVICE (OUTPATIENT)
Dept: PULMONOLOGY | Facility: CLINIC | Age: 77
End: 2022-09-28

## 2022-09-28 NOTE — TELEPHONE ENCOUNTER
Iris called stating she's currently inpatient at [facility] d/t COPD flare.  States she's been using O2 continuously since being admitted.  She currently only has nocturnal O2 at home, she is wondering if Dr. Garg would write for continuous O2 and portability that MD at [facility] states she needs.  Advised that Dr. Garg would be unable to write order as we do not have testing to support this, advised she speak with MD at [facility] and have them send order to .  She has my call back number for any further questions or concerns.

## 2022-09-29 NOTE — TELEPHONE ENCOUNTER
Followed up with Iris.  States discharged from [facility] today and is feeling much better.  She has new order for O2 at 3L/min continuous, [facility] are scheduled to deliver equipment today.  She had to cancel scheduled chest CT to eval for endobronchial valves d/t in pt status, she will reschedule as soon as possible.  She has clinic number and understands to call with questions or concerns.

## 2022-09-30 ASSESSMENT — ENCOUNTER SYMPTOMS
NERVOUS/ANXIOUS: 0
COUGH DISTURBING SLEEP: 1
MEMORY LOSS: 0
DEPRESSION: 0
WEAKNESS: 0
JOINT SWELLING: 0
PANIC: 1
SPUTUM PRODUCTION: 1
LOSS OF CONSCIOUSNESS: 0
DISTURBANCES IN COORDINATION: 0
MYALGIAS: 0
HEADACHES: 1
HEMOPTYSIS: 0
STIFFNESS: 0
MUSCLE WEAKNESS: 0
DECREASED CONCENTRATION: 0
DIZZINESS: 0
SEIZURES: 0
BACK PAIN: 1
TREMORS: 0
POSTURAL DYSPNEA: 1
INSOMNIA: 1
TINGLING: 0
SHORTNESS OF BREATH: 1
PARALYSIS: 0
NUMBNESS: 0
NECK PAIN: 0
WHEEZING: 1
SPEECH CHANGE: 0
COUGH: 1
MUSCLE CRAMPS: 0
DYSPNEA ON EXERTION: 1
SNORES LOUDLY: 0
ARTHRALGIAS: 1

## 2022-10-01 DIAGNOSIS — K21.9 GASTROESOPHAGEAL REFLUX DISEASE, UNSPECIFIED WHETHER ESOPHAGITIS PRESENT: ICD-10-CM

## 2022-10-04 ENCOUNTER — MYC REFILL (OUTPATIENT)
Dept: FAMILY MEDICINE | Facility: CLINIC | Age: 77
End: 2022-10-04

## 2022-10-04 DIAGNOSIS — K21.9 GASTROESOPHAGEAL REFLUX DISEASE, UNSPECIFIED WHETHER ESOPHAGITIS PRESENT: ICD-10-CM

## 2022-10-05 RX ORDER — OMEPRAZOLE 40 MG/1
CAPSULE, DELAYED RELEASE ORAL
Qty: 90 CAPSULE | Refills: 0 | Status: CANCELLED | OUTPATIENT
Start: 2022-10-05

## 2022-10-06 ENCOUNTER — MYC REFILL (OUTPATIENT)
Dept: FAMILY MEDICINE | Facility: CLINIC | Age: 77
End: 2022-10-06

## 2022-10-06 DIAGNOSIS — K21.9 GASTROESOPHAGEAL REFLUX DISEASE, UNSPECIFIED WHETHER ESOPHAGITIS PRESENT: ICD-10-CM

## 2022-10-06 RX ORDER — OMEPRAZOLE 40 MG/1
CAPSULE, DELAYED RELEASE ORAL
Qty: 90 CAPSULE | Refills: 0 | Status: CANCELLED | OUTPATIENT
Start: 2022-10-06

## 2022-10-07 ENCOUNTER — VIRTUAL VISIT (OUTPATIENT)
Dept: ENDOCRINOLOGY | Facility: CLINIC | Age: 77
End: 2022-10-07
Attending: FAMILY MEDICINE
Payer: COMMERCIAL

## 2022-10-07 ENCOUNTER — TELEPHONE (OUTPATIENT)
Dept: ENDOCRINOLOGY | Facility: CLINIC | Age: 77
End: 2022-10-07

## 2022-10-07 DIAGNOSIS — T38.0X5A STEROID-INDUCED OSTEOPOROSIS: ICD-10-CM

## 2022-10-07 DIAGNOSIS — M81.8 STEROID-INDUCED OSTEOPOROSIS: ICD-10-CM

## 2022-10-07 DIAGNOSIS — M48.50XA PATHOLOGICAL COMPRESSION FRACTURE OF VERTEBRA, INITIAL ENCOUNTER (H): ICD-10-CM

## 2022-10-07 DIAGNOSIS — N18.31 STAGE 3A CHRONIC KIDNEY DISEASE (H): ICD-10-CM

## 2022-10-07 DIAGNOSIS — N18.32 STAGE 3B CHRONIC KIDNEY DISEASE (H): Primary | ICD-10-CM

## 2022-10-07 PROCEDURE — 99204 OFFICE O/P NEW MOD 45 MIN: CPT | Mod: 95 | Performed by: INTERNAL MEDICINE

## 2022-10-07 NOTE — PROGRESS NOTES
rIis Carrion is being evaluated via a billable video visit.        How would you like to obtain your AVS? [email or web address]  For the video visit, send the invitation by: Text to cell phone: [phone]  Will anyone else be joining your video visit? No      Visit video  Start 12;35 pm  End: 1:00 pm  amwell                                                                             - Endocrinology Initial Consultation -    Reason for visit/consult:     Age-related osteoporosis with current pathological fracture, initial encounter  Pathological compression fracture of vertebra, initial encounter (H)    Primary care provider: Pillo Koehler    HPI: A 76 yo female here for evaluation of osteoporosis.   Patient records bone density was last time once 2015 she was told osteoporosis the result we could not find today.  She had right side of the hip fracture in 2016 after she fell.  6 months ago she had back pain worsening and they did an x-ray and she was told likely osteoporosis.  She does not drink milk not much daily product however she is taking over-the-counter calcium supplement every day.  She never had osteoporosis medication never had an Fosamax request nor Prolia.  Other medical condition including COPD with oxygen therapy for 3 years .  Risk factor including early menopause of early 40s and smoker and chronic steroid use for COPD.     Prior fragility fracture:no  Parental history of hip fracture: yes right 2016  Rheumatoid arthritis:no  Secondary cause of osteoporosis: steroid chronic use due to COPD  Body habitus (BMI less than or equal to 20):no  Family history of osteoporosis: sister has osteopenia  Sedentary lifestyle:no  Current tabacco smoking:no  History of thyroid disorder:no  Calcuim and Vitmin D supplements:OCT  Other supplement or bone treatment:  Medication use (PPI, epileptic medications etc):no  Early menopause (female): early 40s     Past Medical/Surgical History:  Past Medical History:   Diagnosis  Date     Cervical high risk HPV (human papillomavirus) test positive 10/31/12    type 18     COPD (chronic obstructive pulmonary disease) (H)      GERD (gastroesophageal reflux disease)      Hx of colposcopy with cervical biopsy 11/2012    negative     Hypertension 2013     Osteoporosis      Paroxysmal supraventricular tachycardia (H) 9/6/2017     Peritoneal carcinomatosis (H) 8/24/2015     Pneumonia      Uncomplicated asthma 1980     Past Surgical History:   Procedure Laterality Date     CHOLECYSTECTOMY  6/2014     COLONOSCOPY  12/4/2012    Procedure: COLONOSCOPY;  COLONOSCOPY SCREEN;  Surgeon: Mushtaq Lara MD;  Location: [facility]     GYN SURGERY       HERNIORRHAPHY HIATAL N/A 11/25/2015    Procedure: HERNIORRHAPHY HIATAL;  Surgeon: Chip Carty MD;  Location: [facility]     HYSTERECTOMY TOTAL ABD, ALVIN SALPINGO-OOPHORECTOMY, NODE DISSECTION, TUMOR DEBULKING, COMBINED Bilateral 11/25/2015    Procedure: COMBINED HYSTERECTOMY TOTAL ABDOMINAL, SALPINGO-OOPHORECTOMY, NODE DISSECTION, TUMOR DEBULKING;  Surgeon: Emma Cabrera MD;  Location: [facility]       Allergies:  Allergies   Allergen Reactions     Levaquin Rash       Current Medications   Current Outpatient Medications   Medication     albuterol (PROAIR HFA/PROVENTIL HFA/VENTOLIN HFA) 108 (90 Base) MCG/ACT inhaler     amLODIPine (NORVASC) 5 MG tablet     atorvastatin (LIPITOR) 20 MG tablet     budesonide-formoterol (SYMBICORT) 160-4.5 MCG/ACT Inhaler     calcitonin, salmon, (MIACALCIN) 200 UNIT/ACT nasal spray     Calcium Carbonate (CALCIUM 500 PO)     cyclobenzaprine (FLEXERIL) 5 MG tablet     fluticasone (FLONASE) 50 MCG/ACT spray     ibuprofen (ADVIL,MOTRIN) 600 MG tablet     ipratropium - albuterol 0.5 mg/2.5 mg/3 mL (DUONEB) 0.5-2.5 (3) MG/3ML neb solution     loratadine (CLARITIN) 10 MG tablet     losartan (COZAAR) 100 MG tablet     MAGNESIUM OXIDE PO     omeprazole (PRILOSEC) 40 MG DR capsule     order for DME     triamcinolone (ARISTOCORT  HP) 0.5 % external cream     umeclidinium (INCRUSE ELLIPTA) 62.5 MCG/INH inhaler     vitamin B complex with vitamin C (STRESS TAB) tablet     Zinc Sulfate (ZINC 15 PO)     No current facility-administered medications for this visit.       Family History:  Family History   Problem Relation Age of Onset     Cancer Brother         testicular     Diabetes Sister      Ovarian Cancer Mother 60     Diabetes Mother              Asthma Father              Diabetes Sister      Depression/Anxiety Daughter      Depression Daughter      Osteoporosis Sister      Other Cancer Brother      Diabetes Brother      Depression Other         Grandson       Social History:  Social History     Tobacco Use     Smoking status: Former Smoker     Packs/day: 1.00     Years: 30.00     Pack years: 30.00     Types: Cigarettes     Start date: 1965     Quit date: 10/10/2003     Years since quittin.0     Smokeless tobacco: Never Used   Substance Use Topics     Alcohol use: No   retired, was working [employer]    ROS:  Full review of systems taken with the help of the intake sheet. Otherwise a complete 14 point review of systems was taken and is negative unless stated in the history above.      Physical Exam:   Vitals: There were no vitals taken for this visit.  BMI= There is no height or weight on file to calculate BMI.   General: well appearing, no acute distress, pleasant and conversant,   Mental Status/neuro: alert and oriented  Face: symmetrical, normal facial color  Eyes: anicteric, no proptosis or lid lag  Resp: normally breathing      Labs : I reviewed data from epic and extract and summarize the pertinent data here.   Lab Results   Component Value Date     10/07/2021     2019      Lab Results   Component Value Date    POTASSIUM 3.6 10/07/2021    POTASSIUM 3.7 2019     Lab Results   Component Value Date    CHLORIDE 107 10/07/2021    CHLORIDE 105 2019     Lab Results   Component Value  Date    MAURISIO 8.9 10/07/2021    MAURISIO 9.3 08/02/2019     Lab Results   Component Value Date    CO2 28 10/07/2021    CO2 27 08/02/2019     Lab Results   Component Value Date    BUN 25 10/07/2021    BUN 20 08/02/2019     Lab Results   Component Value Date    CR 1.11 10/07/2021    CR 1.05 08/02/2019     Lab Results   Component Value Date     10/07/2021    GLC 87 08/02/2019     Lab Results   Component Value Date    TSH 4.00 04/18/2018     Lab Results   Component Value Date    T4 1.16 08/04/2017     Lab Results   Component Value Date    A1C 5.5 04/18/2018       No results found for: IGF1  No results found for: LH  No results found for: FSH  No results found for: ESTROGEN  No results found for: PROLACTIN        MRI Brain: I personally reviewed the original images and agree with the below reports.   No results found for this or any previous visit.          Assessment and Plan  77 year old female with osteoporosis    Osteporosis  - ordered DXA since it has not done for several years    - based on the result, we may initiate prolia    - continue current calcium supplement    - BMP, vitamin D level to check prior to prolia infusion.    CKD  Stage 3, prefers Prolia rather than Reclast    RTC with me in 1 year      45 minutes spent on the date of the encounter doing chart review, history and exam, documentation and further activities as noted above.        Lore Ferrera MD  Staff Physician  Endocrinology and Metabolism  License: [id number]

## 2022-10-07 NOTE — LETTER
10/7/2022         RE: Iris Carrion  [address] [facility]  [place] [address]        Dear Colleague,    Thank you for referring your patient, Iris Carrion, to the [facility]. Please see a copy of my visit note below.    Iris Carrion is being evaluated via a billable video visit.        How would you like to obtain your AVS? [email or web address] (formerly [email or web address]  For the video visit, send the invitation by: Text to cell phone: [phone]  Will anyone else be joining your video visit? No      Visit video  Start 12;35 pm  End: 1:00 pm  amwell                                                                             - Endocrinology Initial Consultation -    Reason for visit/consult:     Age-related osteoporosis with current pathological fracture, initial encounter  Pathological compression fracture of vertebra, initial encounter (H)    Primary care provider: Pillo Koehler    HPI: A 78 yo female here for evaluation of osteoporosis.   Patient records bone density was last time once 2015 she was told osteoporosis the result we could not find today.  She had right side of the hip fracture in 2016 after she fell.  6 months ago she had back pain worsening and they did an x-ray and she was told likely osteoporosis.  She does not drink milk not much daily product however she is taking over-the-counter calcium supplement every day.  She never had osteoporosis medication never had an Fosamax request nor Prolia.  Other medical condition including COPD with oxygen therapy for 3 years .  Risk factor including early menopause of early 40s and smoker and chronic steroid use for COPD.     Prior fragility fracture:no  Parental history of hip fracture: yes right 2016  Rheumatoid arthritis:no  Secondary cause of osteoporosis: steroid chronic use due to COPD  Body habitus (BMI less than or equal to 20):no  Family history of osteoporosis: sister has osteopenia  Sedentary lifestyle:no  Current tabacco smoking:no  History  of thyroid disorder:no  Calcuim and Vitmin D supplements:OCT  Other supplement or bone treatment:  Medication use (PPI, epileptic medications etc):no  Early menopause (female): early 40s     Past Medical/Surgical History:  Past Medical History:   Diagnosis Date     Cervical high risk HPV (human papillomavirus) test positive 10/31/12    type 18     COPD (chronic obstructive pulmonary disease) (H)      GERD (gastroesophageal reflux disease)      Hx of colposcopy with cervical biopsy 11/2012    negative     Hypertension 2013     Osteoporosis      Paroxysmal supraventricular tachycardia (H) 9/6/2017     Peritoneal carcinomatosis (H) 8/24/2015     Pneumonia      Uncomplicated asthma 1980     Past Surgical History:   Procedure Laterality Date     CHOLECYSTECTOMY  6/2014     COLONOSCOPY  12/4/2012    Procedure: COLONOSCOPY;  COLONOSCOPY SCREEN;  Surgeon: Mushtaq Lara MD;  Location: [facility]     GYN SURGERY       HERNIORRHAPHY HIATAL N/A 11/25/2015    Procedure: HERNIORRHAPHY HIATAL;  Surgeon: Chip Carty MD;  Location: [facility]     HYSTERECTOMY TOTAL ABD, ALVIN SALPINGO-OOPHORECTOMY, NODE DISSECTION, TUMOR DEBULKING, COMBINED Bilateral 11/25/2015    Procedure: COMBINED HYSTERECTOMY TOTAL ABDOMINAL, SALPINGO-OOPHORECTOMY, NODE DISSECTION, TUMOR DEBULKING;  Surgeon: Emma Cabrera MD;  Location: [facility]       Allergies:  Allergies   Allergen Reactions     Levaquin Rash       Current Medications   Current Outpatient Medications   Medication     albuterol (PROAIR HFA/PROVENTIL HFA/VENTOLIN HFA) 108 (90 Base) MCG/ACT inhaler     amLODIPine (NORVASC) 5 MG tablet     atorvastatin (LIPITOR) 20 MG tablet     budesonide-formoterol (SYMBICORT) 160-4.5 MCG/ACT Inhaler     calcitonin, salmon, (MIACALCIN) 200 UNIT/ACT nasal spray     Calcium Carbonate (CALCIUM 500 PO)     cyclobenzaprine (FLEXERIL) 5 MG tablet     fluticasone (FLONASE) 50 MCG/ACT spray     ibuprofen (ADVIL,MOTRIN) 600 MG tablet     ipratropium -  albuterol 0.5 mg/2.5 mg/3 mL (DUONEB) 0.5-2.5 (3) MG/3ML neb solution     loratadine (CLARITIN) 10 MG tablet     losartan (COZAAR) 100 MG tablet     MAGNESIUM OXIDE PO     omeprazole (PRILOSEC) 40 MG DR capsule     order for DME     triamcinolone (ARISTOCORT HP) 0.5 % external cream     umeclidinium (INCRUSE ELLIPTA) 62.5 MCG/INH inhaler     vitamin B complex with vitamin C (STRESS TAB) tablet     Zinc Sulfate (ZINC 15 PO)     No current facility-administered medications for this visit.       Family History:  Family History   Problem Relation Age of Onset     Cancer Brother         testicular     Diabetes Sister      Ovarian Cancer Mother 60     Diabetes Mother              Asthma Father              Diabetes Sister      Depression/Anxiety Daughter      Depression Daughter      Osteoporosis Sister      Other Cancer Brother      Diabetes Brother      Depression Other         Grandson       Social History:  Social History     Tobacco Use     Smoking status: Former Smoker     Packs/day: 1.00     Years: 30.00     Pack years: 30.00     Types: Cigarettes     Start date: 1965     Quit date: 10/10/2003     Years since quittin.0     Smokeless tobacco: Never Used   Substance Use Topics     Alcohol use: No   retired, was working [employer]    ROS:  Full review of systems taken with the help of the intake sheet. Otherwise a complete 14 point review of systems was taken and is negative unless stated in the history above.      Physical Exam:   Vitals: There were no vitals taken for this visit.  BMI= There is no height or weight on file to calculate BMI.   General: well appearing, no acute distress, pleasant and conversant,   Mental Status/neuro: alert and oriented  Face: symmetrical, normal facial color  Eyes: anicteric, no proptosis or lid lag  Resp: normally breathing      Labs : I reviewed data from epic and extract and summarize the pertinent data here.   Lab Results   Component Value Date    NA  140 10/07/2021     08/02/2019      Lab Results   Component Value Date    POTASSIUM 3.6 10/07/2021    POTASSIUM 3.7 08/02/2019     Lab Results   Component Value Date    CHLORIDE 107 10/07/2021    CHLORIDE 105 08/02/2019     Lab Results   Component Value Date    MAURISIO 8.9 10/07/2021    MAURISIO 9.3 08/02/2019     Lab Results   Component Value Date    CO2 28 10/07/2021    CO2 27 08/02/2019     Lab Results   Component Value Date    BUN 25 10/07/2021    BUN 20 08/02/2019     Lab Results   Component Value Date    CR 1.11 10/07/2021    CR 1.05 08/02/2019     Lab Results   Component Value Date     10/07/2021    GLC 87 08/02/2019     Lab Results   Component Value Date    TSH 4.00 04/18/2018     Lab Results   Component Value Date    T4 1.16 08/04/2017     Lab Results   Component Value Date    A1C 5.5 04/18/2018       No results found for: IGF1  No results found for: LH  No results found for: FSH  No results found for: ESTROGEN  No results found for: PROLACTIN        MRI Brain: I personally reviewed the original images and agree with the below reports.   No results found for this or any previous visit.          Assessment and Plan  77 year old female with osteoporosis    Osteporosis  - ordered DXA since it has not done for several years    - based on the result, we may initiate prolia    - continue current calcium supplement    - BMP, vitamin D level to check     CKD  Stage 3, prefers Prolia rather than Reclast    RTC with me in 1 year      45 minutes spent on the date of the encounter doing chart review, history and exam, documentation and further activities as noted above.        Lore Ferrera MD  Staff Physician  Endocrinology and Metabolism  License: [id number]      Again, thank you for allowing me to participate in the care of your patient.        Sincerely,        Lore Ferrera MD

## 2022-10-10 RX ORDER — OMEPRAZOLE 40 MG/1
CAPSULE, DELAYED RELEASE ORAL
Qty: 90 CAPSULE | Refills: 0 | Status: SHIPPED | OUTPATIENT
Start: 2022-10-10 | End: 2023-01-16

## 2022-10-12 ENCOUNTER — TRANSFERRED RECORDS (OUTPATIENT)
Dept: HEALTH INFORMATION MANAGEMENT | Facility: CLINIC | Age: 77
End: 2022-10-12

## 2022-10-13 ENCOUNTER — ANCILLARY PROCEDURE (OUTPATIENT)
Dept: CT IMAGING | Facility: CLINIC | Age: 77
End: 2022-10-13
Payer: COMMERCIAL

## 2022-10-13 DIAGNOSIS — J44.9 STAGE 3 SEVERE COPD BY GOLD CLASSIFICATION (H): ICD-10-CM

## 2022-10-13 PROCEDURE — 71250 CT THORAX DX C-: CPT | Mod: GC | Performed by: RADIOLOGY

## 2022-10-31 ENCOUNTER — MYC MEDICAL ADVICE (OUTPATIENT)
Dept: PULMONOLOGY | Facility: CLINIC | Age: 77
End: 2022-10-31

## 2022-10-31 DIAGNOSIS — J44.9 STAGE 3 SEVERE COPD BY GOLD CLASSIFICATION (H): ICD-10-CM

## 2022-12-03 DIAGNOSIS — E78.5 DYSLIPIDEMIA: ICD-10-CM

## 2022-12-06 ENCOUNTER — MYC MEDICAL ADVICE (OUTPATIENT)
Dept: FAMILY MEDICINE | Facility: CLINIC | Age: 77
End: 2022-12-06

## 2022-12-06 DIAGNOSIS — I10 ESSENTIAL HYPERTENSION WITH GOAL BLOOD PRESSURE LESS THAN 140/90: ICD-10-CM

## 2022-12-06 RX ORDER — ATORVASTATIN CALCIUM 20 MG/1
TABLET, FILM COATED ORAL
Qty: 90 TABLET | Refills: 0 | Status: SHIPPED | OUTPATIENT
Start: 2022-12-06 | End: 2023-03-08

## 2022-12-06 RX ORDER — LOSARTAN POTASSIUM 100 MG/1
100 TABLET ORAL DAILY
Qty: 90 TABLET | Refills: 3 | Status: SHIPPED | OUTPATIENT
Start: 2022-12-06 | End: 2024-01-22

## 2022-12-14 ENCOUNTER — E-VISIT (OUTPATIENT)
Dept: FAMILY MEDICINE | Facility: CLINIC | Age: 77
End: 2022-12-14
Payer: COMMERCIAL

## 2022-12-14 DIAGNOSIS — R11.0 NAUSEA: Primary | ICD-10-CM

## 2022-12-14 PROCEDURE — 99422 OL DIG E/M SVC 11-20 MIN: CPT | Performed by: FAMILY MEDICINE

## 2022-12-14 RX ORDER — ONDANSETRON 8 MG/1
8 TABLET, FILM COATED ORAL EVERY 8 HOURS PRN
Qty: 12 TABLET | Refills: 0 | Status: SHIPPED | OUTPATIENT
Start: 2022-12-14 | End: 2023-08-29

## 2022-12-19 ENCOUNTER — E-VISIT (OUTPATIENT)
Dept: FAMILY MEDICINE | Facility: CLINIC | Age: 77
End: 2022-12-19
Payer: COMMERCIAL

## 2022-12-19 DIAGNOSIS — M54.50 LOW BACK PAIN, UNSPECIFIED BACK PAIN LATERALITY, UNSPECIFIED CHRONICITY, UNSPECIFIED WHETHER SCIATICA PRESENT: Primary | ICD-10-CM

## 2022-12-19 PROCEDURE — 99207 PR NO BILLABLE SERVICE THIS VISIT: CPT | Performed by: FAMILY MEDICINE

## 2022-12-20 RX ORDER — TRAMADOL HYDROCHLORIDE 50 MG/1
50 TABLET ORAL 2 TIMES DAILY PRN
Qty: 12 TABLET | Refills: 0 | Status: SHIPPED | OUTPATIENT
Start: 2022-12-20

## 2023-01-01 NOTE — PROGRESS NOTES
Iris is a 71 year old female here for follow-up and also address a new problem:     Palpitations - this has been occurring once per week, lasting about 5 minutes. It causes her to sweat and have shortness of breath. No chest pain. She also gets dizzy with it. Denies weight changes or temperature intolerance.  Evaluation and treatment:                        EKG today is normal.                        Check CMP, CBC and TSH                        She is advised what symptoms would warrant urgent or emergent attention.    Metastatic endometrial adenocarcinoma - discovered after she presented with abdominal pain since early July 2015. She follows with oncology.     Chief complaint: Palpitations    HPI: Ms. Iris Carrion is a 72 year old  female who presents today with palpitations for the last 4-5 months. She feels them about 2 times a month. She feels dizzy when it starts and she has to sit down until it stops. She feels that her heart rate goes really fast fast, make her feel sweaty. She is also aware of the offset as well. Her episodes can last between 5min to 20 min. No chest pain during palpitations. Her last episode was a few days ago lasting for about 5 min.She has COPD and she feels SOB all the time. Her ROS is negative except abdominal pain due to her IBS.      She doesnot have a hx of CAD or structural heart disease. HT+, DM-, smoking+ (30 pack years)      Personal, family, and social history reviewed with patient and revised.    PAST MEDICAL HISTORY:  Past Medical History:   Diagnosis Date     Cervical high risk HPV (human papillomavirus) test positive 10/31/12    type 18     COPD (chronic obstructive pulmonary disease) (H)      GERD (gastroesophageal reflux disease)      Hx of colposcopy with cervical biopsy 11/2012    negative     Hypertension 2013     Osteoporosis      Paroxysmal supraventricular tachycardia (H) 9/6/2017     Peritoneal carcinomatosis (H) 8/24/2015     Pneumonia      Uncomplicated  This is a Term female   born 2021  8:50 AM at Gestational Age: 39w5d now at age: 2 day old    VS OK last 24hrs.  Wt and bili pending this AM.  Good voids/stools    Physical Exam:  Visit Vitals  Pulse 140   Temp 99.1 °F (37.3 °C) (Axillary)   Resp 48   Ht 19.5\" (49.5 cm) Comment: Filed from Delivery Summary   Wt 3280 g   HC 35 cm (13.78\") Comment: Filed from Delivery Summary   SpO2 93%   BMI 13.37 kg/m²     Birthweight: 7 lb 7.9 oz (3400 g)  With Weight change: -120 g .-4% since birth  Discharge weight: Weight: 3280 g  Length: 19.5\"  Pink, well perfused, Anterior [place] - soft, flat, OFC: 35 cm   Normal Facies, no anomalies/bruising noted, no cleft noted  Lungs clear to ascultation, No murmur  Abdomen - soft, no masses, non distended  Normal tone, movement, alexander    Intake/Output       10/12 0700  10/13 0659 10/13 0700  10/14 0659    Urine (mL/kg/hr)      Total Output(mL/kg)      Net            Urine Occurrence 2 x     Stool Occurrence 4 x     Unmeasured Breast Milk Occurrence 10 x         Last Feeding:    Last Void: 1 (10/13/23 014)  Stool: 1 (10/13/23 014)    LabsNo results found for this or any previous visit (from the past 48 hour(s)).  Meds:   Current Facility-Administered Medications   Medication   • dextrose (GLUTOSE) 0.4 g/mL (40%) gel 1.75 mL   • lidocaine HCl (PF) (XYLOCAINE) 1 % injection 10 mg     Immunizations:   Most Recent Immunizations   Administered Date(s) Administered   • Hep B, adolescent or pediatric 2023      Impression: Term female [place]  Plan: Discharge home with mother  Spoke with mother    Discharge Planning:    State Screen: 10/12/23 (10/12/23 1012)   Hearing Screen:  Hearing Test Machine: Auditory Brainstem Response (Algo) (10/12/23 1003)   Hearing Test Results: Pass R, Pass L (10/12/23 1003)   Last TCB: 7.2 (10/12/23 0949) at 25 Hrs (10/12/23 0949) hrs of age.   Congenital Heart Disease Screen: Normal (10/12/23 1002)   Circumcision:      Immunizations:   Most Recent Immunizations   Administered Date(s) Administered   • Hep B, adolescent or pediatric 2023      Car Seat Test:     Parental CPR:     Pediatrician:      Follow Up:  Sunny   asthma 1980       CURRENT MEDICATIONS:  Current Outpatient Prescriptions   Medication Sig Dispense Refill     chlorthalidone (HYGROTON) 25 MG tablet Take 1 tablet (25 mg) by mouth daily 90 tablet 0     budesonide-formoterol (SYMBICORT) 160-4.5 MCG/ACT Inhaler Inhale 2 puffs into the lungs 2 times daily 1 Inhaler 5     omeprazole (PRILOSEC) 40 MG capsule Take 1 capsule (40 mg) by mouth daily Take 30-60 minutes before a meal. 90 capsule 1     tiotropium (SPIRIVA HANDIHALER) 18 MCG capsule Inhale 1 capsule (18 mcg) into the lungs daily Inhale contents of one capsule. . 90 capsule 2     loratadine (CLARITIN) 10 MG tablet Take 10 mg by mouth daily       fluticasone (FLONASE) 50 MCG/ACT spray Spray 2 sprays into both nostrils daily 1 Bottle 1     ipratropium - albuterol 0.5 mg/2.5 mg/3 mL (DUONEB) 0.5-2.5 (3) MG/3ML neb solution Take 1 vial (3 mLs) by nebulization every 4 hours as needed for shortness of breath / dyspnea or wheezing 30 vial 5     potassium chloride SA (POTASSIUM CHLORIDE) 20 MEQ tablet Take 1 tablet (20 mEq) by mouth 2 times daily 60 tablet 0     albuterol (PROAIR HFA, PROVENTIL HFA, VENTOLIN HFA) 108 (90 BASE) MCG/ACT inhaler Inhale 2 puffs into the lungs every 6 hours as needed for shortness of breath / dyspnea or wheezing 1 Inhaler 3     ibuprofen (ADVIL,MOTRIN) 600 MG tablet Take 1 tablet (600 mg) by mouth every 6 hours as needed for moderate pain 40 tablet 0     MAGNESIUM OXIDE PO Take 200 mg by mouth 2 times daily       Calcium Carbonate (CALCIUM 500 PO) Take 1 tablet by mouth daily.       BIOTIN PO Take 1 tablet by mouth daily.       Zinc Sulfate (ZINC 15 PO) Take by mouth daily         PAST SURGICAL HISTORY:  Past Surgical History:   Procedure Laterality Date     CHOLECYSTECTOMY  6/2014     COLONOSCOPY  12/4/2012    Procedure: COLONOSCOPY;  COLONOSCOPY SCREEN;  Surgeon: Mushtaq Lara MD;  Location: [facility] OR     GYN SURGERY       HERNIORRHAPHY HIATAL N/A 11/25/2015    Procedure: HERNIORRHAPHY  HIATAL;  Surgeon: Chip Carty MD;  Location: [facility] OR     HYSTERECTOMY TOTAL ABD, ALVIN SALPINGO-OOPHORECTOMY, NODE DISSECTION, TUMOR DEBULKING, COMBINED Bilateral 2015    Procedure: COMBINED HYSTERECTOMY TOTAL ABDOMINAL, SALPINGO-OOPHORECTOMY, NODE DISSECTION, TUMOR DEBULKING;  Surgeon: Emma Cabrera MD;  Location: [facility] OR       ALLERGIES:     Allergies   Allergen Reactions     Levaquin Rash       FAMILY HISTORY:  Family History   Problem Relation Age of Onset     CANCER Brother      testicular     DIABETES Sister      Ovarian Cancer Mother 60     DIABETES Mother           Asthma Father           DIABETES Sister      Depression/Anxiety Daughter      Depression Daughter      OSTEOPOROSIS Sister      Other Cancer Brother      DIABETES Brother      Depression Other      Grandson         SOCIAL HISTORY:  Social History   Substance Use Topics     Smoking status: Former Smoker     Packs/day: 1.00     Years: 30.00     Types: Cigarettes     Start date: 1965     Quit date: 10/10/2003     Smokeless tobacco: Never Used     Alcohol use No       ROS:   Constitutional: No fever, chills, or sweats. Weight stable.   ENT: No visual disturbance, ear ache, epistaxis, sore throat.   Cardiovascular: As per HPI.   Respiratory: No cough, hemoptysis.    GI: No nausea, vomiting, hematemesis, melena, or hematochezia.   : No hematuria.   Integument: Negative.   Psychiatric: Negative.   Hematologic:  Easy bruising, no easy bleeding.  Neuro: Negative.   Endocrinology: No significant heat or cold intolerance   Musculoskeletal: No myalgia.    Exam:  /80 (BP Location: Left arm, Patient Position: Chair, Cuff Size: Adult Regular)  Pulse 71  SpO2 98%  GENERAL APPEARANCE: healthy, alert and no distress  HEENT: no icterus, no central cyanosis  LYMPH/NECK: no adenopathy, no asymmetry, JVP not elevated, no cartid bruits.  RESPIRATORY: lungs clear to auscultation - no rales, rhonchi or wheezes, no use  of accessory muscles, no retractions, respirations are unlabored, normal respiratory rate  CARDIOVASCULAR: regular rhythm, normal S1, S2, no S3 or S4 and no murmur, click or rub, precordium quiet with normal PMI.  GI: soft, non tender  EXTREMITIES: peripheral pulses normal, no edema  NEURO: alert and oriented to person/place/time, normal speech,and affect  VASC: Radial, dorsalis pedis and posterior tibialis pulses are normal in volumes and symmetric bilaterally.   SKIN: no ecchymoses, no rashes     I have reviewed the labs and personally reviewed the imaging below and made my comment in the assessment and plan.    Labs:  CBC RESULTS:   Lab Results   Component Value Date    WBC 7.0 08/04/2017    RBC 5.10 08/04/2017    HGB 15.5 08/04/2017    HCT 46.2 08/04/2017    MCV 91 08/04/2017    MCH 30.4 08/04/2017    MCHC 33.5 08/04/2017    RDW 13.0 08/04/2017     08/04/2017       BMP RESULTS:  Lab Results   Component Value Date     08/04/2017    POTASSIUM 3.4 08/04/2017    CHLORIDE 102 08/04/2017    CO2 30 08/04/2017    ANIONGAP 6 08/04/2017     (H) 08/04/2017    BUN 24 08/04/2017    CR 0.92 08/04/2017    GFRESTIMATED 60 (L) 08/04/2017    GFRESTBLACK 73 08/04/2017    MAURISIO 9.5 08/04/2017          Procedures:    Zio-Patch: 2 week Zio-Patch showed SVT episodes max 16 bpm. She reported no symptoms during the entire time.      EKG 8/4/2017 normal ECG from [facility].      Assessment and Plan:      is a 72 year old ladt with PMH of metastatic endometrial carcinoma     1. Palpitations: Recently she had a 2 week Zio patch with short SVTs up to 16 bpm but she was asymptomatic during the time. I have discussed the options to try to capture those symptomatic episodes with repeat ECG monitoring versus low dose prophylactic beta blocker (since she has a hint of SVT in Zio Patch) versus catheter ablation. She feels dizzy during her episodes. We decided to do a longer ECG monitoring to capture one of her symptomatic  episodes to decide on the treatment plan. She is happy with the plan.    2.COPD  3. Hypertension: She is on 25 mg chlorthalidone qday and K replacement. Her BP is under control.    It was my absolute pleasure to meet Mrs. Casillas in the office today. Please donot hesitate to contact me if you have any questions or concerns.    Yolanda VEGA MD  [facility] Division of Cardiology  Pager [phone]

## 2023-01-08 ENCOUNTER — HEALTH MAINTENANCE LETTER (OUTPATIENT)
Age: 78
End: 2023-01-08

## 2023-01-09 DIAGNOSIS — K21.9 GASTROESOPHAGEAL REFLUX DISEASE, UNSPECIFIED WHETHER ESOPHAGITIS PRESENT: ICD-10-CM

## 2023-01-11 ENCOUNTER — MYC REFILL (OUTPATIENT)
Dept: FAMILY MEDICINE | Facility: CLINIC | Age: 78
End: 2023-01-11

## 2023-01-11 DIAGNOSIS — K21.9 GASTROESOPHAGEAL REFLUX DISEASE, UNSPECIFIED WHETHER ESOPHAGITIS PRESENT: ICD-10-CM

## 2023-01-13 ENCOUNTER — MYC REFILL (OUTPATIENT)
Dept: FAMILY MEDICINE | Facility: CLINIC | Age: 78
End: 2023-01-13

## 2023-01-13 DIAGNOSIS — K21.9 GASTROESOPHAGEAL REFLUX DISEASE, UNSPECIFIED WHETHER ESOPHAGITIS PRESENT: ICD-10-CM

## 2023-01-13 RX ORDER — OMEPRAZOLE 40 MG/1
CAPSULE, DELAYED RELEASE ORAL
Qty: 90 CAPSULE | Refills: 0 | Status: CANCELLED | OUTPATIENT
Start: 2023-01-13

## 2023-01-13 NOTE — TELEPHONE ENCOUNTER
See MyChart request from today in Chart Review.  Patient checking on refill request, only has 4 capsules left.

## 2023-01-16 RX ORDER — OMEPRAZOLE 40 MG/1
40 CAPSULE, DELAYED RELEASE ORAL DAILY
Qty: 90 CAPSULE | Refills: 1 | Status: SHIPPED | OUTPATIENT
Start: 2023-01-16 | End: 2024-02-13

## 2023-01-16 RX ORDER — OMEPRAZOLE 40 MG/1
CAPSULE, DELAYED RELEASE ORAL
Qty: 90 CAPSULE | Refills: 0 | Status: SHIPPED | OUTPATIENT
Start: 2023-01-16 | End: 2024-02-13

## 2023-01-24 ENCOUNTER — OFFICE VISIT (OUTPATIENT)
Dept: FAMILY MEDICINE | Facility: CLINIC | Age: 78
End: 2023-01-24
Payer: COMMERCIAL

## 2023-01-24 VITALS
HEART RATE: 117 BPM | SYSTOLIC BLOOD PRESSURE: 143 MMHG | DIASTOLIC BLOOD PRESSURE: 76 MMHG | BODY MASS INDEX: 18.06 KG/M2 | OXYGEN SATURATION: 94 % | HEIGHT: 65 IN | TEMPERATURE: 97.3 F | WEIGHT: 108.4 LBS | RESPIRATION RATE: 16 BRPM

## 2023-01-24 DIAGNOSIS — R11.10 DRY HEAVES: Primary | ICD-10-CM

## 2023-01-24 PROCEDURE — 99214 OFFICE O/P EST MOD 30 MIN: CPT | Performed by: FAMILY MEDICINE

## 2023-01-24 RX ORDER — ONDANSETRON 4 MG/1
4 TABLET, ORALLY DISINTEGRATING ORAL EVERY 12 HOURS PRN
Qty: 60 TABLET | Refills: 1 | Status: SHIPPED | OUTPATIENT
Start: 2023-01-24 | End: 2024-02-13

## 2023-01-24 RX ORDER — PYRIDOXINE HCL (VITAMIN B6) 25 MG
25 TABLET ORAL DAILY
Qty: 90 TABLET | Refills: 1 | Status: SHIPPED | OUTPATIENT
Start: 2023-01-24 | End: 2023-07-19

## 2023-01-24 NOTE — PROGRESS NOTES
Assessment & Plan     Dry heaves  Iris Carrion is a 77 year old female who presents today for concern of dry heaves going on for 3 months   symptoms are intermittent , occur about 2 times per week , no dysphagia or odynphagia  She has longstanding hx of GERD and is on PPI - no specific triggers for her symptoms    I had a long discussion with the patient about her condition , diagnosis treatment options. We discussed diffenetial diagnosis, and expected course. Unclear etiology at this time   I recommend the following :  - ondansetron (ZOFRAN ODT) 4 MG ODT tab; Take 1 tablet (4 mg) by mouth every 12 hours as needed for nausea  - Adult GI  Referral - Consult Only; Future  - pyridOXINE (VITAMIN B6) 25 MG tablet; Take 1 tablet (25 mg) by mouth daily  Advised to sleep with head elevated  If not better follow up with GI , referral placed   Patient verbalized understanding and agreed on the plan of care. All questions answered.                  No follow-ups on file.    Aliza Nguyen MD  [facility]   Iris is a 77 year old, presenting for the following health issues:  Vomiting (Pt said that she has been vomiting within the last 2-3 weeks. )      History of Present Illness       Reason for visit:  Continued vomiting and stomach pain  Symptom onset:  More than a month  Symptoms include:  Vomiting and lower stomach pain  Symptom intensity:  Moderate  Symptom progression:  Worsening  Had these symptoms before:  No  What makes it worse:  No  What makes it better:  No    She eats 0-1 servings of fruits and vegetables daily.She consumes 1 sweetened beverage(s) daily.She exercises with enough effort to increase her heart rate 9 or less minutes per day.  She exercises with enough effort to increase her heart rate 3 or less days per week.   She is taking medications regularly.         Vomiting started 3 months ago and has been intermittent. Episodes occur every 3 days or so , not  "daily , no nausea. Not feeling sick  - mainly occurs in the morning after she gets up and it is mainly dry heaves     She is currently taking omeprazole 40 mg daily   No specific trigger , she feels better after vomiting    She had EGD at age 22 years and showed acid reflux   CT chest 10/13/2022  IMPRESSION:   1. Stable severe centrilobular and paraseptal emphysematous changes of  the lung parenchyma with bullous change most notable at the right lung  apex.  2. Stable pulmonary nodularity as detailed above.  3. Increased anterior wedging of T7, age indeterminate.  4. Calcified mediastinal/hilar lymphadenopathy and calcified  granulomata throughout the lungs, likely a sequelae of chronic  granulomatous disease    Review of Systems   Constitutional, HEENT, cardiovascular, pulmonary, gi and gu systems are negative, except as otherwise noted.      Objective    BP (!) 143/76   Pulse 117   Temp 97.3  F (36.3  C) (Tympanic)   Resp 16   Ht 1.645 m (5' 4.75\")   Wt 49.2 kg (108 lb 6.4 oz)   SpO2 94%   BMI 18.18 kg/m    Body mass index is 18.18 kg/m .  Physical Exam  Vitals and nursing note reviewed.   Constitutional:       General: She is not in acute distress.     Appearance: Normal appearance. She is not ill-appearing, toxic-appearing or diaphoretic.      Comments: On oxygen    Neurological:      Mental Status: She is alert.                            "

## 2023-01-26 ASSESSMENT — ENCOUNTER SYMPTOMS
MYALGIAS: 0
PALPITATIONS: 0
SORE THROAT: 0
HEMATOCHEZIA: 0
SHORTNESS OF BREATH: 1
NERVOUS/ANXIOUS: 0
DIZZINESS: 0
HEMATURIA: 0
WEAKNESS: 0
PARESTHESIAS: 0
COUGH: 1
BREAST MASS: 0
DIARRHEA: 0
CHILLS: 0
FEVER: 0
DYSURIA: 0
FREQUENCY: 0
HEADACHES: 0
HEARTBURN: 0
NAUSEA: 0
ARTHRALGIAS: 0
ABDOMINAL PAIN: 0
EYE PAIN: 0
CONSTIPATION: 0
JOINT SWELLING: 0

## 2023-01-26 ASSESSMENT — ACTIVITIES OF DAILY LIVING (ADL): CURRENT_FUNCTION: NO ASSISTANCE NEEDED

## 2023-01-29 DIAGNOSIS — R06.02 SHORTNESS OF BREATH: ICD-10-CM

## 2023-02-01 RX ORDER — AMLODIPINE BESYLATE 5 MG/1
5 TABLET ORAL DAILY
Qty: 90 TABLET | Refills: 0 | Status: SHIPPED | OUTPATIENT
Start: 2023-02-01 | End: 2023-05-08

## 2023-02-01 NOTE — TELEPHONE ENCOUNTER
Last Clinic Visit: 7/29/2022 [facility]     BP above medication protocol parameters: Refill of Amlodipine was sent for 90 day supply and FYI to [facility].    BP Readings from Last 3 Encounters:   01/24/23 (!) 143/76   08/08/22 137/69   07/29/22 (!) 175/71

## 2023-02-02 ENCOUNTER — OFFICE VISIT (OUTPATIENT)
Dept: FAMILY MEDICINE | Facility: CLINIC | Age: 78
End: 2023-02-02
Payer: COMMERCIAL

## 2023-02-02 VITALS
HEART RATE: 65 BPM | DIASTOLIC BLOOD PRESSURE: 74 MMHG | RESPIRATION RATE: 18 BRPM | TEMPERATURE: 97.1 F | BODY MASS INDEX: 17.83 KG/M2 | OXYGEN SATURATION: 95 % | HEIGHT: 65 IN | SYSTOLIC BLOOD PRESSURE: 131 MMHG | WEIGHT: 107 LBS

## 2023-02-02 DIAGNOSIS — Z12.31 VISIT FOR SCREENING MAMMOGRAM: ICD-10-CM

## 2023-02-02 DIAGNOSIS — Z23 NEED FOR PROPHYLACTIC VACCINATION AND INOCULATION AGAINST INFLUENZA: ICD-10-CM

## 2023-02-02 DIAGNOSIS — Z23 HIGH PRIORITY FOR 2019-NCOV VACCINE: ICD-10-CM

## 2023-02-02 DIAGNOSIS — Z00.00 ENCOUNTER FOR MEDICARE ANNUAL WELLNESS EXAM: Primary | ICD-10-CM

## 2023-02-02 DIAGNOSIS — T38.0X5A STEROID-INDUCED OSTEOPOROSIS: ICD-10-CM

## 2023-02-02 DIAGNOSIS — M81.8 STEROID-INDUCED OSTEOPOROSIS: ICD-10-CM

## 2023-02-02 LAB
BASOPHILS # BLD AUTO: 0.1 10E3/UL (ref 0–0.2)
BASOPHILS NFR BLD AUTO: 1 %
EOSINOPHIL # BLD AUTO: 0.4 10E3/UL (ref 0–0.7)
EOSINOPHIL NFR BLD AUTO: 5 %
ERYTHROCYTE [DISTWIDTH] IN BLOOD BY AUTOMATED COUNT: 12.9 % (ref 10–15)
HCT VFR BLD AUTO: 40.5 % (ref 35–47)
HGB BLD-MCNC: 13 G/DL (ref 11.7–15.7)
LYMPHOCYTES # BLD AUTO: 0.6 10E3/UL (ref 0.8–5.3)
LYMPHOCYTES NFR BLD AUTO: 8 %
MCH RBC QN AUTO: 30.9 PG (ref 26.5–33)
MCHC RBC AUTO-ENTMCNC: 32.1 G/DL (ref 31.5–36.5)
MCV RBC AUTO: 96 FL (ref 78–100)
MONOCYTES # BLD AUTO: 0.5 10E3/UL (ref 0–1.3)
MONOCYTES NFR BLD AUTO: 6 %
NEUTROPHILS # BLD AUTO: 6.4 10E3/UL (ref 1.6–8.3)
NEUTROPHILS NFR BLD AUTO: 80 %
PLATELET # BLD AUTO: 311 10E3/UL (ref 150–450)
RBC # BLD AUTO: 4.21 10E6/UL (ref 3.8–5.2)
WBC # BLD AUTO: 8 10E3/UL (ref 4–11)

## 2023-02-02 PROCEDURE — 90662 IIV NO PRSV INCREASED AG IM: CPT | Performed by: FAMILY MEDICINE

## 2023-02-02 PROCEDURE — 36415 COLL VENOUS BLD VENIPUNCTURE: CPT | Performed by: FAMILY MEDICINE

## 2023-02-02 PROCEDURE — 82306 VITAMIN D 25 HYDROXY: CPT | Performed by: FAMILY MEDICINE

## 2023-02-02 PROCEDURE — G0008 ADMIN INFLUENZA VIRUS VAC: HCPCS | Performed by: FAMILY MEDICINE

## 2023-02-02 PROCEDURE — 0124A COVID-19 VACCINE BIVALENT BOOSTER 12+ (PFIZER): CPT | Performed by: FAMILY MEDICINE

## 2023-02-02 PROCEDURE — 91312 COVID-19 VACCINE BIVALENT BOOSTER 12+ (PFIZER): CPT | Performed by: FAMILY MEDICINE

## 2023-02-02 PROCEDURE — 80048 BASIC METABOLIC PNL TOTAL CA: CPT | Performed by: FAMILY MEDICINE

## 2023-02-02 PROCEDURE — 99397 PER PM REEVAL EST PAT 65+ YR: CPT | Mod: 25 | Performed by: FAMILY MEDICINE

## 2023-02-02 PROCEDURE — 80061 LIPID PANEL: CPT | Performed by: FAMILY MEDICINE

## 2023-02-02 PROCEDURE — 85025 COMPLETE CBC W/AUTO DIFF WBC: CPT | Performed by: FAMILY MEDICINE

## 2023-02-02 ASSESSMENT — ENCOUNTER SYMPTOMS
CONSTIPATION: 0
CHILLS: 0
BREAST MASS: 0
WEAKNESS: 0
DIZZINESS: 0
FEVER: 0
NERVOUS/ANXIOUS: 0
ARTHRALGIAS: 0
SORE THROAT: 0
HEADACHES: 0
COUGH: 1
MYALGIAS: 0
FREQUENCY: 0
SHORTNESS OF BREATH: 1
PARESTHESIAS: 0
HEMATOCHEZIA: 0
HEARTBURN: 0
PALPITATIONS: 0
JOINT SWELLING: 0
EYE PAIN: 0
ABDOMINAL PAIN: 0
DYSURIA: 0
DIARRHEA: 0
HEMATURIA: 0
NAUSEA: 0

## 2023-02-02 ASSESSMENT — PAIN SCALES - GENERAL: PAINLEVEL: NO PAIN (0)

## 2023-02-02 ASSESSMENT — ACTIVITIES OF DAILY LIVING (ADL): CURRENT_FUNCTION: NO ASSISTANCE NEEDED

## 2023-02-02 NOTE — TELEPHONE ENCOUNTER
Pt is following up with Dr Mcgregor as needed. No advanced heart failure.   Follow up regarding HTN with PCP.     Letter sent to pt

## 2023-02-02 NOTE — PROGRESS NOTES
"   SUBJECTIVE:   CC: Iris is an 77 year old who presents for preventive health visit.     Healthy Habits:     In general, how would you rate your overall health?  Good    Frequency of exercise:  2-3 days/week    Duration of exercise:  Less than 15 minutes    Do you usually eat at least 4 servings of fruit and vegetables a day, include whole grains    & fiber and avoid regularly eating high fat or \"junk\" foods?  No    Taking medications regularly:  Yes    Medication side effects:  None    Ability to successfully perform activities of daily living:  No assistance needed    Home Safety:  No safety concerns identified    Hearing Impairment:  No hearing concerns    In the past 6 months, have you been bothered by leaking of urine?  No    In general, how would you rate your overall mental or emotional health?  Good      PHQ-2 Total Score: 0    Additional concerns today:  No    GERD - chronic symptoms of burning type of pain upper abdomen and sometimes up to the chest. Spicy foods make it worse.  Evaluation and treatment:    Omeprazole 40 mg daily - no side effects - symptoms come back if not taking or taking 20 mg.   Continue same tx.     Metastatic endometrial adenocarcinoma - discovered after she presented with abdominal pain since early July 2015.   Evaluation and treatment:    She follows with oncology.     Previous Paroxysmal SVT - used to be once per week but now less frequently lasting about 5 minutes. It causes her to be dizzy, sweat and have shortness of breath. No chest pain. Denies weight changes or temperature intolerance.  Evaluation and treatment:   EKG 8/4/18 was normal.   Previous labs were fine including CMP, CBC and TSH   Saw cardiology 9/27/17 - no further evaluation was suggested.   Echo 10/11/17 negative.    HTN with hypokalemia - not checking at home. Controlled in clinic.    Evaluation and treatment:   Clorthalidone and KCL - not taking since not needed.    Losartan 100 mg daily - no side effects. " Continue same tx.    BP Readings from Last 6 Encounters:   02/02/23 131/74   01/24/23 (!) 143/76   08/08/22 137/69   07/29/22 (!) 175/71   05/05/22 123/65   05/01/22 (!) 158/73     Last Comprehensive Metabolic Panel:  Sodium   Date Value Ref Range Status   10/07/2021 140 133 - 144 mmol/L Final   08/02/2019 139 133 - 144 mmol/L Final     Potassium   Date Value Ref Range Status   10/07/2021 3.6 3.4 - 5.3 mmol/L Final   08/02/2019 3.7 3.4 - 5.3 mmol/L Final     Chloride   Date Value Ref Range Status   10/07/2021 107 94 - 109 mmol/L Final   08/02/2019 105 94 - 109 mmol/L Final     Carbon Dioxide   Date Value Ref Range Status   08/02/2019 27 20 - 32 mmol/L Final     Carbon Dioxide (CO2)   Date Value Ref Range Status   10/07/2021 28 20 - 32 mmol/L Final     Anion Gap   Date Value Ref Range Status   10/07/2021 5 3 - 14 mmol/L Final   08/02/2019 7 3 - 14 mmol/L Final     Glucose   Date Value Ref Range Status   10/07/2021 104 (H) 70 - 99 mg/dL Final   08/02/2019 87 70 - 99 mg/dL Final     Comment:     Non Fasting     Urea Nitrogen   Date Value Ref Range Status   10/07/2021 25 7 - 30 mg/dL Final   08/02/2019 20 7 - 30 mg/dL Final     Creatinine   Date Value Ref Range Status   10/07/2021 1.11 (H) 0.52 - 1.04 mg/dL Final   08/02/2019 1.05 (H) 0.52 - 1.04 mg/dL Final     GFR Estimate   Date Value Ref Range Status   10/07/2021 49 (L) >60 mL/min/1.73m2 Final     Comment:     As of July 11, 2021, eGFR is calculated by the CKD-EPI creatinine equation, without race adjustment. eGFR can be influenced by muscle mass, exercise, and diet. The reported eGFR is an estimation only and is only applicable if the renal function is stable.   08/02/2019 53 (L) >60 mL/min/[1.73_m2] Final     Comment:     Non  GFR Calc  Starting 12/18/2018, serum creatinine based estimated GFR (eGFR) will be   calculated using the Chronic Kidney Disease Epidemiology Collaboration   (CKD-EPI) equation.       Calcium   Date Value Ref Range Status    10/07/2021 8.9 8.5 - 10.1 mg/dL Final   08/02/2019 9.3 8.5 - 10.1 mg/dL Final     COPD - Has 35 pack history of smoking, quit 2003. Symptoms are intermittent cough, shortness of breath and wheezing. No fevers or chest pain.  Evaluation and treatment:   Prednisone burst and Doxy for exacerbations.   Supplemental O2   She follows with pulmonology    Dyslipidemia - No history of CAD, CVA, PAD or diabetes.   Evaluation and treatment:    Per ATP4, moderate intensity statin recommended.   Lipitor 20 mg daily - no side effects.   Continue same tx.    The 10-year ASCVD risk score (Sirena FUENTES, et al., 2019) is: 21.4%    Values used to calculate the score:      Age: 77 years      Sex: Female      Is Non- : No      Diabetic: No      Tobacco smoker: No      Systolic Blood Pressure: 131 mmHg      Is BP treated: Yes      HDL Cholesterol: 69 mg/dL      Total Cholesterol: 143 mg/dL    Recent Labs   Lab Test 08/02/19  1246 09/10/18  1242 07/25/16  0857 07/18/15  0930   CHOL 143 148   < > 179   HDL 69 71   < > 48*   LDL 50 51   < > 93   TRIG 121 132   < > 189*   CHOLHDLRATIO  --   --   --  3.7    < > = values in this interval not displayed.       Lab Results   Component Value Date    ALT 20 09/10/2018     Gall stone pancreatitis - in June 2014 had cholecystectomy and MRCP to remove common bile duct stone. Fine since then.    Previous poor balance and weakness - no longer using walker. Now balance is fairly good. Has gone to PT.    Left great toe nail - is quite thickened at baseline.   Evaluation and treatment:    Removed per podiatry with good results.    Preventive - declines Shingrix vaccine. covid booster and flu shot given today      Immunization History   Administered Date(s) Administered     COVID-19 Vaccine 12+ (Pfizer) 04/23/2021, 05/14/2021     COVID-19 Vaccine Bivalent Booster 12+ (Pfizer) 02/02/2023     E8a7-20 Novel Flu 10/07/2010     Hpv, Unspecified  12/04/2017     Influenza (High Dose) 3  valent vaccine 2015, 2016, 2017, 09/10/2018, 2019     Influenza (IIV3) PF 10/18/2009, 10/31/2012, 2013, 2014     Influenza Vaccine 65+ (Fluzone HD) 2020, 10/07/2021, 2023     Pneumo Conj 13-V (2010&after) 2016     Pneumococcal 23 valent 10/18/2009, 2013     TD (ADULT, 7+) 10/07/2021     TDAP Vaccine (Adacel) 2011     Td (Adult), Adsorbed 2003     Zoster vaccine, live 2015     STD screen: declined     Mammogram: Negative 2/10/22 - repeat scheduled 23    PAP - has had hysterectomy    PAP NIL 2013  PAP NIL 10/31/2012    Colonoscopy: 18 at [facility] - repeat in 3 years. She plans to set it up.    DEXA: ordered multiple time previously but she told me they canceled on her more than once. Later declined. But now she says she is following with endocrinology for osteoporosis.     Hep C screen: Negative 7/18/15    Vit D: takes 5000 units per day    Advanced directive: referred previously and today.    Lung CA: does not qualify.      SH:    Marital status:   Kids: 2  Employment: retired, takes care of grand kids  Exercise: active  Tobacco: per HPI  Etoh: no  Recreational drugs: no  Caffeine: rarely      Today's PHQ-2 Score:   PHQ-2 (  Pfizer) 2023   Q1: Little interest or pleasure in doing things 0   Q2: Feeling down, depressed or hopeless 0   PHQ-2 Score 0   PHQ-2 Total Score (12-17 Years)- Positive if 3 or more points; Administer PHQ-A if positive -   Q1: Little interest or pleasure in doing things Not at all   Q2: Feeling down, depressed or hopeless Not at all   PHQ-2 Score 0           Social History     Tobacco Use     Smoking status: Former     Packs/day: 1.00     Years: 30.00     Pack years: 30.00     Types: Cigarettes     Start date: 1965     Quit date: 10/10/2003     Years since quittin.3     Passive exposure: Past     Smokeless tobacco: Never   Substance Use Topics     Alcohol use: No         Alcohol Use  "1/26/2023   Prescreen: >3 drinks/day or >7 drinks/week? Not Applicable   Prescreen: >3 drinks/day or >7 drinks/week? -       Reviewed orders with patient.  Reviewed health maintenance and updated orders accordingly - Yes      Breast Cancer Screening:      Pertinent mammograms are reviewed under the imaging tab.    History of abnormal Pap smear:   PAP / HPV 12/2/2013 10/31/2012   PAP (Historical) NIL NIL     Reviewed and updated as needed this visit by clinical staff   Tobacco  Allergies  Meds              Reviewed and updated as needed this visit by Provider                     Review of Systems   Constitutional: Negative for chills and fever.   HENT: Negative for congestion, ear pain, hearing loss and sore throat.    Eyes: Negative for pain and visual disturbance.   Respiratory: Positive for cough and shortness of breath.    Cardiovascular: Negative for chest pain, palpitations and peripheral edema.   Gastrointestinal: Negative for abdominal pain, constipation, diarrhea, heartburn, hematochezia and nausea.   Breasts:  Negative for tenderness, breast mass and discharge.   Genitourinary: Negative for dysuria, frequency, genital sores, hematuria, pelvic pain, urgency, vaginal bleeding and vaginal discharge.   Musculoskeletal: Negative for arthralgias, joint swelling and myalgias.   Skin: Negative for rash.   Neurological: Negative for dizziness, weakness, headaches and paresthesias.   Psychiatric/Behavioral: Negative for mood changes. The patient is not nervous/anxious.           OBJECTIVE:   /74   Pulse 65   Temp 97.1  F (36.2  C) (Tympanic)   Resp 18   Ht 5' 4.75\" (1.645 m)   Wt 107 lb (48.5 kg)   SpO2 95%   BMI 17.94 kg/m    Physical Exam  GENERAL: underweight, no distress  EYES: Eyes grossly normal to inspection, PERRL and conjunctivae and sclerae normal  HENT: ear canals and TM's normal, nose and mouth without ulcers or lesions  NECK: no adenopathy, no asymmetry, masses, or scars and thyroid " normal to palpation  RESP: lungs reduce air movement. O2 sat noted, on supplemental O2. No tachypnea or other signs of respiratory distress.  CV: regular rate and rhythm, normal S1 S2, no S3 or S4, no murmur, click or rub, no peripheral edema and peripheral pulses strong  ABDOMEN: soft, nontender, no hepatosplenomegaly, no masses and bowel sounds normal  MS: no gross musculoskeletal defects noted, no edema  SKIN: no suspicious lesions or rashes  NEURO: Normal strength and tone, mentation intact and speech normal  PSYCH: mentation appears normal, affect normal/bright    ASSESSMENT/PLAN:             COUNSELING:  Reviewed preventive health counseling, as reflected in patient instructions       Regular exercise       Healthy diet/nutrition        She reports that she quit smoking about 19 years ago. Her smoking use included cigarettes. She started smoking about 57 years ago. She has a 30.00 pack-year smoking history. She has been exposed to tobacco smoke. She has never used smokeless tobacco.    Assessment and Plan - Decision Making    1. Encounter for Medicare annual wellness exam    Results of today's exam given to patient verbally along with age appropriate preventive measures. Written instructions reviewed and handed to the patient.    - Lipid panel reflex to direct LDL Non-fasting  - CBC with platelets and differential    2. Visit for screening mammogram    Per HPI    - MA SCREENING DIGITAL BILAT - Future  (s+30); Future    3. Need for prophylactic vaccination and inoculation against influenza    Per HPI    - INFLUENZA, QUAD, HIGH DOSE, PF, 65YR + (FLUZONE HD)    4. High priority for 2019-nCoV vaccine    Per HPI    - COVID-19,PF,PFIZER BOOSTER BIVALENT 12+Yrs    5. Steroid-induced osteoporosis    Per HPI    - Basic metabolic panel  - Vitamin D Deficiency (D3 Only)      Written instructions given as follows:    Patient Instructions   I will contact you with results.

## 2023-02-03 LAB
ANION GAP SERPL CALCULATED.3IONS-SCNC: 6 MMOL/L (ref 3–14)
BUN SERPL-MCNC: 21 MG/DL (ref 7–30)
CALCIUM SERPL-MCNC: 9.3 MG/DL (ref 8.5–10.1)
CHLORIDE BLD-SCNC: 104 MMOL/L (ref 94–109)
CHOLEST SERPL-MCNC: 155 MG/DL
CO2 SERPL-SCNC: 31 MMOL/L (ref 20–32)
CREAT SERPL-MCNC: 1.07 MG/DL (ref 0.52–1.04)
DEPRECATED CALCIDIOL+CALCIFEROL SERPL-MC: 42 UG/L (ref 20–75)
FASTING STATUS PATIENT QL REPORTED: NO
GFR SERPL CREATININE-BSD FRML MDRD: 54 ML/MIN/1.73M2
GLUCOSE BLD-MCNC: 111 MG/DL (ref 70–99)
HDLC SERPL-MCNC: 66 MG/DL
LDLC SERPL CALC-MCNC: 66 MG/DL
NONHDLC SERPL-MCNC: 89 MG/DL
POTASSIUM BLD-SCNC: 3.8 MMOL/L (ref 3.4–5.3)
SODIUM SERPL-SCNC: 141 MMOL/L (ref 133–144)
TRIGL SERPL-MCNC: 115 MG/DL

## 2023-02-07 ENCOUNTER — ANCILLARY PROCEDURE (OUTPATIENT)
Dept: BONE DENSITY | Facility: CLINIC | Age: 78
End: 2023-02-07
Attending: INTERNAL MEDICINE
Payer: COMMERCIAL

## 2023-02-07 ENCOUNTER — ANCILLARY ORDERS (OUTPATIENT)
Dept: ENDOCRINOLOGY | Facility: CLINIC | Age: 78
End: 2023-02-07

## 2023-02-07 DIAGNOSIS — M81.8 STEROID-INDUCED OSTEOPOROSIS: ICD-10-CM

## 2023-02-07 DIAGNOSIS — N18.32 STAGE 3B CHRONIC KIDNEY DISEASE (H): ICD-10-CM

## 2023-02-07 DIAGNOSIS — T38.0X5A STEROID-INDUCED OSTEOPOROSIS: ICD-10-CM

## 2023-02-07 PROCEDURE — 77085 DXA BONE DENSITY AXL VRT FX: CPT | Performed by: INTERNAL MEDICINE

## 2023-02-09 NOTE — RESULT ENCOUNTER NOTE
Iraj Simmons,    Your glucose is a bit up but not worrisome. Your kidney function is also reduced but not worrisome.    All other results were fine.    Regards,    Pillo Koehler M.D.

## 2023-02-21 PROBLEM — M81.8 STEROID-INDUCED OSTEOPOROSIS: Status: ACTIVE | Noted: 2023-02-21

## 2023-02-21 PROBLEM — T38.0X5A STEROID-INDUCED OSTEOPOROSIS: Status: ACTIVE | Noted: 2023-02-21

## 2023-02-21 RX ORDER — DIPHENHYDRAMINE HYDROCHLORIDE 50 MG/ML
50 INJECTION INTRAMUSCULAR; INTRAVENOUS
Status: CANCELLED
Start: 2023-02-21

## 2023-02-21 RX ORDER — EPINEPHRINE 1 MG/ML
0.3 INJECTION, SOLUTION INTRAMUSCULAR; SUBCUTANEOUS EVERY 5 MIN PRN
Status: CANCELLED | OUTPATIENT
Start: 2023-02-21

## 2023-02-21 RX ORDER — ALBUTEROL SULFATE 0.83 MG/ML
2.5 SOLUTION RESPIRATORY (INHALATION)
Status: CANCELLED | OUTPATIENT
Start: 2023-02-21

## 2023-02-21 RX ORDER — METHYLPREDNISOLONE SODIUM SUCCINATE 125 MG/2ML
125 INJECTION, POWDER, LYOPHILIZED, FOR SOLUTION INTRAMUSCULAR; INTRAVENOUS
Status: CANCELLED
Start: 2023-02-21

## 2023-02-21 RX ORDER — MEPERIDINE HYDROCHLORIDE 25 MG/ML
25 INJECTION INTRAMUSCULAR; INTRAVENOUS; SUBCUTANEOUS EVERY 30 MIN PRN
Status: CANCELLED | OUTPATIENT
Start: 2023-02-21

## 2023-02-21 RX ORDER — ALBUTEROL SULFATE 90 UG/1
1-2 AEROSOL, METERED RESPIRATORY (INHALATION)
Status: CANCELLED
Start: 2023-02-21

## 2023-02-27 ENCOUNTER — NURSE TRIAGE (OUTPATIENT)
Dept: FAMILY MEDICINE | Facility: CLINIC | Age: 78
End: 2023-02-27
Payer: COMMERCIAL

## 2023-02-27 NOTE — TELEPHONE ENCOUNTER
This encounter is being created due to the [employer] message dated 2/26/2023 as written below:       Patient reports that she has a cold it is effecting her breathing. Oxygen dos not help with the cough and the breathing. She has had a cold for about 2 weeks it is gradually getting worse. She tested for COVID and she is negative.      Nursing advice: Due to the severity of her symptoms she is to be assessed in the E.R. now. She is to not drive herself. Patient was given signs and symptoms to call 911. Patient verbalizes good understanding, agrees with plan and states she needs no further support. Nayeli Warner R.FREDRICK.    Additional Information    Negative: SEVERE difficulty breathing (e.g., struggling for each breath, speaks in single words, pulse > 120)    Negative: Breathing stopped and hasn't returned    Negative: Choking on something    Negative: Bluish (or gray) lips or face    Negative: Difficult to awaken or acting confused (e.g., disoriented, slurred speech)    Negative: Passed out (i.e., fainted, collapsed and was not responding)    Negative: Wheezing started suddenly after medicine, an allergic food, or bee sting    Negative: Stridor    Negative: Slow, shallow and weak breathing    Negative: Sounds like a life-threatening emergency to the triager    Negative: MODERATE difficulty breathing (e.g., speaks in phrases, SOB even at rest, pulse 100-120) of new-onset or worse than normal    Protocols used: BREATHING DIFFICULTY-A-OH

## 2023-03-07 ENCOUNTER — MYC MEDICAL ADVICE (OUTPATIENT)
Dept: PULMONOLOGY | Facility: CLINIC | Age: 78
End: 2023-03-07
Payer: COMMERCIAL

## 2023-03-07 ENCOUNTER — MYC MEDICAL ADVICE (OUTPATIENT)
Dept: FAMILY MEDICINE | Facility: CLINIC | Age: 78
End: 2023-03-07
Payer: COMMERCIAL

## 2023-03-07 DIAGNOSIS — J44.9 CHRONIC OBSTRUCTIVE PULMONARY DISEASE, UNSPECIFIED COPD TYPE (H): Primary | ICD-10-CM

## 2023-03-07 NOTE — TELEPHONE ENCOUNTER
Contacted Iris to advise her to use Albuterol HFA until Duonebs are in stock again.  Review of medications show she is currently taking Incruse and Symbicort.  She will also follow up with pharmacy to see whether ipratropium and albuterol nebs are available separately.

## 2023-03-08 RX ORDER — ALBUTEROL SULFATE 0.83 MG/ML
2.5 SOLUTION RESPIRATORY (INHALATION) EVERY 4 HOURS PRN
Qty: 90 ML | Refills: 1 | Status: SHIPPED | OUTPATIENT
Start: 2023-03-08 | End: 2023-04-14

## 2023-03-09 ENCOUNTER — APPOINTMENT (OUTPATIENT)
Dept: LAB | Facility: CLINIC | Age: 78
End: 2023-03-09
Payer: COMMERCIAL

## 2023-03-09 ENCOUNTER — INFUSION THERAPY VISIT (OUTPATIENT)
Dept: INFUSION THERAPY | Facility: CLINIC | Age: 78
End: 2023-03-09
Payer: COMMERCIAL

## 2023-03-09 VITALS
RESPIRATION RATE: 16 BRPM | SYSTOLIC BLOOD PRESSURE: 125 MMHG | HEART RATE: 56 BPM | OXYGEN SATURATION: 98 % | DIASTOLIC BLOOD PRESSURE: 65 MMHG | TEMPERATURE: 97.5 F

## 2023-03-09 DIAGNOSIS — T38.0X5A STEROID-INDUCED OSTEOPOROSIS: Primary | ICD-10-CM

## 2023-03-09 DIAGNOSIS — N18.31 STAGE 3A CHRONIC KIDNEY DISEASE (H): ICD-10-CM

## 2023-03-09 DIAGNOSIS — M81.8 STEROID-INDUCED OSTEOPOROSIS: Primary | ICD-10-CM

## 2023-03-09 LAB
CALCIUM SERPL-MCNC: 10.2 MG/DL (ref 8.5–10.1)
CREAT SERPL-MCNC: 0.97 MG/DL (ref 0.52–1.04)
GFR SERPL CREATININE-BSD FRML MDRD: 61 ML/MIN/1.73M2

## 2023-03-09 PROCEDURE — 82565 ASSAY OF CREATININE: CPT | Performed by: NURSE PRACTITIONER

## 2023-03-09 PROCEDURE — 82310 ASSAY OF CALCIUM: CPT | Performed by: NURSE PRACTITIONER

## 2023-03-09 PROCEDURE — 99207 PR NO CHARGE LOS: CPT

## 2023-03-09 PROCEDURE — 96372 THER/PROPH/DIAG INJ SC/IM: CPT | Performed by: NURSE PRACTITIONER

## 2023-03-09 PROCEDURE — 36415 COLL VENOUS BLD VENIPUNCTURE: CPT | Performed by: NURSE PRACTITIONER

## 2023-03-09 RX ORDER — DIPHENHYDRAMINE HYDROCHLORIDE 50 MG/ML
50 INJECTION INTRAMUSCULAR; INTRAVENOUS
Status: CANCELLED
Start: 2023-09-05

## 2023-03-09 RX ORDER — MEPERIDINE HYDROCHLORIDE 25 MG/ML
25 INJECTION INTRAMUSCULAR; INTRAVENOUS; SUBCUTANEOUS EVERY 30 MIN PRN
Status: CANCELLED | OUTPATIENT
Start: 2023-09-05

## 2023-03-09 RX ORDER — ALBUTEROL SULFATE 90 UG/1
1-2 AEROSOL, METERED RESPIRATORY (INHALATION)
Status: CANCELLED
Start: 2023-09-05

## 2023-03-09 RX ORDER — EPINEPHRINE 1 MG/ML
0.3 INJECTION, SOLUTION INTRAMUSCULAR; SUBCUTANEOUS EVERY 5 MIN PRN
Status: CANCELLED | OUTPATIENT
Start: 2023-09-05

## 2023-03-09 RX ORDER — METHYLPREDNISOLONE SODIUM SUCCINATE 125 MG/2ML
125 INJECTION, POWDER, LYOPHILIZED, FOR SOLUTION INTRAMUSCULAR; INTRAVENOUS
Status: CANCELLED
Start: 2023-09-05

## 2023-03-09 RX ORDER — ALBUTEROL SULFATE 0.83 MG/ML
2.5 SOLUTION RESPIRATORY (INHALATION)
Status: CANCELLED | OUTPATIENT
Start: 2023-09-05

## 2023-03-09 NOTE — PROGRESS NOTES
Infusion Nursing Note:  Iris Carrion presents today for Prolia #1.    Patient seen by provider today: No   present during visit today: Not Applicable.    Note: Assessment performed by Meghann ALBA RN prior to injection today. Patient denies symptoms/concerns following previous injection.    Intravenous Access:  No Intravenous access/labs at this visit.    Treatment Conditions:  Lab Results   Component Value Date     02/02/2023    POTASSIUM 3.8 02/02/2023    MAG 1.1 (L) 02/08/2016    CR 0.97 03/09/2023    MAURISIO 10.2 (H) 03/09/2023    BILITOTAL 0.5 10/07/2021    ALBUMIN 3.8 10/07/2021    ALT 30 10/07/2021    AST 22 10/07/2021     Results reviewed, labs MET treatment parameters, ok to proceed with treatment.    Post Infusion Assessment:  Patient tolerated injection without incident.  Site patent and intact, free from redness, edema or discomfort.     Discharge Plan:   Patient discharged in stable condition accompanied by: son.  Departure Mode: Ambulatory with jordy Burks LPN

## 2023-03-22 NOTE — TELEPHONE ENCOUNTER
Left Voicemail (2nd Attempt) for the patient to call back and schedule the following:    Appointment type: Return Endo  Provider: Dr. Ferrera  Return date: 10/7/2023  Specialty phone number: [phone]  Additional appointment(s) needed:   Additonal Notes:     Also sent a [employer] message.    Maggie R/Procedure    [facility]   Neurology, NeuroSurgery, NeuroPsychology and Pain Management Specialties  Medical/Surgical Adult Specialties

## 2023-03-28 ENCOUNTER — TELEPHONE (OUTPATIENT)
Dept: PULMONOLOGY | Facility: CLINIC | Age: 78
End: 2023-03-28
Payer: COMMERCIAL

## 2023-03-29 NOTE — TELEPHONE ENCOUNTER
Patient contacted:    Appointment type: RTN  Provider: DESIRE  Return date: 8/7/23  Specialty phone number: [phone]  Additional appointment(s) needed: NA  Additonal Notes: NA

## 2023-04-05 ENCOUNTER — OFFICE VISIT (OUTPATIENT)
Dept: FAMILY MEDICINE | Facility: CLINIC | Age: 78
End: 2023-04-05
Payer: COMMERCIAL

## 2023-04-05 VITALS
TEMPERATURE: 97.3 F | SYSTOLIC BLOOD PRESSURE: 108 MMHG | RESPIRATION RATE: 26 BRPM | HEIGHT: 60 IN | DIASTOLIC BLOOD PRESSURE: 56 MMHG | BODY MASS INDEX: 20.62 KG/M2 | HEART RATE: 98 BPM | WEIGHT: 105 LBS | OXYGEN SATURATION: 98 %

## 2023-04-05 DIAGNOSIS — J01.00 ACUTE MAXILLARY SINUSITIS, RECURRENCE NOT SPECIFIED: Primary | ICD-10-CM

## 2023-04-05 DIAGNOSIS — J44.9 CHRONIC OBSTRUCTIVE PULMONARY DISEASE, UNSPECIFIED COPD TYPE (H): ICD-10-CM

## 2023-04-05 PROCEDURE — 99213 OFFICE O/P EST LOW 20 MIN: CPT | Performed by: PHYSICIAN ASSISTANT

## 2023-04-05 RX ORDER — DOXYCYCLINE 100 MG/1
100 CAPSULE ORAL 2 TIMES DAILY
Qty: 14 CAPSULE | Refills: 0 | Status: SHIPPED | OUTPATIENT
Start: 2023-04-05 | End: 2023-04-12

## 2023-04-05 ASSESSMENT — PAIN SCALES - GENERAL: PAINLEVEL: NO PAIN (0)

## 2023-04-05 ASSESSMENT — ENCOUNTER SYMPTOMS: COUGH: 1

## 2023-04-05 NOTE — PROGRESS NOTES
Assessment & Plan     (J01.00) Acute maxillary sinusitis, recurrence not specified  (primary encounter diagnosis)  Comment: Patient with 2 months of nasal congestion symptoms.  Consider broad differential diagnosis including URI, COVID, influenza, seasonal allergies, sinusitis, amongst others.  The patient has had 2 months now of waxing and waning nasal congestion and she does endorse a sinus pressure over the maxillary sinuses.  With this has had postnasal drip.  I suspect the patient is having more postnasal drip induced cough.  Her oxygen saturation on 3 L is 98%.  Lungs without any wheezing or obvious crackles.  No indication for chest x-ray at this time.  Discussed with the patient trial of antibiotics for sinusitis.  She has been having some nausea and taking Zofran as needed for this.  Discussed with her trial of doxycycline for sinusitis for Augmentin given the nausea and upset stomach.  Patient was comfortable with this plan.  Return if no improvement or worsening symptoms  Plan: doxycycline hyclate (VIBRAMYCIN) 100 MG capsule          (J44.9) Chronic obstructive pulmonary disease, unspecified COPD type (H)  Comment: History of COPD.  SHe is on 3 L at baseline.  No significant new shortness of breath.  No wheezing.  No indication to initiate steroids at this time    [facility]   Iris is a 77 year old, presenting for the following health issues:  Cough (2 months, with weight loss(8# in the last few weeks))        4/5/2023     2:05 PM   Additional Questions   Roomed by chema seay   Accompanied by self         4/5/2023     2:05 PM   Patient Reported Additional Medications   Patient reports taking the following new medications see chart     Cough    History of Present Illness       Reason for visit:  Persistent cough sns weight loss  Symptoms include:  One cold after another and unable to gain weight  Symptom intensity:  Moderate  Symptom  progression:  Staying the same  Had these symptoms before:  No  What makes it worse:  No  What makes it better:  No    She eats 0-1 servings of fruits and vegetables daily.She consumes 1 sweetened beverage(s) daily.She exercises with enough effort to increase her heart rate 10 to 19 minutes per day.  She exercises with enough effort to increase her heart rate 3 or less days per week.   She is taking medications regularly.     77-year-old female with history of COPD on 3 L via nasal cannula at baseline presents for evaluation of nasal congestion and sinus pressure.  Symptoms have been going on over the last 2+ months.  No fevers with this.  No significant ear pain.  Symptoms have been waxing and waning in severity.  Continues to use nasal spray daily.  Patient denies any significant cough, hemoptysis, sputum production, new shortness of breath.  She is remained on her 3 L via nasal cannula.  She has not had to titrate up her dose.  No recent nausea, vomiting.  No other symptoms with this.          Review of Systems   Respiratory: Positive for cough.       Constitutional, HEENT, cardiovascular, pulmonary, gi and gu systems are negative, except as otherwise noted.      Objective    There were no vitals taken for this visit.  There is no height or weight on file to calculate BMI.  Physical Exam   GENERAL: alert, no distress and nasal cannula in place 3 L/min  NECK: no asymmetry, masses,  RESP: lungs clear to auscultation - no rales, rhonchi or wheezes  CV: regular rates and rhythm, normal S1 S2, no S3 or S4, no murmur, click or rub and peripheral pulses strong  ABDOMEN: soft, nontender, no hepatosplenomegaly, no masses and bowel sounds normal  MS: no gross musculoskeletal defects noted, no edema

## 2023-04-11 ENCOUNTER — TELEPHONE (OUTPATIENT)
Dept: FAMILY MEDICINE | Facility: CLINIC | Age: 78
End: 2023-04-11
Payer: COMMERCIAL

## 2023-04-11 NOTE — TELEPHONE ENCOUNTER
Per further review in chart, appears individual prescriptions were sent in for each the Ipratropium and Albuterol back on 3/8/23.     Disp Refills Start End MARIBETH   ipratropium (ATROVENT) 0.02 % neb solution 90 mL 1 3/8/2023  --   Sig - Route: Take 2.5 mLs (0.5 mg) by nebulization 4 times daily as needed for wheezing - Nebulization      Disp Refills Start End MARIBETH   albuterol (PROVENTIL) (2.5 MG/3ML) 0.083% neb solution 90 mL 1 3/8/2023  --   Sig - Route: Take 1 vial (2.5 mg) by nebulization every 4 hours as needed for shortness of breath, wheezing or cough - Nebulization       Called and left detailed message on provider line with [facility] pharmacy in [place] regarding prescriptions above and informing were sent in on 3/8/23, questioning if patient needs refills again now? Or if pharmacy was not aware these are already on file.   Advised for pharmacy to please clarify and if patient still needing refills now, to contact office back to update.        Nava Cee RN  Clinical Triage/Primary Care  [facility]

## 2023-04-13 ENCOUNTER — MYC MEDICAL ADVICE (OUTPATIENT)
Dept: FAMILY MEDICINE | Facility: CLINIC | Age: 78
End: 2023-04-13
Payer: COMMERCIAL

## 2023-04-13 DIAGNOSIS — J44.9 CHRONIC OBSTRUCTIVE PULMONARY DISEASE, UNSPECIFIED COPD TYPE (H): ICD-10-CM

## 2023-04-14 RX ORDER — ALBUTEROL SULFATE 0.83 MG/ML
2.5 SOLUTION RESPIRATORY (INHALATION) EVERY 4 HOURS PRN
Qty: 360 ML | Refills: 11 | Status: SHIPPED | OUTPATIENT
Start: 2023-04-14 | End: 2024-04-29

## 2023-04-14 NOTE — TELEPHONE ENCOUNTER
Provider:  Please send the requested mediation in the qty stated by the pharmacist if appropriate. Thank you. Nayeli Warner R.N.    In calling the pharmacy, [facility] pharmacist) reports that the patient filled the ipratropium (ATROVENT) 0.02 % neb solution and the albuterol (PROVENTIL) (2.5 MG/3ML) 0.083% neb solution both on 3/8/2023 and 3/28/2023. There are no remaining refills. They QTY she would need to be sufficient for 30 days and what they have the ability to dispense is 360 mLs of each of the requested medications.  To provider to advise.

## 2023-04-23 ENCOUNTER — HEALTH MAINTENANCE LETTER (OUTPATIENT)
Age: 78
End: 2023-04-23

## 2023-04-25 ENCOUNTER — ANCILLARY PROCEDURE (OUTPATIENT)
Dept: MAMMOGRAPHY | Facility: CLINIC | Age: 78
End: 2023-04-25
Attending: FAMILY MEDICINE
Payer: COMMERCIAL

## 2023-04-25 ENCOUNTER — ANCILLARY ORDERS (OUTPATIENT)
Dept: FAMILY MEDICINE | Facility: CLINIC | Age: 78
End: 2023-04-25

## 2023-04-25 DIAGNOSIS — Z12.31 VISIT FOR SCREENING MAMMOGRAM: ICD-10-CM

## 2023-04-25 PROCEDURE — 77067 SCR MAMMO BI INCL CAD: CPT | Mod: TC | Performed by: RADIOLOGY

## 2023-04-25 PROCEDURE — 77063 BREAST TOMOSYNTHESIS BI: CPT | Mod: TC | Performed by: RADIOLOGY

## 2023-05-04 DIAGNOSIS — M80.00XA AGE-RELATED OSTEOPOROSIS WITH CURRENT PATHOLOGICAL FRACTURE, INITIAL ENCOUNTER: ICD-10-CM

## 2023-05-04 DIAGNOSIS — R06.02 SHORTNESS OF BREATH: ICD-10-CM

## 2023-05-04 DIAGNOSIS — M48.50XA PATHOLOGICAL COMPRESSION FRACTURE OF VERTEBRA, INITIAL ENCOUNTER (H): ICD-10-CM

## 2023-05-04 RX ORDER — CALCITONIN SALMON 200 [IU]/.09ML
SPRAY, METERED NASAL
Qty: 4 ML | Refills: 1 | Status: SHIPPED | OUTPATIENT
Start: 2023-05-04 | End: 2023-06-27

## 2023-05-08 DIAGNOSIS — J44.9 STAGE 3 SEVERE COPD BY GOLD CLASSIFICATION (H): ICD-10-CM

## 2023-05-08 RX ORDER — AMLODIPINE BESYLATE 5 MG/1
5 TABLET ORAL DAILY
Qty: 90 TABLET | Refills: 0 | Status: SHIPPED | OUTPATIENT
Start: 2023-05-08 | End: 2023-08-11

## 2023-05-08 RX ORDER — UMECLIDINIUM 62.5 UG/1
1 AEROSOL, POWDER ORAL DAILY
Qty: 1 EACH | Refills: 2 | Status: SHIPPED | OUTPATIENT
Start: 2023-05-08 | End: 2023-08-15

## 2023-06-05 ENCOUNTER — TELEPHONE (OUTPATIENT)
Dept: PULMONOLOGY | Facility: CLINIC | Age: 78
End: 2023-06-05
Payer: COMMERCIAL

## 2023-06-05 NOTE — TELEPHONE ENCOUNTER
Patient Contacted    Appointment type: RTN  Provider: DESIRE  Return date: 9/11/23  Specialty phone number: [phone]  Additional appointment(s) needed: NA  Additonal Notes: rescheduled from 8/7/23.

## 2023-06-26 DIAGNOSIS — J44.9 STAGE 3 SEVERE COPD BY GOLD CLASSIFICATION (H): ICD-10-CM

## 2023-06-26 RX ORDER — BUDESONIDE AND FORMOTEROL FUMARATE DIHYDRATE 160; 4.5 UG/1; UG/1
2 AEROSOL RESPIRATORY (INHALATION) 2 TIMES DAILY
Qty: 10.2 G | Refills: 2 | Status: SHIPPED | OUTPATIENT
Start: 2023-06-26 | End: 2023-11-01

## 2023-06-27 DIAGNOSIS — M48.50XA PATHOLOGICAL COMPRESSION FRACTURE OF VERTEBRA, INITIAL ENCOUNTER (H): ICD-10-CM

## 2023-06-27 DIAGNOSIS — M80.00XA AGE-RELATED OSTEOPOROSIS WITH CURRENT PATHOLOGICAL FRACTURE, INITIAL ENCOUNTER: ICD-10-CM

## 2023-06-27 RX ORDER — CALCITONIN SALMON 200 [IU]/.09ML
SPRAY, METERED NASAL
Qty: 4 ML | Refills: 1 | Status: SHIPPED | OUTPATIENT
Start: 2023-06-27 | End: 2024-01-02

## 2023-07-18 DIAGNOSIS — R11.10 DRY HEAVES: ICD-10-CM

## 2023-07-19 RX ORDER — PYRIDOXINE HCL (VITAMIN B6) 25 MG
25 TABLET ORAL DAILY
Qty: 90 TABLET | Refills: 1 | Status: SHIPPED | OUTPATIENT
Start: 2023-07-19 | End: 2024-01-23

## 2023-08-07 DIAGNOSIS — R06.02 SHORTNESS OF BREATH: ICD-10-CM

## 2023-08-11 DIAGNOSIS — R06.02 SHORTNESS OF BREATH: ICD-10-CM

## 2023-08-11 RX ORDER — AMLODIPINE BESYLATE 5 MG/1
5 TABLET ORAL DAILY
Qty: 90 TABLET | Refills: 0 | OUTPATIENT
Start: 2023-08-11

## 2023-08-11 RX ORDER — AMLODIPINE BESYLATE 5 MG/1
5 TABLET ORAL DAILY
Qty: 90 TABLET | Refills: 0 | Status: SHIPPED | OUTPATIENT
Start: 2023-08-11 | End: 2023-11-03

## 2023-08-11 NOTE — TELEPHONE ENCOUNTER
Per chart review.Pt is following up with Dr Mcgregor as needed. No advanced heart failure.   Follow up regarding HTN with PCP.

## 2023-08-15 ENCOUNTER — MYC MEDICAL ADVICE (OUTPATIENT)
Dept: PULMONOLOGY | Facility: CLINIC | Age: 78
End: 2023-08-15
Payer: COMMERCIAL

## 2023-08-15 DIAGNOSIS — J44.9 STAGE 3 SEVERE COPD BY GOLD CLASSIFICATION (H): ICD-10-CM

## 2023-08-15 RX ORDER — UMECLIDINIUM 62.5 UG/1
1 AEROSOL, POWDER ORAL DAILY
Qty: 1 EACH | Refills: 2 | Status: SHIPPED | OUTPATIENT
Start: 2023-08-15 | End: 2023-11-15

## 2023-08-15 NOTE — TELEPHONE ENCOUNTER
Medication:   umeclidinium (INCRUSE ELLIPTA) 62.5 MCG/ACT inhaler 1 each 2 5/8/2023  --   Sig - Route: Inhale 1 puff into the lungs daily - Inhalation     Date last written: 5/8/23  Dispensed amount: 1   Refills: 2    Requested Pharmacy: [facility] [address] [place], [place] [address], [place] - [address]     Pt's last office visit: 8/8/22  Next scheduled office visit: 9/11/23

## 2023-08-29 ENCOUNTER — OFFICE VISIT (OUTPATIENT)
Dept: FAMILY MEDICINE | Facility: CLINIC | Age: 78
End: 2023-08-29
Payer: COMMERCIAL

## 2023-08-29 VITALS
BODY MASS INDEX: 16.69 KG/M2 | RESPIRATION RATE: 16 BRPM | WEIGHT: 100.2 LBS | SYSTOLIC BLOOD PRESSURE: 121 MMHG | OXYGEN SATURATION: 95 % | HEIGHT: 65 IN | TEMPERATURE: 97.9 F | HEART RATE: 99 BPM | DIASTOLIC BLOOD PRESSURE: 71 MMHG

## 2023-08-29 DIAGNOSIS — T38.0X5A STEROID-INDUCED OSTEOPOROSIS: ICD-10-CM

## 2023-08-29 DIAGNOSIS — R63.4 WEIGHT LOSS: ICD-10-CM

## 2023-08-29 DIAGNOSIS — K21.9 GASTROESOPHAGEAL REFLUX DISEASE, UNSPECIFIED WHETHER ESOPHAGITIS PRESENT: ICD-10-CM

## 2023-08-29 DIAGNOSIS — M81.8 STEROID-INDUCED OSTEOPOROSIS: ICD-10-CM

## 2023-08-29 DIAGNOSIS — R11.0 NAUSEA: ICD-10-CM

## 2023-08-29 DIAGNOSIS — N18.30 STAGE 3 CHRONIC KIDNEY DISEASE, UNSPECIFIED WHETHER STAGE 3A OR 3B CKD (H): Primary | ICD-10-CM

## 2023-08-29 LAB
ALBUMIN SERPL BCG-MCNC: 4.2 G/DL (ref 3.5–5.2)
ALP SERPL-CCNC: 73 U/L (ref 35–104)
ALT SERPL W P-5'-P-CCNC: 16 U/L (ref 0–50)
ANION GAP SERPL CALCULATED.3IONS-SCNC: 11 MMOL/L (ref 7–15)
AST SERPL W P-5'-P-CCNC: 28 U/L (ref 0–45)
BILIRUB DIRECT SERPL-MCNC: <0.2 MG/DL (ref 0–0.3)
BILIRUB SERPL-MCNC: 0.5 MG/DL
BUN SERPL-MCNC: 19.4 MG/DL (ref 8–23)
CALCIUM SERPL-MCNC: 9.2 MG/DL (ref 8.8–10.2)
CHLORIDE SERPL-SCNC: 105 MMOL/L (ref 98–107)
CREAT SERPL-MCNC: 0.78 MG/DL (ref 0.51–0.95)
CREAT UR-MCNC: 152 MG/DL
DEPRECATED HCO3 PLAS-SCNC: 26 MMOL/L (ref 22–29)
GFR SERPL CREATININE-BSD FRML MDRD: 78 ML/MIN/1.73M2
GLUCOSE SERPL-MCNC: 108 MG/DL (ref 70–99)
MICROALBUMIN UR-MCNC: <12 MG/L
MICROALBUMIN/CREAT UR: NORMAL MG/G{CREAT}
POTASSIUM SERPL-SCNC: 3.9 MMOL/L (ref 3.4–5.3)
PROT SERPL-MCNC: 6.8 G/DL (ref 6.4–8.3)
SODIUM SERPL-SCNC: 142 MMOL/L (ref 136–145)

## 2023-08-29 PROCEDURE — 99213 OFFICE O/P EST LOW 20 MIN: CPT | Performed by: PHYSICIAN ASSISTANT

## 2023-08-29 PROCEDURE — 82570 ASSAY OF URINE CREATININE: CPT | Performed by: PHYSICIAN ASSISTANT

## 2023-08-29 PROCEDURE — 82043 UR ALBUMIN QUANTITATIVE: CPT | Performed by: PHYSICIAN ASSISTANT

## 2023-08-29 PROCEDURE — 82248 BILIRUBIN DIRECT: CPT | Performed by: PHYSICIAN ASSISTANT

## 2023-08-29 PROCEDURE — 36415 COLL VENOUS BLD VENIPUNCTURE: CPT | Performed by: PHYSICIAN ASSISTANT

## 2023-08-29 PROCEDURE — 80053 COMPREHEN METABOLIC PANEL: CPT | Performed by: PHYSICIAN ASSISTANT

## 2023-08-29 RX ORDER — ONDANSETRON 8 MG/1
8 TABLET, FILM COATED ORAL EVERY 8 HOURS PRN
Qty: 12 TABLET | Refills: 0 | Status: SHIPPED | OUTPATIENT
Start: 2023-08-29 | End: 2024-02-13

## 2023-08-29 RX ORDER — LACTOSE-REDUCED FOOD
1 LIQUID (ML) ORAL 3 TIMES DAILY
Qty: 125 ML | Refills: 11 | Status: SHIPPED | OUTPATIENT
Start: 2023-08-29 | End: 2023-09-28

## 2023-08-29 ASSESSMENT — ENCOUNTER SYMPTOMS
NAUSEA: 1
VOMITING: 1

## 2023-08-29 ASSESSMENT — PAIN SCALES - GENERAL: PAINLEVEL: NO PAIN (0)

## 2023-08-29 NOTE — PROGRESS NOTES
Assessment & Plan     (N18.30) Stage 3 chronic kidney disease, unspecified whether stage 3a or 3b CKD (H)  (primary encounter diagnosis)  Comment: History of chronic kidney disease.  Discussed with patient screening labs.  History of esophageal reflux disease.  Patient has had  Plan: Albumin Random Urine Quantitative with Creat         Ratio, BASIC METABOLIC PANEL          (K21.9) Gastroesophageal reflux disease, unspecified whether esophagitis present  Comment: Nausea and occasional bouts of vomiting.  With this has had some weight loss.  I did discuss with her at length this is likely related to esophageal reflux.  Discussed endoscopy as well as GI referral.  Patient was amenable to this.  She did have a short trial of Zofran previously.  States that did help with her symptoms.  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  Plan: Nutritional Supplements (BOOST 100 CALORIE         SMART) LIQD          (R11.0) Nausea  Comment: Nausea.  Suspect secondary to esophageal reflux.  Discussed with patient follow-up endoscopy as well as follow-up with gastroenterology.  Plan: Adult GI  Referral - Procedure Only,         ondansetron (ZOFRAN) 8 MG tablet, Adult GI          Referral - Consult Only, Hepatic         panel (Albumin, ALT, AST, Bili, Alk Phos, TP)           (R63.4) Weight loss  Comment: Weight loss.  Patient has been eating regularly.  She denies any fevers or night sweats.  Has been eating regularly.  Discussed with patient using boost 3 times a day after meals to increase caloric intake and increase protein intake.  Follow-up in the next 1 to 2 months for recheck.  Plan: Nutritional Supplements (BOOST 100 CALORIE         SMART) LIQD, Hepatic panel (Albumin, ALT,  "AST,         Bili, Alk Phos, TP)                       [facility]    Bijal Simmons is a 78 year old, presenting for the following health issues:  Weight Loss, Nausea, and Vomiting      History of Present Illness       Reason for visit:  Weight loss and excess vomiting still    She eats 0-1 servings of fruits and vegetables daily.She consumes 1 sweetened beverage(s) daily.She exercises with enough effort to increase her heart rate 9 or less minutes per day.  She exercises with enough effort to increase her heart rate 3 or less days per week.   She is taking medications regularly.     For the last few months patient has been having episodes of Dry heaves that it developed into vomiting.  This occurs 2-3 times per week.  Does have history of esophageal reflux.  Patient does use oxygen via nasal cannula.  He is at 4 L at baseline.  Denies any hematemesis.  With this she has noted weight loss.  She denies any fevers or night sweats.  Does not feel weak or fatigued.  Has been eating regularly and same amounts.  Her son does cooking.  Vomiting episodes only occur in the morning.  She feels well otherwise over the course of the day.  Continues to follow with endocrinology for osteoporosis with upcoming injection in September.  Patient denies any other complaints or concerns at this time.  2-3 times per week vomiting in the morning     Review of Systems   Gastrointestinal:  Positive for nausea and vomiting.            Objective    /71   Pulse 99   Temp 97.9  F (36.6  C) (Tympanic)   Resp 16   Ht 1.651 m (5' 5\")   Wt 45.5 kg (100 lb 3.2 oz)   SpO2 95%   BMI 16.67 kg/m    Body mass index is 16.67 kg/m .  Physical Exam   GENERAL: alert, no distress, frail, and elderly  RESP:  and decreased lung sounds throughout all fields.  Nasal cannula in place.    CV: regular rate and rhythm, normal S1 S2, no S3 or S4, no murmur, click or rub, no peripheral edema and " peripheral pulses strong  ABDOMEN: soft, nontender, no hepatosplenomegaly, no masses and bowel sounds normal  MS: no gross musculoskeletal defects noted, no edema

## 2023-08-30 ENCOUNTER — TELEPHONE (OUTPATIENT)
Dept: FAMILY MEDICINE | Facility: CLINIC | Age: 78
End: 2023-08-30

## 2023-08-30 NOTE — TELEPHONE ENCOUNTER
REFERRAL INFORMATION:  Referring Provider:  Hernandez Gunn PA-C   [facility]   Reason for Visit/Diagnosis:   Gastroesophageal reflux disease, unspecified whether esophagitis present   Nausea        FUTURE VISIT INFORMATION:  Appointment Date: 9/19/2023  Appointment Time:      NOTES STATUS DETAILS   OFFICE NOTE from Referring Provider Internal 8/29/2023 OV with TACOS Gunn   OFFICE NOTE from Other Specialist Internal 1/24/2023 OV with MITCH Nguyen  4/15/2020 VV with GERALD Koehler   HOSPITAL DISCHARGE SUMMARY/  ED VISITS N/A    OPERATIVE REPORT N/A    MEDICATION LIST Internal         ENDOSCOPY  N/A    COLONOSCOPY Care Everywhere 5/25/2018 [facility]   ERCP N/A    EUS N/A    STOOL TESTING N/A    PERTINENT LABS N/A    PATHOLOGY REPORTS (RELATED) N/A    IMAGING (CT, MRI, EGD, MRCP, Small Bowel Follow Through/SBT, MR/CT Enterography) N/A

## 2023-09-11 ENCOUNTER — INFUSION THERAPY VISIT (OUTPATIENT)
Dept: INFUSION THERAPY | Facility: CLINIC | Age: 78
End: 2023-09-11
Payer: COMMERCIAL

## 2023-09-11 VITALS — DIASTOLIC BLOOD PRESSURE: 65 MMHG | HEART RATE: 77 BPM | SYSTOLIC BLOOD PRESSURE: 120 MMHG | OXYGEN SATURATION: 95 %

## 2023-09-11 DIAGNOSIS — M81.8 STEROID-INDUCED OSTEOPOROSIS: ICD-10-CM

## 2023-09-11 DIAGNOSIS — T38.0X5A STEROID-INDUCED OSTEOPOROSIS: ICD-10-CM

## 2023-09-11 DIAGNOSIS — N18.31 STAGE 3A CHRONIC KIDNEY DISEASE (H): Primary | ICD-10-CM

## 2023-09-11 PROCEDURE — 96372 THER/PROPH/DIAG INJ SC/IM: CPT | Performed by: NURSE PRACTITIONER

## 2023-09-11 RX ORDER — EPINEPHRINE 1 MG/ML
0.3 INJECTION, SOLUTION INTRAMUSCULAR; SUBCUTANEOUS EVERY 5 MIN PRN
Status: CANCELLED | OUTPATIENT
Start: 2024-03-03

## 2023-09-11 RX ORDER — MEPERIDINE HYDROCHLORIDE 25 MG/ML
25 INJECTION INTRAMUSCULAR; INTRAVENOUS; SUBCUTANEOUS EVERY 30 MIN PRN
Status: CANCELLED | OUTPATIENT
Start: 2024-03-03

## 2023-09-11 RX ORDER — METHYLPREDNISOLONE SODIUM SUCCINATE 125 MG/2ML
125 INJECTION, POWDER, LYOPHILIZED, FOR SOLUTION INTRAMUSCULAR; INTRAVENOUS
Status: CANCELLED
Start: 2024-03-03

## 2023-09-11 RX ORDER — DIPHENHYDRAMINE HYDROCHLORIDE 50 MG/ML
50 INJECTION INTRAMUSCULAR; INTRAVENOUS
Status: CANCELLED
Start: 2024-03-03

## 2023-09-11 RX ORDER — ALBUTEROL SULFATE 0.83 MG/ML
2.5 SOLUTION RESPIRATORY (INHALATION)
Status: CANCELLED | OUTPATIENT
Start: 2024-03-03

## 2023-09-11 RX ORDER — ALBUTEROL SULFATE 90 UG/1
1-2 AEROSOL, METERED RESPIRATORY (INHALATION)
Status: CANCELLED
Start: 2024-03-03

## 2023-09-11 NOTE — PROGRESS NOTES
Infusion Nursing Note:  Iris Carrion presents today for   Chief Complaint   Patient presents with    Allied Health Visit     Prolia        Patient seen by provider today: No   present during visit today: Not Applicable.    Note: Patient was assessed by Melissa GREGG RN prior to injection .    Treatment Conditions:  Lab Results   Component Value Date    HGB 13.0 02/02/2023    WBC 8.0 02/02/2023    ANEU 5.8 11/07/2019    ANEUTAUTO 6.4 02/02/2023     02/02/2023        Results reviewed, labs MET treatment parameters, ok to proceed with treatment.      Post Infusion Assessment:  Patient tolerated injection without incident.  Site patent and intact, free from redness, edema or discomfort.       Discharge Plan:   Patient discharged in stable condition accompanied by: self.  Departure Mode: Ambulatory.      Jossie Celaya CMA

## 2023-09-18 ASSESSMENT — ENCOUNTER SYMPTOMS
CHILLS: 0
NAUSEA: 1
WHEEZING: 0
TROUBLE SWALLOWING: 0
WEIGHT GAIN: 0
DYSPNEA ON EXERTION: 1
NIGHT SWEATS: 0
HEMOPTYSIS: 0
NECK MASS: 0
DECREASED APPETITE: 0
POLYDIPSIA: 0
VOMITING: 1
RECTAL PAIN: 0
COUGH DISTURBING SLEEP: 0
SMELL DISTURBANCE: 0
POLYPHAGIA: 0
BOWEL INCONTINENCE: 0
TASTE DISTURBANCE: 0
DIARRHEA: 0
SINUS PAIN: 1
ALTERED TEMPERATURE REGULATION: 1
COUGH: 1
SHORTNESS OF BREATH: 1
HOARSE VOICE: 0
FATIGUE: 1
HEARTBURN: 1
BLOOD IN STOOL: 0
HALLUCINATIONS: 0
SINUS CONGESTION: 1
INCREASED ENERGY: 0
CONSTIPATION: 0
JAUNDICE: 0
SORE THROAT: 0
SNORES LOUDLY: 0
BLOATING: 0
WEIGHT LOSS: 1
FEVER: 0
SPUTUM PRODUCTION: 1
POSTURAL DYSPNEA: 1
ABDOMINAL PAIN: 0

## 2023-09-19 ENCOUNTER — PRE VISIT (OUTPATIENT)
Dept: GASTROENTEROLOGY | Facility: CLINIC | Age: 78
End: 2023-09-19

## 2023-09-25 ENCOUNTER — OFFICE VISIT (OUTPATIENT)
Dept: PULMONOLOGY | Facility: CLINIC | Age: 78
End: 2023-09-25
Payer: COMMERCIAL

## 2023-09-25 VITALS — OXYGEN SATURATION: 95 % | DIASTOLIC BLOOD PRESSURE: 63 MMHG | SYSTOLIC BLOOD PRESSURE: 107 MMHG | HEART RATE: 105 BPM

## 2023-09-25 DIAGNOSIS — J44.1 COPD EXACERBATION (H): ICD-10-CM

## 2023-09-25 DIAGNOSIS — Z23 NEED FOR INFLUENZA VACCINATION: Primary | ICD-10-CM

## 2023-09-25 DIAGNOSIS — J43.2 CENTRILOBULAR EMPHYSEMA (H): ICD-10-CM

## 2023-09-25 DIAGNOSIS — J44.9 STAGE 4 VERY SEVERE COPD BY GOLD CLASSIFICATION (H): ICD-10-CM

## 2023-09-25 PROCEDURE — G0008 ADMIN INFLUENZA VIRUS VAC: HCPCS

## 2023-09-25 PROCEDURE — 99214 OFFICE O/P EST MOD 30 MIN: CPT

## 2023-09-25 PROCEDURE — 90662 IIV NO PRSV INCREASED AG IM: CPT

## 2023-09-25 PROCEDURE — 250N000011 HC RX IP 250 OP 636

## 2023-09-25 PROCEDURE — G0463 HOSPITAL OUTPT CLINIC VISIT: HCPCS | Mod: 25

## 2023-09-25 RX ORDER — AZITHROMYCIN 250 MG/1
TABLET, FILM COATED ORAL
Qty: 6 TABLET | Refills: 0 | Status: SHIPPED | OUTPATIENT
Start: 2023-09-25 | End: 2023-09-30

## 2023-09-25 RX ORDER — PREDNISONE 20 MG/1
40 TABLET ORAL DAILY
Qty: 10 TABLET | Refills: 0 | Status: SHIPPED | OUTPATIENT
Start: 2023-09-25 | End: 2024-02-13

## 2023-09-25 RX ADMIN — INFLUENZA A VIRUS A/VICTORIA/4897/2022 IVR-238 (H1N1) ANTIGEN (FORMALDEHYDE INACTIVATED), INFLUENZA A VIRUS A/DARWIN/9/2021 SAN-010 (H3N2) ANTIGEN (FORMALDEHYDE INACTIVATED), INFLUENZA B VIRUS B/PHUKET/3073/2013 ANTIGEN (FORMALDEHYDE INACTIVATED), AND INFLUENZA B VIRUS B/MICHIGAN/01/2021 ANTIGEN (FORMALDEHYDE INACTIVATED) 0.7 ML: 60; 60; 60; 60 INJECTION, SUSPENSION INTRAMUSCULAR at 13:05

## 2023-09-25 NOTE — Clinical Note
I just sent scripts for pulmicort, yupelri and perforomist.  I'm not sure her insurance company is going to like them.  Can you please follow up on this and make sure get through all the appropriate appeal or prior auth process?

## 2023-09-25 NOTE — NURSING NOTE
Chief Complaint   Patient presents with    General Visit     F/u     Vitals were taken and medications were reconciled.     CHRISTY Mccall

## 2023-09-25 NOTE — LETTER
9/25/2023         RE: Iris Carrion  [address]  [place] [address]        Dear Colleague,    Thank you for referring your patient, Iris Carrion, to the [facility]. Please see a copy of my visit note below.    [facility]  Pulmonary Medicine  Visit Clinic Note  September 25, 2023         ASSESSMENT & PLAN       Very severe COPD  Chronic hypoxemic respiratory failure  Emphysema    Unfortunately, she did not qualify for endobronchial valves.  She is currently on maximum inhaler therapy and in need of oxygen at 4 L/min.  Her daily symptoms are significant for shortness of breath which is very limiting.  I have placed an order for pulmonary rehab, and she would like to participate in this.  She would like to do it in [facility].    Also, I am concerned about her respiratory muscle strength and ability to adequately inhale her bronchodilators via inhalers.  She is on Symbicort and Incruse Ellipta.  Given her symptom burden, I think it be best to try fully nebulizing all of her bronchodilators.  This would mean stopping Symbicort and Incruse and changing to nebulized Pulmicort, either Brovana or Perforomist for a long-acting beta agonist, and either Yupelri or Lonhala.      I let her know that there will likely be some paperwork and prior authorizations and need to be done to get some of these proved and so may be some time, but we will keep in touch with her throughout this process.    For her slight worsening of her respiratory symptoms including cough and shortness of breath with mild sputum production I will treat her for a COPD exacerbation of prednisone and azithromycin.    RTC in 6 months      Grayson Garg MD            Today's visit note:     Chief Complaint: General Visit (F/u)      HISTORY OF PRESENT ILLNESS:    Iris Carrion is a 78 year old year old female who is being seen for COPD.  She is really  struggling with shortness of breath with exertion at home.  She is using 4 L of oxygen day and night.  She has not required any antibiotics or prednisone recently for COPD exacerbations.  She is not able to walk up stairs.  She cannot walk a city block without stopping.  She is currently on Symbicort and Incruse Ellipta.  Albuterol inhalers do not do anything, and so she needs to use her DuoNebs 3-4 times a day to help out with her shortness of breath.  I have previously evaluated her for endobronchial valves, and unfortunately her lung anatomy would not allow for these valves to be placed.    Over the last week or so, she has had slight increase in her shortness of breath along with cough and mild sputum production that is slightly changed in color.  She does think that she came down with some cold recently.             Past Medical and Surgical History:     Past Medical History:   Diagnosis Date     Cervical high risk HPV (human papillomavirus) test positive 10/31/12    type 18     COPD (chronic obstructive pulmonary disease) (H)      GERD (gastroesophageal reflux disease)      Hx of colposcopy with cervical biopsy 11/2012    negative     Hypertension 2013     Osteoporosis      Paroxysmal supraventricular tachycardia (H) 9/6/2017     Peritoneal carcinomatosis (H) 8/24/2015     Pneumonia      Uncomplicated asthma 1980     Past Surgical History:   Procedure Laterality Date     CHOLECYSTECTOMY  6/2014     COLONOSCOPY  12/4/2012    Procedure: COLONOSCOPY;  COLONOSCOPY SCREEN;  Surgeon: Mushtaq Lara MD;  Location: [facility] OR     GYN SURGERY       HERNIORRHAPHY HIATAL N/A 11/25/2015    Procedure: HERNIORRHAPHY HIATAL;  Surgeon: Chip Carty MD;  Location: [facility] OR     HYSTERECTOMY TOTAL ABD, ALVIN SALPINGO-OOPHORECTOMY, NODE DISSECTION, TUMOR DEBULKING, COMBINED Bilateral 11/25/2015    Procedure: COMBINED HYSTERECTOMY TOTAL ABDOMINAL, SALPINGO-OOPHORECTOMY, NODE DISSECTION, TUMOR DEBULKING;  Surgeon:  Emma Cabrera MD;  Location:  OR           Family History:     Family History   Problem Relation Age of Onset     Cancer Brother         testicular     Diabetes Sister      Ovarian Cancer Mother 60     Diabetes Mother              Asthma Father              Diabetes Sister      Depression/Anxiety Daughter      Depression Daughter      Osteoporosis Sister      Other Cancer Brother      Diabetes Brother      Depression Other         Grandson              Social History:     Social History     Socioeconomic History     Marital status:      Spouse name: Not on file     Number of children: Not on file     Years of education: Not on file     Highest education level: Not on file   Occupational History     Not on file   Tobacco Use     Smoking status: Former     Packs/day: 1.00     Years: 30.00     Pack years: 30.00     Types: Cigarettes     Start date: 1965     Quit date: 10/10/2003     Years since quittin.9     Passive exposure: Past     Smokeless tobacco: Never   Vaping Use     Vaping Use: Never used   Substance and Sexual Activity     Alcohol use: No     Drug use: No     Sexual activity: Not Currently     Birth control/protection: None   Other Topics Concern     Parent/sibling w/ CABG, MI or angioplasty before 65F 55M? No   Social History Narrative     Not on file     Social Determinants of Health     Financial Resource Strain: Not on file   Food Insecurity: Not on file   Transportation Needs: Not on file   Physical Activity: Not on file   Stress: Not on file   Social Connections: Not on file   Interpersonal Safety: Not on file   Housing Stability: Not on file            Medications:     Current Outpatient Medications   Medication     albuterol (PROAIR HFA/PROVENTIL HFA/VENTOLIN HFA) 108 (90 Base) MCG/ACT inhaler     albuterol (PROVENTIL) (2.5 MG/3ML) 0.083% neb solution     amLODIPine (NORVASC) 5 MG tablet     atorvastatin (LIPITOR) 20 MG tablet     azithromycin (ZITHROMAX)  250 MG tablet     budesonide-formoterol (SYMBICORT) 160-4.5 MCG/ACT Inhaler     calcitonin, salmon, (MIACALCIN) 200 UNIT/ACT nasal spray     Calcium Carbonate (CALCIUM 500 PO)     cyclobenzaprine (FLEXERIL) 5 MG tablet     fluticasone (FLONASE) 50 MCG/ACT spray     ibuprofen (ADVIL,MOTRIN) 600 MG tablet     ipratropium (ATROVENT) 0.02 % neb solution     ipratropium - albuterol 0.5 mg/2.5 mg/3 mL (DUONEB) 0.5-2.5 (3) MG/3ML neb solution     loratadine (CLARITIN) 10 MG tablet     losartan (COZAAR) 100 MG tablet     MAGNESIUM OXIDE PO     Nutritional Supplements (BOOST 100 CALORIE SMART) LIQD     omeprazole (PRILOSEC) 40 MG DR capsule     omeprazole (PRILOSEC) 40 MG DR capsule     ondansetron (ZOFRAN ODT) 4 MG ODT tab     ondansetron (ZOFRAN) 8 MG tablet     order for DME     predniSONE (DELTASONE) 20 MG tablet     pyridOXINE (VITAMIN B6) 25 MG tablet     tiZANidine (ZANAFLEX) 4 MG tablet     traMADol (ULTRAM) 50 MG tablet     triamcinolone (ARISTOCORT HP) 0.5 % external cream     umeclidinium (INCRUSE ELLIPTA) 62.5 MCG/ACT inhaler     vitamin B complex with vitamin C (STRESS TAB) tablet     Zinc Sulfate (ZINC 15 PO)     No current facility-administered medications for this visit.            Review of Systems:         Answers submitted by the patient for this visit:  Symptoms you have experienced in the last 30 days (Submitted on 9/18/2023)  General Symptoms: Yes  Skin Symptoms: No  HENT Symptoms: Yes  EYE SYMPTOMS: No  HEART SYMPTOMS: No  LUNG SYMPTOMS: Yes  INTESTINAL SYMPTOMS: Yes  URINARY SYMPTOMS: No  GYNECOLOGIC SYMPTOMS: No  BREAST SYMPTOMS: No  SKELETAL SYMPTOMS: No  BLOOD SYMPTOMS: No  NERVOUS SYSTEM SYMPTOMS: No  MENTAL HEALTH SYMPTOMS: No  Please answer the questions below to tell us what conditions you are experiencing: (Submitted on 9/18/2023)  Ear pain: No  Ear discharge: No  Hearing loss: No  Tinnitus: No  Nosebleeds: Yes  Congestion: Yes  Sinus pain: Yes  Trouble swallowing: No   Voice hoarseness:  No  Mouth sores: No  Sore throat: No  Tooth pain: No  Gum tenderness: No  Bleeding gums: No  Change in taste: No  Change in sense of smell: No  Dry mouth: No  Hearing aid used: No  Neck lump: No  Please answer the questions below to tell us what conditions you are experiencing: (Submitted on 9/18/2023)  Fever: No  Loss of appetite: No  Weight loss: Yes  Weight gain: No  Fatigue: Yes  Night sweats: No  Chills: No  Increased stress: No  Excessive hunger: No  Excessive thirst: No  Feeling hot or cold when others believe the temperature is normal: Yes  Loss of height: No  Post-operative complications: No  Surgical site pain: No  Hallucinations: No  Change in or Loss of Energy: No  Hyperactivity: No  Confusion: No  Please answer the questions below to tell us what condition you are experiencing: (Submitted on 9/18/2023)  Cough: Yes  Sputum or phlegm: Yes  Coughing up blood: No  Difficulty breating or shortness of breath: Yes  Snoring: No  Wheezing: No  Difficulty breathing on exertion: Yes  Nighttime Cough: No  Difficulty breathing when lying flat: Yes  Please answer the questions below to tell us what conditions you are experiencing: (Submitted on 9/18/2023)  Heart burn or indigestion: Yes  Nausea: Yes  Vomiting: Yes  Abdominal pain: No  Bloating: No  Constipation: No  Diarrhea: No  Blood in stool: No  Black stools: No  Rectal or Anal pain: No  Fecal incontinence: No  Yellowing of skin or eyes: No  Vomit with blood: No  Change in stools: No        PHYSICAL EXAM:  /63   Pulse 105   SpO2 95%      General: Comfortable, No apparent distress  Eyes: Anicteric  Nose: Nasal mucosa with no edema or hyperemia.    Ears: Hearing grossly normal  Mouth: Oral mucosa is moist, without any lesions. No oropharyngeal exudate.  Neck: supple, no thyromegaly  Lymphatics: No cervical or supraclavicular nodes  Respiratory: Decreased breath sounds. No crackles. No rhonchi. No wheezes  Cardiac: RRR, normal S1, S2. No murmurs. No  JVD  Musculoskeletal: Extremities normal. No clubbing. No cyanosis. No edema.  Skin: No rash on limited exam  Neuro: Normal mentation. Normal speech.  Psych:Normal affect           Data:   All laboratory and imaging data reviewed.      PFT:       PFT Interpretation:  Severe airflow obstruction.  Gas trapping and hyperinflation.  Reduced diffusion capacity.  Valid Maneuver    Chest CT: I have reviewed the chest CT images from October 2022 and agree with the radiologist interpretation below:  Airways: The central tracheobronchial tree is clear.     Lungs: Calcified granulomata. Severe centrilobular and paraseptal  emphysematous changes affecting all lobes with moderate involvement of  the left upper lobe. Stable 8 mm subtle nodular density involving the  medial aspect of the right upper lobe (series 4, image 65). Stable 5  mm solid nodule in the right middle lobe (series 4, image 197). No  pleural effusion or pneumothorax     Upper abdomen: Stable left adrenal nodularity dating back to 4/6/2016  CT. Calcified granulomata of the liver and spleen.     Bones: Unchanged compression deformity at T8. Slightly increased  vertebral body height loss at T7.     Soft tissues: Unremarkable.                                                                      IMPRESSION:   1. Stable severe centrilobular and paraseptal emphysematous changes of  the lung parenchyma with bullous change most notable at the right lung  apex.  2. Stable pulmonary nodularity as detailed above.  3. Increased anterior wedging of T7, age indeterminate.  4. Calcified mediastinal/hilar lymphadenopathy and calcified  granulomata throughout the lungs, likely a sequelae of chronic  granulomatous disease.                               Again, thank you for allowing me to participate in the care of your patient.        Sincerely,        Hayes Garg MD

## 2023-09-25 NOTE — PROGRESS NOTES
[facility]  Pulmonary Medicine  Visit Clinic Note  September 25, 2023         ASSESSMENT & PLAN       Very severe COPD  Chronic hypoxemic respiratory failure  Emphysema    Unfortunately, she did not qualify for endobronchial valves.  She is currently on maximum inhaler therapy and in need of oxygen at 4 L/min.  Her daily symptoms are significant for shortness of breath which is very limiting.  I have placed an order for pulmonary rehab, and she would like to participate in this.  She would like to do it in [facility].    Also, I am concerned about her respiratory muscle strength and ability to adequately inhale her bronchodilators via inhalers.  She is on Symbicort and Incruse Ellipta.  Given her symptom burden, I think it be best to try fully nebulizing all of her bronchodilators.  This would mean stopping Symbicort and Incruse and changing to nebulized Pulmicort, either Brovana or Perforomist for a long-acting beta agonist, and either Yupelri or Lonhala.      I let her know that there will likely be some paperwork and prior authorizations and need to be done to get some of these proved and so may be some time, but we will keep in touch with her throughout this process.    For her slight worsening of her respiratory symptoms including cough and shortness of breath with mild sputum production I will treat her for a COPD exacerbation of prednisone and azithromycin.    RTC in 6 months      Grayson Garg MD            Today's visit note:     Chief Complaint: General Visit (F/u)      HISTORY OF PRESENT ILLNESS:    Iris Carrion is a 78 year old year old female who is being seen for COPD.  She is really struggling with shortness of breath with exertion at home.  She is using 4 L of oxygen day and night.  She has not required any antibiotics or prednisone recently for COPD exacerbations.  She is not able to walk up stairs.  She cannot walk a city block without stopping.  She is currently on  Symbicort and Incruse Ellipta.  Albuterol inhalers do not do anything, and so she needs to use her DuoNebs 3-4 times a day to help out with her shortness of breath.  I have previously evaluated her for endobronchial valves, and unfortunately her lung anatomy would not allow for these valves to be placed.    Over the last week or so, she has had slight increase in her shortness of breath along with cough and mild sputum production that is slightly changed in color.  She does think that she came down with some cold recently.             Past Medical and Surgical History:     Past Medical History:   Diagnosis Date    Cervical high risk HPV (human papillomavirus) test positive 10/31/12    type 18    COPD (chronic obstructive pulmonary disease) (H)     GERD (gastroesophageal reflux disease)     Hx of colposcopy with cervical biopsy 2012    negative    Hypertension     Osteoporosis     Paroxysmal supraventricular tachycardia (H) 2017    Peritoneal carcinomatosis (H) 2015    Pneumonia     Uncomplicated asthma      Past Surgical History:   Procedure Laterality Date    CHOLECYSTECTOMY  2014    COLONOSCOPY  2012    Procedure: COLONOSCOPY;  COLONOSCOPY SCREEN;  Surgeon: Mushtaq Lara MD;  Location: [facility] OR    GYN SURGERY      HERNIORRHAPHY HIATAL N/A 2015    Procedure: HERNIORRHAPHY HIATAL;  Surgeon: Chip Carty MD;  Location: [facility] OR    HYSTERECTOMY TOTAL ABD, ALVIN SALPINGO-OOPHORECTOMY, NODE DISSECTION, TUMOR DEBULKING, COMBINED Bilateral 2015    Procedure: COMBINED HYSTERECTOMY TOTAL ABDOMINAL, SALPINGO-OOPHORECTOMY, NODE DISSECTION, TUMOR DEBULKING;  Surgeon: Emma Cabrera MD;  Location: [facility] OR           Family History:     Family History   Problem Relation Age of Onset    Cancer Brother         testicular    Diabetes Sister     Ovarian Cancer Mother 60    Diabetes Mother             Asthma Father             Diabetes Sister      Depression/Anxiety Daughter     Depression Daughter     Osteoporosis Sister     Other Cancer Brother     Diabetes Brother     Depression Other         Grandson              Social History:     Social History     Socioeconomic History    Marital status:      Spouse name: Not on file    Number of children: Not on file    Years of education: Not on file    Highest education level: Not on file   Occupational History    Not on file   Tobacco Use    Smoking status: Former     Packs/day: 1.00     Years: 30.00     Pack years: 30.00     Types: Cigarettes     Start date: 1965     Quit date: 10/10/2003     Years since quittin.9     Passive exposure: Past    Smokeless tobacco: Never   Vaping Use    Vaping Use: Never used   Substance and Sexual Activity    Alcohol use: No    Drug use: No    Sexual activity: Not Currently     Birth control/protection: None   Other Topics Concern    Parent/sibling w/ CABG, MI or angioplasty before 65F 55M? No   Social History Narrative    Not on file     Social Determinants of Health     Financial Resource Strain: Not on file   Food Insecurity: Not on file   Transportation Needs: Not on file   Physical Activity: Not on file   Stress: Not on file   Social Connections: Not on file   Interpersonal Safety: Not on file   Housing Stability: Not on file            Medications:     Current Outpatient Medications   Medication    albuterol (PROAIR HFA/PROVENTIL HFA/VENTOLIN HFA) 108 (90 Base) MCG/ACT inhaler    albuterol (PROVENTIL) (2.5 MG/3ML) 0.083% neb solution    amLODIPine (NORVASC) 5 MG tablet    atorvastatin (LIPITOR) 20 MG tablet    azithromycin (ZITHROMAX) 250 MG tablet    budesonide-formoterol (SYMBICORT) 160-4.5 MCG/ACT Inhaler    calcitonin, salmon, (MIACALCIN) 200 UNIT/ACT nasal spray    Calcium Carbonate (CALCIUM 500 PO)    cyclobenzaprine (FLEXERIL) 5 MG tablet    fluticasone (FLONASE) 50 MCG/ACT spray    ibuprofen (ADVIL,MOTRIN) 600 MG tablet    ipratropium (ATROVENT) 0.02  % neb solution    ipratropium - albuterol 0.5 mg/2.5 mg/3 mL (DUONEB) 0.5-2.5 (3) MG/3ML neb solution    loratadine (CLARITIN) 10 MG tablet    losartan (COZAAR) 100 MG tablet    MAGNESIUM OXIDE PO    Nutritional Supplements (BOOST 100 CALORIE SMART) LIQD    omeprazole (PRILOSEC) 40 MG DR capsule    omeprazole (PRILOSEC) 40 MG DR capsule    ondansetron (ZOFRAN ODT) 4 MG ODT tab    ondansetron (ZOFRAN) 8 MG tablet    order for DME    predniSONE (DELTASONE) 20 MG tablet    pyridOXINE (VITAMIN B6) 25 MG tablet    tiZANidine (ZANAFLEX) 4 MG tablet    traMADol (ULTRAM) 50 MG tablet    triamcinolone (ARISTOCORT HP) 0.5 % external cream    umeclidinium (INCRUSE ELLIPTA) 62.5 MCG/ACT inhaler    vitamin B complex with vitamin C (STRESS TAB) tablet    Zinc Sulfate (ZINC 15 PO)     No current facility-administered medications for this visit.            Review of Systems:         Answers submitted by the patient for this visit:  Symptoms you have experienced in the last 30 days (Submitted on 9/18/2023)  General Symptoms: Yes  Skin Symptoms: No  HENT Symptoms: Yes  EYE SYMPTOMS: No  HEART SYMPTOMS: No  LUNG SYMPTOMS: Yes  INTESTINAL SYMPTOMS: Yes  URINARY SYMPTOMS: No  GYNECOLOGIC SYMPTOMS: No  BREAST SYMPTOMS: No  SKELETAL SYMPTOMS: No  BLOOD SYMPTOMS: No  NERVOUS SYSTEM SYMPTOMS: No  MENTAL HEALTH SYMPTOMS: No  Please answer the questions below to tell us what conditions you are experiencing: (Submitted on 9/18/2023)  Ear pain: No  Ear discharge: No  Hearing loss: No  Tinnitus: No  Nosebleeds: Yes  Congestion: Yes  Sinus pain: Yes  Trouble swallowing: No   Voice hoarseness: No  Mouth sores: No  Sore throat: No  Tooth pain: No  Gum tenderness: No  Bleeding gums: No  Change in taste: No  Change in sense of smell: No  Dry mouth: No  Hearing aid used: No  Neck lump: No  Please answer the questions below to tell us what conditions you are experiencing: (Submitted on 9/18/2023)  Fever: No  Loss of appetite: No  Weight loss:  Yes  Weight gain: No  Fatigue: Yes  Night sweats: No  Chills: No  Increased stress: No  Excessive hunger: No  Excessive thirst: No  Feeling hot or cold when others believe the temperature is normal: Yes  Loss of height: No  Post-operative complications: No  Surgical site pain: No  Hallucinations: No  Change in or Loss of Energy: No  Hyperactivity: No  Confusion: No  Please answer the questions below to tell us what condition you are experiencing: (Submitted on 9/18/2023)  Cough: Yes  Sputum or phlegm: Yes  Coughing up blood: No  Difficulty breating or shortness of breath: Yes  Snoring: No  Wheezing: No  Difficulty breathing on exertion: Yes  Nighttime Cough: No  Difficulty breathing when lying flat: Yes  Please answer the questions below to tell us what conditions you are experiencing: (Submitted on 9/18/2023)  Heart burn or indigestion: Yes  Nausea: Yes  Vomiting: Yes  Abdominal pain: No  Bloating: No  Constipation: No  Diarrhea: No  Blood in stool: No  Black stools: No  Rectal or Anal pain: No  Fecal incontinence: No  Yellowing of skin or eyes: No  Vomit with blood: No  Change in stools: No        PHYSICAL EXAM:  /63   Pulse 105   SpO2 95%      General: Comfortable, No apparent distress  Eyes: Anicteric  Nose: Nasal mucosa with no edema or hyperemia.    Ears: Hearing grossly normal  Mouth: Oral mucosa is moist, without any lesions. No oropharyngeal exudate.  Neck: supple, no thyromegaly  Lymphatics: No cervical or supraclavicular nodes  Respiratory: Decreased breath sounds. No crackles. No rhonchi. No wheezes  Cardiac: RRR, normal S1, S2. No murmurs. No JVD  Musculoskeletal: Extremities normal. No clubbing. No cyanosis. No edema.  Skin: No rash on limited exam  Neuro: Normal mentation. Normal speech.  Psych:Normal affect           Data:   All laboratory and imaging data reviewed.      PFT:       PFT Interpretation:  Severe airflow obstruction.  Gas trapping and hyperinflation.  Reduced diffusion  capacity.  Valid Maneuver    Chest CT: I have reviewed the chest CT images from October 2022 and agree with the radiologist interpretation below:  Airways: The central tracheobronchial tree is clear.     Lungs: Calcified granulomata. Severe centrilobular and paraseptal  emphysematous changes affecting all lobes with moderate involvement of  the left upper lobe. Stable 8 mm subtle nodular density involving the  medial aspect of the right upper lobe (series 4, image 65). Stable 5  mm solid nodule in the right middle lobe (series 4, image 197). No  pleural effusion or pneumothorax     Upper abdomen: Stable left adrenal nodularity dating back to 4/6/2016  CT. Calcified granulomata of the liver and spleen.     Bones: Unchanged compression deformity at T8. Slightly increased  vertebral body height loss at T7.     Soft tissues: Unremarkable.                                                                      IMPRESSION:   1. Stable severe centrilobular and paraseptal emphysematous changes of  the lung parenchyma with bullous change most notable at the right lung  apex.  2. Stable pulmonary nodularity as detailed above.  3. Increased anterior wedging of T7, age indeterminate.  4. Calcified mediastinal/hilar lymphadenopathy and calcified  granulomata throughout the lungs, likely a sequelae of chronic  granulomatous disease.

## 2023-09-26 RX ORDER — REVEFENACIN 175 UG/3ML
175 SOLUTION RESPIRATORY (INHALATION) DAILY
Qty: 90 ML | Refills: 11 | Status: SHIPPED | OUTPATIENT
Start: 2023-09-26 | End: 2024-02-13

## 2023-09-26 RX ORDER — BUDESONIDE 0.5 MG/2ML
0.5 INHALANT ORAL 2 TIMES DAILY
Qty: 120 ML | Refills: 11 | Status: SHIPPED | OUTPATIENT
Start: 2023-09-26 | End: 2024-04-29

## 2023-09-26 RX ORDER — FORMOTEROL FUMARATE DIHYDRATE 20 UG/2ML
20 SOLUTION RESPIRATORY (INHALATION) EVERY 12 HOURS
Qty: 120 ML | Refills: 11 | Status: SHIPPED | OUTPATIENT
Start: 2023-09-26 | End: 2024-04-29

## 2023-09-29 ASSESSMENT — ENCOUNTER SYMPTOMS
POSTURAL DYSPNEA: 1
WEIGHT GAIN: 0
SHORTNESS OF BREATH: 1
SLEEP DISTURBANCES DUE TO BREATHING: 1
POLYDIPSIA: 0
MYALGIAS: 0
ORTHOPNEA: 1
INSOMNIA: 1
SNORES LOUDLY: 0
MUSCLE CRAMPS: 0
WEIGHT LOSS: 1
NIGHT SWEATS: 0
SPUTUM PRODUCTION: 1
MUSCLE WEAKNESS: 0
HOARSE VOICE: 0
WHEEZING: 1
INCREASED ENERGY: 0
SINUS PAIN: 0
SORE THROAT: 0
COUGH: 1
LEG PAIN: 0
PANIC: 0
TASTE DISTURBANCE: 0
DECREASED CONCENTRATION: 0
NECK MASS: 0
DYSPNEA ON EXERTION: 1
BACK PAIN: 1
EXERCISE INTOLERANCE: 0
HYPERTENSION: 0
SINUS CONGESTION: 1
DEPRESSION: 0
POLYPHAGIA: 0
PALPITATIONS: 0
JOINT SWELLING: 0
LIGHT-HEADEDNESS: 0
COUGH DISTURBING SLEEP: 0
HALLUCINATIONS: 0
NERVOUS/ANXIOUS: 0
TROUBLE SWALLOWING: 0
SMELL DISTURBANCE: 0
ALTERED TEMPERATURE REGULATION: 0
STIFFNESS: 1
CHILLS: 0
HEMOPTYSIS: 0
HYPOTENSION: 0
FATIGUE: 1
FEVER: 0
ARTHRALGIAS: 0
SYNCOPE: 0
NECK PAIN: 0

## 2023-10-06 ENCOUNTER — VIRTUAL VISIT (OUTPATIENT)
Dept: ENDOCRINOLOGY | Facility: CLINIC | Age: 78
End: 2023-10-06
Payer: COMMERCIAL

## 2023-10-06 VITALS — HEIGHT: 65 IN | WEIGHT: 100 LBS | BODY MASS INDEX: 16.66 KG/M2

## 2023-10-06 DIAGNOSIS — T38.0X5A STEROID-INDUCED OSTEOPOROSIS: ICD-10-CM

## 2023-10-06 DIAGNOSIS — M81.0 AGE-RELATED OSTEOPOROSIS WITHOUT CURRENT PATHOLOGICAL FRACTURE: Primary | ICD-10-CM

## 2023-10-06 DIAGNOSIS — M81.8 STEROID-INDUCED OSTEOPOROSIS: ICD-10-CM

## 2023-10-06 DIAGNOSIS — N18.32 STAGE 3B CHRONIC KIDNEY DISEASE (H): ICD-10-CM

## 2023-10-06 PROCEDURE — 99214 OFFICE O/P EST MOD 30 MIN: CPT | Mod: 95 | Performed by: INTERNAL MEDICINE

## 2023-10-06 ASSESSMENT — PAIN SCALES - GENERAL: PAINLEVEL: NO PAIN (0)

## 2023-10-06 NOTE — PROGRESS NOTES
Iris Carrion is being evaluated via a billable video visit.        How would you like to obtain your AVS? Fuze  For the video visit, send the invitation by: Text to cell phone: [phone]  Will anyone else be joining your video visit? No      Visit video  Start 4:14 pm  End: 4:27  pm  amwell                                                                             - Endocrinology follow up-    Reason for visit/consult:     Age-related osteoporosis with current pathological fracture, initial encounter  Pathological compression fracture of vertebra, initial encounter (H)    Primary care provider: Pillo Koehler      Assessment and Plan  A 78 year old female with osteoporosis    Osteporosis  Bone density has been improving, she has received prolia twice so far    - continue prolia for now    - continue current calcium supplement    - next bone density in 1 year      CKD  Stage 3    RTC with me in 1 year      30 minutes spent on the date of the encounter doing chart review, history and exam, documentation and further activities as noted above.      Lore Ferrera MD  Staff Physician  Endocrinology and Metabolism  License: [id number]    Interval History as of 10/6/2023 : Patient has been doing well. Medication compliance good for ca vitamin D supplement  . New event includes : no pertinent medical event noted .   HPI: A 78 yo female here for evaluation of osteoporosis.   Patient records bone density was last time once 2015 she was told osteoporosis the result we could not find today.  She had right side of the hip fracture in 2016 after she fell.  6 months ago she had back pain worsening and they did an x-ray and she was told likely osteoporosis.  She does not drink milk not much daily product however she is taking over-the-counter calcium supplement every day.  She never had osteoporosis medication never had an Fosamax request nor Prolia.  Other medical condition including COPD with oxygen therapy for 3 years .   Risk factor including early menopause of early 40s and smoker and chronic steroid use for COPD.     Prior fragility fracture:no  Parental history of hip fracture: yes right 2016  Rheumatoid arthritis:no  Secondary cause of osteoporosis: steroid chronic use due to COPD  Body habitus (BMI less than or equal to 20):no  Family history of osteoporosis: sister has osteopenia  Sedentary lifestyle:no  Current tabacco smoking:no  History of thyroid disorder:no  Calcuim and Vitmin D supplements:OCT  Other supplement or bone treatment:  Medication use (PPI, epileptic medications etc):no  Early menopause (female): early 40s     Past Medical/Surgical History:  Past Medical History:   Diagnosis Date    Cervical high risk HPV (human papillomavirus) test positive 10/31/12    type 18    COPD (chronic obstructive pulmonary disease) (H)     GERD (gastroesophageal reflux disease)     Hx of colposcopy with cervical biopsy 11/2012    negative    Hypertension 2013    Osteoporosis     Paroxysmal supraventricular tachycardia (H) 9/6/2017    Peritoneal carcinomatosis (H) 8/24/2015    Pneumonia     Uncomplicated asthma 1980     Past Surgical History:   Procedure Laterality Date    CHOLECYSTECTOMY  6/2014    COLONOSCOPY  12/4/2012    Procedure: COLONOSCOPY;  COLONOSCOPY SCREEN;  Surgeon: Mushtaq Lara MD;  Location: [facility] OR    GYN SURGERY      HERNIORRHAPHY HIATAL N/A 11/25/2015    Procedure: HERNIORRHAPHY HIATAL;  Surgeon: Chip Carty MD;  Location: [facility]    HYSTERECTOMY TOTAL ABD, ALVIN SALPINGO-OOPHORECTOMY, NODE DISSECTION, TUMOR DEBULKING, COMBINED Bilateral 11/25/2015    Procedure: COMBINED HYSTERECTOMY TOTAL ABDOMINAL, SALPINGO-OOPHORECTOMY, NODE DISSECTION, TUMOR DEBULKING;  Surgeon: Emma Cabrera MD;  Location: [facility] OR       Allergies:  Allergies   Allergen Reactions    Levaquin Rash       Current Medications   Current Outpatient Medications   Medication    albuterol (PROAIR HFA/PROVENTIL HFA/VENTOLIN HFA) 108  (90 Base) MCG/ACT inhaler    albuterol (PROVENTIL) (2.5 MG/3ML) 0.083% neb solution    amLODIPine (NORVASC) 5 MG tablet    atorvastatin (LIPITOR) 20 MG tablet    budesonide (PULMICORT) 0.5 MG/2ML neb solution    budesonide-formoterol (SYMBICORT) 160-4.5 MCG/ACT Inhaler    calcitonin, salmon, (MIACALCIN) 200 UNIT/ACT nasal spray    Calcium Carbonate (CALCIUM 500 PO)    cyclobenzaprine (FLEXERIL) 5 MG tablet    fluticasone (FLONASE) 50 MCG/ACT spray    formoterol (PERFOROMIST) 20 MCG/2ML neb solution    ibuprofen (ADVIL,MOTRIN) 600 MG tablet    ipratropium (ATROVENT) 0.02 % neb solution    ipratropium - albuterol 0.5 mg/2.5 mg/3 mL (DUONEB) 0.5-2.5 (3) MG/3ML neb solution    loratadine (CLARITIN) 10 MG tablet    losartan (COZAAR) 100 MG tablet    MAGNESIUM OXIDE PO    omeprazole (PRILOSEC) 40 MG DR capsule    omeprazole (PRILOSEC) 40 MG DR capsule    ondansetron (ZOFRAN ODT) 4 MG ODT tab    ondansetron (ZOFRAN) 8 MG tablet    order for DME    predniSONE (DELTASONE) 20 MG tablet    pyridOXINE (VITAMIN B6) 25 MG tablet    Revefenacin (YUPELRI) 175 MCG/3ML SOLN    tiZANidine (ZANAFLEX) 4 MG tablet    traMADol (ULTRAM) 50 MG tablet    triamcinolone (ARISTOCORT HP) 0.5 % external cream    umeclidinium (INCRUSE ELLIPTA) 62.5 MCG/ACT inhaler    vitamin B complex with vitamin C (STRESS TAB) tablet    Zinc Sulfate (ZINC 15 PO)     No current facility-administered medications for this visit.       Family History:  Family History   Problem Relation Age of Onset    Cancer Brother         testicular    Diabetes Sister     Ovarian Cancer Mother 60    Diabetes Mother             Asthma Father             Diabetes Sister     Depression/Anxiety Daughter     Depression Daughter     Osteoporosis Sister     Other Cancer Brother     Diabetes Brother     Depression Other         Grandson       Social History:  Social History     Tobacco Use    Smoking status: Former     Packs/day: 1.00     Years: 30.00     Pack years: 30.00      Types: Cigarettes     Start date: 1965     Quit date: 10/10/2003     Years since quittin.0     Passive exposure: Past    Smokeless tobacco: Never   Substance Use Topics    Alcohol use: No   retired, was working [employer]    ROS:  Full review of systems taken with the help of the intake sheet. Otherwise a complete 14 point review of systems was taken and is negative unless stated in the history above.      Physical Exam:   Vitals: There were no vitals taken for this visit.  BMI= There is no height or weight on file to calculate BMI.   General: well appearing, no acute distress, pleasant and conversant,   Mental Status/neuro: alert and oriented  Face: symmetrical, normal facial color  Eyes: anicteric, no proptosis or lid lag  Resp: normally breathing      Labs : I reviewed data from epic and extract and summarize the pertinent data here.   Lab Results   Component Value Date     10/07/2021     2019      Lab Results   Component Value Date    POTASSIUM 3.6 10/07/2021    POTASSIUM 3.7 2019     Lab Results   Component Value Date    CHLORIDE 107 10/07/2021    CHLORIDE 105 2019     Lab Results   Component Value Date    MAURISIO 8.9 10/07/2021    MAURISIO 9.3 2019     Lab Results   Component Value Date    CO2 28 10/07/2021    CO2 27 2019     Lab Results   Component Value Date    BUN 25 10/07/2021    BUN 20 2019     Lab Results   Component Value Date    CR 1.11 10/07/2021    CR 1.05 2019     Lab Results   Component Value Date     10/07/2021    GLC 87 2019     Lab Results   Component Value Date    TSH 4.00 2018     Lab Results   Component Value Date    T4 1.16 2017     Lab Results   Component Value Date    A1C 5.5 2018       No results found for: IGF1  No results found for: LH  No results found for: FSH  No results found for: ESTROGEN  No results found for: PROLACTIN        MRI Brain: I personally reviewed the original images and  agree with the below reports.   No results found for this or any previous visit.

## 2023-10-06 NOTE — LETTER
10/6/2023         RE: Iris Carrion  [address] [place] [address]        Dear Colleague,    Thank you for referring your patient, Iris Carrion, to the [facility]. Please see a copy of my visit note below.    Iris Carrion is being evaluated via a billable video visit.        How would you like to obtain your AVS? [employer]  For the video visit, send the invitation by: Text to cell phone: [phone]  Will anyone else be joining your video visit? No      Visit video  Start 4:14 pm  End: 4:27  pm  amwell                                                                             - Endocrinology follow up-    Reason for visit/consult:     Age-related osteoporosis with current pathological fracture, initial encounter  Pathological compression fracture of vertebra, initial encounter (H)    Primary care provider: Pillo Koehler      Assessment and Plan  A 78 year old female with osteoporosis    Osteporosis  Bone density has been improving, she has received prolia twice so far    - continue prolia for now    - continue current calcium supplement    - next bone density in 1 year      CKD  Stage 3    RTC with me in 1 year      30 minutes spent on the date of the encounter doing chart review, history and exam, documentation and further activities as noted above.      Lore Ferrera MD  Staff Physician  Endocrinology and Metabolism  License: [id number]    Interval History as of 10/6/2023 : Patient has been doing well. Medication compliance good for ca vitamin D supplement  . New event includes : no pertinent medical event noted .   HPI: A 76 yo female here for evaluation of osteoporosis.   Patient records bone density was last time once 2015 she was told osteoporosis the result we could not find today.  She had right side of the hip fracture in 2016 after she fell.  6 months ago she had back pain worsening and they did an x-ray and she was told likely osteoporosis.  She does  not drink milk not much daily product however she is taking over-the-counter calcium supplement every day.  She never had osteoporosis medication never had an Fosamax request nor Prolia.  Other medical condition including COPD with oxygen therapy for 3 years .  Risk factor including early menopause of early 40s and smoker and chronic steroid use for COPD.     Prior fragility fracture:no  Parental history of hip fracture: yes right 2016  Rheumatoid arthritis:no  Secondary cause of osteoporosis: steroid chronic use due to COPD  Body habitus (BMI less than or equal to 20):no  Family history of osteoporosis: sister has osteopenia  Sedentary lifestyle:no  Current tabacco smoking:no  History of thyroid disorder:no  Calcuim and Vitmin D supplements:OCT  Other supplement or bone treatment:  Medication use (PPI, epileptic medications etc):no  Early menopause (female): early 40s     Past Medical/Surgical History:  Past Medical History:   Diagnosis Date     Cervical high risk HPV (human papillomavirus) test positive 10/31/12    type 18     COPD (chronic obstructive pulmonary disease) (H)      GERD (gastroesophageal reflux disease)      Hx of colposcopy with cervical biopsy 11/2012    negative     Hypertension 2013     Osteoporosis      Paroxysmal supraventricular tachycardia (H) 9/6/2017     Peritoneal carcinomatosis (H) 8/24/2015     Pneumonia      Uncomplicated asthma 1980     Past Surgical History:   Procedure Laterality Date     CHOLECYSTECTOMY  6/2014     COLONOSCOPY  12/4/2012    Procedure: COLONOSCOPY;  COLONOSCOPY SCREEN;  Surgeon: Mushtaq Lara MD;  Location: [facility] OR     GYN SURGERY       HERNIORRHAPHY HIATAL N/A 11/25/2015    Procedure: HERNIORRHAPHY HIATAL;  Surgeon: Chip Carty MD;  Location: [facility] OR     HYSTERECTOMY TOTAL ABD, ALVIN SALPINGO-OOPHORECTOMY, NODE DISSECTION, TUMOR DEBULKING, COMBINED Bilateral 11/25/2015    Procedure: COMBINED HYSTERECTOMY TOTAL ABDOMINAL, SALPINGO-OOPHORECTOMY, NODE  DISSECTION, TUMOR DEBULKING;  Surgeon: Emma Cabrera MD;  Location: [facility] OR       Allergies:  Allergies   Allergen Reactions     Levaquin Rash       Current Medications   Current Outpatient Medications   Medication     albuterol (PROAIR HFA/PROVENTIL HFA/VENTOLIN HFA) 108 (90 Base) MCG/ACT inhaler     albuterol (PROVENTIL) (2.5 MG/3ML) 0.083% neb solution     amLODIPine (NORVASC) 5 MG tablet     atorvastatin (LIPITOR) 20 MG tablet     budesonide (PULMICORT) 0.5 MG/2ML neb solution     budesonide-formoterol (SYMBICORT) 160-4.5 MCG/ACT Inhaler     calcitonin, salmon, (MIACALCIN) 200 UNIT/ACT nasal spray     Calcium Carbonate (CALCIUM 500 PO)     cyclobenzaprine (FLEXERIL) 5 MG tablet     fluticasone (FLONASE) 50 MCG/ACT spray     formoterol (PERFOROMIST) 20 MCG/2ML neb solution     ibuprofen (ADVIL,MOTRIN) 600 MG tablet     ipratropium (ATROVENT) 0.02 % neb solution     ipratropium - albuterol 0.5 mg/2.5 mg/3 mL (DUONEB) 0.5-2.5 (3) MG/3ML neb solution     loratadine (CLARITIN) 10 MG tablet     losartan (COZAAR) 100 MG tablet     MAGNESIUM OXIDE PO     omeprazole (PRILOSEC) 40 MG DR capsule     omeprazole (PRILOSEC) 40 MG DR capsule     ondansetron (ZOFRAN ODT) 4 MG ODT tab     ondansetron (ZOFRAN) 8 MG tablet     order for DME     predniSONE (DELTASONE) 20 MG tablet     pyridOXINE (VITAMIN B6) 25 MG tablet     Revefenacin (YUPELRI) 175 MCG/3ML SOLN     tiZANidine (ZANAFLEX) 4 MG tablet     traMADol (ULTRAM) 50 MG tablet     triamcinolone (ARISTOCORT HP) 0.5 % external cream     umeclidinium (INCRUSE ELLIPTA) 62.5 MCG/ACT inhaler     vitamin B complex with vitamin C (STRESS TAB) tablet     Zinc Sulfate (ZINC 15 PO)     No current facility-administered medications for this visit.       Family History:  Family History   Problem Relation Age of Onset     Cancer Brother         testicular     Diabetes Sister      Ovarian Cancer Mother 60     Diabetes Mother              Asthma Father               Diabetes Sister      Depression/Anxiety Daughter      Depression Daughter      Osteoporosis Sister      Other Cancer Brother      Diabetes Brother      Depression Other         Grandson       Social History:  Social History     Tobacco Use     Smoking status: Former     Packs/day: 1.00     Years: 30.00     Pack years: 30.00     Types: Cigarettes     Start date: 1965     Quit date: 10/10/2003     Years since quittin.0     Passive exposure: Past     Smokeless tobacco: Never   Substance Use Topics     Alcohol use: No   retired, was working [employer]    ROS:  Full review of systems taken with the help of the intake sheet. Otherwise a complete 14 point review of systems was taken and is negative unless stated in the history above.      Physical Exam:   Vitals: There were no vitals taken for this visit.  BMI= There is no height or weight on file to calculate BMI.   General: well appearing, no acute distress, pleasant and conversant,   Mental Status/neuro: alert and oriented  Face: symmetrical, normal facial color  Eyes: anicteric, no proptosis or lid lag  Resp: normally breathing      Labs : I reviewed data from epic and extract and summarize the pertinent data here.   Lab Results   Component Value Date     10/07/2021     2019      Lab Results   Component Value Date    POTASSIUM 3.6 10/07/2021    POTASSIUM 3.7 2019     Lab Results   Component Value Date    CHLORIDE 107 10/07/2021    CHLORIDE 105 2019     Lab Results   Component Value Date    MAURISIO 8.9 10/07/2021    MAURISIO 9.3 2019     Lab Results   Component Value Date    CO2 28 10/07/2021    CO2 27 2019     Lab Results   Component Value Date    BUN 25 10/07/2021    BUN 20 2019     Lab Results   Component Value Date    CR 1.11 10/07/2021    CR 1.05 2019     Lab Results   Component Value Date     10/07/2021    GLC 87 2019     Lab Results   Component Value Date    TSH 4.00 2018     Lab  Results   Component Value Date    T4 1.16 08/04/2017     Lab Results   Component Value Date    A1C 5.5 04/18/2018       No results found for: IGF1  No results found for: LH  No results found for: FSH  No results found for: ESTROGEN  No results found for: PROLACTIN        MRI Brain: I personally reviewed the original images and agree with the below reports.   No results found for this or any previous visit.        Again, thank you for allowing me to participate in the care of your patient.        Sincerely,        Lore Ferrera MD

## 2023-10-06 NOTE — NURSING NOTE
Is the patient currently in the state of [place]? YES    Visit mode:VIDEO    If the visit is dropped, the patient can be reconnected by: VIDEO VISIT: Send to e-mail at: [email or web address]    Will anyone else be joining the visit? NO  (If patient encounters technical issues they should call [phone]    How would you like to obtain your AVS? MyChart    Are changes needed to the allergy or medication list? No    Reason for visit: RECHRAFIQ KAPADIA

## 2023-10-09 ENCOUNTER — TELEPHONE (OUTPATIENT)
Dept: ENDOCRINOLOGY | Facility: CLINIC | Age: 78
End: 2023-10-09
Payer: COMMERCIAL

## 2023-10-09 NOTE — TELEPHONE ENCOUNTER
Left Voicemail (1st Attempt) for the patient to call back and schedule the following:    Appointment type: return  Provider: dr. Ferrera   Return date: 10/6/2024  Specialty phone number: [phone]  Additonal Notes: Return in about 1 year (around 10/6/2024)     Becca lai Procedure   Orthopedics, Podiatry, Sports Medicine, Ent ,Eye , Audiology, Adult Endocrine & Diabetes, Nutrition & Medication Therapy Management Specialties   [facility]

## 2023-10-11 ENCOUNTER — TELEPHONE (OUTPATIENT)
Dept: FAMILY MEDICINE | Facility: CLINIC | Age: 78
End: 2023-10-11
Payer: COMMERCIAL

## 2023-10-11 DIAGNOSIS — K21.9 GASTROESOPHAGEAL REFLUX DISEASE, UNSPECIFIED WHETHER ESOPHAGITIS PRESENT: ICD-10-CM

## 2023-10-11 RX ORDER — OMEPRAZOLE 40 MG/1
CAPSULE, DELAYED RELEASE ORAL
Qty: 90 CAPSULE | Refills: 0 | OUTPATIENT
Start: 2023-10-11

## 2023-10-11 NOTE — TELEPHONE ENCOUNTER
On 10/11/2023 I spoke with Iris, she is in need of financial assistance for medication.    We reviewed the Pharmacy Assistance Fund for $500, the Prescription Assistance Program for  assistance programs, gross income, insurance and RX list.     Unfortunately, there is currently not a program for Yupelri (only if the patient is does not have insurance through [employer]).    Iris will contact us in the future if she has any further medication financial assistance.    Ese Deluca  Prescription   Please send responses to [employer]

## 2023-10-12 ENCOUNTER — TELEPHONE (OUTPATIENT)
Dept: GASTROENTEROLOGY | Facility: CLINIC | Age: 78
End: 2023-10-12
Payer: COMMERCIAL

## 2023-10-12 NOTE — TELEPHONE ENCOUNTER
Patient with osteoporosis. Omeprazole can interfere with calcium absorption. Should follow up with GI as discussed previously.

## 2023-10-12 NOTE — TELEPHONE ENCOUNTER
Patient notified of provider's message as written below. Patient verbalized good understanding, had no further questions and needed no further support.Nayeli Warner R.N.

## 2023-10-12 NOTE — TELEPHONE ENCOUNTER
"Endoscopy Scheduling Screen    Have you had a positive Covid test in the last 14 days?  No    Are you active on MyChart?   Yes    What insurance is in the chart?  Other:  [facility]    Ordering/Referring Provider:   SHERRY SABILLON        (If ordering provider performs procedure, schedule with ordering provider unless otherwise instructed. )    BMI: Estimated body mass index is 16.9 kg/m  as calculated from the following:    Height as of 10/6/23: 1.638 m (5' 4.5\").    Weight as of 10/6/23: 45.4 kg (100 lb).     Sedation Ordered  moderate sedation.   If patient BMI > 50 do not schedule in [facility].    If patient BMI > 45 do not schedule at [facility].    Are you taking methadone or Suboxone?  No    Are you taking any prescription medications for pain 3 or more times per week?   No    Do you have a history of malignant hyperthermia or adverse reaction to anesthesia?  No    (Females) Are you currently pregnant?   No     Have you been diagnosed or told you have pulmonary hypertension?   No    Do you have an LVAD?  No    Have you been told you have moderate to severe sleep apnea?  No    Have you been told you have COPD, asthma, or any other lung disease?  Yes     What breathing problems do you have?  COPD     Do you use home oxygen?  Yes (Hospital Only)    Have your breathing problems required an ED visit or hospitalization in the last year?  No    Do you have any heart conditions?  No     Have you ever had an organ transplant?   No    Have you ever had or are you awaiting a heart or lung transplant?   No    Have you had a stroke or transient ischemic attack (TIA aka \"mini stroke\" in the last 6 months?   No    Have you been diagnosed with or been told you have cirrhosis of the liver?   No    Are you currently on dialysis?   No    Do you need assistance transferring?   No    BMI: Estimated body mass index is 16.9 kg/m  as calculated from the following:    Height as of 10/6/23: 1.638 m (5' 4.5\").    Weight as of 10/6/23: 45.4 kg " (100 lb).     Is patients BMI > 40 and scheduling location [facility]?  No    Do you take an injectable medication for weight loss or diabetes (excluding insulin)?  No    Do you take the medication Naltrexone?  No    Do you take blood thinners?  No       Prep   Are you currently on dialysis or do you have chronic kidney disease?  No    Do you have a diagnosis of diabetes?  No    Do you have a diagnosis of cystic fibrosis (CF)?  No    On a regular basis do you go 3 -5 days between bowel movements?  No    BMI > 40?  No    Preferred Pharmacy:    [facility] [address] [place] [address] - [address]  [address] [place] [address]  Phone: [phone] Fax: [phone]      Final Scheduling Details   Colonoscopy prep sent?  N/A    Procedure scheduled  Upper endoscopy (EGD)    Surgeon:  Adria     Date of procedure:  11/7     Pre-OP / PAC:   No - Not required for this site.    Location  [facility] - Patient preference.    Sedation   MAC/Deep Sedation  site      Patient Reminders:   You will receive a call from a Nurse to review instructions and health history.  This assessment must be completed prior to your procedure.  Failure to complete the Nurse assessment may result in the procedure being cancelled.      On the day of your procedure, please designate an adult(s) who can drive you home stay with you for the next 24 hours. The medicines used in the exam will make you sleepy. You will not be able to drive.      You cannot take public transportation, ride share services, or non-medical taxi service without a responsible caregiver.  Medical transport services are allowed with the requirement that a responsible caregiver will receive you at your destination.  We require that drivers and caregivers are confirmed prior to your procedure.

## 2023-10-20 ENCOUNTER — TELEPHONE (OUTPATIENT)
Dept: PULMONOLOGY | Facility: CLINIC | Age: 78
End: 2023-10-20
Payer: COMMERCIAL

## 2023-10-20 NOTE — TELEPHONE ENCOUNTER
Left Voicemail (1st Attempt) for the patient to call back and schedule the following:    Appointment type: FRANC  Provider: DESIRE  Return date: 04/2024  Specialty phone number: [phone]  Additional appointment(s) needed: N/A  Additonal Notes: N/A

## 2023-11-03 DIAGNOSIS — R06.02 SHORTNESS OF BREATH: ICD-10-CM

## 2023-11-03 RX ORDER — AMLODIPINE BESYLATE 5 MG/1
5 TABLET ORAL DAILY
Qty: 90 TABLET | Refills: 1 | Status: SHIPPED | OUTPATIENT
Start: 2023-11-03 | End: 2024-02-13

## 2023-11-06 ENCOUNTER — ANESTHESIA EVENT (OUTPATIENT)
Dept: GASTROENTEROLOGY | Facility: CLINIC | Age: 78
End: 2023-11-06
Payer: COMMERCIAL

## 2023-11-06 ASSESSMENT — COPD QUESTIONNAIRES: COPD: 1

## 2023-11-06 NOTE — H&P
[facility] History and Physical    Iris Carrion MRN# [id number]   Age: 78 year old YOB: 1945     Date of Admission:  (Not on file)    Home clinic: [facility]  Primary care provider: Hernandez Gunn          Impression and Plan:   Impression:   Gastroesophageal reflux disease, unspecified whether esophagitis present [K21.9]  Nausea [R11.0]  No prior EGD in system      Plan:   Proceed to EGD as planned.  The procedure, risks(bleeding, perforation), benefits and alternatives were discussed and the patient agrees to proceed. Cleared for Anesthesia             Chief Complaint:   Gastroesophageal reflux disease, unspecified whether esophagitis present [K21.9]  Nausea [R11.0]    History is obtained from the patient          History of Present Illness:   This 78 year old female is being seen at this time for evaluation EGD.  Has reflux sx on PPI.  Fatty food intolerance - same sx as reflux.             Past Medical History:     Past Medical History:   Diagnosis Date    Cervical high risk HPV (human papillomavirus) test positive 10/31/12    type 18    COPD (chronic obstructive pulmonary disease) (H)     GERD (gastroesophageal reflux disease)     Hx of colposcopy with cervical biopsy 11/2012    negative    Hypertension 2013    Osteoporosis     Paroxysmal supraventricular tachycardia 9/6/2017    Peritoneal carcinomatosis (H) 8/24/2015    Pneumonia     Uncomplicated asthma 1980            Past Surgical History:     Past Surgical History:   Procedure Laterality Date    CHOLECYSTECTOMY  6/2014    COLONOSCOPY  12/4/2012    Procedure: COLONOSCOPY;  COLONOSCOPY SCREEN;  Surgeon: Mushtaq Lara MD;  Location: [facility] OR    GYN SURGERY      HERNIORRHAPHY HIATAL N/A 11/25/2015    Procedure: HERNIORRHAPHY HIATAL;  Surgeon: Chip Carty MD;  Location: [facility] OR    HYSTERECTOMY TOTAL ABD, ALVIN SALPINGO-OOPHORECTOMY, NODE DISSECTION, TUMOR DEBULKING, COMBINED Bilateral  2015    Procedure: COMBINED HYSTERECTOMY TOTAL ABDOMINAL, SALPINGO-OOPHORECTOMY, NODE DISSECTION, TUMOR DEBULKING;  Surgeon: Emma Cabrera MD;  Location:  OR            Social History:     Social History     Tobacco Use    Smoking status: Former     Packs/day: 1.00     Years: 30.00     Additional pack years: 0.00     Total pack years: 30.00     Types: Cigarettes     Start date: 1965     Quit date: 10/10/2003     Years since quittin.0     Passive exposure: Past    Smokeless tobacco: Never   Substance Use Topics    Alcohol use: No            Family History:     Family History   Problem Relation Age of Onset    Cancer Brother         testicular    Diabetes Sister     Ovarian Cancer Mother 60    Diabetes Mother             Asthma Father             Diabetes Sister     Depression/Anxiety Daughter     Depression Daughter     Osteoporosis Sister     Other Cancer Brother     Diabetes Brother     Depression Other         Grandson            Immunizations:     VACCINE/DOSE   Diptheria   DPT   DTAP   HBIG   Hepatitis A   Hepatitis B   HIB   Influenza   Measles   Meningococcal   MMR   Mumps   Pneumococcal   Polio   Rubella   Small Pox   TDAP   Varicella   Zoster            Allergies:     Allergies   Allergen Reactions    Levaquin Rash            Medications:     No current facility-administered medications for this encounter.     Current Outpatient Medications   Medication Sig    albuterol (PROAIR HFA/PROVENTIL HFA/VENTOLIN HFA) 108 (90 Base) MCG/ACT inhaler Inhale 2 puffs into the lungs every 6 hours as needed for shortness of breath / dyspnea or wheezing    albuterol (PROVENTIL) (2.5 MG/3ML) 0.083% neb solution Take 1 vial (2.5 mg) by nebulization every 4 hours as needed for shortness of breath, wheezing or cough    amLODIPine (NORVASC) 5 MG tablet Take 1 tablet by mouth once daily    atorvastatin (LIPITOR) 20 MG tablet Take 1 tablet by mouth once daily    budesonide (PULMICORT) 0.5  MG/2ML neb solution Take 2 mLs (0.5 mg) by nebulization 2 times daily    calcitonin, salmon, (MIACALCIN) 200 UNIT/ACT nasal spray INSTILL ONE SPRAY INTO ONE NOSTRIL DAILY- ALTERNATE NOSTRIL EACH DAY    Calcium Carbonate (CALCIUM 500 PO) Take 1 tablet by mouth daily.    cyclobenzaprine (FLEXERIL) 5 MG tablet Take 1 tablet (5 mg) by mouth 3 times daily as needed for muscle spasms    fluticasone (FLONASE) 50 MCG/ACT spray Spray 2 sprays into both nostrils daily    formoterol (PERFOROMIST) 20 MCG/2ML neb solution Take 2 mLs (20 mcg) by nebulization every 12 hours    ibuprofen (ADVIL,MOTRIN) 600 MG tablet Take 1 tablet (600 mg) by mouth every 6 hours as needed for moderate pain    ipratropium (ATROVENT) 0.02 % neb solution Take 2.5 mLs (0.5 mg) by nebulization 4 times daily as needed for wheezing    ipratropium - albuterol 0.5 mg/2.5 mg/3 mL (DUONEB) 0.5-2.5 (3) MG/3ML neb solution USE 1 AMPULE IN NEBULIZER EVERY 4 HOURS AS NEEDED FOR SHORTNESS OF BREATH /DYSPNEA, OR WHEEZING    loratadine (CLARITIN) 10 MG tablet Take 10 mg by mouth daily    losartan (COZAAR) 100 MG tablet Take 1 tablet (100 mg) by mouth daily    MAGNESIUM OXIDE PO Take 200 mg by mouth 2 times daily    omeprazole (PRILOSEC) 40 MG DR capsule TAKE 1 CAPSULE BY MOUTH ONCE DAILY 30 TO 60 MINUTES BEFORE A MEAL (Patient not taking: Reported on 11/1/2023)    omeprazole (PRILOSEC) 40 MG DR capsule Take 1 capsule (40 mg) by mouth daily TAKE 1 CAPSULE BY MOUTH ONCE DAILY 30  TO  60  MINUTES  BEFORE  A  MEAL Strength: 40 mg (Patient not taking: Reported on 11/1/2023)    ondansetron (ZOFRAN ODT) 4 MG ODT tab Take 1 tablet (4 mg) by mouth every 12 hours as needed for nausea    ondansetron (ZOFRAN) 8 MG tablet Take 1 tablet (8 mg) by mouth every 8 hours as needed for nausea    order for DME Equipment being ordered: Nebulizer    predniSONE (DELTASONE) 20 MG tablet Take 2 tablets (40 mg) by mouth daily (Patient not taking: Reported on 11/1/2023)    pyridOXINE (VITAMIN  B6) 25 MG tablet Take 1 tablet (25 mg) by mouth daily    Revefenacin (YUPELRI) 175 MCG/3ML SOLN Inhale 3 mLs (175 mcg) into the lungs daily (Patient not taking: Reported on 11/1/2023)    tiZANidine (ZANAFLEX) 4 MG tablet Take 1 tablet (4 mg) by mouth 2 times daily as needed for muscle spasms    traMADol (ULTRAM) 50 MG tablet Take 1 tablet (50 mg) by mouth 2 times daily as needed for severe pain (7-10)    triamcinolone (ARISTOCORT HP) 0.5 % external cream Apply topically 2 times daily    umeclidinium (INCRUSE ELLIPTA) 62.5 MCG/ACT inhaler Inhale 1 puff into the lungs daily    vitamin B complex with vitamin C (STRESS TAB) tablet Take 1 tablet by mouth daily    Zinc Sulfate (ZINC 15 PO) Take by mouth daily             Review of Systems:   The review of systems was positive for the following findings.  None.  The remainder of the review of systems was unremarkable.          Physical Exam:   All vitals have been reviewed  not currently breastfeeding.  No intake or output data in the 24 hours ending 11/06/23 1457  SHEENT examination revealed NC/aT EOMI.  Examination of the chest revealed CTA.  Examination of the heart revealed RRR.  Examination of the abdomen revealed Soft, non tender.  The neuromuscular examination was NL.          Data:   All laboratory data reviewed  No results found for any visits on 11/07/23.  -     Eric Covington MD, FACS

## 2023-11-07 ENCOUNTER — HOSPITAL ENCOUNTER (OUTPATIENT)
Facility: CLINIC | Age: 78
Discharge: HOME | End: 2023-11-07
Attending: SPECIALIST | Admitting: SPECIALIST
Payer: COMMERCIAL

## 2023-11-07 ENCOUNTER — ANESTHESIA (OUTPATIENT)
Dept: GASTROENTEROLOGY | Facility: CLINIC | Age: 78
End: 2023-11-07
Payer: COMMERCIAL

## 2023-11-07 VITALS
OXYGEN SATURATION: 100 % | SYSTOLIC BLOOD PRESSURE: 117 MMHG | WEIGHT: 100 LBS | RESPIRATION RATE: 16 BRPM | HEART RATE: 70 BPM | HEIGHT: 65 IN | DIASTOLIC BLOOD PRESSURE: 72 MMHG | BODY MASS INDEX: 16.66 KG/M2 | TEMPERATURE: 97.8 F

## 2023-11-07 LAB — UPPER GI ENDOSCOPY: NORMAL

## 2023-11-07 PROCEDURE — 88341 IMHCHEM/IMCYTCHM EA ADD ANTB: CPT | Mod: 26 | Performed by: PATHOLOGY

## 2023-11-07 PROCEDURE — 88305 TISSUE EXAM BY PATHOLOGIST: CPT | Mod: 26 | Performed by: PATHOLOGY

## 2023-11-07 PROCEDURE — 370N000017 HC ANESTHESIA TECHNICAL FEE, PER MIN: Performed by: SPECIALIST

## 2023-11-07 PROCEDURE — 43239 EGD BIOPSY SINGLE/MULTIPLE: CPT | Performed by: SPECIALIST

## 2023-11-07 PROCEDURE — 88342 IMHCHEM/IMCYTCHM 1ST ANTB: CPT | Mod: 26 | Performed by: PATHOLOGY

## 2023-11-07 PROCEDURE — 250N000011 HC RX IP 250 OP 636: Performed by: NURSE ANESTHETIST, CERTIFIED REGISTERED

## 2023-11-07 PROCEDURE — 258N000003 HC RX IP 258 OP 636: Performed by: NURSE ANESTHETIST, CERTIFIED REGISTERED

## 2023-11-07 PROCEDURE — 250N000009 HC RX 250: Performed by: NURSE ANESTHETIST, CERTIFIED REGISTERED

## 2023-11-07 PROCEDURE — 88342 IMHCHEM/IMCYTCHM 1ST ANTB: CPT | Mod: TC | Performed by: SPECIALIST

## 2023-11-07 RX ORDER — LIDOCAINE 40 MG/G
CREAM TOPICAL
Status: DISCONTINUED | OUTPATIENT
Start: 2023-11-07 | End: 2023-11-07 | Stop reason: HOSPADM

## 2023-11-07 RX ORDER — LIDOCAINE HYDROCHLORIDE 20 MG/ML
INJECTION, SOLUTION INFILTRATION; PERINEURAL PRN
Status: DISCONTINUED | OUTPATIENT
Start: 2023-11-07 | End: 2023-11-07

## 2023-11-07 RX ORDER — SODIUM CHLORIDE, SODIUM LACTATE, POTASSIUM CHLORIDE, CALCIUM CHLORIDE 600; 310; 30; 20 MG/100ML; MG/100ML; MG/100ML; MG/100ML
INJECTION, SOLUTION INTRAVENOUS CONTINUOUS
Status: DISCONTINUED | OUTPATIENT
Start: 2023-11-07 | End: 2023-11-07 | Stop reason: HOSPADM

## 2023-11-07 RX ORDER — PROPOFOL 10 MG/ML
INJECTION, EMULSION INTRAVENOUS PRN
Status: DISCONTINUED | OUTPATIENT
Start: 2023-11-07 | End: 2023-11-07

## 2023-11-07 RX ADMIN — PROPOFOL 30 MG: 10 INJECTION, EMULSION INTRAVENOUS at 13:46

## 2023-11-07 RX ADMIN — LIDOCAINE HYDROCHLORIDE 35 MG: 20 INJECTION, SOLUTION INFILTRATION; PERINEURAL at 13:45

## 2023-11-07 RX ADMIN — SODIUM CHLORIDE, POTASSIUM CHLORIDE, SODIUM LACTATE AND CALCIUM CHLORIDE 10 ML/HR: 600; 310; 30; 20 INJECTION, SOLUTION INTRAVENOUS at 12:56

## 2023-11-07 RX ADMIN — PROPOFOL 30 MG: 10 INJECTION, EMULSION INTRAVENOUS at 13:48

## 2023-11-07 RX ADMIN — PROPOFOL 50 MG: 10 INJECTION, EMULSION INTRAVENOUS at 13:45

## 2023-11-07 ASSESSMENT — ACTIVITIES OF DAILY LIVING (ADL): ADLS_ACUITY_SCORE: 35

## 2023-11-07 ASSESSMENT — COPD QUESTIONNAIRES: CAT_SEVERITY: SEVERE

## 2023-11-07 NOTE — ANESTHESIA POSTPROCEDURE EVALUATION
Patient: Iris Carrion    Procedure: Procedure(s):  ESOPHAGOGASTRODUODENOSCOPY, WITH BIOPSY       Anesthesia Type:  No value filed.    Note:  Disposition: Outpatient   Postop Pain Control: Uneventful            Sign Out: Well controlled pain   PONV: No   Neuro/Psych: Uneventful            Sign Out: Acceptable/Baseline neuro status   Airway/Respiratory: Uneventful            Sign Out: Acceptable/Baseline resp. status   CV/Hemodynamics: Uneventful            Sign Out: Acceptable CV status   Other NRE: NONE   DID A NON-ROUTINE EVENT OCCUR? No    Event details/Postop Comments:  Pt was happy with anesthesia care.  No complications.  I will follow up with the pt if needed.           Last vitals:  Vitals Value Taken Time   /72 11/07/23 1430   Temp     Pulse 70 11/07/23 1430   Resp 16 11/07/23 1430   SpO2 100 % 11/07/23 1430       Electronically Signed By: CHANDLER Yanez CRNA  November 7, 2023  2:46 PM

## 2023-11-07 NOTE — ANESTHESIA PREPROCEDURE EVALUATION
Anesthesia Pre-Procedure Evaluation    Patient: Iris Carrion   MRN: [id number] : 1945        Procedure : Procedure(s):  Esophagoscopy, gastroscopy, duodenoscopy (EGD), combined          Past Medical History:   Diagnosis Date    Cervical high risk HPV (human papillomavirus) test positive 10/31/12    type 18    COPD (chronic obstructive pulmonary disease) (H)     GERD (gastroesophageal reflux disease)     Hx of colposcopy with cervical biopsy 2012    negative    Hypertension     Osteoporosis     Paroxysmal supraventricular tachycardia 2017    Peritoneal carcinomatosis (H) 2015    Pneumonia     Uncomplicated asthma       Past Surgical History:   Procedure Laterality Date    CHOLECYSTECTOMY  2014    COLONOSCOPY  2012    Procedure: COLONOSCOPY;  COLONOSCOPY SCREEN;  Surgeon: Mushtaq Lara MD;  Location: [facility]    GYN SURGERY      HERNIORRHAPHY HIATAL N/A 2015    Procedure: HERNIORRHAPHY HIATAL;  Surgeon: Chip Carty MD;  Location: [facility]    HYSTERECTOMY TOTAL ABD, ALVIN SALPINGO-OOPHORECTOMY, NODE DISSECTION, TUMOR DEBULKING, COMBINED Bilateral 2015    Procedure: COMBINED HYSTERECTOMY TOTAL ABDOMINAL, SALPINGO-OOPHORECTOMY, NODE DISSECTION, TUMOR DEBULKING;  Surgeon: Emma Cabrera MD;  Location: [facility] OR      Allergies   Allergen Reactions    Levaquin Rash      Social History     Tobacco Use    Smoking status: Former     Packs/day: 1.00     Years: 30.00     Additional pack years: 0.00     Total pack years: 30.00     Types: Cigarettes     Start date: 1965     Quit date: 10/10/2003     Years since quittin.0     Passive exposure: Past    Smokeless tobacco: Never   Substance Use Topics    Alcohol use: No      Wt Readings from Last 1 Encounters:   10/06/23 45.4 kg (100 lb)        Anesthesia Evaluation   Pt has had prior anesthetic. Type: MAC and General.    No history of anesthetic complications       ROS/MED HX  ENT/Pulmonary:     (+)                     Mild Persistent, asthma  Treatment: Inhaler daily,  severe,  COPD, O2 dependent, during Both, 4 liters/min,        (-) recent URI   Neurologic:  - neg neurologic ROS     Cardiovascular:     (+) Dyslipidemia hypertension- -   -  - -                                 Previous cardiac testing   Echo: Date: 7/25/22 Results:  Interpretation Summary     Poor acoustic windows.  Global and regional left ventricular function is hyperkinetic with an EF of  65-70%.  Right ventricular function, chamber size, wall motion, and thickness are  normal.  Right ventricular systolic pressure is 34mmHg above the right atrial pressure.  IVC diameter <2.1 cm collapsing >50% with sniff suggests a normal RA pressure  of 3 mmHg.  No significant changes noted.  ___________________________    Stress Test:  Date: Results:    ECG Reviewed:  Date: 2017 Results:  SR  Cath:  Date: Results:      METS/Exercise Tolerance:     Hematologic:  - neg hematologic  ROS     Musculoskeletal:  - neg musculoskeletal ROS     GI/Hepatic:     (+) GERD, Symptomatic,                  Renal/Genitourinary:     (+) renal disease, type: CRI, Pt does not require dialysis,           Endo:  - neg endo ROS     Psychiatric/Substance Use:  - neg psychiatric ROS     Infectious Disease:  - neg infectious disease ROS     Malignancy:   (+) Malignancy, History of Other.Other CA Ovarian Remission status post Chemo and Surgery.    Other:  - neg other ROS          Physical Exam    Airway  airway exam normal      Mallampati: II   TM distance: > 3 FB   Neck ROM: full   Mouth opening: > 3 cm    Respiratory Devices and Support         Dental     Comment: Very poor dentition, cracked, missing and broken teeth        Cardiovascular   cardiovascular exam normal       Rhythm and rate: regular and normal     Pulmonary   pulmonary exam normal        (+) decreased breath sounds           OUTSIDE LABS:  CBC:   Lab Results   Component Value Date    WBC 8.0 02/02/2023    WBC 7.7 10/07/2021     HGB 13.0 02/02/2023    HGB 13.4 09/07/2022    HCT 40.5 02/02/2023    HCT 42.8 10/07/2021     02/02/2023     10/07/2021     BMP:   Lab Results   Component Value Date     08/29/2023     02/02/2023    POTASSIUM 3.9 08/29/2023    POTASSIUM 3.8 02/02/2023    CHLORIDE 105 08/29/2023    CHLORIDE 104 02/02/2023    CO2 26 08/29/2023    CO2 31 02/02/2023    BUN 19.4 08/29/2023    BUN 21 02/02/2023    CR 0.78 08/29/2023    CR 0.97 03/09/2023     (H) 08/29/2023     (H) 02/02/2023     COAGS:   Lab Results   Component Value Date    INR 1.1 08/31/2015     POC:   Lab Results   Component Value Date     (H) 04/06/2016     HEPATIC:   Lab Results   Component Value Date    ALBUMIN 4.2 08/29/2023    PROTTOTAL 6.8 08/29/2023    ALT 16 08/29/2023    AST 28 08/29/2023    ALKPHOS 73 08/29/2023    BILITOTAL 0.5 08/29/2023     OTHER:   Lab Results   Component Value Date    PH 7.41 09/07/2022    A1C 5.5 04/18/2018    MAURISIO 9.2 08/29/2023    MAG 1.1 (L) 02/08/2016    TSH 4.00 04/18/2018    T4 1.16 08/04/2017       Anesthesia Plan    ASA Status:  3    NPO Status:  NPO Appropriate    Anesthesia Type: MAC.     - Reason for MAC: straight local not clinically adequate      Maintenance: TIVA.        Consents    Anesthesia Plan(s) and associated risks, benefits, and realistic alternatives discussed. Questions answered and patient/representative(s) expressed understanding.     - Discussed:     - Discussed with:  Patient       Use of blood products discussed: No .     Postoperative Care            Comments:    Other Comments: The risks and benefits of anesthesia, and the alternatives where applicable, have been discussed with the patient, and they wish to proceed.            Gianfranco Mcdonald, APRN CRNA

## 2023-11-07 NOTE — ANESTHESIA CARE TRANSFER NOTE
Patient: Iris Carrion    Procedure: Procedure(s):  ESOPHAGOGASTRODUODENOSCOPY, WITH BIOPSY       Diagnosis: Gastroesophageal reflux disease, unspecified whether esophagitis present [K21.9]  Nausea [R11.0]  Diagnosis Additional Information: No value filed.    Anesthesia Type:   No value filed.     Note:    Oropharynx: oropharynx clear of all foreign objects and spontaneously breathing  Level of Consciousness: drowsy  Oxygen Supplementation: nasal cannula    Independent Airway: airway patency satisfactory and stable  Dentition: dentition unchanged  Vital Signs Stable: post-procedure vital signs reviewed and stable  Report to RN Given: handoff report given  Patient transferred to: Phase II    Handoff Report: Identifed the Patient, Identified the Reponsible Provider, Reviewed the pertinent medical history, Discussed the surgical course, Reviewed Intra-OP anesthesia mangement and issues during anesthesia, Set expectations for post-procedure period and Allowed opportunity for questions and acknowledgement of understanding  Vitals:  Vitals Value Taken Time   BP 85/48 11/07/23 1401   Temp     Pulse 75 11/07/23 1401   Resp     SpO2 100 % 11/07/23 1402   Vitals shown include unfiled device data.    Electronically Signed By: CHANDLER Yanez CRNA  November 7, 2023  2:03 PM

## 2023-11-07 NOTE — DISCHARGE INSTRUCTIONS
[facility]    Home Care Following Endoscopy          Activity:  You have just undergone an endoscopic procedure usually performed with conscious sedation.    Do not work or operate machinery (including a car) for at least 12 hours.        Diet:  Return to the diet you were on before your procedure but eat lightly for the first 12-24 hours.  Drink plenty of water.  Resume any regular medications unless otherwise advised by your physician.     If you had any biopsy or polyp removed please refrain from aspirin or aspirin products for 2 days.     Pain:  You may take Tylenol as needed for pain.  Expected Recovery:   It is normal to have a mild sore throat after upper endoscopy.    Call Your Physician if You Have:  After Upper Endoscopy:  Shoulder, back or chest pain.  Difficulty breathing or swallowing.  Vomiting blood.    Any questions or concerns about your recovery, please call [phone] or after klqqb [phone] ([phone]) Nurse Advice Line.    Follow-up Care:  If you did have polyps/biopsy tissue sample(s) removed.  The polyps/biopsy tissue sample(s) will be sent to pathology.    You should receive letter in your My Chart  with your results within 1-2 weeks.   Please call if you have not received a notification of your results.

## 2023-11-10 LAB
PATH REPORT.COMMENTS IMP SPEC: NORMAL
PATH REPORT.COMMENTS IMP SPEC: NORMAL
PATH REPORT.FINAL DX SPEC: NORMAL
PATH REPORT.GROSS SPEC: NORMAL
PATH REPORT.MICROSCOPIC SPEC OTHER STN: NORMAL
PATH REPORT.MICROSCOPIC SPEC OTHER STN: NORMAL
PATH REPORT.RELEVANT HX SPEC: NORMAL
PHOTO IMAGE: NORMAL

## 2023-11-15 ENCOUNTER — MYC MEDICAL ADVICE (OUTPATIENT)
Dept: PULMONOLOGY | Facility: CLINIC | Age: 78
End: 2023-11-15
Payer: COMMERCIAL

## 2023-11-15 DIAGNOSIS — J44.9 STAGE 3 SEVERE COPD BY GOLD CLASSIFICATION (H): ICD-10-CM

## 2023-11-15 RX ORDER — UMECLIDINIUM 62.5 UG/1
1 AEROSOL, POWDER ORAL DAILY
Qty: 1 EACH | Refills: 2 | Status: SHIPPED | OUTPATIENT
Start: 2023-11-15 | End: 2024-02-14

## 2024-01-02 DIAGNOSIS — M80.00XA AGE-RELATED OSTEOPOROSIS WITH CURRENT PATHOLOGICAL FRACTURE, INITIAL ENCOUNTER: ICD-10-CM

## 2024-01-02 DIAGNOSIS — M48.50XA PATHOLOGICAL COMPRESSION FRACTURE OF VERTEBRA, INITIAL ENCOUNTER (H): ICD-10-CM

## 2024-01-02 RX ORDER — CALCITONIN SALMON 200 [IU]/.09ML
SPRAY, METERED NASAL
Qty: 4 ML | Refills: 1 | Status: SHIPPED | OUTPATIENT
Start: 2024-01-02 | End: 2024-05-07

## 2024-01-16 NOTE — LETTER
My COPD Action Plan     Name: Iris Carrion    YOB: 1945   Date: 9/10/2018    My doctor: NIKOLE BRAVO MD   My clinic: [address] [address] [phone]        Make sure you've had your pneumonia   vaccines.          GREEN ZONE       Doing well today      Usual level of activity and exercise    Usual amount of cough and mucus    No shortness of breath    Usual level of health (thinking clearly, sleeping well, feel like eating) Actions:      Take daily medicines    Use oxygen as prescribed    Follow regular exercise and diet plan    Avoid cigarette smoke and other irritants that harm the lungs           YELLOW ZONE          Having a bad day or flare up      Short of breath more than usual    A lot more sputum (mucus) than usual    Sputum looks yellow, green, tan, brown or bloody    More coughing or wheezing    Fever or chills    Less energy; trouble completing activities    Trouble thinking or focusing    Using quick relief inhaler or nebulizer more often    Poor sleep; symptoms wake me up    Do not feel like eating Actions:      Get plenty of rest    Take daily medicines    Use quick relief inhaler every 4 hours    If you use oxygen, call you doctor to see if you should adjust your oxygen    Do breathing exercises or other things to help you relax    Let a loved one, friend or neighbor know you are feeling worse    Call your care team if you have 2 or more symptoms.  Start taking steroids or antibiotics if directed by your care team           RED ZONE       Need medical care now      Severe shortness of breath (feel you can't breathe)    Fever, chills    Not enough breath to do any activity    Trouble coughing up mucus, walking or talking    Blood in mucus    Frequent coughing   Rescue medicines are not working    Not able to sleep because of breathing    Feel confused or drowsy    Chest pain    Actions:      Call your health care team.  If  Goal Outcome Evaluation:  Plan of Care Reviewed With: patient        Progress: no change       Pt admitted from surgery last night, I&D of R. Leg abscess with wound vac placement. RA. C/o pain- see MAR. Sepsis screen +, blood cultures pending. MRSA swab of the nares sent down this AM- results pending. Wound vac in place. VSS safety maintained.                             you cannot reach your care team, call 911 or go to the emergency room.        Annual Reminders:  Meet with Care Team, Flu Shot every Fall  Pharmacy: [id number]  [place], [place] - [address]

## 2024-01-18 ENCOUNTER — PATIENT OUTREACH (OUTPATIENT)
Dept: CARE COORDINATION | Facility: CLINIC | Age: 79
End: 2024-01-18
Payer: COMMERCIAL

## 2024-01-20 DIAGNOSIS — I10 ESSENTIAL HYPERTENSION WITH GOAL BLOOD PRESSURE LESS THAN 140/90: ICD-10-CM

## 2024-01-22 RX ORDER — LOSARTAN POTASSIUM 100 MG/1
100 TABLET ORAL DAILY
Qty: 90 TABLET | Refills: 0 | Status: SHIPPED | OUTPATIENT
Start: 2024-01-22 | End: 2024-02-13

## 2024-01-23 DIAGNOSIS — R11.10 DRY HEAVES: ICD-10-CM

## 2024-01-23 RX ORDER — PYRIDOXINE HCL (VITAMIN B6) 25 MG
25 TABLET ORAL DAILY
Qty: 90 TABLET | Refills: 1 | Status: SHIPPED | OUTPATIENT
Start: 2024-01-23 | End: 2024-07-21

## 2024-02-01 ENCOUNTER — PATIENT OUTREACH (OUTPATIENT)
Dept: CARE COORDINATION | Facility: CLINIC | Age: 79
End: 2024-02-01
Payer: COMMERCIAL

## 2024-02-06 SDOH — HEALTH STABILITY: PHYSICAL HEALTH: ON AVERAGE, HOW MANY MINUTES DO YOU ENGAGE IN EXERCISE AT THIS LEVEL?: 0 MIN

## 2024-02-06 SDOH — HEALTH STABILITY: PHYSICAL HEALTH: ON AVERAGE, HOW MANY DAYS PER WEEK DO YOU ENGAGE IN MODERATE TO STRENUOUS EXERCISE (LIKE A BRISK WALK)?: 0 DAYS

## 2024-02-06 ASSESSMENT — SOCIAL DETERMINANTS OF HEALTH (SDOH): HOW OFTEN DO YOU GET TOGETHER WITH FRIENDS OR RELATIVES?: ONCE A WEEK

## 2024-02-09 ENCOUNTER — TELEPHONE (OUTPATIENT)
Dept: FAMILY MEDICINE | Facility: CLINIC | Age: 79
End: 2024-02-09
Payer: COMMERCIAL

## 2024-02-09 SDOH — HEALTH STABILITY: PHYSICAL HEALTH: ON AVERAGE, HOW MANY DAYS PER WEEK DO YOU ENGAGE IN MODERATE TO STRENUOUS EXERCISE (LIKE A BRISK WALK)?: 0 DAYS

## 2024-02-09 SDOH — HEALTH STABILITY: PHYSICAL HEALTH: ON AVERAGE, HOW MANY MINUTES DO YOU ENGAGE IN EXERCISE AT THIS LEVEL?: 0 MIN

## 2024-02-09 ASSESSMENT — SOCIAL DETERMINANTS OF HEALTH (SDOH): HOW OFTEN DO YOU GET TOGETHER WITH FRIENDS OR RELATIVES?: ONCE A WEEK

## 2024-02-09 NOTE — TELEPHONE ENCOUNTER
Left message on patient's voicemail that her wellness exam on 2/13/24, needs to be in-person and to arrive at 1:40 PM. I changed this in Epic. Faiza Reyes [facility]

## 2024-02-13 ENCOUNTER — MYC MEDICAL ADVICE (OUTPATIENT)
Dept: PULMONOLOGY | Facility: CLINIC | Age: 79
End: 2024-02-13

## 2024-02-13 ENCOUNTER — OFFICE VISIT (OUTPATIENT)
Dept: FAMILY MEDICINE | Facility: CLINIC | Age: 79
End: 2024-02-13
Payer: COMMERCIAL

## 2024-02-13 VITALS
RESPIRATION RATE: 16 BRPM | OXYGEN SATURATION: 100 % | BODY MASS INDEX: 17.81 KG/M2 | DIASTOLIC BLOOD PRESSURE: 67 MMHG | WEIGHT: 106.9 LBS | HEIGHT: 65 IN | TEMPERATURE: 97.8 F | HEART RATE: 99 BPM | SYSTOLIC BLOOD PRESSURE: 126 MMHG

## 2024-02-13 DIAGNOSIS — J96.11 CHRONIC RESPIRATORY FAILURE WITH HYPOXIA (H): ICD-10-CM

## 2024-02-13 DIAGNOSIS — K21.9 GASTROESOPHAGEAL REFLUX DISEASE, UNSPECIFIED WHETHER ESOPHAGITIS PRESENT: ICD-10-CM

## 2024-02-13 DIAGNOSIS — E78.5 DYSLIPIDEMIA: ICD-10-CM

## 2024-02-13 DIAGNOSIS — R06.02 SHORTNESS OF BREATH: ICD-10-CM

## 2024-02-13 DIAGNOSIS — G47.00 INSOMNIA, UNSPECIFIED TYPE: ICD-10-CM

## 2024-02-13 DIAGNOSIS — N18.30 STAGE 3 CHRONIC KIDNEY DISEASE, UNSPECIFIED WHETHER STAGE 3A OR 3B CKD (H): ICD-10-CM

## 2024-02-13 DIAGNOSIS — Z00.00 ENCOUNTER FOR MEDICARE ANNUAL WELLNESS EXAM: Primary | ICD-10-CM

## 2024-02-13 DIAGNOSIS — I10 ESSENTIAL HYPERTENSION WITH GOAL BLOOD PRESSURE LESS THAN 140/90: ICD-10-CM

## 2024-02-13 DIAGNOSIS — L60.8 TOENAIL DEFORMITY: ICD-10-CM

## 2024-02-13 DIAGNOSIS — J44.9 STAGE 3 SEVERE COPD BY GOLD CLASSIFICATION (H): ICD-10-CM

## 2024-02-13 PROBLEM — I50.30 NYHA CLASS 3 HEART FAILURE WITH PRESERVED EJECTION FRACTION (H): Status: ACTIVE | Noted: 2024-02-13

## 2024-02-13 PROBLEM — I42.8 NONISCHEMIC CARDIOMYOPATHY (H): Status: ACTIVE | Noted: 2024-02-13

## 2024-02-13 PROBLEM — N18.32 STAGE 3B CHRONIC KIDNEY DISEASE (H): Status: ACTIVE | Noted: 2021-11-05

## 2024-02-13 LAB
ERYTHROCYTE [DISTWIDTH] IN BLOOD BY AUTOMATED COUNT: 12 % (ref 10–15)
HCT VFR BLD AUTO: 44.4 % (ref 35–47)
HGB BLD-MCNC: 14 G/DL (ref 11.7–15.7)
MCH RBC QN AUTO: 30.4 PG (ref 26.5–33)
MCHC RBC AUTO-ENTMCNC: 31.5 G/DL (ref 31.5–36.5)
MCV RBC AUTO: 97 FL (ref 78–100)
PLATELET # BLD AUTO: 208 10E3/UL (ref 150–450)
RBC # BLD AUTO: 4.6 10E6/UL (ref 3.8–5.2)
WBC # BLD AUTO: 6.3 10E3/UL (ref 4–11)

## 2024-02-13 PROCEDURE — 90480 ADMN SARSCOV2 VAC 1/ONLY CMP: CPT | Performed by: PHYSICIAN ASSISTANT

## 2024-02-13 PROCEDURE — 80048 BASIC METABOLIC PNL TOTAL CA: CPT | Performed by: PHYSICIAN ASSISTANT

## 2024-02-13 PROCEDURE — 36415 COLL VENOUS BLD VENIPUNCTURE: CPT | Performed by: PHYSICIAN ASSISTANT

## 2024-02-13 PROCEDURE — 80061 LIPID PANEL: CPT | Performed by: PHYSICIAN ASSISTANT

## 2024-02-13 PROCEDURE — 91320 SARSCV2 VAC 30MCG TRS-SUC IM: CPT | Performed by: PHYSICIAN ASSISTANT

## 2024-02-13 PROCEDURE — 85027 COMPLETE CBC AUTOMATED: CPT | Performed by: PHYSICIAN ASSISTANT

## 2024-02-13 PROCEDURE — 99214 OFFICE O/P EST MOD 30 MIN: CPT | Mod: 25 | Performed by: PHYSICIAN ASSISTANT

## 2024-02-13 PROCEDURE — G0439 PPPS, SUBSEQ VISIT: HCPCS | Performed by: PHYSICIAN ASSISTANT

## 2024-02-13 RX ORDER — RESPIRATORY SYNCYTIAL VIRUS VACCINE 120MCG/0.5
0.5 KIT INTRAMUSCULAR ONCE
Qty: 1 EACH | Refills: 0 | Status: CANCELLED | OUTPATIENT
Start: 2024-02-13 | End: 2024-02-13

## 2024-02-13 RX ORDER — LOSARTAN POTASSIUM 100 MG/1
100 TABLET ORAL DAILY
Qty: 90 TABLET | Refills: 3 | Status: SHIPPED | OUTPATIENT
Start: 2024-02-13

## 2024-02-13 RX ORDER — AMLODIPINE BESYLATE 5 MG/1
5 TABLET ORAL DAILY
Qty: 90 TABLET | Refills: 3 | Status: SHIPPED | OUTPATIENT
Start: 2024-02-13

## 2024-02-13 RX ORDER — ATORVASTATIN CALCIUM 20 MG/1
20 TABLET, FILM COATED ORAL DAILY
Qty: 90 TABLET | Refills: 3 | Status: SHIPPED | OUTPATIENT
Start: 2024-02-13

## 2024-02-13 ASSESSMENT — PAIN SCALES - GENERAL: PAINLEVEL: NO PAIN (0)

## 2024-02-13 NOTE — PROGRESS NOTES
Preventive Care Visit  [facility]  Hernandez Gunn PA-C, Physician Assistant - Medical  Feb 13, 2024    Assessment & Plan     (Z00.00) Encounter for Medicare annual wellness exam  (primary encounter diagnosis)  Comment: Here for Medicare wellness exam    ((N18.30) Stage 3 chronic kidney disease, unspecified whether stage 3a or 3b CKD (H)  Comment: Last creatinine was normal.  Discussed with patient recheck.  Plan: CBC with Platelets, BASIC METABOLIC PANEL          (R06.02) Shortness of breath  Comment: Breathing has been improving.  Patient on amlodipine 5 mg.  Plan: amLODIPine (NORVASC) 5 MG tablet            (E78.5) Dyslipidemia  Comment: On atorvastatin 20 mg.  No muscle aches with medication.  Plan: Lipid panel reflex to direct LDL Non-fasting,         atorvastatin (LIPITOR) 20 MG tablet        (I10) Essential hypertension with goal blood pressure less than 140/90  Comment: History of hypertension on losartan 100 mg.  Blood pressure within goal.  No side effects with medications.  Plan: losartan (COZAAR) 100 MG tablet           (K21.9) Gastroesophageal reflux disease, unspecified whether esophagitis present  Comment: Esophageal reflux.  Has been improving.  Patient stopped taking omeprazole.    (G47.00) Insomnia, unspecified type  Comment: Troubles initiating sleep.  Discussed sleep medicine referral.  Plan: Adult Sleep Eval & Management          Referral           (J96.11) Chronic respiratory failure with hypoxia (H)  Comment: Chronic respiratory failure with hypoxia.  Continue with supplemental oxyge    (L60.8) Toenail deformity  Comment: Nail deformity.  Discussed with patient podiatry referral.  Plan: Orthopedic  Referral             Counseling  Appropriate preventive services were discussed with this patient, including applicable screening as appropriate for fall prevention, nutrition, physical activity, Tobacco-use cessation, weight loss and cognition.  Checklist  reviewing preventive services available has been given to the patient.  Reviewed patient's diet, addressing concerns and/or questions.   The patient was instructed to see the dentist every 6 months.   Discussed possible causes of fatigue.         Bijal Simmons is a 78 year old, presenting for the following:  Referral (Referral to Podiatrist. ) and Physical       Health Care Directive  Patient does not have a Health Care Directive or Living Will: Discussed advance care planning with patient; information given to patient to review.    HPI    Continue with similar nail issues as prior with nail pulling away from toe.  Has had removal in the past with recurrence of symptoms.    History of COPD follows with pulmonology.  Next appointment April.  Currently on 4 L/min.    Overall has had improvement of strength with using Ensure and has had weight gain as well.  Feeling stronger.  Able to climb stairs without as much difficulty.    Patient follows with endocrinology for Prolia injections for osteoporosis.      Previously was experiencing GERD symptoms, however GERD improved since stopping omeprazole.  Not currently taking any antiacid.    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  Last treatments 2016    HTN : Blood pressure in goal here.  Denies any side effects of medications.  Patient on amlodipine 5 mg as well as losartan 100 mg.  No chest pain or shortness of breath.    HLD: On atorvastatin 20 mg.  No muscle aches.      Has been able to get up and down sttairs better. Feeling more stable.      Troubles initiating sleep -denies any ruminating thoughts.  No anxiety or depression surrounding this.  This has been more situational.  Discussed potential for sleep evaluation.        2/9/2024   General Health   How would you rate your overall physical health? (!) FAIR   Feel stress (tense, anxious, or unable to sleep) To some extent   (!) STRESS CONCERN      2/9/2024   Nutrition   Diet:  Regular (no restrictions)         2/9/2024   Exercise   Days per week of moderate/strenous exercise 0 days   Average minutes spent exercising at this level 0 min   (!) EXERCISE CONCERN      2/9/2024   Social Factors   Frequency of gathering with friends or relatives Once a week   Worry food won't last until get money to buy more No   Food not last or not have enough money for food? No   Do you have housing?  Yes   Are you worried about losing your housing? No   Lack of transportation? No   Unable to get utilities (heat,electricity)? No         2/9/2024   Fall Risk   Fallen 2 or more times in the past year? No   Trouble with walking or balance? No          2/9/2024   Activities of Daily Living- Home Safety   Needs help with the following daily activites None of the above   Safety concerns in the home None of the above         2/9/2024   Dental   Dentist two times every year? (!) NO         2/9/2024   Hearing Screening   Hearing concerns? None of the above         2/9/2024   Driving Risk Screening   Patient/family members have concerns about driving (!) DECLINE         2/9/2024   General Alertness/Fatigue Screening   Have you been more tired than usual lately? (!) YES         2/9/2024   Urinary Incontinence Screening   Bothered by leaking urine in past 6 months No          No data to display                  Today's PHQ-2 Score:       2/12/2024     2:45 PM   PHQ-2 ( 1999 [employer])   Q1: Little interest or pleasure in doing things 0   Q2: Feeling down, depressed or hopeless 0   PHQ-2 Score 0   Q1: Little interest or pleasure in doing things Not at all   Q2: Feeling down, depressed or hopeless Not at all   PHQ-2 Score 0           2/9/2024   Substance Use   Alcohol more than 3/day or more than 7/wk Not Applicable   Do you have a current opioid prescription? No   How severe/bad is pain from 1 to 10? 4/10   Do you use any other substances recreationally? No     Social History     Tobacco Use    Smoking status: Former      Packs/day: 1.00     Years: 30.00     Additional pack years: 0.00     Total pack years: 30.00     Types: Cigarettes     Start date: 1965     Quit date: 10/10/2003     Years since quittin.3     Passive exposure: Past    Smokeless tobacco: Never   Vaping Use    Vaping Use: Never used   Substance Use Topics    Alcohol use: No    Drug use: No            No data to display                 Mammogram Screening - After age 74- determine frequency with patient based on health status, life expectancy and patient goals would like to switch to every other year     The 10-year ASCVD risk score (Sirena FUENTES, et al., 2019) is: 22.6%    Values used to calculate the score:      Age: 78 years      Sex: Female      Is Non- : No      Diabetic: No      Tobacco smoker: No      Systolic Blood Pressure: 126 mmHg      Is BP treated: Yes      HDL Cholesterol: 66 mg/dL      Total Cholesterol: 155 mg/dL            Reviewed and updated as needed this visit by Provider   Tobacco  Allergies  Meds  Problems  Med Hx  Surg Hx  Fam Hx            Past Medical History:   Diagnosis Date    Arthritis     Have been noticing stiffness in my hands and arms    Cervical high risk HPV (human papillomavirus) test positive 10/31/2012    type 18    COPD (chronic obstructive pulmonary disease) (H)     GERD (gastroesophageal reflux disease)     Hx of colposcopy with cervical biopsy 2012    negative    Hypertension     Osteoporosis     Paroxysmal supraventricular tachycardia 2017    Peritoneal carcinomatosis (H) 2015    Pneumonia     Uncomplicated asthma 1980     Past Surgical History:   Procedure Laterality Date    CHOLECYSTECTOMY  2014    COLONOSCOPY  2012    Procedure: COLONOSCOPY;  COLONOSCOPY SCREEN;  Surgeon: Mushtaq Lara MD;  Location:  OR    ESOPHAGOSCOPY, GASTROSCOPY, DUODENOSCOPY (EGD), COMBINED N/A 2023    Procedure: ESOPHAGOGASTRODUODENOSCOPY, WITH BIOPSY;  Surgeon: Adria  MD Eric;  Location:  GI    GYN SURGERY      HERNIORRHAPHY HIATAL N/A 11/25/2015    Procedure: HERNIORRHAPHY HIATAL;  Surgeon: Chip Carty MD;  Location: [facility] OR    HYSTERECTOMY TOTAL ABD, ALVIN SALPINGO-OOPHORECTOMY, NODE DISSECTION, TUMOR DEBULKING, COMBINED Bilateral 11/25/2015    Procedure: COMBINED HYSTERECTOMY TOTAL ABDOMINAL, SALPINGO-OOPHORECTOMY, NODE DISSECTION, TUMOR DEBULKING;  Surgeon: Emma Cabrera MD;  Location:  OR     Current providers sharing in care for this patient include:  Patient Care Team:  Hernandez Gunn PA-C as PCP - General  Iliana Pierre, RN as   Hayes Garg MD as MD (Internal Medicine)  Hayes Garg MD as Assigned Pulmonology Provider  Tanmay Mcgregor MD as MD (Cardiovascular Disease)  Lore Ferrera MD as MD (Endocrinology, Diabetes, and Metabolism)  Lore Ferrera MD as Assigned Endocrinology Provider  Hernandez Gunn PA-C as Assigned PCP  Glen Jasso PA-C as Physician Assistant (Gastroenterology)    The following health maintenance items are reviewed in Epic and correct as of today:  Health Maintenance   Topic Date Due    HF ACTION PLAN  Never done    RSV VACCINE (Pregnancy & 60+) (1 - 1-dose 60+ series) Never done    ZOSTER IMMUNIZATION (1 of 2) 06/16/2015    ADVANCE CARE PLANNING  05/09/2023    COVID-19 Vaccine (4 - 2023-24 season) 09/01/2023    LIPID  02/02/2024    ANNUAL REVIEW OF HM ORDERS  02/02/2024    CBC  02/02/2024    MEDICARE ANNUAL WELLNESS VISIT  02/02/2024    HEMOGLOBIN  02/02/2024    BMP  02/29/2024    MAMMO SCREENING  04/25/2024    ALT  08/29/2024    MICROALBUMIN  08/29/2024    FALL RISK ASSESSMENT  02/13/2025    GLUCOSE  08/29/2026    DTAP/TDAP/TD IMMUNIZATION (3 - Td or Tdap) 10/07/2031    DEXA  02/07/2038    TSH W/FREE T4 REFLEX  Completed    SPIROMETRY  Completed    HEPATITIS C SCREENING  Completed    COPD ACTION PLAN  Completed    PHQ-2 (once per calendar year)  Completed    INFLUENZA VACCINE   "Completed    Pneumococcal Vaccine: 65+ Years  Completed    URINALYSIS  Completed    IPV IMMUNIZATION  Aged Out    HPV IMMUNIZATION  Aged Out    MENINGITIS IMMUNIZATION  Aged Out    RSV MONOCLONAL ANTIBODY  Aged Out    COLORECTAL CANCER SCREENING  Discontinued         Review of Systems  Constitutional, HEENT, cardiovascular, pulmonary, gi and gu systems are negative, except as otherwise noted.     Objective    Exam  /67   Pulse 99   Temp 97.8  F (36.6  C) (Tympanic)   Resp 16   Ht 1.651 m (5' 5\")   Wt 48.5 kg (106 lb 14.4 oz)   SpO2 100%   BMI 17.79 kg/m     Estimated body mass index is 17.79 kg/m  as calculated from the following:    Height as of this encounter: 1.651 m (5' 5\").    Weight as of this encounter: 48.5 kg (106 lb 14.4 oz).    Physical Exam  GENERAL: alert, no distress, and appears elderly and frail.  Nasal cannula in place at 4 L/min.  EYES: Eyes grossly normal to inspection, PERRL and conjunctivae and sclerae normal  HENT: ear canals and TM's normal, nose and mouth without ulcers or lesions  NECK: no adenopathy, no asymmetry, masses, or scars  RESP: Decreased lung sounds throughout all fields.  No wheeze or crackles.  Nasal cannula in place 4 L/min  CV: regular rate and rhythm, normal S1 S2, no S3 or S4, no murmur, click or rub, no peripheral edema  ABDOMEN: soft, nontender, no hepatosplenomegaly, no masses and bowel sounds normal  MS: no gross musculoskeletal defects noted, no edema  SKIN: no suspicious lesions or rashes  NEURO: Normal strength and tone, mentation intact and speech normal  PSYCH: mentation appears normal, affect normal/bright         2/13/2024   Mini Cog   Clock Draw Score 2 Normal   3 Item Recall 3 objects recalled   Mini Cog Total Score 5          Signed Electronically by: Hernandez Gunn PA-C    "

## 2024-02-13 NOTE — PROGRESS NOTES
"  {PROVIDER CHARTING PREFERENCE:440518}    Bijal Simmons is a 78 year old, presenting for the following health issues:  Follow Up and Referral (Referral to Podiatrist. )  {(!) Visit Details have not yet been documented.  Please enter Visit Details and then use this list to pull in documentation. (Optional):087711}  HPI     {MA/LPN/RN Pre-Provider Visit Orders- hCG/UA/Strep (Optional):772523}  {SUPERLIST (Optional):510272}  {additonal problems for provider to add (Optional):025492}    {ROS Picklists (Optional):780941}      Objective    /67   Pulse 99   Temp 97.8  F (36.6  C) (Tympanic)   Resp 16   Ht 1.651 m (5' 5\")   Wt 48.5 kg (106 lb 14.4 oz)   SpO2 100%   BMI 17.79 kg/m    Body mass index is 17.79 kg/m .  Physical Exam   {Exam List (Optional):531547}    {Diagnostic Test Results (Optional):541835}        Signed Electronically by: Hernandez Gunn PA-C  {Email feedback regarding this note to [email or web address]   :750870}  "

## 2024-02-14 LAB
ANION GAP SERPL CALCULATED.3IONS-SCNC: 10 MMOL/L (ref 7–15)
BUN SERPL-MCNC: 24.2 MG/DL (ref 8–23)
CALCIUM SERPL-MCNC: 9.7 MG/DL (ref 8.8–10.2)
CHLORIDE SERPL-SCNC: 102 MMOL/L (ref 98–107)
CHOLEST SERPL-MCNC: 152 MG/DL
CREAT SERPL-MCNC: 0.8 MG/DL (ref 0.51–0.95)
DEPRECATED HCO3 PLAS-SCNC: 29 MMOL/L (ref 22–29)
EGFRCR SERPLBLD CKD-EPI 2021: 76 ML/MIN/1.73M2
FASTING STATUS PATIENT QL REPORTED: NO
GLUCOSE SERPL-MCNC: 99 MG/DL (ref 70–99)
HDLC SERPL-MCNC: 72 MG/DL
LDLC SERPL CALC-MCNC: 63 MG/DL
NONHDLC SERPL-MCNC: 80 MG/DL
POTASSIUM SERPL-SCNC: 4 MMOL/L (ref 3.4–5.3)
SODIUM SERPL-SCNC: 141 MMOL/L (ref 135–145)
TRIGL SERPL-MCNC: 86 MG/DL

## 2024-02-14 RX ORDER — UMECLIDINIUM 62.5 UG/1
1 AEROSOL, POWDER ORAL DAILY
Qty: 1 EACH | Refills: 2 | Status: SHIPPED | OUTPATIENT
Start: 2024-02-14 | End: 2024-04-29

## 2024-02-21 ENCOUNTER — TRANSFERRED RECORDS (OUTPATIENT)
Dept: HEALTH INFORMATION MANAGEMENT | Facility: CLINIC | Age: 79
End: 2024-02-21
Payer: COMMERCIAL

## 2024-02-29 ENCOUNTER — OFFICE VISIT (OUTPATIENT)
Dept: PODIATRY | Facility: CLINIC | Age: 79
End: 2024-02-29
Payer: COMMERCIAL

## 2024-02-29 VITALS — SYSTOLIC BLOOD PRESSURE: 138 MMHG | HEART RATE: 90 BPM | DIASTOLIC BLOOD PRESSURE: 80 MMHG

## 2024-02-29 DIAGNOSIS — N18.30 STAGE 3 CHRONIC KIDNEY DISEASE, UNSPECIFIED WHETHER STAGE 3A OR 3B CKD (H): Primary | ICD-10-CM

## 2024-02-29 DIAGNOSIS — B35.1 ONYCHOMYCOSIS: ICD-10-CM

## 2024-02-29 PROCEDURE — 11720 DEBRIDE NAIL 1-5: CPT | Mod: Q8 | Performed by: PODIATRIST

## 2024-02-29 PROCEDURE — 99203 OFFICE O/P NEW LOW 30 MIN: CPT | Mod: 25 | Performed by: PODIATRIST

## 2024-02-29 NOTE — PROGRESS NOTES
Subjective:    Pt is seen today as a new pt for a foot exam.  Has CKD stage 3. Patient is having a hard time trimming his nails and points to left first and second nail.  This has been getting more difficult over the years.  Sometimes they are painful.  The pain is aggravated by activity and relieved by rest.  Patient denies numbness or tingling in her toes.  She denies history of foot ulcers.  Primary care is Hernandez Gunn last seen 2/13/24.      ROS:  see above         Allergies   Allergen Reactions    Levaquin Rash       Current Outpatient Medications   Medication Sig Dispense Refill    albuterol (PROAIR HFA/PROVENTIL HFA/VENTOLIN HFA) 108 (90 Base) MCG/ACT inhaler Inhale 2 puffs into the lungs every 6 hours as needed for shortness of breath / dyspnea or wheezing 8.5 g 3    albuterol (PROVENTIL) (2.5 MG/3ML) 0.083% neb solution Take 1 vial (2.5 mg) by nebulization every 4 hours as needed for shortness of breath, wheezing or cough 360 mL 11    amLODIPine (NORVASC) 5 MG tablet Take 1 tablet (5 mg) by mouth daily 90 tablet 3    atorvastatin (LIPITOR) 20 MG tablet Take 1 tablet (20 mg) by mouth daily 90 tablet 3    budesonide (PULMICORT) 0.5 MG/2ML neb solution Take 2 mLs (0.5 mg) by nebulization 2 times daily 120 mL 11    calcitonin, salmon, (MIACALCIN) 200 UNIT/ACT nasal spray INSTILL ONE SPRAY INTO ONE NOSTRIL DAILY- ALTERNATE NOSTRIL EACH DAY 4 mL 1    Calcium Carbonate (CALCIUM 500 PO) Take 1 tablet by mouth daily.      cyclobenzaprine (FLEXERIL) 5 MG tablet Take 1 tablet (5 mg) by mouth 3 times daily as needed for muscle spasms 20 tablet 0    fluticasone (FLONASE) 50 MCG/ACT spray Spray 2 sprays into both nostrils daily 1 Bottle 1    formoterol (PERFOROMIST) 20 MCG/2ML neb solution Take 2 mLs (20 mcg) by nebulization every 12 hours 120 mL 11    ibuprofen (ADVIL,MOTRIN) 600 MG tablet Take 1 tablet (600 mg) by mouth every 6 hours as needed for moderate pain 40 tablet 0    ipratropium (ATROVENT) 0.02 % neb  solution Take 2.5 mLs (0.5 mg) by nebulization 4 times daily as needed for wheezing 360 mL 11    ipratropium - albuterol 0.5 mg/2.5 mg/3 mL (DUONEB) 0.5-2.5 (3) MG/3ML neb solution USE 1 AMPULE IN NEBULIZER EVERY 4 HOURS AS NEEDED FOR SHORTNESS OF BREATH /DYSPNEA, OR WHEEZING 90 mL 11    loratadine (CLARITIN) 10 MG tablet Take 10 mg by mouth daily      losartan (COZAAR) 100 MG tablet Take 1 tablet (100 mg) by mouth daily 90 tablet 3    MAGNESIUM OXIDE PO Take 200 mg by mouth 2 times daily      order for DME Equipment being ordered: Nebulizer 1 Device 0    pyridOXINE (VITAMIN B6) 25 MG tablet Take 1 tablet (25 mg) by mouth daily 90 tablet 1    tiZANidine (ZANAFLEX) 4 MG tablet Take 1 tablet (4 mg) by mouth 2 times daily as needed for muscle spasms 30 tablet 0    traMADol (ULTRAM) 50 MG tablet Take 1 tablet (50 mg) by mouth 2 times daily as needed for severe pain (7-10) 12 tablet 0    triamcinolone (ARISTOCORT HP) 0.5 % external cream Apply topically 2 times daily 60 g 1    umeclidinium (INCRUSE ELLIPTA) 62.5 MCG/ACT inhaler Inhale 1 puff into the lungs daily 1 each 2    vitamin B complex with vitamin C (STRESS TAB) tablet Take 1 tablet by mouth daily      Zinc Sulfate (ZINC 15 PO) Take by mouth daily         Patient Active Problem List   Diagnosis    Stage 3 severe COPD by GOLD classification (H)    GERD (gastroesophageal reflux disease)    Osteoporosis    Advanced directives, counseling/discussion    Cervical high risk HPV (human papillomavirus) test positive    Hearing loss    Acute pancreatitis    Hypokalemia    Peritoneal carcinomatosis (H)    Hypomagnesemia    Chemotherapy-induced neutropenia  (H24)    Chronic bronchitis, unspecified chronic bronchitis type (H)    S/P LUCILA-BSO    Malignant neoplasm of ovary, left (H)    Family history of malignant neoplasm of ovary    Essential hypertension with goal blood pressure less than 140/90    Pulmonary nodules    Paroxysmal supraventricular tachycardia    Post-menopausal     Dyslipidemia    Psoriasis    Stage 3b chronic kidney disease (H)    Centrilobular emphysema (H)    Steroid-induced osteoporosis    Chronic respiratory failure with hypoxia (H)    Nonischemic cardiomyopathy (H)    NYHA class 3 heart failure with preserved ejection fraction (H)       Past Medical History:   Diagnosis Date    Arthritis     Have been noticing stiffness in my hands and arms    Cervical high risk HPV (human papillomavirus) test positive 10/31/2012    type 18    COPD (chronic obstructive pulmonary disease) (H)     GERD (gastroesophageal reflux disease)     Hx of colposcopy with cervical biopsy 2012    negative    Hypertension     Osteoporosis     Paroxysmal supraventricular tachycardia 2017    Peritoneal carcinomatosis (H) 2015    Pneumonia     Uncomplicated asthma 1980       Past Surgical History:   Procedure Laterality Date    CHOLECYSTECTOMY  2014    COLONOSCOPY  2012    Procedure: COLONOSCOPY;  COLONOSCOPY SCREEN;  Surgeon: Mushtaq Lara MD;  Location:  OR    ESOPHAGOSCOPY, GASTROSCOPY, DUODENOSCOPY (EGD), COMBINED N/A 2023    Procedure: ESOPHAGOGASTRODUODENOSCOPY, WITH BIOPSY;  Surgeon: Eric Covington MD;  Location: [facility]    GYN SURGERY      HERNIORRHAPHY HIATAL N/A 2015    Procedure: HERNIORRHAPHY HIATAL;  Surgeon: Chip Carty MD;  Location: [facility]    HYSTERECTOMY TOTAL ABD, ALVIN SALPINGO-OOPHORECTOMY, NODE DISSECTION, TUMOR DEBULKING, COMBINED Bilateral 2015    Procedure: COMBINED HYSTERECTOMY TOTAL ABDOMINAL, SALPINGO-OOPHORECTOMY, NODE DISSECTION, TUMOR DEBULKING;  Surgeon: Emma Cabrera MD;  Location: [facility] OR       Family History   Problem Relation Age of Onset    Cancer Brother         testicular    Diabetes Sister     Ovarian Cancer Mother 60    Diabetes Mother             Asthma Father             Diabetes Sister     Depression/Anxiety Daughter     Depression Daughter     Osteoporosis Sister     Other  Cancer Brother     Diabetes Brother     Depression Other         Grandson       Social History     Tobacco Use    Smoking status: Former     Packs/day: 1.00     Years: 30.00     Additional pack years: 0.00     Total pack years: 30.00     Types: Cigarettes     Start date: 1965     Quit date: 10/10/2003     Years since quittin.4     Passive exposure: Past    Smokeless tobacco: Never   Substance Use Topics    Alcohol use: No         Exam:    Vitals: /80   Pulse 90   BMI: There is no height or weight on file to calculate BMI.  Height: Data Unavailable    Constitutional/ general:  Pt is in no apparent distress, appears well-nourished.  Cooperative with history and physical exam.     Psych:  The patient answered questions appropriately.  Normal affect.  Seems to have reasonable expectations, in terms of treatment.     Lungs:  Non labored breathing, non labored speech. No cough.  No audible wheezing. Even, quiet breathing.       Vascular:  negative PT arteries.  DP 2/4.  CFT < 3 sec.   Ankle edema and varicosities noted.  No pedal hair growth.    Neuro:  Alert and oriented x 3. Coordinated gait.  Light touch sensation is intact t    Derm: skin thin, shiny, atrophic.  No erythema, ecchymosis, or cyanosis.      Musculoskeletal:    Lower extremity muscle strength is normal.  Patient is ambulatory without an assistive device or brace.  No gross deformities.  Normal arch.   MS 5/5 all compartments.  Normal ROM all fore foot and rearfoot joints.  Left 1/2 nails are thickened, elongated, discolored with subungual debris.    No masses or breakdown in the skin bilaterally.  No erythema or ecchymosis noted bilateral.     A/P  CKD stage 3  Onychomycosis      Mycotic nails manually debrided with a .    Gave patient instructions on daily foot examination and wearing good shoes at all times. Will RETURN TO CLINIC ASAP if notices any problems.  Will refer to [facility] for further foot care.  Return to clinic  prn.      Dameon Powers, DINORAM, FACFAS

## 2024-02-29 NOTE — LETTER
2/29/2024         RE: Iris Carrion  [address] [place] [address]        Dear Colleague,    Thank you for referring your patient, Iris Carrion, to the [facility]. Please see a copy of my visit note below.    Subjective:    Pt is seen today as a new pt for a foot exam.  Has CKD stage 3. Patient is having a hard time trimming his nails and points to left first and second nail.  This has been getting more difficult over the years.  Sometimes they are painful.  The pain is aggravated by activity and relieved by rest.  Patient denies numbness or tingling in her toes.  She denies history of foot ulcers.  Primary care is Hernandez Gunn last seen 2/13/24.      ROS:  see above         Allergies   Allergen Reactions     Levaquin Rash       Current Outpatient Medications   Medication Sig Dispense Refill     albuterol (PROAIR HFA/PROVENTIL HFA/VENTOLIN HFA) 108 (90 Base) MCG/ACT inhaler Inhale 2 puffs into the lungs every 6 hours as needed for shortness of breath / dyspnea or wheezing 8.5 g 3     albuterol (PROVENTIL) (2.5 MG/3ML) 0.083% neb solution Take 1 vial (2.5 mg) by nebulization every 4 hours as needed for shortness of breath, wheezing or cough 360 mL 11     amLODIPine (NORVASC) 5 MG tablet Take 1 tablet (5 mg) by mouth daily 90 tablet 3     atorvastatin (LIPITOR) 20 MG tablet Take 1 tablet (20 mg) by mouth daily 90 tablet 3     budesonide (PULMICORT) 0.5 MG/2ML neb solution Take 2 mLs (0.5 mg) by nebulization 2 times daily 120 mL 11     calcitonin, salmon, (MIACALCIN) 200 UNIT/ACT nasal spray INSTILL ONE SPRAY INTO ONE NOSTRIL DAILY- ALTERNATE NOSTRIL EACH DAY 4 mL 1     Calcium Carbonate (CALCIUM 500 PO) Take 1 tablet by mouth daily.       cyclobenzaprine (FLEXERIL) 5 MG tablet Take 1 tablet (5 mg) by mouth 3 times daily as needed for muscle spasms 20 tablet 0     fluticasone (FLONASE) 50 MCG/ACT spray Spray 2 sprays into both nostrils daily 1 Bottle 1     formoterol  (PERFOROMIST) 20 MCG/2ML neb solution Take 2 mLs (20 mcg) by nebulization every 12 hours 120 mL 11     ibuprofen (ADVIL,MOTRIN) 600 MG tablet Take 1 tablet (600 mg) by mouth every 6 hours as needed for moderate pain 40 tablet 0     ipratropium (ATROVENT) 0.02 % neb solution Take 2.5 mLs (0.5 mg) by nebulization 4 times daily as needed for wheezing 360 mL 11     ipratropium - albuterol 0.5 mg/2.5 mg/3 mL (DUONEB) 0.5-2.5 (3) MG/3ML neb solution USE 1 AMPULE IN NEBULIZER EVERY 4 HOURS AS NEEDED FOR SHORTNESS OF BREATH /DYSPNEA, OR WHEEZING 90 mL 11     loratadine (CLARITIN) 10 MG tablet Take 10 mg by mouth daily       losartan (COZAAR) 100 MG tablet Take 1 tablet (100 mg) by mouth daily 90 tablet 3     MAGNESIUM OXIDE PO Take 200 mg by mouth 2 times daily       order for DME Equipment being ordered: Nebulizer 1 Device 0     pyridOXINE (VITAMIN B6) 25 MG tablet Take 1 tablet (25 mg) by mouth daily 90 tablet 1     tiZANidine (ZANAFLEX) 4 MG tablet Take 1 tablet (4 mg) by mouth 2 times daily as needed for muscle spasms 30 tablet 0     traMADol (ULTRAM) 50 MG tablet Take 1 tablet (50 mg) by mouth 2 times daily as needed for severe pain (7-10) 12 tablet 0     triamcinolone (ARISTOCORT HP) 0.5 % external cream Apply topically 2 times daily 60 g 1     umeclidinium (INCRUSE ELLIPTA) 62.5 MCG/ACT inhaler Inhale 1 puff into the lungs daily 1 each 2     vitamin B complex with vitamin C (STRESS TAB) tablet Take 1 tablet by mouth daily       Zinc Sulfate (ZINC 15 PO) Take by mouth daily         Patient Active Problem List   Diagnosis     Stage 3 severe COPD by GOLD classification (H)     GERD (gastroesophageal reflux disease)     Osteoporosis     Advanced directives, counseling/discussion     Cervical high risk HPV (human papillomavirus) test positive     Hearing loss     Acute pancreatitis     Hypokalemia     Peritoneal carcinomatosis (H)     Hypomagnesemia     Chemotherapy-induced neutropenia  (H24)     Chronic bronchitis,  unspecified chronic bronchitis type (H)     S/P LUCILA-BSO     Malignant neoplasm of ovary, left (H)     Family history of malignant neoplasm of ovary     Essential hypertension with goal blood pressure less than 140/90     Pulmonary nodules     Paroxysmal supraventricular tachycardia     Post-menopausal     Dyslipidemia     Psoriasis     Stage 3b chronic kidney disease (H)     Centrilobular emphysema (H)     Steroid-induced osteoporosis     Chronic respiratory failure with hypoxia (H)     Nonischemic cardiomyopathy (H)     NYHA class 3 heart failure with preserved ejection fraction (H)       Past Medical History:   Diagnosis Date     Arthritis     Have been noticing stiffness in my hands and arms     Cervical high risk HPV (human papillomavirus) test positive 10/31/2012    type 18     COPD (chronic obstructive pulmonary disease) (H)      GERD (gastroesophageal reflux disease)      Hx of colposcopy with cervical biopsy 11/2012    negative     Hypertension 2013     Osteoporosis      Paroxysmal supraventricular tachycardia 09/06/2017     Peritoneal carcinomatosis (H) 08/24/2015     Pneumonia      Uncomplicated asthma 1980       Past Surgical History:   Procedure Laterality Date     CHOLECYSTECTOMY  6/2014     COLONOSCOPY  12/4/2012    Procedure: COLONOSCOPY;  COLONOSCOPY SCREEN;  Surgeon: Mushtaq Lara MD;  Location:  OR     ESOPHAGOSCOPY, GASTROSCOPY, DUODENOSCOPY (EGD), COMBINED N/A 11/7/2023    Procedure: ESOPHAGOGASTRODUODENOSCOPY, WITH BIOPSY;  Surgeon: Eric Covington MD;  Location: [facility]     GYN SURGERY       HERNIORRHAPHY HIATAL N/A 11/25/2015    Procedure: HERNIORRHAPHY HIATAL;  Surgeon: Chip Carty MD;  Location: [facility]     HYSTERECTOMY TOTAL ABD, ALVIN SALPINGO-OOPHORECTOMY, NODE DISSECTION, TUMOR DEBULKING, COMBINED Bilateral 11/25/2015    Procedure: COMBINED HYSTERECTOMY TOTAL ABDOMINAL, SALPINGO-OOPHORECTOMY, NODE DISSECTION, TUMOR DEBULKING;  Surgeon: Emma Cabrera MD;   Location: [facility]       Family History   Problem Relation Age of Onset     Cancer Brother         testicular     Diabetes Sister      Ovarian Cancer Mother 60     Diabetes Mother              Asthma Father              Diabetes Sister      Depression/Anxiety Daughter      Depression Daughter      Osteoporosis Sister      Other Cancer Brother      Diabetes Brother      Depression Other         Grandson       Social History     Tobacco Use     Smoking status: Former     Packs/day: 1.00     Years: 30.00     Additional pack years: 0.00     Total pack years: 30.00     Types: Cigarettes     Start date: 1965     Quit date: 10/10/2003     Years since quittin.4     Passive exposure: Past     Smokeless tobacco: Never   Substance Use Topics     Alcohol use: No         Exam:    Vitals: /80   Pulse 90   BMI: There is no height or weight on file to calculate BMI.  Height: Data Unavailable    Constitutional/ general:  Pt is in no apparent distress, appears well-nourished.  Cooperative with history and physical exam.     Psych:  The patient answered questions appropriately.  Normal affect.  Seems to have reasonable expectations, in terms of treatment.     Lungs:  Non labored breathing, non labored speech. No cough.  No audible wheezing. Even, quiet breathing.       Vascular:  negative PT arteries.  DP 2/4.  CFT < 3 sec.   Ankle edema and varicosities noted.  No pedal hair growth.    Neuro:  Alert and oriented x 3. Coordinated gait.  Light touch sensation is intact t    Derm: skin thin, shiny, atrophic.  No erythema, ecchymosis, or cyanosis.      Musculoskeletal:    Lower extremity muscle strength is normal.  Patient is ambulatory without an assistive device or brace.  No gross deformities.  Normal arch.   MS 5/5 all compartments.  Normal ROM all fore foot and rearfoot joints.  Left 1/2 nails are thickened, elongated, discolored with subungual debris.    No masses or breakdown in the skin bilaterally.   No erythema or ecchymosis noted bilateral.     A/P  CKD stage 3  Onychomycosis      Mycotic nails manually debrided with a .    Gave patient instructions on daily foot examination and wearing good shoes at all times. Will RETURN TO CLINIC ASAP if notices any problems.  Will refer to [facility] for further foot care.  Return to clinic prn.      Dameon Powers DPM, FACFAS               Again, thank you for allowing me to participate in the care of your patient.        Sincerely,        Dameon Powers DPM

## 2024-02-29 NOTE — PATIENT INSTRUCTIONS
We wish you continued good healing. If you have any questions or concerns, please do not hesitate to contact us at  [phone]    [facility] (secure e-mail communication and access to your chart) to send a message or to make an appointment.    Please remember to call and schedule a follow up appointment if one was recommended at your earliest convenience.     PODIATRY CLINIC HOURS  TELEPHONE NUMBER    Dr. Dameon SALMONPANTONIO FACFAS        Clinics:  [facility]   [place]  Tuesday 1PM-6PM  [place]  Wednesday 745AM-330PM  [place]  Monday 2nd,4th  830AM-4PM  Thursday/Friday 745AM-230PM     [place] APPOINTMENTS  [phone]    [place] APPOINTMENTS  [phone]          If you need a medication refill, please contact us you may need lab work and/or a follow up visit prior to your refill (i.e. Antifungal medications).  If MRI needed please call Imaging at [phone]   HOW DO I GET MY KNEE SCOOTER? Knee scooters can be picked up at ANY Medical Supply stores with your knee scooter Prescription.  OR  Bring your signed prescription to an [facility] showroom.   Set up an appointment for your custom Orthotics. Call any Orthotics locations call [phone]

## 2024-03-08 RX ORDER — ALBUTEROL SULFATE 0.83 MG/ML
2.5 SOLUTION RESPIRATORY (INHALATION)
Status: CANCELLED | OUTPATIENT
Start: 2024-03-08

## 2024-03-08 RX ORDER — EPINEPHRINE 1 MG/ML
0.3 INJECTION, SOLUTION INTRAMUSCULAR; SUBCUTANEOUS EVERY 5 MIN PRN
Status: CANCELLED | OUTPATIENT
Start: 2024-03-08

## 2024-03-08 RX ORDER — DIPHENHYDRAMINE HYDROCHLORIDE 50 MG/ML
50 INJECTION INTRAMUSCULAR; INTRAVENOUS
Status: CANCELLED
Start: 2024-03-08

## 2024-03-08 RX ORDER — METHYLPREDNISOLONE SODIUM SUCCINATE 125 MG/2ML
125 INJECTION, POWDER, LYOPHILIZED, FOR SOLUTION INTRAMUSCULAR; INTRAVENOUS
Status: CANCELLED
Start: 2024-03-08

## 2024-03-08 RX ORDER — MEPERIDINE HYDROCHLORIDE 25 MG/ML
25 INJECTION INTRAMUSCULAR; INTRAVENOUS; SUBCUTANEOUS EVERY 30 MIN PRN
Status: CANCELLED | OUTPATIENT
Start: 2024-03-08

## 2024-03-08 RX ORDER — ALBUTEROL SULFATE 90 UG/1
1-2 AEROSOL, METERED RESPIRATORY (INHALATION)
Status: CANCELLED
Start: 2024-03-08

## 2024-03-11 ENCOUNTER — LAB (OUTPATIENT)
Dept: INFUSION THERAPY | Facility: CLINIC | Age: 79
End: 2024-03-11
Attending: INTERNAL MEDICINE
Payer: COMMERCIAL

## 2024-03-11 VITALS — DIASTOLIC BLOOD PRESSURE: 63 MMHG | SYSTOLIC BLOOD PRESSURE: 125 MMHG | HEART RATE: 82 BPM | OXYGEN SATURATION: 97 %

## 2024-03-11 DIAGNOSIS — N18.32 STAGE 3B CHRONIC KIDNEY DISEASE (H): Primary | ICD-10-CM

## 2024-03-11 DIAGNOSIS — T38.0X5A STEROID-INDUCED OSTEOPOROSIS: ICD-10-CM

## 2024-03-11 DIAGNOSIS — M81.8 STEROID-INDUCED OSTEOPOROSIS: ICD-10-CM

## 2024-03-11 LAB
CALCIUM SERPL-MCNC: 9.5 MG/DL (ref 8.8–10.2)
CREAT SERPL-MCNC: 0.86 MG/DL (ref 0.51–0.95)
EGFRCR SERPLBLD CKD-EPI 2021: 70 ML/MIN/1.73M2
HOLD SPECIMEN: NORMAL
HOLD SPECIMEN: NORMAL

## 2024-03-11 PROCEDURE — 82565 ASSAY OF CREATININE: CPT | Performed by: INTERNAL MEDICINE

## 2024-03-11 PROCEDURE — 250N000011 HC RX IP 250 OP 636: Mod: JZ | Performed by: INTERNAL MEDICINE

## 2024-03-11 PROCEDURE — 36415 COLL VENOUS BLD VENIPUNCTURE: CPT | Performed by: INTERNAL MEDICINE

## 2024-03-11 PROCEDURE — 82310 ASSAY OF CALCIUM: CPT | Performed by: INTERNAL MEDICINE

## 2024-03-11 PROCEDURE — 96372 THER/PROPH/DIAG INJ SC/IM: CPT | Performed by: INTERNAL MEDICINE

## 2024-03-11 PROCEDURE — 99207 PR NO CHARGE LOS: CPT

## 2024-03-11 RX ORDER — EPINEPHRINE 1 MG/ML
0.3 INJECTION, SOLUTION INTRAMUSCULAR; SUBCUTANEOUS EVERY 5 MIN PRN
Status: CANCELLED | OUTPATIENT
Start: 2024-09-07

## 2024-03-11 RX ORDER — ALBUTEROL SULFATE 90 UG/1
1-2 AEROSOL, METERED RESPIRATORY (INHALATION)
Status: CANCELLED
Start: 2024-09-07

## 2024-03-11 RX ORDER — DIPHENHYDRAMINE HYDROCHLORIDE 50 MG/ML
50 INJECTION INTRAMUSCULAR; INTRAVENOUS
Status: CANCELLED
Start: 2024-09-07

## 2024-03-11 RX ORDER — ALBUTEROL SULFATE 0.83 MG/ML
2.5 SOLUTION RESPIRATORY (INHALATION)
Status: CANCELLED | OUTPATIENT
Start: 2024-09-07

## 2024-03-11 RX ORDER — METHYLPREDNISOLONE SODIUM SUCCINATE 125 MG/2ML
125 INJECTION, POWDER, LYOPHILIZED, FOR SOLUTION INTRAMUSCULAR; INTRAVENOUS
Status: CANCELLED
Start: 2024-09-07

## 2024-03-11 RX ORDER — MEPERIDINE HYDROCHLORIDE 25 MG/ML
25 INJECTION INTRAMUSCULAR; INTRAVENOUS; SUBCUTANEOUS EVERY 30 MIN PRN
Status: CANCELLED | OUTPATIENT
Start: 2024-09-07

## 2024-03-11 RX ADMIN — DENOSUMAB 60 MG: 60 INJECTION SUBCUTANEOUS at 13:14

## 2024-03-11 NOTE — PROGRESS NOTES
Infusion Nursing Note:  Iris Carrion presents today for   Chief Complaint   Patient presents with    Allied Health Visit     Prolia       Patient seen by provider today: No   present during visit today: Not Applicable.    Note: Patient was assessed by Felipe Cao RN prior to injection.      Treatment Conditions:  Lab Results   Component Value Date     02/13/2024    POTASSIUM 4.0 02/13/2024    MAG 1.1 (L) 02/08/2016    CR 0.86 03/11/2024    MAURISIO 9.5 03/11/2024    BILITOTAL 0.5 08/29/2023    ALBUMIN 4.2 08/29/2023    ALT 16 08/29/2023    AST 28 08/29/2023       Results reviewed, labs MET treatment parameters, ok to proceed with treatment.      Post Infusion Assessment:  Patient tolerated injection without incident.  Site patent and intact, free from redness, edema or discomfort.       Discharge Plan:   Patient discharged in stable condition accompanied by: self.  Departure Mode: Ambulatory.      Jossie Celaya CMA

## 2024-03-26 ENCOUNTER — PATIENT OUTREACH (OUTPATIENT)
Dept: CARE COORDINATION | Facility: CLINIC | Age: 79
End: 2024-03-26
Payer: COMMERCIAL

## 2024-04-23 ENCOUNTER — PATIENT OUTREACH (OUTPATIENT)
Dept: CARE COORDINATION | Facility: CLINIC | Age: 79
End: 2024-04-23
Payer: COMMERCIAL

## 2024-04-29 ENCOUNTER — OFFICE VISIT (OUTPATIENT)
Dept: PULMONOLOGY | Facility: CLINIC | Age: 79
End: 2024-04-29
Payer: COMMERCIAL

## 2024-04-29 VITALS
SYSTOLIC BLOOD PRESSURE: 148 MMHG | BODY MASS INDEX: 17.52 KG/M2 | WEIGHT: 109 LBS | RESPIRATION RATE: 18 BRPM | HEIGHT: 66 IN | DIASTOLIC BLOOD PRESSURE: 72 MMHG | HEART RATE: 89 BPM

## 2024-04-29 DIAGNOSIS — J44.9 STAGE 4 VERY SEVERE COPD BY GOLD CLASSIFICATION (H): ICD-10-CM

## 2024-04-29 DIAGNOSIS — J44.9 CHRONIC OBSTRUCTIVE PULMONARY DISEASE, UNSPECIFIED COPD TYPE (H): ICD-10-CM

## 2024-04-29 DIAGNOSIS — J43.2 CENTRILOBULAR EMPHYSEMA (H): ICD-10-CM

## 2024-04-29 PROCEDURE — 99214 OFFICE O/P EST MOD 30 MIN: CPT

## 2024-04-29 PROCEDURE — G0463 HOSPITAL OUTPT CLINIC VISIT: HCPCS

## 2024-04-29 RX ORDER — BUDESONIDE 0.5 MG/2ML
0.5 INHALANT ORAL 2 TIMES DAILY
Qty: 120 ML | Refills: 11 | Status: SHIPPED | OUTPATIENT
Start: 2024-04-29

## 2024-04-29 RX ORDER — UMECLIDINIUM 62.5 UG/1
1 AEROSOL, POWDER ORAL DAILY
Qty: 1 EACH | Refills: 11 | Status: SHIPPED | OUTPATIENT
Start: 2024-04-29

## 2024-04-29 RX ORDER — ALBUTEROL SULFATE 0.83 MG/ML
2.5 SOLUTION RESPIRATORY (INHALATION) EVERY 4 HOURS PRN
Qty: 360 ML | Refills: 11 | OUTPATIENT
Start: 2024-04-29

## 2024-04-29 RX ORDER — ALBUTEROL SULFATE 0.83 MG/ML
2.5 SOLUTION RESPIRATORY (INHALATION) EVERY 4 HOURS PRN
Qty: 360 ML | Refills: 11 | Status: SHIPPED | OUTPATIENT
Start: 2024-04-29

## 2024-04-29 RX ORDER — FORMOTEROL FUMARATE DIHYDRATE 20 UG/2ML
20 SOLUTION RESPIRATORY (INHALATION) EVERY 12 HOURS
Qty: 120 ML | Refills: 11 | Status: SHIPPED | OUTPATIENT
Start: 2024-04-29

## 2024-04-29 ASSESSMENT — PAIN SCALES - GENERAL: PAINLEVEL: NO PAIN (0)

## 2024-04-29 NOTE — NURSING NOTE
Chief Complaint   Patient presents with    RECHECK     Follow up visit     Scottie Paul, CMA CMA at 12:30 PM on 4/29/2024

## 2024-04-29 NOTE — PROGRESS NOTES
[facility]  Pulmonary Medicine  Visit Clinic Note  April 29, 2024         ASSESSMENT & PLAN       Very severe COPD  Chronic hypoxemic respiratory failure  Emphysema    Respiratory symptoms are markedly improved on nebulized budesonide and formoterol.  She remains on Incruse Ellipta due to the fact that her insurance company will not cover nebulized long-acting muscarinic antagonist.  She is active during the day, and actually tries to go up and down her stairs a few times a day just for exercise.  She remains on 4 L of oxygen at rest, during exertion, and when she sleeps at night.  She is going to try to titrated down to 3 L and watch her pulse oximeter carefully to make sure her oxygen saturation remains above 90%.    She has not had any exacerbations of her lung disease requiring antibiotics or prednisone since I saw her last fall.    I will have her follow-up with me in about 1 year        Grayson Garg MD     I spent 30 minutes on the date of the visit reviewing the medical record, performing a history and physical exam, discussing her nebulized medications and exercise routine with her.        Today's visit note:     Chief Complaint: RECHECK (Follow up visit)      HISTORY OF PRESENT ILLNESS:    This is a 78-year-old female with a history of very severe COPD, chronic hypoxemic respiratory failure, who presents to the pulmonary clinic for regular follow-up evaluation.  At her last clinic visit, she was having a lot of respiratory symptoms that were not responding to her typical inhalers.  We switched all of her inhalers to nebulized medications.  She has had insurance coverage for nebulized budesonide and formoterol.  However Yupelri has not been covered.  So she remains on Incruse Ellipta.  With the changes to nebulized medications, her breathing has been much better.  She continues to use oxygen at about 4 L/min during the day and night.  She will decrease to 3 L on occasion.  She has a  pulse oximeter and usually is in the mid 90% range.  She denies any cough, mucus production or wheeze.           Past Medical and Surgical History:     Past Medical History:   Diagnosis Date    Arthritis     Have been noticing stiffness in my hands and arms    Cervical high risk HPV (human papillomavirus) test positive 10/31/2012    type 18    COPD (chronic obstructive pulmonary disease) (H)     GERD (gastroesophageal reflux disease)     Hx of colposcopy with cervical biopsy 2012    negative    Hypertension     Osteoporosis     Paroxysmal supraventricular tachycardia (H24) 2017    Peritoneal carcinomatosis (H) 2015    Pneumonia     Uncomplicated asthma      Past Surgical History:   Procedure Laterality Date    CHOLECYSTECTOMY  2014    COLONOSCOPY  2012    Procedure: COLONOSCOPY;  COLONOSCOPY SCREEN;  Surgeon: Mushtaq Lara MD;  Location:  OR    ESOPHAGOSCOPY, GASTROSCOPY, DUODENOSCOPY (EGD), COMBINED N/A 2023    Procedure: ESOPHAGOGASTRODUODENOSCOPY, WITH BIOPSY;  Surgeon: Eric Covington MD;  Location:  GI    GYN SURGERY      HERNIORRHAPHY HIATAL N/A 2015    Procedure: HERNIORRHAPHY HIATAL;  Surgeon: Chip Carty MD;  Location: [facility] OR    HYSTERECTOMY TOTAL ABD, ALVIN SALPINGO-OOPHORECTOMY, NODE DISSECTION, TUMOR DEBULKING, COMBINED Bilateral 2015    Procedure: COMBINED HYSTERECTOMY TOTAL ABDOMINAL, SALPINGO-OOPHORECTOMY, NODE DISSECTION, TUMOR DEBULKING;  Surgeon: Emma Cabrera MD;  Location: [facility] OR           Family History:     Family History   Problem Relation Age of Onset    Cancer Brother         testicular    Diabetes Sister     Ovarian Cancer Mother 60    Diabetes Mother             Asthma Father             Diabetes Sister     Depression/Anxiety Daughter     Depression Daughter     Osteoporosis Sister     Other Cancer Brother     Diabetes Brother     Depression Other         Grandson              Social History:      Social History     Socioeconomic History    Marital status:      Spouse name: Not on file    Number of children: Not on file    Years of education: Not on file    Highest education level: Not on file   Occupational History    Not on file   Tobacco Use    Smoking status: Former     Current packs/day: 0.00     Average packs/day: 1 pack/day for 38.2 years (38.2 ttl pk-yrs)     Types: Cigarettes     Start date: 1965     Quit date: 10/10/2003     Years since quittin.5     Passive exposure: Past    Smokeless tobacco: Never   Vaping Use    Vaping status: Never Used   Substance and Sexual Activity    Alcohol use: No    Drug use: No    Sexual activity: Not Currently     Birth control/protection: None   Other Topics Concern    Parent/sibling w/ CABG, MI or angioplasty before 65F 55M? No   Social History Narrative    Not on file     Social Determinants of Health     Financial Resource Strain: Low Risk  (2024)    Financial Resource Strain     Within the past 12 months, have you or your family members you live with been unable to get utilities (heat, electricity) when it was really needed?: No   Food Insecurity: Low Risk  (2024)    Food Insecurity     Within the past 12 months, did you worry that your food would run out before you got money to buy more?: No     Within the past 12 months, did the food you bought just not last and you didn t have money to get more?: No   Transportation Needs: Low Risk  (2024)    Transportation Needs     Within the past 12 months, has lack of transportation kept you from medical appointments, getting your medicines, non-medical meetings or appointments, work, or from getting things that you need?: No   Physical Activity: Inactive (2024)    Exercise Vital Sign     Days of Exercise per Week: 0 days     Minutes of Exercise per Session: 0 min   Stress: Stress Concern Present (2024)    [employer] [place] [employer] - Occupational Stress Questionnaire      Feeling of Stress : To some extent   Social Connections: Unknown (2/9/2024)    Social Connection and Isolation Panel [NHANES]     Frequency of Communication with Friends and Family: Not on file     Frequency of Social Gatherings with Friends and Family: Once a week     Attends Sikh Services: Not on file     Active Member of Clubs or Organizations: Not on file     Attends Club or Organization Meetings: Not on file     Marital Status: Not on file   Interpersonal Safety: Low Risk  (2/13/2024)    Interpersonal Safety     Do you feel physically and emotionally safe where you currently live?: Yes     Within the past 12 months, have you been hit, slapped, kicked or otherwise physically hurt by someone?: No     Within the past 12 months, have you been humiliated or emotionally abused in other ways by your partner or ex-partner?: No   Housing Stability: Low Risk  (2/9/2024)    Housing Stability     Do you have housing? : Yes     Are you worried about losing your housing?: No            Medications:     Current Outpatient Medications   Medication Sig Dispense Refill    albuterol (PROAIR HFA/PROVENTIL HFA/VENTOLIN HFA) 108 (90 Base) MCG/ACT inhaler Inhale 2 puffs into the lungs every 6 hours as needed for shortness of breath / dyspnea or wheezing 8.5 g 3    albuterol (PROVENTIL) (2.5 MG/3ML) 0.083% neb solution Take 1 vial (2.5 mg) by nebulization every 4 hours as needed for shortness of breath, wheezing or cough 360 mL 11    amLODIPine (NORVASC) 5 MG tablet Take 1 tablet (5 mg) by mouth daily 90 tablet 3    atorvastatin (LIPITOR) 20 MG tablet Take 1 tablet (20 mg) by mouth daily 90 tablet 3    budesonide (PULMICORT) 0.5 MG/2ML neb solution Take 2 mLs (0.5 mg) by nebulization 2 times daily 120 mL 11    calcitonin, salmon, (MIACALCIN) 200 UNIT/ACT nasal spray INSTILL ONE SPRAY INTO ONE NOSTRIL DAILY- ALTERNATE NOSTRIL EACH DAY 4 mL 1    Calcium Carbonate (CALCIUM 500 PO) Take 1 tablet by mouth daily.       "cyclobenzaprine (FLEXERIL) 5 MG tablet Take 1 tablet (5 mg) by mouth 3 times daily as needed for muscle spasms 20 tablet 0    fluticasone (FLONASE) 50 MCG/ACT spray Spray 2 sprays into both nostrils daily 1 Bottle 1    formoterol (PERFOROMIST) 20 MCG/2ML neb solution Take 2 mLs (20 mcg) by nebulization every 12 hours 120 mL 11    ibuprofen (ADVIL,MOTRIN) 600 MG tablet Take 1 tablet (600 mg) by mouth every 6 hours as needed for moderate pain 40 tablet 0    ipratropium (ATROVENT) 0.02 % neb solution Take 2.5 mLs (0.5 mg) by nebulization 4 times daily as needed for wheezing 360 mL 11    loratadine (CLARITIN) 10 MG tablet Take 10 mg by mouth daily      losartan (COZAAR) 100 MG tablet Take 1 tablet (100 mg) by mouth daily 90 tablet 3    MAGNESIUM OXIDE PO Take 200 mg by mouth 2 times daily      order for DME Equipment being ordered: Nebulizer 1 Device 0    pyridOXINE (VITAMIN B6) 25 MG tablet Take 1 tablet (25 mg) by mouth daily 90 tablet 1    tiZANidine (ZANAFLEX) 4 MG tablet Take 1 tablet (4 mg) by mouth 2 times daily as needed for muscle spasms 30 tablet 0    traMADol (ULTRAM) 50 MG tablet Take 1 tablet (50 mg) by mouth 2 times daily as needed for severe pain (7-10) 12 tablet 0    triamcinolone (ARISTOCORT HP) 0.5 % external cream Apply topically 2 times daily 60 g 1    umeclidinium (INCRUSE ELLIPTA) 62.5 MCG/ACT inhaler Inhale 1 puff into the lungs daily 1 each 2    vitamin B complex with vitamin C (STRESS TAB) tablet Take 1 tablet by mouth daily      Zinc Sulfate (ZINC 15 PO) Take by mouth daily       No current facility-administered medications for this visit.         PHYSICAL EXAM:  BP (!) 148/72   Pulse 89   Resp 18   Ht 1.664 m (5' 5.5\")   Wt 49.4 kg (109 lb)   BMI 17.86 kg/m  oxygen saturation 96% on 4 L    General: Comfortable, No apparent distress  Eyes: Anicteric  Ears: Hearing grossly normal  Neck: supple, no thyromegaly  Lymphatics: No cervical or supraclavicular nodes  Respiratory: Decreased breath " sounds. No crackles. No rhonchi. No wheezes  Cardiac: RRR, normal S1, S2. No murmurs.  Musculoskeletal: Extremities normal. No clubbing. No cyanosis. No edema.  Skin: No rash on limited exam  Neuro: Normal mentation. Normal speech.  Psych:Normal affect           Data:   All laboratory and imaging data reviewed.      PFT:       PFT Interpretation:  Severe airflow obstruction.  Gas trapping and hyperinflation.  Reduced diffusion capacity.  Valid Maneuver    Chest CT: I have reviewed the chest CT images from October 2022 and agree with the radiologist interpretation below:  Airways: The central tracheobronchial tree is clear.     Lungs: Calcified granulomata. Severe centrilobular and paraseptal  emphysematous changes affecting all lobes with moderate involvement of  the left upper lobe. Stable 8 mm subtle nodular density involving the  medial aspect of the right upper lobe (series 4, image 65). Stable 5  mm solid nodule in the right middle lobe (series 4, image 197). No  pleural effusion or pneumothorax     Upper abdomen: Stable left adrenal nodularity dating back to 4/6/2016  CT. Calcified granulomata of the liver and spleen.     Bones: Unchanged compression deformity at T8. Slightly increased  vertebral body height loss at T7.     Soft tissues: Unremarkable.                                                                      IMPRESSION:   1. Stable severe centrilobular and paraseptal emphysematous changes of  the lung parenchyma with bullous change most notable at the right lung  apex.  2. Stable pulmonary nodularity as detailed above.  3. Increased anterior wedging of T7, age indeterminate.  4. Calcified mediastinal/hilar lymphadenopathy and calcified  granulomata throughout the lungs, likely a sequelae of chronic  granulomatous disease.

## 2024-04-29 NOTE — LETTER
4/29/2024         RE: Iris Carrion  [id number] [facility]  [place] [address]        Dear Colleague,    Thank you for referring your patient, Iris Carrion, to the [facility] [place]. Please see a copy of my visit note below.    [facility]  Pulmonary Medicine  Visit Clinic Note  April 29, 2024         ASSESSMENT & PLAN       Very severe COPD  Chronic hypoxemic respiratory failure  Emphysema    Respiratory symptoms are markedly improved on nebulized budesonide and formoterol.  She remains on Incruse Ellipta due to the fact that her insurance company will not cover nebulized long-acting muscarinic antagonist.  She is active during the day, and actually tries to go up and down her stairs a few times a day just for exercise.  She remains on 4 L of oxygen at rest, during exertion, and when she sleeps at night.  She is going to try to titrated down to 3 L and watch her pulse oximeter carefully to make sure her oxygen saturation remains above 90%.    She has not had any exacerbations of her lung disease requiring antibiotics or prednisone since I saw her last fall.    I will have her follow-up with me in about 1 year        Grayson Garg MD     I spent 30 minutes on the date of the visit reviewing the medical record, performing a history and physical exam, discussing her nebulized medications and exercise routine with her.        Today's visit note:     Chief Complaint: RECHECK (Follow up visit)      HISTORY OF PRESENT ILLNESS:    This is a 78-year-old female with a history of very severe COPD, chronic hypoxemic respiratory failure, who presents to the pulmonary clinic for regular follow-up evaluation.  At her last clinic visit, she was having a lot of respiratory symptoms that were not responding to her typical inhalers.  We switched all of her inhalers to nebulized medications.  She has had insurance coverage for nebulized budesonide and  formoterol.  However Yupelri has not been covered.  So she remains on Incruse Ellipta.  With the changes to nebulized medications, her breathing has been much better.  She continues to use oxygen at about 4 L/min during the day and night.  She will decrease to 3 L on occasion.  She has a pulse oximeter and usually is in the mid 90% range.  She denies any cough, mucus production or wheeze.           Past Medical and Surgical History:     Past Medical History:   Diagnosis Date     Arthritis     Have been noticing stiffness in my hands and arms     Cervical high risk HPV (human papillomavirus) test positive 10/31/2012    type 18     COPD (chronic obstructive pulmonary disease) (H)      GERD (gastroesophageal reflux disease)      Hx of colposcopy with cervical biopsy 11/2012    negative     Hypertension 2013     Osteoporosis      Paroxysmal supraventricular tachycardia (H24) 09/06/2017     Peritoneal carcinomatosis (H) 08/24/2015     Pneumonia      Uncomplicated asthma 1980     Past Surgical History:   Procedure Laterality Date     CHOLECYSTECTOMY  6/2014     COLONOSCOPY  12/4/2012    Procedure: COLONOSCOPY;  COLONOSCOPY SCREEN;  Surgeon: Mushtaq Lara MD;  Location:  OR     ESOPHAGOSCOPY, GASTROSCOPY, DUODENOSCOPY (EGD), COMBINED N/A 11/7/2023    Procedure: ESOPHAGOGASTRODUODENOSCOPY, WITH BIOPSY;  Surgeon: Eric Covington MD;  Location:  GI     GYN SURGERY       HERNIORRHAPHY HIATAL N/A 11/25/2015    Procedure: HERNIORRHAPHY HIATAL;  Surgeon: Chip Carty MD;  Location: [facility] OR     HYSTERECTOMY TOTAL ABD, ALVIN SALPINGO-OOPHORECTOMY, NODE DISSECTION, TUMOR DEBULKING, COMBINED Bilateral 11/25/2015    Procedure: COMBINED HYSTERECTOMY TOTAL ABDOMINAL, SALPINGO-OOPHORECTOMY, NODE DISSECTION, TUMOR DEBULKING;  Surgeon: Emma Cabrera MD;  Location: [facility] OR           Family History:     Family History   Problem Relation Age of Onset     Cancer Brother         testicular     Diabetes Sister       Ovarian Cancer Mother 60     Diabetes Mother              Asthma Father              Diabetes Sister      Depression/Anxiety Daughter      Depression Daughter      Osteoporosis Sister      Other Cancer Brother      Diabetes Brother      Depression Other         Grandson              Social History:     Social History     Socioeconomic History     Marital status:      Spouse name: Not on file     Number of children: Not on file     Years of education: Not on file     Highest education level: Not on file   Occupational History     Not on file   Tobacco Use     Smoking status: Former     Current packs/day: 0.00     Average packs/day: 1 pack/day for 38.2 years (38.2 ttl pk-yrs)     Types: Cigarettes     Start date: 1965     Quit date: 10/10/2003     Years since quittin.5     Passive exposure: Past     Smokeless tobacco: Never   Vaping Use     Vaping status: Never Used   Substance and Sexual Activity     Alcohol use: No     Drug use: No     Sexual activity: Not Currently     Birth control/protection: None   Other Topics Concern     Parent/sibling w/ CABG, MI or angioplasty before 65F 55M? No   Social History Narrative     Not on file     Social Determinants of Health     Financial Resource Strain: Low Risk  (2024)    Financial Resource Strain      Within the past 12 months, have you or your family members you live with been unable to get utilities (heat, electricity) when it was really needed?: No   Food Insecurity: Low Risk  (2024)    Food Insecurity      Within the past 12 months, did you worry that your food would run out before you got money to buy more?: No      Within the past 12 months, did the food you bought just not last and you didn t have money to get more?: No   Transportation Needs: Low Risk  (2024)    Transportation Needs      Within the past 12 months, has lack of transportation kept you from medical appointments, getting your medicines, non-medical meetings  or appointments, work, or from getting things that you need?: No   Physical Activity: Inactive (2/9/2024)    Exercise Vital Sign      Days of Exercise per Week: 0 days      Minutes of Exercise per Session: 0 min   Stress: Stress Concern Present (2/9/2024)    [employer] [place] [employer] - Occupational Stress Questionnaire      Feeling of Stress : To some extent   Social Connections: Unknown (2/9/2024)    Social Connection and Isolation Panel [NHANES]      Frequency of Communication with Friends and Family: Not on file      Frequency of Social Gatherings with Friends and Family: Once a week      Attends Jainism Services: Not on file      Active Member of Clubs or Organizations: Not on file      Attends Club or Organization Meetings: Not on file      Marital Status: Not on file   Interpersonal Safety: Low Risk  (2/13/2024)    Interpersonal Safety      Do you feel physically and emotionally safe where you currently live?: Yes      Within the past 12 months, have you been hit, slapped, kicked or otherwise physically hurt by someone?: No      Within the past 12 months, have you been humiliated or emotionally abused in other ways by your partner or ex-partner?: No   Housing Stability: Low Risk  (2/9/2024)    Housing Stability      Do you have housing? : Yes      Are you worried about losing your housing?: No            Medications:     Current Outpatient Medications   Medication Sig Dispense Refill     albuterol (PROAIR HFA/PROVENTIL HFA/VENTOLIN HFA) 108 (90 Base) MCG/ACT inhaler Inhale 2 puffs into the lungs every 6 hours as needed for shortness of breath / dyspnea or wheezing 8.5 g 3     albuterol (PROVENTIL) (2.5 MG/3ML) 0.083% neb solution Take 1 vial (2.5 mg) by nebulization every 4 hours as needed for shortness of breath, wheezing or cough 360 mL 11     amLODIPine (NORVASC) 5 MG tablet Take 1 tablet (5 mg) by mouth daily 90 tablet 3     atorvastatin (LIPITOR) 20 MG tablet Take 1 tablet (20 mg) by  mouth daily 90 tablet 3     budesonide (PULMICORT) 0.5 MG/2ML neb solution Take 2 mLs (0.5 mg) by nebulization 2 times daily 120 mL 11     calcitonin, salmon, (MIACALCIN) 200 UNIT/ACT nasal spray INSTILL ONE SPRAY INTO ONE NOSTRIL DAILY- ALTERNATE NOSTRIL EACH DAY 4 mL 1     Calcium Carbonate (CALCIUM 500 PO) Take 1 tablet by mouth daily.       cyclobenzaprine (FLEXERIL) 5 MG tablet Take 1 tablet (5 mg) by mouth 3 times daily as needed for muscle spasms 20 tablet 0     fluticasone (FLONASE) 50 MCG/ACT spray Spray 2 sprays into both nostrils daily 1 Bottle 1     formoterol (PERFOROMIST) 20 MCG/2ML neb solution Take 2 mLs (20 mcg) by nebulization every 12 hours 120 mL 11     ibuprofen (ADVIL,MOTRIN) 600 MG tablet Take 1 tablet (600 mg) by mouth every 6 hours as needed for moderate pain 40 tablet 0     ipratropium (ATROVENT) 0.02 % neb solution Take 2.5 mLs (0.5 mg) by nebulization 4 times daily as needed for wheezing 360 mL 11     loratadine (CLARITIN) 10 MG tablet Take 10 mg by mouth daily       losartan (COZAAR) 100 MG tablet Take 1 tablet (100 mg) by mouth daily 90 tablet 3     MAGNESIUM OXIDE PO Take 200 mg by mouth 2 times daily       order for DME Equipment being ordered: Nebulizer 1 Device 0     pyridOXINE (VITAMIN B6) 25 MG tablet Take 1 tablet (25 mg) by mouth daily 90 tablet 1     tiZANidine (ZANAFLEX) 4 MG tablet Take 1 tablet (4 mg) by mouth 2 times daily as needed for muscle spasms 30 tablet 0     traMADol (ULTRAM) 50 MG tablet Take 1 tablet (50 mg) by mouth 2 times daily as needed for severe pain (7-10) 12 tablet 0     triamcinolone (ARISTOCORT HP) 0.5 % external cream Apply topically 2 times daily 60 g 1     umeclidinium (INCRUSE ELLIPTA) 62.5 MCG/ACT inhaler Inhale 1 puff into the lungs daily 1 each 2     vitamin B complex with vitamin C (STRESS TAB) tablet Take 1 tablet by mouth daily       Zinc Sulfate (ZINC 15 PO) Take by mouth daily       No current facility-administered medications for this visit.  "        PHYSICAL EXAM:  BP (!) 148/72   Pulse 89   Resp 18   Ht 1.664 m (5' 5.5\")   Wt 49.4 kg (109 lb)   BMI 17.86 kg/m  oxygen saturation 96% on 4 L    General: Comfortable, No apparent distress  Eyes: Anicteric  Ears: Hearing grossly normal  Neck: supple, no thyromegaly  Lymphatics: No cervical or supraclavicular nodes  Respiratory: Decreased breath sounds. No crackles. No rhonchi. No wheezes  Cardiac: RRR, normal S1, S2. No murmurs.  Musculoskeletal: Extremities normal. No clubbing. No cyanosis. No edema.  Skin: No rash on limited exam  Neuro: Normal mentation. Normal speech.  Psych:Normal affect           Data:   All laboratory and imaging data reviewed.      PFT:       PFT Interpretation:  Severe airflow obstruction.  Gas trapping and hyperinflation.  Reduced diffusion capacity.  Valid Maneuver    Chest CT: I have reviewed the chest CT images from October 2022 and agree with the radiologist interpretation below:  Airways: The central tracheobronchial tree is clear.     Lungs: Calcified granulomata. Severe centrilobular and paraseptal  emphysematous changes affecting all lobes with moderate involvement of  the left upper lobe. Stable 8 mm subtle nodular density involving the  medial aspect of the right upper lobe (series 4, image 65). Stable 5  mm solid nodule in the right middle lobe (series 4, image 197). No  pleural effusion or pneumothorax     Upper abdomen: Stable left adrenal nodularity dating back to 4/6/2016  CT. Calcified granulomata of the liver and spleen.     Bones: Unchanged compression deformity at T8. Slightly increased  vertebral body height loss at T7.     Soft tissues: Unremarkable.                                                                      IMPRESSION:   1. Stable severe centrilobular and paraseptal emphysematous changes of  the lung parenchyma with bullous change most notable at the right lung  apex.  2. Stable pulmonary nodularity as detailed above.  3. Increased anterior " wedging of T7, age indeterminate.  4. Calcified mediastinal/hilar lymphadenopathy and calcified  granulomata throughout the lungs, likely a sequelae of chronic  granulomatous disease.                               Again, thank you for allowing me to participate in the care of your patient.        Sincerely,        Hayes Garg MD

## 2024-05-06 DIAGNOSIS — J42 CHRONIC BRONCHITIS, UNSPECIFIED CHRONIC BRONCHITIS TYPE (H): ICD-10-CM

## 2024-05-06 RX ORDER — ALBUTEROL SULFATE 90 UG/1
2 AEROSOL, METERED RESPIRATORY (INHALATION) EVERY 6 HOURS PRN
Qty: 8.5 G | Refills: 3 | Status: SHIPPED | OUTPATIENT
Start: 2024-05-06

## 2024-05-07 DIAGNOSIS — M48.50XA PATHOLOGICAL COMPRESSION FRACTURE OF VERTEBRA, INITIAL ENCOUNTER (H): ICD-10-CM

## 2024-05-07 DIAGNOSIS — M80.00XA AGE-RELATED OSTEOPOROSIS WITH CURRENT PATHOLOGICAL FRACTURE, INITIAL ENCOUNTER: ICD-10-CM

## 2024-05-07 RX ORDER — CALCITONIN SALMON 200 [IU]/.09ML
SPRAY, METERED NASAL
Qty: 4 ML | Refills: 1 | Status: SHIPPED | OUTPATIENT
Start: 2024-05-07 | End: 2024-07-01

## 2024-06-17 PROBLEM — Z71.89 ADVANCED DIRECTIVES, COUNSELING/DISCUSSION: Status: RESOLVED | Noted: 2018-05-09 | Resolved: 2024-06-17

## 2024-06-29 ENCOUNTER — HEALTH MAINTENANCE LETTER (OUTPATIENT)
Age: 79
End: 2024-06-29

## 2024-06-30 DIAGNOSIS — M80.00XA AGE-RELATED OSTEOPOROSIS WITH CURRENT PATHOLOGICAL FRACTURE, INITIAL ENCOUNTER: ICD-10-CM

## 2024-06-30 DIAGNOSIS — M48.50XA PATHOLOGICAL COMPRESSION FRACTURE OF VERTEBRA, INITIAL ENCOUNTER (H): ICD-10-CM

## 2024-07-01 RX ORDER — CALCITONIN SALMON 200 [IU]/.09ML
SPRAY, METERED NASAL
Qty: 4 ML | Refills: 0 | Status: SHIPPED | OUTPATIENT
Start: 2024-07-01 | End: 2024-09-13

## 2024-07-21 DIAGNOSIS — R11.10 DRY HEAVES: ICD-10-CM

## 2024-07-21 RX ORDER — PYRIDOXINE HCL (VITAMIN B6) 25 MG
25 TABLET ORAL DAILY
Qty: 90 TABLET | Refills: 0 | Status: SHIPPED | OUTPATIENT
Start: 2024-07-21

## 2024-07-23 DIAGNOSIS — R11.10 DRY HEAVES: ICD-10-CM

## 2024-07-23 RX ORDER — PYRIDOXINE HCL (VITAMIN B6) 25 MG
25 TABLET ORAL DAILY
Qty: 90 TABLET | Refills: 0 | OUTPATIENT
Start: 2024-07-23

## 2024-09-12 ENCOUNTER — LAB (OUTPATIENT)
Dept: INFUSION THERAPY | Facility: CLINIC | Age: 79
End: 2024-09-12
Attending: INTERNAL MEDICINE
Payer: COMMERCIAL

## 2024-09-12 VITALS — HEART RATE: 97 BPM | OXYGEN SATURATION: 98 % | SYSTOLIC BLOOD PRESSURE: 149 MMHG | DIASTOLIC BLOOD PRESSURE: 70 MMHG

## 2024-09-12 DIAGNOSIS — N18.32 STAGE 3B CHRONIC KIDNEY DISEASE (H): Primary | ICD-10-CM

## 2024-09-12 DIAGNOSIS — M81.8 STEROID-INDUCED OSTEOPOROSIS: ICD-10-CM

## 2024-09-12 DIAGNOSIS — T38.0X5A STEROID-INDUCED OSTEOPOROSIS: ICD-10-CM

## 2024-09-12 LAB
CALCIUM SERPL-MCNC: 9.9 MG/DL (ref 8.8–10.4)
CREAT SERPL-MCNC: 0.88 MG/DL (ref 0.51–0.95)
EGFRCR SERPLBLD CKD-EPI 2021: 67 ML/MIN/1.73M2
HOLD SPECIMEN: NORMAL
HOLD SPECIMEN: NORMAL

## 2024-09-12 PROCEDURE — 36415 COLL VENOUS BLD VENIPUNCTURE: CPT | Performed by: INTERNAL MEDICINE

## 2024-09-12 PROCEDURE — 96372 THER/PROPH/DIAG INJ SC/IM: CPT | Performed by: INTERNAL MEDICINE

## 2024-09-12 PROCEDURE — 82310 ASSAY OF CALCIUM: CPT | Performed by: INTERNAL MEDICINE

## 2024-09-12 PROCEDURE — 99207 PR NO CHARGE LOS: CPT

## 2024-09-12 PROCEDURE — 250N000011 HC RX IP 250 OP 636: Performed by: INTERNAL MEDICINE

## 2024-09-12 PROCEDURE — 82565 ASSAY OF CREATININE: CPT | Performed by: INTERNAL MEDICINE

## 2024-09-12 RX ORDER — ALBUTEROL SULFATE 90 UG/1
1-2 AEROSOL, METERED RESPIRATORY (INHALATION)
Start: 2025-03-06

## 2024-09-12 RX ORDER — ALBUTEROL SULFATE 0.83 MG/ML
2.5 SOLUTION RESPIRATORY (INHALATION)
OUTPATIENT
Start: 2025-03-06

## 2024-09-12 RX ORDER — MEPERIDINE HYDROCHLORIDE 25 MG/ML
25 INJECTION INTRAMUSCULAR; INTRAVENOUS; SUBCUTANEOUS EVERY 30 MIN PRN
OUTPATIENT
Start: 2025-03-06

## 2024-09-12 RX ORDER — METHYLPREDNISOLONE SODIUM SUCCINATE 125 MG/2ML
125 INJECTION, POWDER, LYOPHILIZED, FOR SOLUTION INTRAMUSCULAR; INTRAVENOUS
Start: 2025-03-06

## 2024-09-12 RX ORDER — DIPHENHYDRAMINE HYDROCHLORIDE 50 MG/ML
50 INJECTION INTRAMUSCULAR; INTRAVENOUS
Start: 2025-03-06

## 2024-09-12 RX ORDER — EPINEPHRINE 1 MG/ML
0.3 INJECTION, SOLUTION INTRAMUSCULAR; SUBCUTANEOUS EVERY 5 MIN PRN
OUTPATIENT
Start: 2025-03-06

## 2024-09-12 RX ADMIN — DENOSUMAB 60 MG: 60 INJECTION SUBCUTANEOUS at 12:05

## 2024-09-12 ASSESSMENT — PAIN SCALES - GENERAL: PAINLEVEL: NO PAIN (0)

## 2024-09-12 NOTE — PROGRESS NOTES
Infusion Nursing Note:  Iris Carrion presents today for Prolia.    Patient seen by provider today: No   present during visit today: Not Applicable.    Note: Assessment performed by Melissa ALCALA RN prior to injection today.    Intravenous Access:  No Intravenous access/labs at this visit.    Treatment Conditions:  Lab Results   Component Value Date     02/13/2024    POTASSIUM 4.0 02/13/2024    MAG 1.1 (L) 02/08/2016    CR 0.88 09/12/2024    MAURISIO 9.9 09/12/2024    BILITOTAL 0.5 08/29/2023    ALBUMIN 4.2 08/29/2023    ALT 16 08/29/2023    AST 28 08/29/2023       Results reviewed, labs MET treatment parameters, ok to proceed with treatment.      Post Infusion Assessment:  Patient tolerated injection without incident.  Site patent and intact, free from redness, edema or discomfort.       Discharge Plan:   Patient discharged in stable condition accompanied by: self.  Departure Mode: Ambulatory.  Patient directed to scheduling desk to make next Prolia appt for 6 months.      Sandrita Burks LPN

## 2024-09-13 DIAGNOSIS — M80.00XA AGE-RELATED OSTEOPOROSIS WITH CURRENT PATHOLOGICAL FRACTURE, INITIAL ENCOUNTER: ICD-10-CM

## 2024-09-13 DIAGNOSIS — M48.50XA PATHOLOGICAL COMPRESSION FRACTURE OF VERTEBRA, INITIAL ENCOUNTER (H): ICD-10-CM

## 2024-09-13 RX ORDER — CALCITONIN SALMON 200 [IU]/.09ML
SPRAY, METERED NASAL
Qty: 4 ML | Refills: 0 | Status: SHIPPED | OUTPATIENT
Start: 2024-09-13

## 2024-09-25 ENCOUNTER — ANCILLARY PROCEDURE (OUTPATIENT)
Dept: BONE DENSITY | Facility: CLINIC | Age: 79
End: 2024-09-25
Attending: INTERNAL MEDICINE
Payer: COMMERCIAL

## 2024-09-25 DIAGNOSIS — M81.0 AGE-RELATED OSTEOPOROSIS WITHOUT CURRENT PATHOLOGICAL FRACTURE: ICD-10-CM

## 2024-09-25 PROCEDURE — 77080 DXA BONE DENSITY AXIAL: CPT | Mod: TC | Performed by: PHYSICIAN ASSISTANT

## 2024-09-25 PROCEDURE — 77081 DXA BONE DENSITY APPENDICULR: CPT | Mod: TC | Performed by: PHYSICIAN ASSISTANT

## 2024-10-11 ENCOUNTER — OFFICE VISIT (OUTPATIENT)
Dept: ENDOCRINOLOGY | Facility: CLINIC | Age: 79
End: 2024-10-11
Payer: COMMERCIAL

## 2024-10-11 VITALS
SYSTOLIC BLOOD PRESSURE: 127 MMHG | OXYGEN SATURATION: 95 % | BODY MASS INDEX: 18.62 KG/M2 | DIASTOLIC BLOOD PRESSURE: 71 MMHG | WEIGHT: 113.6 LBS | RESPIRATION RATE: 22 BRPM | HEART RATE: 82 BPM

## 2024-10-11 DIAGNOSIS — M81.0 OSTEOPOROSIS, UNSPECIFIED OSTEOPOROSIS TYPE, UNSPECIFIED PATHOLOGICAL FRACTURE PRESENCE: Primary | ICD-10-CM

## 2024-10-11 PROCEDURE — 99214 OFFICE O/P EST MOD 30 MIN: CPT | Performed by: INTERNAL MEDICINE

## 2024-10-11 NOTE — LETTER
10/11/2024      Iris Carrion  [address] [place] [address]      Dear Colleague,    Thank you for referring your patient, Iris Carrion, to the [facility]. Please see a copy of my visit note below.                                                                               - Endocrinology follow up-    Reason for visit/consult:     Age-related osteoporosis with current pathological fracture, initial encounter  Pathological compression fracture of vertebra, initial encounter (H)    Primary care provider: Pillo Koehler      Assessment and Plan  A 78 year old female with osteoporosis    Osteporosis  Bone density has been improving, she has received prolia 4 times so far, No new fractures, Bone pain resolved.    - continue prolia     - continue current calcium supplement and vit D    - next bone density in 2 years      CKD  Stage 3    RTC with me in 1 year    Staffed with MD Caitlin Butler MD   PGY2 [place] resident, [facility] [place]    Attending tie-in note  I saw the patient with Dr. Harris PGY2 and directly examined patient and discussed. Agree above note and plan.     30 minutes spent on the date of the encounter doing chart review, history and exam, documentation and further activities as noted above.    Lore Ferrera MD  Staff Physician  Endocrinology and Metabolism  [facility]  License: [id number]  Pager: [phone]     Interval History as of 10/11/2024 : Patient has been doing well. Medication compliance good for ca vitamin D supplement. Started on nutrition supplement, gained ~10lb since late 2023.  Interval History as of 10/6/2023 : Patient has been doing well. Medication compliance good for ca vitamin D supplement  . New event includes : no pertinent medical event noted .   HPI: A 78 yo female here for evaluation of osteoporosis.   Patient records bone density was last time once 2015 she was told osteoporosis the result we could not  find today.  She had right side of the hip fracture in 2016 after she fell.  6 months ago she had back pain worsening and they did an x-ray and she was told likely osteoporosis.  She does not drink milk not much daily product however she is taking over-the-counter calcium supplement every day.  She never had osteoporosis medication never had an Fosamax request nor Prolia.  Other medical condition including COPD now ith oxygen concentrator for the past 1.5yrs .  Risk factor including early menopause of early 40s and smoker and chronic steroid use for COPD.     Prior fragility fracture:no  Parental history of hip fracture: yes right 2016  Rheumatoid arthritis:no  Secondary cause of osteoporosis: steroid chronic use due to COPD  Body habitus (BMI less than or equal to 20):no  Family history of osteoporosis: sister has osteopenia  Sedentary lifestyle:no  Current tabacco smoking:no  History of thyroid disorder:no  Calcuim and Vitmin D supplements:OCT  Other supplement or bone treatment:  Medication use (PPI, epileptic medications etc):no  Early menopause (female): early 40s     Past Medical/Surgical History:  Past Medical History:   Diagnosis Date     Arthritis     Have been noticing stiffness in my hands and arms     Cervical high risk HPV (human papillomavirus) test positive 10/31/2012    type 18     COPD (chronic obstructive pulmonary disease) (H)      GERD (gastroesophageal reflux disease)      Hx of colposcopy with cervical biopsy 11/2012    negative     Hypertension 2013     Osteoporosis      Paroxysmal supraventricular tachycardia (H) 09/06/2017     Peritoneal carcinomatosis (H) 08/24/2015     Pneumonia      Uncomplicated asthma 1980     Past Surgical History:   Procedure Laterality Date     CHOLECYSTECTOMY  6/2014     COLONOSCOPY  12/4/2012    Procedure: COLONOSCOPY;  COLONOSCOPY SCREEN;  Surgeon: Mushtaq Lara MD;  Location:  OR     ESOPHAGOSCOPY, GASTROSCOPY, DUODENOSCOPY (EGD), COMBINED N/A 11/7/2023     Procedure: ESOPHAGOGASTRODUODENOSCOPY, WITH BIOPSY;  Surgeon: Eric Covington MD;  Location:  GI     GYN SURGERY       HERNIORRHAPHY HIATAL N/A 11/25/2015    Procedure: HERNIORRHAPHY HIATAL;  Surgeon: Chip Carty MD;  Location: [facility] OR     HYSTERECTOMY TOTAL ABD, ALVIN SALPINGO-OOPHORECTOMY, NODE DISSECTION, TUMOR DEBULKING, COMBINED Bilateral 11/25/2015    Procedure: COMBINED HYSTERECTOMY TOTAL ABDOMINAL, SALPINGO-OOPHORECTOMY, NODE DISSECTION, TUMOR DEBULKING;  Surgeon: Emma Cabrera MD;  Location: [facility] OR     IR CHEST PORT PLACEMENT > 5 YRS OF AGE  9/2/2015       Allergies:  Allergies   Allergen Reactions     Levaquin Rash       Current Medications   Current Outpatient Medications   Medication Sig Dispense Refill     albuterol (PROAIR HFA/PROVENTIL HFA/VENTOLIN HFA) 108 (90 Base) MCG/ACT inhaler Inhale 2 puffs into the lungs every 6 hours as needed for shortness of breath or wheezing 8.5 g 3     albuterol (PROVENTIL) (2.5 MG/3ML) 0.083% neb solution Take 1 vial (2.5 mg) by nebulization every 4 hours as needed for shortness of breath, wheezing or cough 360 mL 11     amLODIPine (NORVASC) 5 MG tablet Take 1 tablet (5 mg) by mouth daily 90 tablet 3     atorvastatin (LIPITOR) 20 MG tablet Take 1 tablet (20 mg) by mouth daily 90 tablet 3     budesonide (PULMICORT) 0.5 MG/2ML neb solution Take 2 mLs (0.5 mg) by nebulization 2 times daily 120 mL 11     calcitonin, salmon, (MIACALCIN) 200 UNIT/ACT nasal spray USE 1 SPRAY(S) IN ALTERNATE NOSTRILS ONCE DAILY 4 mL 0     Calcium Carbonate (CALCIUM 500 PO) Take 1 tablet by mouth daily.       cyclobenzaprine (FLEXERIL) 5 MG tablet Take 1 tablet (5 mg) by mouth 3 times daily as needed for muscle spasms 20 tablet 0     fluticasone (FLONASE) 50 MCG/ACT spray Spray 2 sprays into both nostrils daily 1 Bottle 1     formoterol (PERFOROMIST) 20 MCG/2ML neb solution Take 2 mLs (20 mcg) by nebulization every 12 hours 120 mL 11     ibuprofen (ADVIL,MOTRIN) 600 MG  tablet Take 1 tablet (600 mg) by mouth every 6 hours as needed for moderate pain 40 tablet 0     ipratropium (ATROVENT) 0.02 % neb solution Take 2.5 mLs (0.5 mg) by nebulization 4 times daily as needed for wheezing 360 mL 11     loratadine (CLARITIN) 10 MG tablet Take 10 mg by mouth daily       losartan (COZAAR) 100 MG tablet Take 1 tablet (100 mg) by mouth daily 90 tablet 3     MAGNESIUM OXIDE PO Take 200 mg by mouth 2 times daily       order for DME Equipment being ordered: Nebulizer 1 Device 0     pyridOXINE (VITAMIN B6) 25 MG tablet Take 1 tablet by mouth once daily 90 tablet 0     tiZANidine (ZANAFLEX) 4 MG tablet Take 1 tablet (4 mg) by mouth 2 times daily as needed for muscle spasms 30 tablet 0     traMADol (ULTRAM) 50 MG tablet Take 1 tablet (50 mg) by mouth 2 times daily as needed for severe pain (7-10) 12 tablet 0     triamcinolone (ARISTOCORT HP) 0.5 % external cream Apply topically 2 times daily 60 g 1     umeclidinium (INCRUSE ELLIPTA) 62.5 MCG/ACT inhaler Inhale 1 puff into the lungs daily 1 each 11     vitamin B complex with vitamin C (STRESS TAB) tablet Take 1 tablet by mouth daily       Zinc Sulfate (ZINC 15 PO) Take by mouth daily       No current facility-administered medications for this visit.       Family History:  Family History   Problem Relation Age of Onset     Cancer Brother         testicular     Diabetes Sister      Ovarian Cancer Mother 60     Diabetes Mother              Asthma Father              Diabetes Sister      Depression/Anxiety Daughter      Depression Daughter      Osteoporosis Sister      Other Cancer Brother      Diabetes Brother      Depression Other         Grandson       Social History:  Social History     Tobacco Use     Smoking status: Former     Current packs/day: 0.00     Average packs/day: 1 pack/day for 38.2 years (38.2 ttl pk-yrs)     Types: Cigarettes     Start date: 1965     Quit date: 10/10/2003     Years since quittin.0     Passive  exposure: Past     Smokeless tobacco: Never   Substance Use Topics     Alcohol use: No   retired, was working [employer]    ROS:  Full review of systems taken with the help of the intake sheet. Otherwise a complete 14 point review of systems was taken and is negative unless stated in the history above.      Physical Exam:   Vitals: BP (!) 163/75 (BP Location: Left arm, Patient Position: Sitting, Cuff Size: Adult Regular)   Pulse 82   Resp 22   Wt 51.5 kg (113 lb 9.6 oz)   SpO2 95%   BMI 18.62 kg/m    BMI= Body mass index is 18.62 kg/m .   General: well appearing, no acute distress, pleasant and conversant,   Mental Status/neuro: alert and oriented  Face: symmetrical, normal facial color  Eyes: anicteric, no proptosis or lid lag  Resp: normally breathing, nasal cannula present      Labs : I reviewed data from epic and extract and summarize the pertinent data here.   Lab Results   Component Value Date     10/07/2021     08/02/2019      Lab Results   Component Value Date    POTASSIUM 3.6 10/07/2021    POTASSIUM 3.7 08/02/2019     Lab Results   Component Value Date    CHLORIDE 107 10/07/2021    CHLORIDE 105 08/02/2019     Lab Results   Component Value Date    MAURISIO 8.9 10/07/2021    MAURISIO 9.3 08/02/2019     Lab Results   Component Value Date    CO2 28 10/07/2021    CO2 27 08/02/2019     Lab Results   Component Value Date    BUN 25 10/07/2021    BUN 20 08/02/2019     Lab Results   Component Value Date    CR 1.11 10/07/2021    CR 1.05 08/02/2019     Lab Results   Component Value Date     10/07/2021    GLC 87 08/02/2019     Lab Results   Component Value Date    TSH 4.00 04/18/2018     Lab Results   Component Value Date    T4 1.16 08/04/2017     Lab Results   Component Value Date    A1C 5.5 04/18/2018       No results found for: IGF1  No results found for: LH  No results found for: FSH  No results found for: ESTROGEN  No results found for: PROLACTIN        MRI Brain: I personally reviewed the  original images and agree with the below reports.   No results found for this or any previous visit.        Again, thank you for allowing me to participate in the care of your patient.        Sincerely,        Lore Ferrear MD

## 2024-10-11 NOTE — PATIENT INSTRUCTIONS
Welcome to the [facility] Endocrinology and Diabetes Clinics     Our Endocrinology Clinics are here to provide you with a team-based, collaborative approach in the diagnosis and treatment of patients with diabetes and endocrine disorders. The team is made up of Physicians, Physician Assistants, Certified Diabetes Educators, Registered Nurses, Medical Assistants, Emergency Medical Technicians, and many others, all of whom have the unified goal of providing our patients with high quality care.     Please see below for some helpful tips to best navigate and use the [facility] Endocrinology clinic:     [place] Respect: At [facility], we are committed to a respectful and safe space for all patients, visitors, and staff.  We believe that mutual respect between patients and their care team is the foundation of quality care.  It is our expectation that you will be treated with respect by your care team.  In turn, we ask that all communication with the care team (written and verbal) be respectful and free from profanity, threatening, or abusive language.  Disrespectful communication undermines our therapeutic relationship with you and may result in us being unable to continue to provide your care.    Refills: A provider must see you at least annually to prescribe and refill medications. This is to ensure your safety as well as meet insurance and compliance regulations.    Scheduling: Many of our Providers offer both in-person or video visits. Please call to schedule any needed follow ups as soon as possible because our provider schedules fill up very quickly. Our care team has the right to require an in-person visit when they believe that it is medically necessary. Please remember that for any virtual visits, you must be in the [facility] at the time of the visit, otherwise we are unable to see you and you will need to be rescheduled.    Missed Appointments: If you need to cancel or miss your  scheduled appointment, please call the clinic at [phone] to reschedule.  Please note if you repeatedly miss appointments or repeatedly miss appointments without calling to inform us ahead of time (no-show), the clinic may elect to not allow you to reschedule without speaking to a manager, may require a Partnership In Care Agreement prior to rescheduling, or could result in you no longer being able to receive care from the clinic. Providing the clinic with timely notification if you have to miss an appointment, allows us to better serve the needs of all of our patients.    Primary Care Provider: Our Endocrinologists are Specialists in their field. We expect you to have a Primary Care Provider established to handle any needs outside of your diabetes and endocrine care.  We would be happy to assist you find a Primary Care Provider, if you do not have one.    [employer]: [employer] is a wonderful resource that allows you access to your Care Team via online or the winsome. Please ask a member of the team if you would like help creating an account. Please note that it may take up to 2 business days for a response. [employer] messages are not reviewed on weekends or after business hours.  Emergent or urgent care needs should never be communicated via [employer].  If you experience a medical emergency call 911 or go to the nearest emergency room.    Labs: It is recommended that you stay within the [facility] for labs but you are welcome to obtain ordered labs (with some exceptions) from any location of your choice as long as they are able to complete and process the needed labs. If you need us to fax orders to your preferred lab, please provide us the name and fax number of the lab you would like to go to so we can fax the orders. If your labs are drawn outside of the [facility], please have them fax the results to [phone] ([place]) or [phone] ([place]) or via [facility]. It is your  responsibility to ensure that outside lab results are sent to us.    We look forward to working with you. Please do not hesitate to reach out with any questions.    Thank you,    The Endocrine Team    [facility] Address:   [place] Address:     [address] [place] [address]    Phone: [phone]  Fax: [phone] [address] [place] [address]    Phone: [phone]  Fax: [phone]     [facility] Cost Estimate Phone Number: [phone]    General Lab and Imaging Scheduling Phone Number: [phone]

## 2024-10-11 NOTE — PROGRESS NOTES
- Endocrinology follow up-    Reason for visit/consult:     Age-related osteoporosis with current pathological fracture, initial encounter  Pathological compression fracture of vertebra, initial encounter (H)    Primary care provider: Pillo Koehler      Assessment and Plan  A 78 year old female with osteoporosis    Osteporosis  Bone density has been improving, she has received prolia 4 times so far, No new fractures, Bone pain resolved.    - continue prolia     - continue current calcium supplement and vit D    - next bone density in 2 years      CKD  Stage 3    RTC with me in 1 year    Staffed with MD Caitlin Butler MD   [place] [facility] [place] tie-in note  I saw the patient with Dr. Harris PGY2 and directly examined patient and discussed. Agree above note and plan.     30 minutes spent on the date of the encounter doing chart review, history and exam, documentation and further activities as noted above.    Lore Ferrera MD  Staff Physician  Endocrinology and Metabolism  [facility]  License: [id number]  Pager: [phone]     Interval History as of 10/11/2024 : Patient has been doing well. Medication compliance good for ca vitamin D supplement. Started on nutrition supplement, gained ~10lb since late 2023.  Interval History as of 10/6/2023 : Patient has been doing well. Medication compliance good for ca vitamin D supplement  . New event includes : no pertinent medical event noted .   HPI: A 78 yo female here for evaluation of osteoporosis.   Patient records bone density was last time once 2015 she was told osteoporosis the result we could not find today.  She had right side of the hip fracture in 2016 after she fell.  6 months ago she had back pain worsening and they did an x-ray and she was told likely osteoporosis.  She does not drink milk not much daily product however she is taking  over-the-counter calcium supplement every day.  She never had osteoporosis medication never had an Fosamax request nor Prolia.  Other medical condition including COPD now ith oxygen concentrator for the past 1.5yrs .  Risk factor including early menopause of early 40s and smoker and chronic steroid use for COPD.     Prior fragility fracture:no  Parental history of hip fracture: yes right 2016  Rheumatoid arthritis:no  Secondary cause of osteoporosis: steroid chronic use due to COPD  Body habitus (BMI less than or equal to 20):no  Family history of osteoporosis: sister has osteopenia  Sedentary lifestyle:no  Current tabacco smoking:no  History of thyroid disorder:no  Calcuim and Vitmin D supplements:OCT  Other supplement or bone treatment:  Medication use (PPI, epileptic medications etc):no  Early menopause (female): early 40s     Past Medical/Surgical History:  Past Medical History:   Diagnosis Date    Arthritis     Have been noticing stiffness in my hands and arms    Cervical high risk HPV (human papillomavirus) test positive 10/31/2012    type 18    COPD (chronic obstructive pulmonary disease) (H)     GERD (gastroesophageal reflux disease)     Hx of colposcopy with cervical biopsy 11/2012    negative    Hypertension 2013    Osteoporosis     Paroxysmal supraventricular tachycardia (H) 09/06/2017    Peritoneal carcinomatosis (H) 08/24/2015    Pneumonia     Uncomplicated asthma 1980     Past Surgical History:   Procedure Laterality Date    CHOLECYSTECTOMY  6/2014    COLONOSCOPY  12/4/2012    Procedure: COLONOSCOPY;  COLONOSCOPY SCREEN;  Surgeon: Mushtaq Lara MD;  [facility]    ESOPHAGOSCOPY, GASTROSCOPY, DUODENOSCOPY (EGD), COMBINED N/A 11/7/2023    Procedure: ESOPHAGOGASTRODUODENOSCOPY, WITH BIOPSY;  Surgeon: Eric Covington MD;  Location: [facility]    GYN SURGERY      HERNIORRHAPHY HIATAL N/A 11/25/2015    Procedure: HERNIORRHAPHY HIATAL;  Surgeon: Chip Carty MD;  Location:  OR     HYSTERECTOMY TOTAL ABD, ALVIN SALPINGO-OOPHORECTOMY, NODE DISSECTION, TUMOR DEBULKING, COMBINED Bilateral 11/25/2015    Procedure: COMBINED HYSTERECTOMY TOTAL ABDOMINAL, SALPINGO-OOPHORECTOMY, NODE DISSECTION, TUMOR DEBULKING;  Surgeon: Emma Cabrera MD;  Location: [facility] OR    IR CHEST PORT PLACEMENT > 5 YRS OF AGE  9/2/2015       Allergies:  Allergies   Allergen Reactions    Levaquin Rash       Current Medications   Current Outpatient Medications   Medication Sig Dispense Refill    albuterol (PROAIR HFA/PROVENTIL HFA/VENTOLIN HFA) 108 (90 Base) MCG/ACT inhaler Inhale 2 puffs into the lungs every 6 hours as needed for shortness of breath or wheezing 8.5 g 3    albuterol (PROVENTIL) (2.5 MG/3ML) 0.083% neb solution Take 1 vial (2.5 mg) by nebulization every 4 hours as needed for shortness of breath, wheezing or cough 360 mL 11    amLODIPine (NORVASC) 5 MG tablet Take 1 tablet (5 mg) by mouth daily 90 tablet 3    atorvastatin (LIPITOR) 20 MG tablet Take 1 tablet (20 mg) by mouth daily 90 tablet 3    budesonide (PULMICORT) 0.5 MG/2ML neb solution Take 2 mLs (0.5 mg) by nebulization 2 times daily 120 mL 11    calcitonin, salmon, (MIACALCIN) 200 UNIT/ACT nasal spray USE 1 SPRAY(S) IN ALTERNATE NOSTRILS ONCE DAILY 4 mL 0    Calcium Carbonate (CALCIUM 500 PO) Take 1 tablet by mouth daily.      cyclobenzaprine (FLEXERIL) 5 MG tablet Take 1 tablet (5 mg) by mouth 3 times daily as needed for muscle spasms 20 tablet 0    fluticasone (FLONASE) 50 MCG/ACT spray Spray 2 sprays into both nostrils daily 1 Bottle 1    formoterol (PERFOROMIST) 20 MCG/2ML neb solution Take 2 mLs (20 mcg) by nebulization every 12 hours 120 mL 11    ibuprofen (ADVIL,MOTRIN) 600 MG tablet Take 1 tablet (600 mg) by mouth every 6 hours as needed for moderate pain 40 tablet 0    ipratropium (ATROVENT) 0.02 % neb solution Take 2.5 mLs (0.5 mg) by nebulization 4 times daily as needed for wheezing 360 mL 11    loratadine (CLARITIN) 10 MG tablet Take 10 mg by  mouth daily      losartan (COZAAR) 100 MG tablet Take 1 tablet (100 mg) by mouth daily 90 tablet 3    MAGNESIUM OXIDE PO Take 200 mg by mouth 2 times daily      order for DME Equipment being ordered: Nebulizer 1 Device 0    pyridOXINE (VITAMIN B6) 25 MG tablet Take 1 tablet by mouth once daily 90 tablet 0    tiZANidine (ZANAFLEX) 4 MG tablet Take 1 tablet (4 mg) by mouth 2 times daily as needed for muscle spasms 30 tablet 0    traMADol (ULTRAM) 50 MG tablet Take 1 tablet (50 mg) by mouth 2 times daily as needed for severe pain (7-10) 12 tablet 0    triamcinolone (ARISTOCORT HP) 0.5 % external cream Apply topically 2 times daily 60 g 1    umeclidinium (INCRUSE ELLIPTA) 62.5 MCG/ACT inhaler Inhale 1 puff into the lungs daily 1 each 11    vitamin B complex with vitamin C (STRESS TAB) tablet Take 1 tablet by mouth daily      Zinc Sulfate (ZINC 15 PO) Take by mouth daily       No current facility-administered medications for this visit.       Family History:  Family History   Problem Relation Age of Onset    Cancer Brother         testicular    Diabetes Sister     Ovarian Cancer Mother 60    Diabetes Mother             Asthma Father             Diabetes Sister     Depression/Anxiety Daughter     Depression Daughter     Osteoporosis Sister     Other Cancer Brother     Diabetes Brother     Depression Other         Grandson       Social History:  Social History     Tobacco Use    Smoking status: Former     Current packs/day: 0.00     Average packs/day: 1 pack/day for 38.2 years (38.2 ttl pk-yrs)     Types: Cigarettes     Start date: 1965     Quit date: 10/10/2003     Years since quittin.0     Passive exposure: Past    Smokeless tobacco: Never   Substance Use Topics    Alcohol use: No   retired, was working [employer]    ROS:  Full review of systems taken with the help of the intake sheet. Otherwise a complete 14 point review of systems was taken and is negative unless stated in the history  above.      Physical Exam:   Vitals: BP (!) 163/75 (BP Location: Left arm, Patient Position: Sitting, Cuff Size: Adult Regular)   Pulse 82   Resp 22   Wt 51.5 kg (113 lb 9.6 oz)   SpO2 95%   BMI 18.62 kg/m    BMI= Body mass index is 18.62 kg/m .   General: well appearing, no acute distress, pleasant and conversant,   Mental Status/neuro: alert and oriented  Face: symmetrical, normal facial color  Eyes: anicteric, no proptosis or lid lag  Resp: normally breathing, nasal cannula present      Labs : I reviewed data from epic and extract and summarize the pertinent data here.   Lab Results   Component Value Date     10/07/2021     08/02/2019      Lab Results   Component Value Date    POTASSIUM 3.6 10/07/2021    POTASSIUM 3.7 08/02/2019     Lab Results   Component Value Date    CHLORIDE 107 10/07/2021    CHLORIDE 105 08/02/2019     Lab Results   Component Value Date    MAURISIO 8.9 10/07/2021    MAURISIO 9.3 08/02/2019     Lab Results   Component Value Date    CO2 28 10/07/2021    CO2 27 08/02/2019     Lab Results   Component Value Date    BUN 25 10/07/2021    BUN 20 08/02/2019     Lab Results   Component Value Date    CR 1.11 10/07/2021    CR 1.05 08/02/2019     Lab Results   Component Value Date     10/07/2021    GLC 87 08/02/2019     Lab Results   Component Value Date    TSH 4.00 04/18/2018     Lab Results   Component Value Date    T4 1.16 08/04/2017     Lab Results   Component Value Date    A1C 5.5 04/18/2018       No results found for: IGF1  No results found for: LH  No results found for: FSH  No results found for: ESTROGEN  No results found for: PROLACTIN        MRI Brain: I personally reviewed the original images and agree with the below reports.   No results found for this or any previous visit.

## 2024-10-11 NOTE — NURSING NOTE
Iris Carrion's goals for this visit include:   Chief Complaint   Patient presents with    Follow Up    Osteoporosis     osteoporosis       She requests these members of her care team be copied on today's visit information: yes    PCP: Hernandez Gunn    Referring Provider:  Referred Self, MD  No address on file    BP (!) 163/75 (BP Location: Left arm, Patient Position: Sitting, Cuff Size: Adult Regular)   Pulse 82   Resp 22   Wt 51.5 kg (113 lb 9.6 oz)   SpO2 95%   BMI 18.62 kg/m      Do you need any medication refills at today's visit? None    Alhaji Martinez, EMT       97

## 2024-10-20 DIAGNOSIS — R11.10 DRY HEAVES: ICD-10-CM

## 2024-10-21 RX ORDER — PYRIDOXINE HCL (VITAMIN B6) 25 MG
25 TABLET ORAL DAILY
Qty: 90 TABLET | Refills: 0 | Status: SHIPPED | OUTPATIENT
Start: 2024-10-21

## 2024-10-22 ENCOUNTER — PATIENT OUTREACH (OUTPATIENT)
Dept: CARE COORDINATION | Facility: CLINIC | Age: 79
End: 2024-10-22
Payer: COMMERCIAL

## 2024-10-22 DIAGNOSIS — M80.00XA AGE-RELATED OSTEOPOROSIS WITH CURRENT PATHOLOGICAL FRACTURE, INITIAL ENCOUNTER: ICD-10-CM

## 2024-10-22 DIAGNOSIS — M48.50XA PATHOLOGICAL COMPRESSION FRACTURE OF VERTEBRA, INITIAL ENCOUNTER (H): ICD-10-CM

## 2024-10-22 RX ORDER — CALCITONIN SALMON 200 [IU]/.09ML
SPRAY, METERED NASAL
Qty: 4 ML | Refills: 2 | Status: SHIPPED | OUTPATIENT
Start: 2024-10-22

## 2024-11-07 ENCOUNTER — TELEPHONE (OUTPATIENT)
Dept: PULMONOLOGY | Facility: CLINIC | Age: 79
End: 2024-11-07
Payer: COMMERCIAL

## 2024-11-07 NOTE — TELEPHONE ENCOUNTER
Patient confirmed scheduled appointment:  Date: 04/28/2025  Time: 12:00PM  Visit type: FRANC  Provider: DESIRE  Location: [facility]  Testing/imaging: N/A  Additional notes: N/A

## 2025-01-14 ENCOUNTER — PATIENT OUTREACH (OUTPATIENT)
Dept: CARE COORDINATION | Facility: CLINIC | Age: 80
End: 2025-01-14
Payer: COMMERCIAL

## 2025-01-15 DIAGNOSIS — M80.00XA AGE-RELATED OSTEOPOROSIS WITH CURRENT PATHOLOGICAL FRACTURE, INITIAL ENCOUNTER: ICD-10-CM

## 2025-01-15 DIAGNOSIS — M48.50XA PATHOLOGICAL COMPRESSION FRACTURE OF VERTEBRA, INITIAL ENCOUNTER (H): ICD-10-CM

## 2025-01-15 RX ORDER — CALCITONIN SALMON 200 [IU]/.09ML
SPRAY, METERED NASAL
Qty: 4 ML | Refills: 0 | Status: SHIPPED | OUTPATIENT
Start: 2025-01-15

## 2025-01-22 DIAGNOSIS — R11.10 DRY HEAVES: ICD-10-CM

## 2025-01-22 RX ORDER — PYRIDOXINE HCL (VITAMIN B6) 25 MG
25 TABLET ORAL DAILY
Qty: 90 TABLET | Refills: 0 | Status: SHIPPED | OUTPATIENT
Start: 2025-01-22

## 2025-01-28 ENCOUNTER — PATIENT OUTREACH (OUTPATIENT)
Dept: CARE COORDINATION | Facility: CLINIC | Age: 80
End: 2025-01-28
Payer: COMMERCIAL

## 2025-02-12 DIAGNOSIS — E78.5 DYSLIPIDEMIA: ICD-10-CM

## 2025-02-12 RX ORDER — ATORVASTATIN CALCIUM 20 MG/1
20 TABLET, FILM COATED ORAL DAILY
Qty: 90 TABLET | Refills: 0 | Status: SHIPPED | OUTPATIENT
Start: 2025-02-12

## 2025-02-13 DIAGNOSIS — M80.00XA AGE-RELATED OSTEOPOROSIS WITH CURRENT PATHOLOGICAL FRACTURE, INITIAL ENCOUNTER: ICD-10-CM

## 2025-02-13 DIAGNOSIS — M48.50XA PATHOLOGICAL COMPRESSION FRACTURE OF VERTEBRA, INITIAL ENCOUNTER (H): ICD-10-CM

## 2025-02-13 RX ORDER — CALCITONIN SALMON 200 [IU]/.09ML
SPRAY, METERED NASAL
Qty: 3.7 ML | Refills: 0 | Status: SHIPPED | OUTPATIENT
Start: 2025-02-13

## 2025-03-10 DIAGNOSIS — M48.50XA PATHOLOGICAL COMPRESSION FRACTURE OF VERTEBRA, INITIAL ENCOUNTER (H): ICD-10-CM

## 2025-03-10 DIAGNOSIS — M80.00XA AGE-RELATED OSTEOPOROSIS WITH CURRENT PATHOLOGICAL FRACTURE, INITIAL ENCOUNTER: ICD-10-CM

## 2025-03-10 RX ORDER — CALCITONIN SALMON 200 [IU]/.09ML
SPRAY, METERED NASAL
Qty: 4 ML | Refills: 0 | Status: SHIPPED | OUTPATIENT
Start: 2025-03-10

## 2025-03-12 DIAGNOSIS — M81.8 STEROID-INDUCED OSTEOPOROSIS: ICD-10-CM

## 2025-03-12 DIAGNOSIS — T38.0X5A STEROID-INDUCED OSTEOPOROSIS: ICD-10-CM

## 2025-03-12 DIAGNOSIS — N18.32 STAGE 3B CHRONIC KIDNEY DISEASE (H): Primary | ICD-10-CM

## 2025-03-12 RX ORDER — MEPERIDINE HYDROCHLORIDE 25 MG/ML
25 INJECTION INTRAMUSCULAR; INTRAVENOUS; SUBCUTANEOUS
Status: CANCELLED | OUTPATIENT
Start: 2025-03-12

## 2025-03-12 RX ORDER — ALBUTEROL SULFATE 0.83 MG/ML
2.5 SOLUTION RESPIRATORY (INHALATION)
Status: CANCELLED | OUTPATIENT
Start: 2025-03-12

## 2025-03-12 RX ORDER — DIPHENHYDRAMINE HYDROCHLORIDE 50 MG/ML
25 INJECTION, SOLUTION INTRAMUSCULAR; INTRAVENOUS
Status: CANCELLED
Start: 2025-03-12

## 2025-03-12 RX ORDER — ALBUTEROL SULFATE 90 UG/1
1-2 INHALANT RESPIRATORY (INHALATION)
Status: CANCELLED
Start: 2025-03-12

## 2025-03-12 RX ORDER — EPINEPHRINE 1 MG/ML
0.3 INJECTION, SOLUTION, CONCENTRATE INTRAVENOUS EVERY 5 MIN PRN
Status: CANCELLED | OUTPATIENT
Start: 2025-03-12

## 2025-03-12 RX ORDER — METHYLPREDNISOLONE SODIUM SUCCINATE 40 MG/ML
40 INJECTION INTRAMUSCULAR; INTRAVENOUS
Status: CANCELLED
Start: 2025-03-12

## 2025-03-12 RX ORDER — DIPHENHYDRAMINE HYDROCHLORIDE 50 MG/ML
50 INJECTION, SOLUTION INTRAMUSCULAR; INTRAVENOUS
Status: CANCELLED
Start: 2025-03-12

## 2025-03-13 ENCOUNTER — INFUSION THERAPY VISIT (OUTPATIENT)
Dept: INFUSION THERAPY | Facility: CLINIC | Age: 80
End: 2025-03-13
Attending: INTERNAL MEDICINE
Payer: COMMERCIAL

## 2025-03-13 VITALS
RESPIRATION RATE: 18 BRPM | OXYGEN SATURATION: 98 % | WEIGHT: 112.8 LBS | SYSTOLIC BLOOD PRESSURE: 136 MMHG | TEMPERATURE: 98.1 F | DIASTOLIC BLOOD PRESSURE: 76 MMHG | BODY MASS INDEX: 18.49 KG/M2 | HEART RATE: 77 BPM

## 2025-03-13 DIAGNOSIS — M81.8 STEROID-INDUCED OSTEOPOROSIS: ICD-10-CM

## 2025-03-13 DIAGNOSIS — T38.0X5A STEROID-INDUCED OSTEOPOROSIS: Primary | ICD-10-CM

## 2025-03-13 DIAGNOSIS — M81.8 STEROID-INDUCED OSTEOPOROSIS: Primary | ICD-10-CM

## 2025-03-13 DIAGNOSIS — T38.0X5A STEROID-INDUCED OSTEOPOROSIS: ICD-10-CM

## 2025-03-13 DIAGNOSIS — N18.32 STAGE 3B CHRONIC KIDNEY DISEASE (H): ICD-10-CM

## 2025-03-13 DIAGNOSIS — N18.32 STAGE 3B CHRONIC KIDNEY DISEASE (H): Primary | ICD-10-CM

## 2025-03-13 LAB
CALCIUM SERPL-MCNC: 10.1 MG/DL (ref 8.8–10.4)
CREAT SERPL-MCNC: 0.99 MG/DL (ref 0.51–0.95)
EGFRCR SERPLBLD CKD-EPI 2021: 58 ML/MIN/1.73M2
HOLD SPECIMEN: NORMAL
HOLD SPECIMEN: NORMAL

## 2025-03-13 PROCEDURE — 250N000011 HC RX IP 250 OP 636: Mod: JZ | Performed by: INTERNAL MEDICINE

## 2025-03-13 PROCEDURE — 82310 ASSAY OF CALCIUM: CPT | Performed by: INTERNAL MEDICINE

## 2025-03-13 PROCEDURE — 82565 ASSAY OF CREATININE: CPT | Performed by: INTERNAL MEDICINE

## 2025-03-13 PROCEDURE — 96372 THER/PROPH/DIAG INJ SC/IM: CPT | Performed by: INTERNAL MEDICINE

## 2025-03-13 RX ORDER — DIPHENHYDRAMINE HYDROCHLORIDE 50 MG/ML
25 INJECTION, SOLUTION INTRAMUSCULAR; INTRAVENOUS
Status: CANCELLED
Start: 2025-09-09

## 2025-03-13 RX ORDER — METHYLPREDNISOLONE SODIUM SUCCINATE 40 MG/ML
40 INJECTION INTRAMUSCULAR; INTRAVENOUS
Status: CANCELLED
Start: 2025-09-09

## 2025-03-13 RX ORDER — DIPHENHYDRAMINE HYDROCHLORIDE 50 MG/ML
50 INJECTION, SOLUTION INTRAMUSCULAR; INTRAVENOUS
Status: CANCELLED
Start: 2025-09-09

## 2025-03-13 RX ORDER — MEPERIDINE HYDROCHLORIDE 25 MG/ML
25 INJECTION INTRAMUSCULAR; INTRAVENOUS; SUBCUTANEOUS
Status: CANCELLED | OUTPATIENT
Start: 2025-09-09

## 2025-03-13 RX ORDER — ALBUTEROL SULFATE 90 UG/1
1-2 INHALANT RESPIRATORY (INHALATION)
Status: CANCELLED
Start: 2025-09-09

## 2025-03-13 RX ORDER — DIPHENHYDRAMINE HYDROCHLORIDE 50 MG/ML
25 INJECTION, SOLUTION INTRAMUSCULAR; INTRAVENOUS
Start: 2025-09-09

## 2025-03-13 RX ORDER — DIPHENHYDRAMINE HYDROCHLORIDE 50 MG/ML
50 INJECTION, SOLUTION INTRAMUSCULAR; INTRAVENOUS
Start: 2025-09-09

## 2025-03-13 RX ORDER — ALBUTEROL SULFATE 0.83 MG/ML
2.5 SOLUTION RESPIRATORY (INHALATION)
Status: CANCELLED | OUTPATIENT
Start: 2025-09-09

## 2025-03-13 RX ORDER — ALBUTEROL SULFATE 0.83 MG/ML
2.5 SOLUTION RESPIRATORY (INHALATION)
OUTPATIENT
Start: 2025-09-09

## 2025-03-13 RX ORDER — MEPERIDINE HYDROCHLORIDE 25 MG/ML
25 INJECTION INTRAMUSCULAR; INTRAVENOUS; SUBCUTANEOUS
OUTPATIENT
Start: 2025-09-09

## 2025-03-13 RX ORDER — EPINEPHRINE 1 MG/ML
0.3 INJECTION, SOLUTION INTRAMUSCULAR; SUBCUTANEOUS EVERY 5 MIN PRN
Status: CANCELLED | OUTPATIENT
Start: 2025-09-09

## 2025-03-13 RX ORDER — METHYLPREDNISOLONE SODIUM SUCCINATE 40 MG/ML
40 INJECTION INTRAMUSCULAR; INTRAVENOUS
Start: 2025-09-09

## 2025-03-13 RX ORDER — ALBUTEROL SULFATE 90 UG/1
1-2 INHALANT RESPIRATORY (INHALATION)
Start: 2025-09-09

## 2025-03-13 RX ORDER — EPINEPHRINE 1 MG/ML
0.3 INJECTION, SOLUTION INTRAMUSCULAR; SUBCUTANEOUS EVERY 5 MIN PRN
OUTPATIENT
Start: 2025-09-09

## 2025-03-13 RX ADMIN — DENOSUMAB 60 MG: 60 INJECTION SUBCUTANEOUS at 13:33

## 2025-03-13 ASSESSMENT — PAIN SCALES - GENERAL: PAINLEVEL_OUTOF10: NO PAIN (0)

## 2025-03-13 NOTE — PROGRESS NOTES
Infusion Nursing Note:  Iris Carrion presents today for Prolia.    Patient seen by provider today: No   present during visit today: Not Applicable.    Note: N/A.      Intravenous Access:  No Intravenous access/labs at this visit.    Treatment Conditions:  Results reviewed, labs MET treatment parameters, ok to proceed with treatment.      Post Infusion Assessment:  Patient tolerated infusion without incident.       Discharge Plan:   Patient discharged in stable condition accompanied by: self, son in lobby.  Patient does not have future appointment, advised to stop at  to schedule.      Melissa Zelaya RN

## 2025-03-17 DIAGNOSIS — J42 CHRONIC BRONCHITIS, UNSPECIFIED CHRONIC BRONCHITIS TYPE (H): ICD-10-CM

## 2025-03-17 RX ORDER — ALBUTEROL SULFATE 90 UG/1
2 INHALANT RESPIRATORY (INHALATION) EVERY 6 HOURS PRN
Qty: 8.5 G | Refills: 5 | Status: SHIPPED | OUTPATIENT
Start: 2025-03-17

## 2025-03-30 ENCOUNTER — HEALTH MAINTENANCE LETTER (OUTPATIENT)
Age: 80
End: 2025-03-30

## 2025-04-02 DIAGNOSIS — J44.9 STAGE 4 VERY SEVERE COPD BY GOLD CLASSIFICATION (H): ICD-10-CM

## 2025-04-02 DIAGNOSIS — J43.2 CENTRILOBULAR EMPHYSEMA (H): ICD-10-CM

## 2025-04-02 RX ORDER — BUDESONIDE 0.5 MG/2ML
0.5 INHALANT ORAL 2 TIMES DAILY
Qty: 120 ML | Refills: 1 | Status: SHIPPED | OUTPATIENT
Start: 2025-04-02

## 2025-04-05 DIAGNOSIS — I10 ESSENTIAL HYPERTENSION WITH GOAL BLOOD PRESSURE LESS THAN 140/90: ICD-10-CM

## 2025-04-05 DIAGNOSIS — R06.02 SHORTNESS OF BREATH: ICD-10-CM

## 2025-04-07 DIAGNOSIS — M80.00XA AGE-RELATED OSTEOPOROSIS WITH CURRENT PATHOLOGICAL FRACTURE, INITIAL ENCOUNTER: ICD-10-CM

## 2025-04-07 DIAGNOSIS — M48.50XA PATHOLOGICAL COMPRESSION FRACTURE OF VERTEBRA, INITIAL ENCOUNTER (H): ICD-10-CM

## 2025-04-07 RX ORDER — LOSARTAN POTASSIUM 100 MG/1
100 TABLET ORAL DAILY
Qty: 90 TABLET | Refills: 0 | Status: SHIPPED | OUTPATIENT
Start: 2025-04-07

## 2025-04-07 RX ORDER — CALCITONIN SALMON 200 [IU]/.09ML
SPRAY, METERED NASAL
Qty: 4 ML | Refills: 0 | Status: SHIPPED | OUTPATIENT
Start: 2025-04-07

## 2025-04-07 RX ORDER — AMLODIPINE BESYLATE 5 MG/1
5 TABLET ORAL DAILY
Qty: 90 TABLET | Refills: 0 | Status: SHIPPED | OUTPATIENT
Start: 2025-04-07

## 2025-04-08 ENCOUNTER — MYC REFILL (OUTPATIENT)
Dept: PULMONOLOGY | Facility: CLINIC | Age: 80
End: 2025-04-08
Payer: COMMERCIAL

## 2025-04-08 DIAGNOSIS — J44.9 STAGE 4 VERY SEVERE COPD BY GOLD CLASSIFICATION (H): ICD-10-CM

## 2025-04-08 DIAGNOSIS — J43.2 CENTRILOBULAR EMPHYSEMA (H): ICD-10-CM

## 2025-04-08 RX ORDER — FORMOTEROL FUMARATE 20 UG/2ML
20 SOLUTION RESPIRATORY (INHALATION) EVERY 12 HOURS
Qty: 120 ML | Refills: 1 | Status: SHIPPED | OUTPATIENT
Start: 2025-04-08

## 2025-04-08 RX ORDER — UMECLIDINIUM 62.5 UG/1
1 AEROSOL, POWDER ORAL DAILY
Qty: 1 EACH | Refills: 1 | Status: SHIPPED | OUTPATIENT
Start: 2025-04-08

## 2025-04-17 SDOH — HEALTH STABILITY: PHYSICAL HEALTH: ON AVERAGE, HOW MANY MINUTES DO YOU ENGAGE IN EXERCISE AT THIS LEVEL?: 0 MIN

## 2025-04-17 SDOH — HEALTH STABILITY: PHYSICAL HEALTH: ON AVERAGE, HOW MANY DAYS PER WEEK DO YOU ENGAGE IN MODERATE TO STRENUOUS EXERCISE (LIKE A BRISK WALK)?: 0 DAYS

## 2025-04-17 ASSESSMENT — SOCIAL DETERMINANTS OF HEALTH (SDOH): HOW OFTEN DO YOU GET TOGETHER WITH FRIENDS OR RELATIVES?: ONCE A WEEK

## 2025-04-20 DIAGNOSIS — R11.10 DRY HEAVES: ICD-10-CM

## 2025-04-21 RX ORDER — PYRIDOXINE HCL (VITAMIN B6) 25 MG
25 TABLET ORAL DAILY
Qty: 90 TABLET | Refills: 0 | Status: SHIPPED | OUTPATIENT
Start: 2025-04-21 | End: 2025-04-24

## 2025-04-24 ENCOUNTER — OFFICE VISIT (OUTPATIENT)
Dept: FAMILY MEDICINE | Facility: CLINIC | Age: 80
End: 2025-04-24
Payer: COMMERCIAL

## 2025-04-24 VITALS
HEIGHT: 63 IN | WEIGHT: 112.6 LBS | SYSTOLIC BLOOD PRESSURE: 134 MMHG | RESPIRATION RATE: 16 BRPM | BODY MASS INDEX: 19.95 KG/M2 | DIASTOLIC BLOOD PRESSURE: 72 MMHG | TEMPERATURE: 97.9 F | OXYGEN SATURATION: 98 % | HEART RATE: 100 BPM

## 2025-04-24 DIAGNOSIS — R06.02 SHORTNESS OF BREATH: ICD-10-CM

## 2025-04-24 DIAGNOSIS — M79.641 PAIN IN BOTH HANDS: ICD-10-CM

## 2025-04-24 DIAGNOSIS — E78.5 DYSLIPIDEMIA: ICD-10-CM

## 2025-04-24 DIAGNOSIS — J96.11 CHRONIC RESPIRATORY FAILURE WITH HYPOXIA (H): ICD-10-CM

## 2025-04-24 DIAGNOSIS — N18.32 STAGE 3B CHRONIC KIDNEY DISEASE (H): ICD-10-CM

## 2025-04-24 DIAGNOSIS — Z12.31 VISIT FOR SCREENING MAMMOGRAM: ICD-10-CM

## 2025-04-24 DIAGNOSIS — R11.10 DRY HEAVES: ICD-10-CM

## 2025-04-24 DIAGNOSIS — Z23 NEED FOR VACCINATION: ICD-10-CM

## 2025-04-24 DIAGNOSIS — Z13.6 SCREENING FOR CARDIOVASCULAR CONDITION: ICD-10-CM

## 2025-04-24 DIAGNOSIS — I10 ESSENTIAL HYPERTENSION WITH GOAL BLOOD PRESSURE LESS THAN 140/90: ICD-10-CM

## 2025-04-24 DIAGNOSIS — I50.30 NYHA CLASS 3 HEART FAILURE WITH PRESERVED EJECTION FRACTION (H): ICD-10-CM

## 2025-04-24 DIAGNOSIS — M79.642 PAIN IN BOTH HANDS: ICD-10-CM

## 2025-04-24 DIAGNOSIS — L40.9 PSORIASIS: ICD-10-CM

## 2025-04-24 DIAGNOSIS — Z00.00 ENCOUNTER FOR MEDICARE ANNUAL WELLNESS EXAM: Primary | ICD-10-CM

## 2025-04-24 PROBLEM — J43.2 CENTRILOBULAR EMPHYSEMA (H): Status: RESOLVED | Noted: 2022-08-09 | Resolved: 2025-04-24

## 2025-04-24 LAB
ERYTHROCYTE [DISTWIDTH] IN BLOOD BY AUTOMATED COUNT: 12.1 % (ref 10–15)
HCT VFR BLD AUTO: 40.2 % (ref 35–47)
HGB BLD-MCNC: 12.8 G/DL (ref 11.7–15.7)
MCH RBC QN AUTO: 29.7 PG (ref 26.5–33)
MCHC RBC AUTO-ENTMCNC: 31.8 G/DL (ref 31.5–36.5)
MCV RBC AUTO: 93 FL (ref 78–100)
PLATELET # BLD AUTO: 315 10E3/UL (ref 150–450)
RBC # BLD AUTO: 4.31 10E6/UL (ref 3.8–5.2)
WBC # BLD AUTO: 7 10E3/UL (ref 4–11)

## 2025-04-24 RX ORDER — LOSARTAN POTASSIUM 100 MG/1
100 TABLET ORAL DAILY
Qty: 90 TABLET | Refills: 3 | Status: SHIPPED | OUTPATIENT
Start: 2025-04-24

## 2025-04-24 RX ORDER — PYRIDOXINE HCL (VITAMIN B6) 25 MG
25 TABLET ORAL DAILY
Qty: 90 TABLET | Refills: 3 | Status: SHIPPED | OUTPATIENT
Start: 2025-04-24

## 2025-04-24 RX ORDER — HYDROCORTISONE 25 MG/G
CREAM TOPICAL 2 TIMES DAILY
Qty: 30 G | Refills: 2 | Status: SHIPPED | OUTPATIENT
Start: 2025-04-24

## 2025-04-24 RX ORDER — AMLODIPINE BESYLATE 5 MG/1
5 TABLET ORAL DAILY
Qty: 90 TABLET | Refills: 3 | Status: SHIPPED | OUTPATIENT
Start: 2025-04-24

## 2025-04-24 RX ORDER — ATORVASTATIN CALCIUM 20 MG/1
20 TABLET, FILM COATED ORAL DAILY
Qty: 90 TABLET | Refills: 3 | Status: SHIPPED | OUTPATIENT
Start: 2025-04-24

## 2025-04-24 ASSESSMENT — PAIN SCALES - GENERAL: PAINLEVEL_OUTOF10: NO PAIN (0)

## 2025-04-24 NOTE — PROGRESS NOTES
Preventive Care Visit  [facility]  Hernandez Gunn PA-C, Physician Assistant - Medical  Apr 24, 2025      Assessment & Plan     (Z00.00) Encounter for Medicare annual wellness exam  (primary encounter diagnosis)  Comment: Encounter for Medicare annual wellness      (Z23) Need for vaccination  Comment: Discussed need for vaccinations for pharmacy      (N18.32) Stage 3b chronic kidney disease (H)  Comment: Has had improvement of creatinine continue to monitor  Plan: Albumin Random Urine Quantitative with Creat         Ratio, CBC with Platelets, Parathyroid Hormone         Intact, Comprehensive metabolic panel (BMP +         Alb, Alk Phos, ALT, AST, Total. Bili, TP),         CANCELED: Hemoglobin           (Z12.31) Visit for screening mammogram  Comment: Discussed screening mammogram  Plan: MA Screening Bilateral w/ Ko           (Z13.6) Screening for cardiovascular condition  Comment:   Plan:     (J96.11) Chronic respiratory failure with hypoxia (H)  Comment: Continue with nasal cannula.  Continue with pulmonology    (I50.30) NYHA class 3 heart failure with preserved ejection fraction (H)  Comment:  previously followed with cardiology heart failure with preserved ejection fraction shortness of breath on amlodipine 5 mg.          (E78.5) Dyslipidemia  Comment: Dyslipidemia.  Continue with statin no muscle aches with medication  Plan: atorvastatin (LIPITOR) 20 MG tablet, Lipid         panel reflex to direct LDL Fasting, CANCELED:         Lipid panel reflex to direct LDL Non-fasting           (I10) Essential hypertension with goal blood pressure less than 140/90  Comment:  hypertension on amlodipine and losartan.  Refills of medications provided.  Plan: losartan (COZAAR) 100 MG tablet            (R11.10) Dry heaves  Comment: Dry heaves continue B6  Plan: pyridOXINE (VITAMIN B6) 25 MG tablet           (M79.641,  M79.642) Pain in both hands  Comment: Discussed rheumatoid factor   Plan: Rheumatoid  factor, Anti Nuclear Lucia IgG by IFA         with Reflex           (L40.9) Psoriasis  Comment: trial of steroid cream discussed risk of skin thinning  Plan: hydrocortisone 2.5 % cream            Counseling  Appropriate preventive services were addressed with this patient via screening, questionnaire, or discussion as appropriate for fall prevention, nutrition, physical activity, Tobacco-use cessation, social engagement, weight loss and cognition.  Checklist reviewing preventive services available has been given to the patient.  Reviewed patient's diet, addressing concerns and/or questions.   The patient was instructed to see the dentist every 6 months.   The patient was provided with written information regarding signs of hearing loss.     Bijal Simmons is a 79 year old, presenting for the following:  Physical        4/24/2025     2:02 PM   Additional Questions   Roomed by DEREK MAHER   Accompanied by SELF       HPI      Continue with similar nail issues as prior with nail pulling away from toe.  Has had removal in the past with recurrence of symptoms.     History of COPD follows with pulmonology.  Next appointment April.  Currently on 3 L/min.     Overall has had improvement of strength with using Ensure and has had weight gain as well.  Feeling stronger.      Previously was experiencing GERD symptoms, however GERD improved since stopping omeprazole.  Not currently taking any antiacid.     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  Last treatments 2016     HTN : Blood pressure in goal here.  Denies any side effects of medications.  Patient on amlodipine 5 mg as well as losartan 100 mg.  No chest pain or shortness of breath.     HLD: On atorvastatin 20 mg.  No muscle aches.       Has been able to get up and down sttairs better. Feeling more stable.       Troubles initiating sleep -denies any ruminating thoughts.  No anxiety or depression surrounding this.  This has been more  situational.  Discussed potential for sleep evaluation.    Osteoporosis   Endocrinology for osteoporosis - every 6 months Prolia injections for osteoporosis.           Advance Care Planning    Health Care Directive received at today's visit.  Forwarded to [facility]os[employer]X.        4/17/2025   General Health   How would you rate your overall physical health? (!) FAIR   Feel stress (tense, anxious, or unable to sleep) Only a little   (!) STRESS CONCERN      4/17/2025   Nutrition   Diet: Regular (no restrictions)         4/17/2025   Exercise   Days per week of moderate/strenous exercise 0 days   Average minutes spent exercising at this level 0 min         4/17/2025   Social Factors   Frequency of gathering with friends or relatives Once a week   Worry food won't last until get money to buy more No   Food not last or not have enough money for food? No   Do you have housing? (Housing is defined as stable permanent housing and does not include staying outside in a car, in a tent, in an abandoned building, in an overnight shelter, or couch-surfing.) Yes   Are you worried about losing your housing? No   Lack of transportation? No   Unable to get utilities (heat,electricity)? No         4/17/2025   Fall Risk   Fallen 2 or more times in the past year? No   Trouble with walking or balance? No          4/17/2025   Activities of Daily Living- Home Safety   Needs help with the following daily activites None of the above   Safety concerns in the home None of the above         4/17/2025   Dental   Dentist two times every year? (!) NO         4/17/2025   Hearing Screening   Hearing concerns? (!) I NEED TO ASK PEOPLE TO SPEAK UP OR REPEAT THEMSELVES.    (!) TROUBLE UNDERSTANDING SOFT OR WHISPERED SPEECH.       Multiple values from one day are sorted in reverse-chronological order         4/17/2025   Driving Risk Screening   Patient/family members have concerns about driving No         4/17/2025   General Alertness/Fatigue Screening    Have you been more tired than usual lately? No         2025   Urinary Incontinence Screening   Bothered by leaking urine in past 6 months No     Today's PHQ-2 Score:       2025    12:23 PM   PHQ-2 (  [employer])   Q1: Little interest or pleasure in doing things 0   Q2: Feeling down, depressed or hopeless 0   PHQ-2 Score 0    Q1: Little interest or pleasure in doing things Not at all   Q2: Feeling down, depressed or hopeless Not at all   PHQ-2 Score 0       Patient-reported         2025   Substance Use   Alcohol more than 3/day or more than 7/wk Not Applicable   Do you have a current opioid prescription? No   How severe/bad is pain from 1 to 10? 2/10   Do you use any other substances recreationally? No     Social History     Tobacco Use    Smoking status: Former     Current packs/day: 0.00     Average packs/day: 1 pack/day for 38.2 years (38.2 ttl pk-yrs)     Types: Cigarettes     Start date: 1965     Quit date: 10/10/2003     Years since quittin.5     Passive exposure: Past    Smokeless tobacco: Never   Vaping Use    Vaping status: Never Used   Substance Use Topics    Alcohol use: No    Drug use: No           2023   LAST FHS-7 RESULTS   1st degree relative breast or ovarian cancer No   Any relative bilateral breast cancer No   Any male have breast cancer No   Any ONE woman have BOTH breast AND ovarian cancer No   Any woman with breast cancer before 50yrs No   2 or more relatives with breast AND/OR ovarian cancer No   2 or more relatives with breast AND/OR bowel cancer No       Mammogram Screening - After age 74- determine frequency with patient based on health status, life expectancy and patient goals    ASCVD Risk   The 10-year ASCVD risk score (Sirena FUENTES, et al., 2019) is: 28.3%    Values used to calculate the score:      Age: 79 years      Sex: Female      Is Non- : No      Diabetic: No      Tobacco smoker: No      Systolic Blood Pressure: 134  mmHg      Is BP treated: Yes      HDL Cholesterol: 72 mg/dL      Total Cholesterol: 152 mg/dL      Reviewed and updated as needed this visit by Provider                    Past Medical History:   Diagnosis Date    Arthritis     Have been noticing stiffness in my hands and arms    Cervical high risk HPV (human papillomavirus) test positive 10/31/2012    type 18    COPD (chronic obstructive pulmonary disease) (H)     GERD (gastroesophageal reflux disease)     Hx of colposcopy with cervical biopsy 11/2012    negative    Hypertension 2013    Osteoporosis     Paroxysmal supraventricular tachycardia 09/06/2017    Peritoneal carcinomatosis (H) 08/24/2015    Pneumonia     Uncomplicated asthma 1980     Past Surgical History:   Procedure Laterality Date    CHOLECYSTECTOMY  6/2014    COLONOSCOPY  12/4/2012    Procedure: COLONOSCOPY;  COLONOSCOPY SCREEN;  Surgeon: Mushtaq Laar MD;  Location: [facility]    ESOPHAGOSCOPY, GASTROSCOPY, DUODENOSCOPY (EGD), COMBINED N/A 11/7/2023    Procedure: ESOPHAGOGASTRODUODENOSCOPY, WITH BIOPSY;  Surgeon: Eric Covington MD;  Location: [facility]    GYN SURGERY      HERNIORRHAPHY HIATAL N/A 11/25/2015    Procedure: HERNIORRHAPHY HIATAL;  Surgeon: Chip Carty MD;  Location: [facility]    HYSTERECTOMY TOTAL ABD, ALVIN SALPINGO-OOPHORECTOMY, NODE DISSECTION, TUMOR DEBULKING, COMBINED Bilateral 11/25/2015    Procedure: COMBINED HYSTERECTOMY TOTAL ABDOMINAL, SALPINGO-OOPHORECTOMY, NODE DISSECTION, TUMOR DEBULKING;  Surgeon: Emma Cabrera MD;  Location: [facility] OR    IR CHEST PORT PLACEMENT > 5 YRS OF AGE  9/2/2015     Current providers sharing in care for this patient include:  Patient Care Team:  Hernandez Gunn PA-C as PCP - General  Iliana Pierre RN as   Hayes Garg MD as MD (Internal Medicine)  Hayes Garg MD as Assigned Pulmonology Provider  Tanmay Mcgregor MD as MD (Cardiovascular Disease)  Lore Ferrera MD as MD (Endocrinology, Diabetes, and  "Metabolism)  Lore Ferrera MD as Assigned Endocrinology Provider  Hernandez Gunn PA-C as Assigned PCP  Glen Jasso PA-C as Physician Assistant (Gastroenterology)  Dameon Powers DPM as Assigned Surgical Provider    The following health maintenance items are reviewed in Epic and correct as of today:  Health Maintenance   Topic Date Due    HF ACTION PLAN  Never done    PARATHYROID  Never done    PHOSPHORUS  Never done    ZOSTER IMMUNIZATION (1 of 2) 06/16/2015    RSV VACCINE (1 - 1-dose 75+ series) Never done    ANNUAL REVIEW OF HM ORDERS  02/02/2024    MICROALBUMIN  02/29/2024    MAMMO SCREENING  04/25/2024    BMP  08/13/2024    ALT  08/29/2024    INFLUENZA VACCINE (1) 09/01/2024    COVID-19 Vaccine (5 - 2024-25 season) 09/01/2024    LIPID  02/13/2025    CBC  02/13/2025    HEMOGLOBIN  02/13/2025    MEDICARE ANNUAL WELLNESS VISIT  04/24/2026    FALL RISK ASSESSMENT  04/24/2026    DIABETES SCREENING  02/13/2027    ADVANCE CARE PLANNING  02/13/2029    DTAP/TDAP/TD IMMUNIZATION (3 - Td or Tdap) 10/07/2031    DEXA  09/25/2039    TSH W/FREE T4 REFLEX  Completed    SPIROMETRY  Completed    HEPATITIS C SCREENING  Completed    COPD ACTION PLAN  Completed    PHQ-2 (once per calendar year)  Completed    Pneumococcal Vaccine: 50+ Years  Completed    URINALYSIS  Completed    ALK PHOS  Completed    HPV IMMUNIZATION  Aged Out    MENINGITIS IMMUNIZATION  Aged Out    COLORECTAL CANCER SCREENING  Discontinued         Review of Systems  Constitutional, neuro, ENT, endocrine, pulmonary, cardiac, gastrointestinal, genitourinary, musculoskeletal, integument and psychiatric systems are negative, except as otherwise noted.     Objective    Exam  /72   Pulse 100   Temp 97.9  F (36.6  C) (Oral)   Resp 16   Ht 1.6 m (5' 3\")   Wt 51.1 kg (112 lb 9.6 oz)   SpO2 98%   BMI 19.95 kg/m     Estimated body mass index is 19.95 kg/m  as calculated from the following:    Height as of this encounter: 1.6 m (5' 3\").    Weight as " of this encounter: 51.1 kg (112 lb 9.6 oz).    Physical Exam  GENERAL: alert, no distress, frail, elderly, and nasal cannula in place 3 LPM  EYES: Eyes grossly normal to inspection, PERRL and conjunctivae and sclerae normal  HENT: ear canals and TM's normal, nose and mouth without ulcers or lesions  NECK: no adenopathy, no asymmetry, masses, or scars  RESP: lungs clear to auscultation - no rales, rhonchi or wheezes  CV: regular rate and rhythm, normal S1 S2, no S3 or S4, no murmur, click or rub, no peripheral edema  ABDOMEN: soft, nontender, no hepatosplenomegaly, no masses and bowel sounds normal  MS: no gross musculoskeletal defects noted, no edema  SKIN: no suspicious lesions or rashes  NEURO: Normal strength and tone, mentation intact and speech normal  PSYCH: mentation appears normal, affect normal/bright    No concerns for cognitive impairment on examination.           2/13/2024   Mini Cog   Clock Draw Score 2 Normal   3 Item Recall 3 objects recalled   Mini Cog Total Score 5              Signed Electronically by: Hernandez Gunn PA-C

## 2025-04-24 NOTE — PATIENT INSTRUCTIONS
RSV vaccine and shingrix at pharmacy.       Patient Education   Preventive Care Advice   This is general advice given by our system to help you stay healthy. However, your care team may have specific advice just for you. Please talk to your care team about your preventive care needs.  Nutrition  Eat 5 or more servings of fruits and vegetables each day.  Try wheat bread, brown rice and whole grain pasta (instead of white bread, rice, and pasta).  Get enough calcium and vitamin D. Check the label on foods and aim for 100% of the RDA (recommended daily allowance).  Lifestyle  Exercise at least 150 minutes each week  (30 minutes a day, 5 days a week).  Do muscle strengthening activities 2 days a week. These help control your weight and prevent disease.  No smoking.  Wear sunscreen to prevent skin cancer.  Have a dental exam and cleaning every 6 months.  Yearly exams  See your health care team every year to talk about:  Any changes in your health.  Any medicines your care team has prescribed.  Preventive care, family planning, and ways to prevent chronic diseases.  Shots (vaccines)   HPV shots (up to age 26), if you've never had them before.  Hepatitis B shots (up to age 59), if you've never had them before.  COVID-19 shot: Get this shot when it's due.  Flu shot: Get a flu shot every year.  Tetanus shot: Get a tetanus shot every 10 years.  Pneumococcal, hepatitis A, and RSV shots: Ask your care team if you need these based on your risk.  Shingles shot (for age 50 and up)  General health tests  Diabetes screening:  Starting at age 35, Get screened for diabetes at least every 3 years.  If you are younger than age 35, ask your care team if you should be screened for diabetes.  Cholesterol test: At age 39, start having a cholesterol test every 5 years, or more often if advised.  Bone density scan (DEXA): At age 50, ask your care team if you should have this scan for osteoporosis (brittle bones).  Hepatitis C: Get tested at  least once in your life.  STIs (sexually transmitted infections)  Before age 24: Ask your care team if you should be screened for STIs.  After age 24: Get screened for STIs if you're at risk. You are at risk for STIs (including HIV) if:  You are sexually active with more than one person.  You don't use condoms every time.  You or a partner was diagnosed with a sexually transmitted infection.  If you are at risk for HIV, ask about PrEP medicine to prevent HIV.  Get tested for HIV at least once in your life, whether you are at risk for HIV or not.  Cancer screening tests  Cervical cancer screening: If you have a cervix, begin getting regular cervical cancer screening tests starting at age 21.  Breast cancer scan (mammogram): If you've ever had breasts, begin having regular mammograms starting at age 40. This is a scan to check for breast cancer.  Colon cancer screening: It is important to start screening for colon cancer at age 45.  Have a colonoscopy test every 10 years (or more often if you're at risk) Or, ask your provider about stool tests like a FIT test every year or Cologuard test every 3 years.  To learn more about your testing options, visit:   .  For help making a decision, visit:   https://bit.ly/ng45841.  Prostate cancer screening test: If you have a prostate, ask your care team if a prostate cancer screening test (PSA) at age 55 is right for you.  Lung cancer screening: If you are a current or former smoker ages 50 to 80, ask your care team if ongoing lung cancer screenings are right for you.  For informational purposes only. Not to replace the advice of your health care provider. Copyright   2023 [facility] [employer]. All rights reserved. Clinically reviewed by the [facility] Transitions Program. [employer] 571821 - REV 01/24.  Hearing Loss: Care Instructions  Overview     Hearing loss is a sudden or slow decrease in how well you hear. It can range from slight to profound. Permanent hearing  loss can occur with aging. It also can happen when you are exposed long-term to loud noise. Examples include listening to loud music, riding motorcycles, or being around other loud machines.  Hearing loss can affect your work and home life. It can make you feel lonely or depressed. You may feel that you have lost your independence. But hearing aids and other devices can help you hear better and feel connected to others.  Follow-up care is a key part of your treatment and safety. Be sure to make and go to all appointments, and call your doctor if you are having problems. It's also a good idea to know your test results and keep a list of the medicines you take.  How can you care for yourself at home?  Avoid loud noises whenever possible. This helps keep your hearing from getting worse.  Always wear hearing protection around loud noises.  Wear a hearing aid as directed.  A professional can help you pick a hearing aid that will work best for you.  You can also get hearing aids over the counter for mild to moderate hearing loss.  Have hearing tests as your doctor suggests. They can show whether your hearing has changed. Your hearing aid may need to be adjusted.  Use other devices as needed. These may include:  Telephone amplifiers and hearing aids that can connect to a television, stereo, radio, or microphone.  Devices that use lights or vibrations. These alert you to the doorbell, a ringing telephone, or a baby monitor.  Television closed-captioning. This shows the words at the bottom of the screen. Most new TVs can do this.  TTY (text telephone). This lets you type messages back and forth on the telephone instead of talking or listening. These devices are also called TDD. When messages are typed on the keyboard, they are sent over the phone line to a receiving TTY. The message is shown on a monitor.  Use text messaging, social media, and email if it is hard for you to communicate by telephone.  Try to learn a listening  "technique called speechreading. It is not lipreading. You pay attention to people's gestures, expressions, posture, and tone of voice. These clues can help you understand what a person is saying. Face the person you are talking to, and have them face you. Make sure the lighting is good. You need to see the other person's face clearly.  Think about counseling if you need help to adjust to your hearing loss.  When should you call for help?  Watch closely for changes in your health, and be sure to contact your doctor if:    You think your hearing is getting worse.     You have new symptoms, such as dizziness or nausea.   Where can you learn more?  Go to https://www.[employer].net/patiented  Enter R798 in the search box to learn more about \"Hearing Loss: Care Instructions.\"  Current as of: October 27, 2024  Content Version: 14.4    [id number] [employer].   Care instructions adapted under license by your healthcare professional. If you have questions about a medical condition or this instruction, always ask your healthcare professional. [employer] disclaims any warranty or liability for your use of this information.       "

## 2025-04-28 ENCOUNTER — OFFICE VISIT (OUTPATIENT)
Dept: PULMONOLOGY | Facility: CLINIC | Age: 80
End: 2025-04-28
Payer: COMMERCIAL

## 2025-04-28 VITALS
SYSTOLIC BLOOD PRESSURE: 145 MMHG | DIASTOLIC BLOOD PRESSURE: 72 MMHG | OXYGEN SATURATION: 97 % | WEIGHT: 112.6 LBS | HEIGHT: 63 IN | BODY MASS INDEX: 19.95 KG/M2 | HEART RATE: 76 BPM

## 2025-04-28 DIAGNOSIS — E88.01 HETEROZYGOUS ALPHA 1-ANTITRYPSIN DEFICIENCY (H): Primary | ICD-10-CM

## 2025-04-28 DIAGNOSIS — J44.9 STAGE 4 VERY SEVERE COPD BY GOLD CLASSIFICATION (H): ICD-10-CM

## 2025-04-28 DIAGNOSIS — J43.2 CENTRILOBULAR EMPHYSEMA (H): ICD-10-CM

## 2025-04-28 LAB
EST. AVERAGE GLUCOSE BLD GHB EST-MCNC: 103 MG/DL
HBA1C MFR BLD: 5.2 % (ref 0–5.6)

## 2025-04-28 PROCEDURE — 99213 OFFICE O/P EST LOW 20 MIN: CPT

## 2025-04-28 PROCEDURE — 3078F DIAST BP <80 MM HG: CPT

## 2025-04-28 PROCEDURE — G0463 HOSPITAL OUTPT CLINIC VISIT: HCPCS

## 2025-04-28 PROCEDURE — 3077F SYST BP >= 140 MM HG: CPT

## 2025-04-28 PROCEDURE — 1126F AMNT PAIN NOTED NONE PRSNT: CPT

## 2025-04-28 ASSESSMENT — PAIN SCALES - GENERAL: PAINLEVEL_OUTOF10: NO PAIN (0)

## 2025-04-28 NOTE — PROGRESS NOTES
[facility]  Pulmonary Medicine  Visit Clinic Note  April 28, 2025         ASSESSMENT & PLAN       Very severe COPD  Chronic hypoxemic respiratory failure  Emphysema  Alpha 1 Antitrypsin Heterozygote    Stable respiratory symptoms on her current nebulized medications as well as Incruse Ellipta.  I will continue things as is.  We went over oxygen goals.  I let her know that the goal is to keep her oxygen saturation above 88%.  If she is able to do that without oxygen, she does not need to use it.  She should measure this frequently while she is at home.    She is not a candidate for endobronchial valves.    I will have her follow-up with me in 1 year with a 6-minute walk test.      Grayson Garg MD     I spent 24 minutes on the date of the visit reviewing the medical record, performing a history and physical exam, discussing her nebulized medications and exercise routine with her.        Today's visit note:     Chief Complaint: RECHECK (Return COPD )      HISTORY OF PRESENT ILLNESS:    This is a 79-year-old female with a history of COPD returning for a regular yearly follow-up visit.  Over the last year, she has not had any exacerbations of her lung disease requiring steroids or antibiotics.  She states that her respiratory symptoms have been stable.  She has gained a little bit of weight, which is a good thing for her.  She continues to use budesonide and formoterol nebulized medication twice a day.  She also takes Incruse Ellipta once a day.  She has albuterol and ipratropium nebs that she can use as needed, but she rarely ever uses these.    She has oxygen at home, and she states she is using 3 L of oxygen when she uses the oxygen.           Past Medical and Surgical History:     Past Medical History:   Diagnosis Date    Arthritis     Have been noticing stiffness in my hands and arms    Cervical high risk HPV (human papillomavirus) test positive 10/31/2012    type 18    COPD (chronic obstructive  pulmonary disease) (H)     GERD (gastroesophageal reflux disease)     Hx of colposcopy with cervical biopsy 2012    negative    Hypertension     Osteoporosis     Paroxysmal supraventricular tachycardia 2017    Peritoneal carcinomatosis (H) 2015    Pneumonia     Uncomplicated asthma      Past Surgical History:   Procedure Laterality Date    CHOLECYSTECTOMY  2014    COLONOSCOPY  2012    Procedure: COLONOSCOPY;  COLONOSCOPY SCREEN;  Surgeon: Mushtaq Lara MD;  Location: [facility] OR    ESOPHAGOSCOPY, GASTROSCOPY, DUODENOSCOPY (EGD), COMBINED N/A 2023    Procedure: ESOPHAGOGASTRODUODENOSCOPY, WITH BIOPSY;  Surgeon: Eric Covington MD;  Location:  GI    GYN SURGERY      HERNIORRHAPHY HIATAL N/A 2015    Procedure: HERNIORRHAPHY HIATAL;  Surgeon: Chip Carty MD;  Location: [facility] OR    HYSTERECTOMY TOTAL ABD, ALVIN SALPINGO-OOPHORECTOMY, NODE DISSECTION, TUMOR DEBULKING, COMBINED Bilateral 2015    Procedure: COMBINED HYSTERECTOMY TOTAL ABDOMINAL, SALPINGO-OOPHORECTOMY, NODE DISSECTION, TUMOR DEBULKING;  Surgeon: Emma Cabrera MD;  Location: [facility] OR    IR CHEST PORT PLACEMENT > 5 YRS OF AGE  2015           Family History:     Family History   Problem Relation Age of Onset    Cancer Brother         testicular    Diabetes Sister     Ovarian Cancer Mother 60    Diabetes Mother             Asthma Father             Diabetes Sister     Depression/Anxiety Daughter     Depression Daughter     Osteoporosis Sister     Other Cancer Brother     Diabetes Brother     Depression Other         Grandson              Social History:     Social History     Socioeconomic History    Marital status:      Spouse name: Not on file    Number of children: Not on file    Years of education: Not on file    Highest education level: Not on file   Occupational History    Not on file   Tobacco Use    Smoking status: Former     Current packs/day: 0.00     Average  packs/day: 1 pack/day for 38.2 years (38.2 ttl pk-yrs)     Types: Cigarettes     Start date: 1965     Quit date: 10/10/2003     Years since quittin.5     Passive exposure: Past    Smokeless tobacco: Never   Vaping Use    Vaping status: Never Used   Substance and Sexual Activity    Alcohol use: No    Drug use: No    Sexual activity: Not Currently     Birth control/protection: None   Other Topics Concern    Parent/sibling w/ CABG, MI or angioplasty before 65F 55M? No   Social History Narrative    Not on file     Social Drivers of Health     Financial Resource Strain: Low Risk  (2025)    Financial Resource Strain     Within the past 12 months, have you or your family members you live with been unable to get utilities (heat, electricity) when it was really needed?: No   Food Insecurity: Low Risk  (2025)    Food Insecurity     Within the past 12 months, did you worry that your food would run out before you got money to buy more?: No     Within the past 12 months, did the food you bought just not last and you didn t have money to get more?: No   Transportation Needs: Low Risk  (2025)    Transportation Needs     Within the past 12 months, has lack of transportation kept you from medical appointments, getting your medicines, non-medical meetings or appointments, work, or from getting things that you need?: No   Physical Activity: Inactive (2025)    Exercise Vital Sign     Days of Exercise per Week: 0 days     Minutes of Exercise per Session: 0 min   Stress: No Stress Concern Present (2025)    [employer] [place] [employer] - Occupational Stress Questionnaire     Feeling of Stress : Only a little   Social Connections: Unknown (2025)    Social Connection and Isolation Panel [NHANES]     Frequency of Communication with Friends and Family: Not on file     Frequency of Social Gatherings with Friends and Family: Once a week     Attends [employer] Services: Not on file     Active  Member of Clubs or Organizations: Not on file     Attends Club or Organization Meetings: Not on file     Marital Status: Not on file   Interpersonal Safety: Low Risk  (4/24/2025)    Interpersonal Safety     Do you feel physically and emotionally safe where you currently live?: Yes     Within the past 12 months, have you been hit, slapped, kicked or otherwise physically hurt by someone?: No     Within the past 12 months, have you been humiliated or emotionally abused in other ways by your partner or ex-partner?: No   Housing Stability: Low Risk  (4/17/2025)    Housing Stability     Do you have housing? : Yes     Are you worried about losing your housing?: No            Medications:     Current Outpatient Medications   Medication Sig Dispense Refill    albuterol (PROAIR HFA/PROVENTIL HFA/VENTOLIN HFA) 108 (90 Base) MCG/ACT inhaler Inhale 2 puffs into the lungs every 6 hours as needed for shortness of breath or wheezing. 8.5 g 5    albuterol (PROVENTIL) (2.5 MG/3ML) 0.083% neb solution Take 1 vial (2.5 mg) by nebulization every 4 hours as needed for shortness of breath, wheezing or cough 360 mL 11    amLODIPine (NORVASC) 5 MG tablet Take 1 tablet (5 mg) by mouth daily. 90 tablet 3    atorvastatin (LIPITOR) 20 MG tablet Take 1 tablet (20 mg) by mouth daily. 90 tablet 3    budesonide (PULMICORT) 0.5 MG/2ML neb solution Take 2 mLs (0.5 mg) by nebulization 2 times daily. 120 mL 1    calcitonin, salmon, (MIACALCIN) 200 UNIT/ACT nasal spray USE 1 SPRAY ONCE DAILY IN ALTERNATE NOSTRILS EACH DAY. APPOINTMENT REQUIRED FOR REFILLS 4 mL 0    Calcium Carbonate (CALCIUM 500 PO) Take 1 tablet by mouth daily.      cyclobenzaprine (FLEXERIL) 5 MG tablet Take 1 tablet (5 mg) by mouth 3 times daily as needed for muscle spasms 20 tablet 0    formoterol (PERFOROMIST) 20 MCG/2ML neb solution Take 2 mLs (20 mcg) by nebulization every 12 hours. 120 mL 1    hydrocortisone 2.5 % cream Apply topically 2 times daily. 30 g 2    ipratropium  "(ATROVENT) 0.02 % neb solution Take 2.5 mLs (0.5 mg) by nebulization 4 times daily as needed for wheezing 360 mL 11    loratadine (CLARITIN) 10 MG tablet Take 10 mg by mouth daily      losartan (COZAAR) 100 MG tablet Take 1 tablet (100 mg) by mouth daily. 90 tablet 3    MAGNESIUM OXIDE PO Take 200 mg by mouth 2 times daily      order for DME Equipment being ordered: Nebulizer 1 Device 0    pyridOXINE (VITAMIN B6) 25 MG tablet Take 1 tablet (25 mg) by mouth daily. 90 tablet 3    triamcinolone (ARISTOCORT HP) 0.5 % external cream Apply topically 2 times daily 60 g 1    umeclidinium (INCRUSE ELLIPTA) 62.5 MCG/ACT inhaler Inhale 1 puff into the lungs daily. 1 each 1    vitamin B complex with vitamin C (STRESS TAB) tablet Take 1 tablet by mouth daily      Zinc Sulfate (ZINC 15 PO) Take by mouth daily       No current facility-administered medications for this visit.         PHYSICAL EXAM:  BP (!) 145/72   Pulse 76   Ht 1.6 m (5' 3\")   Wt 51.1 kg (112 lb 9.6 oz)   SpO2 97%   BMI 19.95 kg/m  oxygen saturation on 3 L    General: Comfortable, No apparent distress  Eyes: Anicteric  Ears: Hearing grossly normal  Neck: supple, no thyromegaly  Lymphatics: No cervical or supraclavicular nodes  Respiratory: Decreased breath sounds. No crackles. No rhonchi. No wheezes  Cardiac: RRR, normal S1, S2.   Musculoskeletal: Extremities normal. No clubbing. No cyanosis. No edema.  Skin: No rash on limited exam  Neuro: Normal mentation. Normal speech.  Psych:Normal affect           Data:   All laboratory and imaging data reviewed.      Alpha 1 Antitrypsin: PiMZ      PFT:       PFT Interpretation:  Severe airflow obstruction.  Gas trapping and hyperinflation.  Reduced diffusion capacity.  Valid Maneuver    Chest CT: I have reviewed the chest CT images from October 2022 and agree with the radiologist interpretation below:  Airways: The central tracheobronchial tree is clear.     Lungs: Calcified granulomata. Severe centrilobular and " paraseptal  emphysematous changes affecting all lobes with moderate involvement of  the left upper lobe. Stable 8 mm subtle nodular density involving the  medial aspect of the right upper lobe (series 4, image 65). Stable 5  mm solid nodule in the right middle lobe (series 4, image 197). No  pleural effusion or pneumothorax     Upper abdomen: Stable left adrenal nodularity dating back to 4/6/2016  CT. Calcified granulomata of the liver and spleen.     Bones: Unchanged compression deformity at T8. Slightly increased  vertebral body height loss at T7.     Soft tissues: Unremarkable.                                                                      IMPRESSION:   1. Stable severe centrilobular and paraseptal emphysematous changes of  the lung parenchyma with bullous change most notable at the right lung  apex.  2. Stable pulmonary nodularity as detailed above.  3. Increased anterior wedging of T7, age indeterminate.  4. Calcified mediastinal/hilar lymphadenopathy and calcified  granulomata throughout the lungs, likely a sequelae of chronic  granulomatous disease.

## 2025-04-28 NOTE — NURSING NOTE
Chief Complaint   Patient presents with    RECHECK     Return COPD      Medications reviewed and vital signs taken.   Rayna Mason CMA

## 2025-04-29 ENCOUNTER — PATIENT OUTREACH (OUTPATIENT)
Dept: CARE COORDINATION | Facility: CLINIC | Age: 80
End: 2025-04-29
Payer: COMMERCIAL

## 2025-05-04 DIAGNOSIS — M48.50XA PATHOLOGICAL COMPRESSION FRACTURE OF VERTEBRA, INITIAL ENCOUNTER (H): ICD-10-CM

## 2025-05-04 DIAGNOSIS — M80.00XA AGE-RELATED OSTEOPOROSIS WITH CURRENT PATHOLOGICAL FRACTURE, INITIAL ENCOUNTER: ICD-10-CM

## 2025-05-05 RX ORDER — CALCITONIN SALMON 200 [IU]/.09ML
SPRAY, METERED NASAL
Qty: 4 ML | Refills: 0 | Status: SHIPPED | OUTPATIENT
Start: 2025-05-05

## 2025-07-07 ENCOUNTER — MYC MEDICAL ADVICE (OUTPATIENT)
Dept: PULMONOLOGY | Facility: CLINIC | Age: 80
End: 2025-07-07
Payer: COMMERCIAL

## 2025-07-07 ENCOUNTER — MYC REFILL (OUTPATIENT)
Dept: PULMONOLOGY | Facility: CLINIC | Age: 80
End: 2025-07-07
Payer: COMMERCIAL

## 2025-07-07 DIAGNOSIS — J44.9 STAGE 4 VERY SEVERE COPD BY GOLD CLASSIFICATION (H): ICD-10-CM

## 2025-07-07 DIAGNOSIS — J43.2 CENTRILOBULAR EMPHYSEMA (H): ICD-10-CM

## 2025-07-07 RX ORDER — UMECLIDINIUM 62.5 UG/1
1 AEROSOL, POWDER ORAL DAILY
Qty: 1 EACH | Refills: 1 | OUTPATIENT
Start: 2025-07-07

## 2025-07-07 RX ORDER — FORMOTEROL FUMARATE 20 UG/2ML
20 SOLUTION RESPIRATORY (INHALATION) EVERY 12 HOURS
Qty: 120 ML | Refills: 9 | Status: SHIPPED | OUTPATIENT
Start: 2025-07-07

## 2025-07-07 RX ORDER — UMECLIDINIUM 62.5 UG/1
1 AEROSOL, POWDER ORAL DAILY
Qty: 1 EACH | Refills: 9 | Status: SHIPPED | OUTPATIENT
Start: 2025-07-07

## 2025-07-07 RX ORDER — BUDESONIDE 0.5 MG/2ML
0.5 INHALANT ORAL 2 TIMES DAILY
Qty: 120 ML | Refills: 9 | Status: SHIPPED | OUTPATIENT
Start: 2025-07-07

## 2025-07-07 RX ORDER — FORMOTEROL FUMARATE 20 UG/2ML
20 SOLUTION RESPIRATORY (INHALATION) EVERY 12 HOURS
Qty: 120 ML | Refills: 1 | OUTPATIENT
Start: 2025-07-07

## 2025-07-13 ENCOUNTER — HEALTH MAINTENANCE LETTER (OUTPATIENT)
Age: 80
End: 2025-07-13

## 2025-07-28 ENCOUNTER — PATIENT OUTREACH (OUTPATIENT)
Dept: CARE COORDINATION | Facility: CLINIC | Age: 80
End: 2025-07-28
Payer: COMMERCIAL

## 2025-08-26 ENCOUNTER — OFFICE VISIT (OUTPATIENT)
Dept: RHEUMATOLOGY | Facility: CLINIC | Age: 80
End: 2025-08-26
Payer: COMMERCIAL

## 2025-08-26 ENCOUNTER — ANCILLARY PROCEDURE (OUTPATIENT)
Dept: GENERAL RADIOLOGY | Facility: CLINIC | Age: 80
End: 2025-08-26
Attending: INTERNAL MEDICINE
Payer: COMMERCIAL

## 2025-08-26 VITALS
DIASTOLIC BLOOD PRESSURE: 73 MMHG | RESPIRATION RATE: 16 BRPM | OXYGEN SATURATION: 100 % | SYSTOLIC BLOOD PRESSURE: 125 MMHG | WEIGHT: 114.8 LBS | BODY MASS INDEX: 20.34 KG/M2 | HEART RATE: 108 BPM

## 2025-08-26 DIAGNOSIS — Z79.899 HIGH RISK MEDICATIONS (NOT ANTICOAGULANTS) LONG-TERM USE: ICD-10-CM

## 2025-08-26 DIAGNOSIS — M79.642 PAIN IN BOTH HANDS: ICD-10-CM

## 2025-08-26 DIAGNOSIS — M05.79 RHEUMATOID ARTHRITIS INVOLVING MULTIPLE SITES WITH POSITIVE RHEUMATOID FACTOR (H): ICD-10-CM

## 2025-08-26 DIAGNOSIS — L40.9 PSORIASIS: ICD-10-CM

## 2025-08-26 DIAGNOSIS — M79.641 PAIN IN BOTH HANDS: ICD-10-CM

## 2025-08-26 DIAGNOSIS — M05.79 RHEUMATOID ARTHRITIS INVOLVING MULTIPLE SITES WITH POSITIVE RHEUMATOID FACTOR (H): Primary | ICD-10-CM

## 2025-08-26 DIAGNOSIS — Z11.59 ENCOUNTER FOR SCREENING FOR OTHER VIRAL DISEASES: ICD-10-CM

## 2025-08-26 LAB
BASOPHILS # BLD AUTO: 0.06 10E3/UL (ref 0–0.2)
BASOPHILS NFR BLD AUTO: 0.9 %
EOSINOPHIL # BLD AUTO: 0.16 10E3/UL (ref 0–0.7)
EOSINOPHIL NFR BLD AUTO: 2.5 %
ERYTHROCYTE [DISTWIDTH] IN BLOOD BY AUTOMATED COUNT: 12.6 % (ref 10–15)
ERYTHROCYTE [SEDIMENTATION RATE] IN BLOOD BY WESTERGREN METHOD: 24 MM/HR (ref 0–30)
HCT VFR BLD AUTO: 42.8 % (ref 35–47)
HGB BLD-MCNC: 13.2 G/DL (ref 11.7–15.7)
IMM GRANULOCYTES # BLD: <0.04 10E3/UL
IMM GRANULOCYTES NFR BLD: 0.2 %
LYMPHOCYTES # BLD AUTO: 0.56 10E3/UL (ref 0.8–5.3)
LYMPHOCYTES NFR BLD AUTO: 8.7 %
MCH RBC QN AUTO: 29.3 PG (ref 26.5–33)
MCHC RBC AUTO-ENTMCNC: 30.8 G/DL (ref 31.5–36.5)
MCV RBC AUTO: 95.1 FL (ref 78–100)
MONOCYTES # BLD AUTO: 0.51 10E3/UL (ref 0–1.3)
MONOCYTES NFR BLD AUTO: 7.9 %
NEUTROPHILS # BLD AUTO: 5.16 10E3/UL (ref 1.6–8.3)
NEUTROPHILS NFR BLD AUTO: 79.8 %
PLATELET # BLD AUTO: 308 10E3/UL (ref 150–450)
RBC # BLD AUTO: 4.5 10E6/UL (ref 3.8–5.2)
WBC # BLD AUTO: 6.46 10E3/UL (ref 4–11)

## 2025-08-26 PROCEDURE — 86803 HEPATITIS C AB TEST: CPT | Performed by: INTERNAL MEDICINE

## 2025-08-26 PROCEDURE — 87340 HEPATITIS B SURFACE AG IA: CPT | Performed by: INTERNAL MEDICINE

## 2025-08-26 PROCEDURE — 3074F SYST BP LT 130 MM HG: CPT | Performed by: INTERNAL MEDICINE

## 2025-08-26 PROCEDURE — 86140 C-REACTIVE PROTEIN: CPT | Performed by: INTERNAL MEDICINE

## 2025-08-26 PROCEDURE — 86618 LYME DISEASE ANTIBODY: CPT | Performed by: INTERNAL MEDICINE

## 2025-08-26 PROCEDURE — 99204 OFFICE O/P NEW MOD 45 MIN: CPT | Performed by: INTERNAL MEDICINE

## 2025-08-26 PROCEDURE — 82565 ASSAY OF CREATININE: CPT | Performed by: INTERNAL MEDICINE

## 2025-08-26 PROCEDURE — 86200 CCP ANTIBODY: CPT | Performed by: INTERNAL MEDICINE

## 2025-08-26 PROCEDURE — 86704 HEP B CORE ANTIBODY TOTAL: CPT | Performed by: INTERNAL MEDICINE

## 2025-08-26 PROCEDURE — 3078F DIAST BP <80 MM HG: CPT | Performed by: INTERNAL MEDICINE

## 2025-08-26 PROCEDURE — 85025 COMPLETE CBC W/AUTO DIFF WBC: CPT | Performed by: INTERNAL MEDICINE

## 2025-08-26 PROCEDURE — G2211 COMPLEX E/M VISIT ADD ON: HCPCS | Performed by: INTERNAL MEDICINE

## 2025-08-26 PROCEDURE — 73630 X-RAY EXAM OF FOOT: CPT | Mod: TC | Performed by: STUDENT IN AN ORGANIZED HEALTH CARE EDUCATION/TRAINING PROGRAM

## 2025-08-26 PROCEDURE — 73130 X-RAY EXAM OF HAND: CPT | Mod: TC | Performed by: STUDENT IN AN ORGANIZED HEALTH CARE EDUCATION/TRAINING PROGRAM

## 2025-08-26 PROCEDURE — 85652 RBC SED RATE AUTOMATED: CPT | Performed by: INTERNAL MEDICINE

## 2025-08-26 PROCEDURE — 80076 HEPATIC FUNCTION PANEL: CPT | Performed by: INTERNAL MEDICINE

## 2025-08-26 RX ORDER — FOLIC ACID 1 MG/1
1 TABLET ORAL DAILY
Qty: 100 TABLET | Refills: 3 | Status: SHIPPED | OUTPATIENT
Start: 2025-08-26

## 2025-08-26 RX ORDER — METHOTREXATE 2.5 MG/1
15 TABLET ORAL WEEKLY
Qty: 24 TABLET | Refills: 3 | Status: SHIPPED | OUTPATIENT
Start: 2025-08-26

## 2025-08-27 LAB
ALBUMIN SERPL BCG-MCNC: 4.5 G/DL (ref 3.5–5.2)
ALP SERPL-CCNC: 103 U/L (ref 40–150)
ALT SERPL W P-5'-P-CCNC: 19 U/L (ref 0–50)
AST SERPL W P-5'-P-CCNC: 36 U/L (ref 0–45)
B BURGDOR IGG+IGM SER QL: 0.04
BILIRUB SERPL-MCNC: 0.4 MG/DL
BILIRUBIN DIRECT (ROCHE PRO & PURE): 0.19 MG/DL (ref 0–0.45)
CCP AB SER IA-ACNC: 53 U/ML
CREAT SERPL-MCNC: 0.83 MG/DL (ref 0.51–0.95)
CRP SERPL-MCNC: <3 MG/L
EGFRCR SERPLBLD CKD-EPI 2021: 72 ML/MIN/1.73M2
HBV CORE AB SERPL QL IA: NONREACTIVE
HBV SURFACE AG SERPL QL IA: NONREACTIVE
HCV AB SERPL QL IA: NONREACTIVE
PROT SERPL-MCNC: 7.7 G/DL (ref 6.4–8.3)

## (undated) DEVICE — LUBRICATING JELLY 4.25OZ

## (undated) DEVICE — TUBING SUCTION 12"X1/4" N612

## (undated) DEVICE — ENDO FORCEP ENDOJAW BIOPSY 2.8MMX230CM FB-220U

## (undated) DEVICE — KIT ENDO TURNOVER/PROCEDURE CARRY-ON 101822

## (undated) RX ORDER — ALBUTEROL SULFATE 0.83 MG/ML
SOLUTION RESPIRATORY (INHALATION)
Status: DISPENSED
Start: 2019-11-07